# Patient Record
Sex: FEMALE | Race: WHITE | HISPANIC OR LATINO | Employment: OTHER | URBAN - METROPOLITAN AREA
[De-identification: names, ages, dates, MRNs, and addresses within clinical notes are randomized per-mention and may not be internally consistent; named-entity substitution may affect disease eponyms.]

---

## 2019-05-02 DIAGNOSIS — Z98.84 BARIATRIC SURGERY STATUS: Primary | ICD-10-CM

## 2019-05-03 ENCOUNTER — OFFICE VISIT (OUTPATIENT)
Dept: PLASTIC SURGERY | Facility: CLINIC | Age: 49
End: 2019-05-03
Payer: MEDICARE

## 2019-05-03 VITALS — HEIGHT: 60 IN | BODY MASS INDEX: 47.12 KG/M2 | WEIGHT: 240 LBS

## 2019-05-03 DIAGNOSIS — Z92.3 HISTORY OF RADIATION THERAPY: Primary | ICD-10-CM

## 2019-05-03 DIAGNOSIS — Z90.13 ACQUIRED ABSENCE OF BREAST AND ABSENT NIPPLE, BILATERAL: ICD-10-CM

## 2019-05-03 PROCEDURE — 99205 OFFICE O/P NEW HI 60 MIN: CPT | Performed by: PLASTIC SURGERY

## 2019-05-03 RX ORDER — ANASTROZOLE 1 MG/1
TABLET ORAL
COMMUNITY
End: 2019-08-08 | Stop reason: HOSPADM

## 2019-05-03 RX ORDER — CLINDAMYCIN HYDROCHLORIDE 300 MG/1
CAPSULE ORAL
Refills: 0 | COMMUNITY
Start: 2019-04-27 | End: 2019-06-07 | Stop reason: ALTCHOICE

## 2019-05-03 RX ORDER — ALENDRONATE SODIUM 70 MG/1
TABLET ORAL
COMMUNITY

## 2019-05-03 RX ORDER — ACETAMINOPHEN AND CODEINE PHOSPHATE 300; 30 MG/1; MG/1
1-2 TABLET ORAL EVERY 4 HOURS PRN
COMMUNITY
End: 2019-10-04 | Stop reason: SDUPTHER

## 2019-05-03 RX ORDER — TRIAMCINOLONE ACETONIDE 5 MG/G
OINTMENT TOPICAL 2 TIMES DAILY
COMMUNITY
Start: 2018-12-05 | End: 2020-03-20

## 2019-05-03 RX ORDER — TRIAMCINOLONE ACETONIDE 5 MG/G
CREAM TOPICAL AS NEEDED
COMMUNITY
Start: 2019-04-30

## 2019-05-05 PROBLEM — Z92.3 HISTORY OF RADIATION THERAPY: Status: ACTIVE | Noted: 2019-05-05

## 2019-05-05 PROBLEM — Z90.13 ACQUIRED ABSENCE OF BREAST AND ABSENT NIPPLE, BILATERAL: Status: ACTIVE | Noted: 2019-05-05

## 2019-05-06 PROBLEM — Z85.3 PERSONAL HISTORY OF MALIGNANT NEOPLASM OF BREAST: Status: ACTIVE | Noted: 2019-05-06

## 2019-05-10 ENCOUNTER — HOSPITAL ENCOUNTER (OUTPATIENT)
Dept: RADIOLOGY | Facility: HOSPITAL | Age: 49
Discharge: HOME | End: 2019-05-10
Attending: SURGERY
Payer: MEDICARE

## 2019-05-10 DIAGNOSIS — Z98.84 BARIATRIC SURGERY STATUS: ICD-10-CM

## 2019-05-10 PROCEDURE — 74240 X-RAY XM UPR GI TRC 1CNTRST: CPT

## 2019-06-07 ENCOUNTER — OFFICE VISIT (OUTPATIENT)
Dept: BARIATRICS | Facility: CLINIC | Age: 49
End: 2019-06-07
Payer: MEDICARE

## 2019-06-07 VITALS
SYSTOLIC BLOOD PRESSURE: 117 MMHG | RESPIRATION RATE: 16 BRPM | TEMPERATURE: 98.9 F | DIASTOLIC BLOOD PRESSURE: 78 MMHG | WEIGHT: 220 LBS | BODY MASS INDEX: 43.19 KG/M2 | HEIGHT: 60 IN | HEART RATE: 90 BPM

## 2019-06-07 DIAGNOSIS — E66.9 OBESITY, CLASS II, BMI 35-39.9: Primary | ICD-10-CM

## 2019-06-07 DIAGNOSIS — Z98.84 BARIATRIC SURGERY STATUS: ICD-10-CM

## 2019-06-07 DIAGNOSIS — K91.2 POSTSURGICAL MALABSORPTION: ICD-10-CM

## 2019-06-07 PROBLEM — E66.812 OBESITY, CLASS II, BMI 35-39.9: Status: ACTIVE | Noted: 2019-06-07

## 2019-06-07 PROCEDURE — 99204 OFFICE O/P NEW MOD 45 MIN: CPT | Performed by: SURGERY

## 2019-06-17 ENCOUNTER — ANESTHESIA EVENT (OUTPATIENT)
Dept: GASTROENTEROLOGY | Facility: HOSPITAL | Age: 49
End: 2019-06-17

## 2019-06-19 ENCOUNTER — HOSPITAL ENCOUNTER (OUTPATIENT)
Dept: GASTROENTEROLOGY | Facility: HOSPITAL | Age: 49
Setting detail: OUTPATIENT SURGERY
Discharge: HOME | End: 2019-06-19
Attending: SURGERY | Admitting: SURGERY
Payer: MEDICARE

## 2019-06-19 ENCOUNTER — ANESTHESIA (OUTPATIENT)
Dept: GASTROENTEROLOGY | Facility: HOSPITAL | Age: 49
End: 2019-06-19

## 2019-06-19 VITALS
OXYGEN SATURATION: 98 % | HEART RATE: 88 BPM | RESPIRATION RATE: 15 BRPM | SYSTOLIC BLOOD PRESSURE: 117 MMHG | WEIGHT: 220 LBS | HEIGHT: 60 IN | DIASTOLIC BLOOD PRESSURE: 66 MMHG | TEMPERATURE: 98.1 F | BODY MASS INDEX: 43.19 KG/M2

## 2019-06-19 DIAGNOSIS — Z98.84 BARIATRIC SURGERY STATUS: ICD-10-CM

## 2019-06-19 PROCEDURE — 88305 TISSUE EXAM BY PATHOLOGIST: CPT | Performed by: PATHOLOGY

## 2019-06-19 PROCEDURE — 88342 IMHCHEM/IMCYTCHM 1ST ANTB: CPT | Performed by: PATHOLOGY

## 2019-06-19 PROCEDURE — 43239 EGD BIOPSY SINGLE/MULTIPLE: CPT | Performed by: SURGERY

## 2019-06-19 RX ORDER — SODIUM CHLORIDE 9 MG/ML
125 INJECTION, SOLUTION INTRAVENOUS CONTINUOUS
Status: DISCONTINUED | OUTPATIENT
Start: 2019-06-19 | End: 2019-06-23 | Stop reason: HOSPADM

## 2019-06-19 RX ORDER — PROPOFOL 10 MG/ML
INJECTION, EMULSION INTRAVENOUS AS NEEDED
Status: DISCONTINUED | OUTPATIENT
Start: 2019-06-19 | End: 2019-06-19 | Stop reason: SURG

## 2019-06-19 RX ADMIN — SODIUM CHLORIDE 125 ML/HR: 0.9 INJECTION, SOLUTION INTRAVENOUS at 13:27

## 2019-06-19 RX ADMIN — PROPOFOL 150 MG: 10 INJECTION, EMULSION INTRAVENOUS at 13:37

## 2019-06-19 RX ADMIN — LIDOCAINE HYDROCHLORIDE 100 MG: 20 INJECTION, SOLUTION INTRAVENOUS at 13:37

## 2019-06-28 ENCOUNTER — OFFICE VISIT (OUTPATIENT)
Dept: BARIATRICS | Facility: CLINIC | Age: 49
End: 2019-06-28
Payer: MEDICARE

## 2019-06-28 VITALS
TEMPERATURE: 98 F | SYSTOLIC BLOOD PRESSURE: 118 MMHG | HEIGHT: 60 IN | BODY MASS INDEX: 43 KG/M2 | WEIGHT: 219 LBS | DIASTOLIC BLOOD PRESSURE: 70 MMHG | HEART RATE: 76 BPM

## 2019-06-28 DIAGNOSIS — K91.2 POSTSURGICAL MALABSORPTION: ICD-10-CM

## 2019-06-28 DIAGNOSIS — E66.01 OBESITY, CLASS III, BMI 40-49.9 (MORBID OBESITY) (HCC): Primary | ICD-10-CM

## 2019-06-28 DIAGNOSIS — Z98.84 BARIATRIC SURGERY STATUS: ICD-10-CM

## 2019-06-28 PROBLEM — E66.813 OBESITY, CLASS III, BMI 40-49.9 (MORBID OBESITY): Status: ACTIVE | Noted: 2019-06-28

## 2019-06-28 PROCEDURE — 99213 OFFICE O/P EST LOW 20 MIN: CPT | Performed by: SURGERY

## 2019-07-03 ENCOUNTER — APPOINTMENT (OUTPATIENT)
Dept: LAB | Facility: CLINIC | Age: 49
End: 2019-07-03
Payer: MEDICARE

## 2019-07-03 ENCOUNTER — OFFICE VISIT (OUTPATIENT)
Dept: PLASTIC SURGERY | Facility: CLINIC | Age: 49
End: 2019-07-03

## 2019-07-03 DIAGNOSIS — Z90.13 ACQUIRED ABSENCE OF BREAST AND ABSENT NIPPLE, BILATERAL: Primary | ICD-10-CM

## 2019-07-03 DIAGNOSIS — Z85.3 PERSONAL HISTORY OF MALIGNANT NEOPLASM OF BREAST: ICD-10-CM

## 2019-07-03 LAB
ANION GAP SERPL CALCULATED.3IONS-SCNC: 10 MMOL/L (ref 4–13)
BASOPHILS # BLD AUTO: 0.05 THOUSANDS/ΜL (ref 0–0.1)
BASOPHILS NFR BLD AUTO: 1 % (ref 0–1)
BUN SERPL-MCNC: 23 MG/DL (ref 5–25)
CALCIUM SERPL-MCNC: 8.8 MG/DL (ref 8.3–10.1)
CHLORIDE SERPL-SCNC: 105 MMOL/L (ref 100–108)
CO2 SERPL-SCNC: 26 MMOL/L (ref 21–32)
CREAT SERPL-MCNC: 0.88 MG/DL (ref 0.6–1.3)
EOSINOPHIL # BLD AUTO: 0.22 THOUSAND/ΜL (ref 0–0.61)
EOSINOPHIL NFR BLD AUTO: 2 % (ref 0–6)
ERYTHROCYTE [DISTWIDTH] IN BLOOD BY AUTOMATED COUNT: 17 % (ref 11.6–15.1)
GFR SERPL CREATININE-BSD FRML MDRD: 78 ML/MIN/1.73SQ M
GLUCOSE P FAST SERPL-MCNC: 94 MG/DL (ref 65–99)
HCT VFR BLD AUTO: 38.3 % (ref 34.8–46.1)
HGB BLD-MCNC: 11.8 G/DL (ref 11.5–15.4)
IMM GRANULOCYTES # BLD AUTO: 0.03 THOUSAND/UL (ref 0–0.2)
IMM GRANULOCYTES NFR BLD AUTO: 0 % (ref 0–2)
LYMPHOCYTES # BLD AUTO: 2.23 THOUSANDS/ΜL (ref 0.6–4.47)
LYMPHOCYTES NFR BLD AUTO: 22 % (ref 14–44)
MCH RBC QN AUTO: 24.6 PG (ref 26.8–34.3)
MCHC RBC AUTO-ENTMCNC: 30.8 G/DL (ref 31.4–37.4)
MCV RBC AUTO: 80 FL (ref 82–98)
MONOCYTES # BLD AUTO: 0.86 THOUSAND/ΜL (ref 0.17–1.22)
MONOCYTES NFR BLD AUTO: 8 % (ref 4–12)
NEUTROPHILS # BLD AUTO: 6.85 THOUSANDS/ΜL (ref 1.85–7.62)
NEUTS SEG NFR BLD AUTO: 67 % (ref 43–75)
NRBC BLD AUTO-RTO: 0 /100 WBCS
PLATELET # BLD AUTO: 344 THOUSANDS/UL (ref 149–390)
PMV BLD AUTO: 8.9 FL (ref 8.9–12.7)
POTASSIUM SERPL-SCNC: 3.9 MMOL/L (ref 3.5–5.3)
RBC # BLD AUTO: 4.8 MILLION/UL (ref 3.81–5.12)
SODIUM SERPL-SCNC: 141 MMOL/L (ref 136–145)
WBC # BLD AUTO: 10.24 THOUSAND/UL (ref 4.31–10.16)

## 2019-07-03 PROCEDURE — 85025 COMPLETE CBC W/AUTO DIFF WBC: CPT

## 2019-07-03 PROCEDURE — 36415 COLL VENOUS BLD VENIPUNCTURE: CPT

## 2019-07-03 PROCEDURE — 80048 BASIC METABOLIC PNL TOTAL CA: CPT

## 2019-07-03 PROCEDURE — PREOP: Performed by: PLASTIC SURGERY

## 2019-07-03 RX ORDER — OXYCODONE HYDROCHLORIDE 5 MG/1
10 TABLET ORAL EVERY 4 HOURS PRN
Qty: 20 TABLET | Refills: 0 | Status: CANCELLED | OUTPATIENT
Start: 2019-07-03

## 2019-07-03 RX ORDER — IBUPROFEN 400 MG/1
400 TABLET ORAL EVERY 6 HOURS PRN
Qty: 112 TABLET | Refills: 0 | Status: CANCELLED | OUTPATIENT
Start: 2019-07-03

## 2019-07-03 RX ORDER — ONDANSETRON 4 MG/1
4 TABLET, FILM COATED ORAL EVERY 6 HOURS
Qty: 10 TABLET | Refills: 0 | Status: CANCELLED | OUTPATIENT
Start: 2019-07-03 | End: 2019-07-06

## 2019-07-08 RX ORDER — CIPROFLOXACIN 500 MG/1
500 TABLET, FILM COATED ORAL EVERY 12 HOURS SCHEDULED
Qty: 28 TABLET | Refills: 0 | Status: SHIPPED | OUTPATIENT
Start: 2019-08-06 | End: 2019-08-20

## 2019-07-08 RX ORDER — GABAPENTIN 300 MG/1
300 CAPSULE ORAL 3 TIMES DAILY
Qty: 42 CAPSULE | Refills: 0 | Status: SHIPPED | OUTPATIENT
Start: 2019-07-08 | End: 2019-07-16 | Stop reason: ALTCHOICE

## 2019-07-08 RX ORDER — ACETAMINOPHEN 500 MG
975 TABLET ORAL 3 TIMES DAILY PRN
Qty: 2 TABLET | Refills: 1 | Status: ON HOLD | OUTPATIENT
Start: 2019-07-08 | End: 2019-08-08

## 2019-07-16 ENCOUNTER — TELEPHONE (OUTPATIENT)
Dept: PLASTIC SURGERY | Facility: CLINIC | Age: 49
End: 2019-07-16

## 2019-07-16 DIAGNOSIS — Z90.13 ACQUIRED ABSENCE OF BREAST AND ABSENT NIPPLE, BILATERAL: Primary | ICD-10-CM

## 2019-07-16 RX ORDER — GABAPENTIN 250 MG/5ML
250 SOLUTION ORAL 3 TIMES DAILY
Qty: 100 ML | Refills: 1 | Status: SHIPPED | OUTPATIENT
Start: 2019-07-16 | End: 2020-03-20

## 2019-07-16 NOTE — PRE-PROCEDURE INSTRUCTIONS
Pre-Surgery Instructions:   Medication Instructions    acetaminophen (TYLENOL) 500 mg tablet PRN    acetaminophen-codeine (TYLENOL #3) 300-30 mg per tablet Patient was instructed by Physician and understands   alendronate (FOSAMAX) 70 mg tablet Patient was instructed by Physician and understands   anastrozole (ARIMIDEX) 1 mg tablet Patient was instructed by Physician and understands      fluticasone-salmeterol (ADVAIR DISKUS) 250-50 mcg/dose inhaler PRN    triamcinolone (KENALOG) 0 5 % ointment PRN

## 2019-07-16 NOTE — TELEPHONE ENCOUNTER
Spoke to patient and had Dr Ray Rater send over new prescription for liquid gabapentin as she can not take capsules

## 2019-08-04 PROCEDURE — NC001 PR NO CHARGE: Performed by: PHYSICIAN ASSISTANT

## 2019-08-04 NOTE — H&P
H&P - Plastic Surgery   Martina Mccauley 51 y o  female MRN: [id number]  Unit/Bed#:  Encounter: [id number]           Assessment:  History of radiation therapy [Z92 3]      Acquired absence of breast and absent nipple, bilateral [Z90 13]      Personal history of malignant neoplasm of breast [Z85 3]  Plan:  BREAST IMPLANT REMOVAL (Bilateral Breast)      BREAST CAPSULECTOMY (Bilateral Breast)      PECTORALIS MUSCLE REPAIR (Bilateral Chest)      BREAST RECONSTRUCTION W/FLAP LATISSIMUS DORSI (Right Breast)      BREAST TISSUE EXPANDER PLACEMENT (Bilateral Breast)  Anesthesia type: General        HPI:   Martina Mccauley is a 51y o  year old female who presents presents for evaluation for breast reconstruction options  Patient describes the following timeline:     - In 2013 she was found to have right breast cancer for which she underwent 2 lumpectomies given her positive margins  Patient had 6 lymph nodes remove and underwent adjuvant chemotherapy and radiation and the poke holes  She subsequently had a hysterectomy given some fibrous her uterus    - In 2014 she underwent a laparoscopic gastric bypass at the [facility] and lost 100 pounds  - In 2015 she underwent bilateral breast reduction and abdominoplasty with Dr Tiffany Swift  - In 2016 she underwent bilateral mastectomies with immediate placement of tissue expanders by Dr Tiffany Swift  - In 2017 she underwent exchange of expander to permanent implant as well as nipple reconstruction   - In August 2018, patient was found to have capsule contracture for which she underwent exchange of the right implant to a larger size      Patient today presents complaining that she is very dissatisfied with the appearance of her breasts with significant deformities as well as associated pain    She 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 children's and is very conscientious about what her daughter might  think of her       Patient characteristics  Significant medical history: morbid obesity, metabolic syndrome  Prior abdominal surgery: abdomioplasty  Tobacco use: none  Current bra size: n/a  Desired bra size: any size   BMI: 47  PMH/FH of DVT/PE: none  PMH/FH of miscarriages: none        REVIEW OF SYSTEMS    Constitutional: Negative  HENT: Negative  Eyes: Negative  Respiratory: Negative  Cardiovascular: Negative  Gastrointestinal: Negative  Endocrine: Negative  Genitourinary: Negative  Musculoskeletal: Positive for back pain  Skin: Positive for color change  Allergic/Immunologic: Negative  Neurological: Negative  Hematological: Negative  Psychiatric/Behavioral: Negative        Historical Information   Past Medical History:   Diagnosis Date    Arthritis     [facility]     [facility]     [facility], right breast [facility]     Heartburn     Hiatal hernia     History of bariatric surgery     Kidney stone     Osteoporosis     stage 3    Postsurgical malabsorption     Stress incontinence     Thyroid nodule      Past Surgical History:   Procedure Laterality Date    ABDOMINAL SURGERY      BRACHIOPLASTY Bilateral     2017    CARPAL TUNNEL RELEASE Bilateral 2015    CHOLECYSTECTOMY      COLONOSCOPY      GALLBLADDER SURGERY  2015    GASTRIC BYPASS  2014    HYSTERECTOMY  05/2013    INCONTINENCE SURGERY  09/2013    MASTECTOMY Bilateral 10/2016    REDUCTION MAMMAPLASTY       Social History   Social History     Substance and Sexual Activity   Alcohol Use Not Currently    Frequency: Never     Social History     Substance and Sexual Activity   Drug Use Not Currently     Social History     Tobacco Use   Smoking Status Never Smoker   Smokeless Tobacco Never Used     Family History:   Family History   Problem Relation Age of Onset    No Known Problems Mother     Diabetes Father     Heart disease Father        Meds/Allergies   all current active meds have been reviewed      Objective     Physical Exam   Constitutional  She appears well-developed and well-nourished       Eyes  Pupils are equal, round, and reactive to light          Cardiovascular: Normal rate       Pulmonary/Chest  Effort normal       Skin  Skin is warm       Psychiatric  She has a normal mood and affect  Her behavior is normal  Judgment and thought content normal       Abdomen and Hips  Soft  Abdominal shape is not panniculus  A surgical scar is present  Prior abdominoplasty scar  No bulges or hernia       Extremities  Arms: She is also positive for upper extremity lipodystrophy  Legs: She is positive for lower extremity lipodystrophy which is located at the inner thighs, saddle bags, supra-patellar, infra-patellar, infra-gluteal, medio-patellar and posterior          Breast  Breast Measurements:    Right Left   A: Sternal notch to nipple distance (cm) 21 22   B: Midsternum to nipple distance (cm)       C: Midclavicle to nipple distance (cm)       D: Nipple to inframammary fold (cm) 5 7   E: Base width (cm) 15 5 15 5   F: Inframammary distance (cm) 25       Right Left   Areolar diameter (cm)       Nipple diameter (cm)       Superior tissue pinch test (cm)       Breast ptosis (grade 0-3) 0 0            Her breasts are asymmetrical  Her left breast is larger than the right  Her breasts have tight envelope compliance  Her breasts have compromised tissue coverage  Additional breast conditions include the following:   Right Breast: Positive for skin change  Her right breast has a scar  Lab Results:   Lab Results   Component Value Date    WBC 10 24 (H) 07/03/2019    HGB 11 8 07/03/2019    HCT 38 3 07/03/2019    MCV 80 (L) 07/03/2019     07/03/2019          No results found for: TISSUECULT, WOUNDCULT      Imaging Studies:   No results found  EKG, Pathology, and Other Studies:   Lab Results   Component Value Date/Time    [facility]  06/19/2019 01:40 PM     A   Stomach, bx gastric pouch-r/o h pylori biopsy:    -Benign gastric-oxyntoantral type mucosa with minimal superficial chronic inflammation  -No evidence of Paneth cell metaplasia or atrophic gastritis   -No evidence of dysplasia or malignancy   -No H Pylori organisms identified on immunohistochemical stained slide   Control reacted appropriately           No intake or output data in the 24 hours ending 08/04/19 1005    Invasive Devices     None                 VTE Prophylaxis: Sequential compression device Karen Pierce     Code Status: No Order  Advance Directive and Living Will:      Power of :       ** HP by Dr Stacey Gonzalez

## 2019-08-05 ENCOUNTER — TELEPHONE (OUTPATIENT)
Dept: PLASTIC SURGERY | Facility: CLINIC | Age: 49
End: 2019-08-05

## 2019-08-05 ENCOUNTER — ANESTHESIA EVENT (OUTPATIENT)
Dept: PERIOP | Facility: HOSPITAL | Age: 49
DRG: 940 | End: 2019-08-05
Payer: MEDICARE

## 2019-08-06 ENCOUNTER — HOSPITAL ENCOUNTER (INPATIENT)
Facility: HOSPITAL | Age: 49
LOS: 2 days | Discharge: HOME HEALTH | DRG: 940 | End: 2019-08-08
Attending: PLASTIC SURGERY | Admitting: PLASTIC SURGERY
Payer: MEDICARE

## 2019-08-06 ENCOUNTER — ANESTHESIA (OUTPATIENT)
Dept: PERIOP | Facility: HOSPITAL | Age: 49
DRG: 940 | End: 2019-08-06
Payer: MEDICARE

## 2019-08-06 DIAGNOSIS — Z85.3 PERSONAL HISTORY OF MALIGNANT NEOPLASM OF BREAST: Primary | ICD-10-CM

## 2019-08-06 DIAGNOSIS — Z90.13 ACQUIRED ABSENCE OF BREAST AND ABSENT NIPPLE, BILATERAL: ICD-10-CM

## 2019-08-06 DIAGNOSIS — Z92.3 HISTORY OF RADIATION THERAPY: ICD-10-CM

## 2019-08-06 LAB — GLUCOSE SERPL-MCNC: 399 MG/DL (ref 65–140)

## 2019-08-06 PROCEDURE — 0HPU0JZ REMOVAL OF SYNTHETIC SUBSTITUTE FROM LEFT BREAST, OPEN APPROACH: ICD-10-PCS | Performed by: PLASTIC SURGERY

## 2019-08-06 PROCEDURE — 19371 PERI-IMPLT CAPSLC BRST COMPL: CPT | Performed by: PLASTIC SURGERY

## 2019-08-06 PROCEDURE — 88305 TISSUE EXAM BY PATHOLOGIST: CPT | Performed by: PATHOLOGY

## 2019-08-06 PROCEDURE — 97605 NEG PRS WND THER DME<=50SQCM: CPT | Performed by: PLASTIC SURGERY

## 2019-08-06 PROCEDURE — 0HHV0NZ INSERTION OF TISSUE EXPANDER INTO BILATERAL BREAST, OPEN APPROACH: ICD-10-PCS | Performed by: PLASTIC SURGERY

## 2019-08-06 PROCEDURE — 19340 INSJ BREAST IMPLT SM D MAST: CPT | Performed by: PLASTIC SURGERY

## 2019-08-06 PROCEDURE — 88300 SURGICAL PATH GROSS: CPT | Performed by: PATHOLOGY

## 2019-08-06 PROCEDURE — 82948 REAGENT STRIP/BLOOD GLUCOSE: CPT

## 2019-08-06 PROCEDURE — C9290 INJ, BUPIVACAINE LIPOSOME: HCPCS | Performed by: PLASTIC SURGERY

## 2019-08-06 PROCEDURE — 0HRV075 REPLACEMENT OF BILATERAL BREAST USING LATISSIMUS DORSI MYOCUTANEOUS FLAP, OPEN APPROACH: ICD-10-PCS | Performed by: PLASTIC SURGERY

## 2019-08-06 PROCEDURE — 0HPT0JZ REMOVAL OF SYNTHETIC SUBSTITUTE FROM RIGHT BREAST, OPEN APPROACH: ICD-10-PCS | Performed by: PLASTIC SURGERY

## 2019-08-06 PROCEDURE — 0KQJ0ZZ REPAIR LEFT THORAX MUSCLE, OPEN APPROACH: ICD-10-PCS | Performed by: PLASTIC SURGERY

## 2019-08-06 PROCEDURE — C1789 PROSTHESIS, BREAST, IMP: HCPCS | Performed by: PLASTIC SURGERY

## 2019-08-06 PROCEDURE — 88302 TISSUE EXAM BY PATHOLOGIST: CPT | Performed by: PATHOLOGY

## 2019-08-06 PROCEDURE — 0KQH0ZZ REPAIR RIGHT THORAX MUSCLE, OPEN APPROACH: ICD-10-PCS | Performed by: PLASTIC SURGERY

## 2019-08-06 PROCEDURE — 3E0T3BZ INTRODUCTION OF ANESTHETIC AGENT INTO PERIPHERAL NERVES AND PLEXI, PERCUTANEOUS APPROACH: ICD-10-PCS | Performed by: PLASTIC SURGERY

## 2019-08-06 PROCEDURE — 15777 ACELLULAR DERM MATRIX IMPLT: CPT | Performed by: PLASTIC SURGERY

## 2019-08-06 PROCEDURE — 19361 BRST RCNSTJ LATSMS DRSI FLAP: CPT | Performed by: PLASTIC SURGERY

## 2019-08-06 DEVICE — (320 SQ CM) GRAFT SKIN ALLODERM 16 X 20CM THICK RTU PERF: Type: IMPLANTABLE DEVICE | Site: CHEST | Status: FUNCTIONAL

## 2019-08-06 DEVICE — SMOOTH, HIGH PROFILE, SUTURE TABS, INTEGRAL INJECTION DOME, 600CC
Type: IMPLANTABLE DEVICE | Site: CHEST | Status: FUNCTIONAL
Brand: ARTOURA BREAST TISSUE EXPANDER

## 2019-08-06 RX ORDER — ONDANSETRON 2 MG/ML
4 INJECTION INTRAMUSCULAR; INTRAVENOUS ONCE AS NEEDED
Status: DISCONTINUED | OUTPATIENT
Start: 2019-08-06 | End: 2019-08-06 | Stop reason: HOSPADM

## 2019-08-06 RX ORDER — DEXAMETHASONE SODIUM PHOSPHATE 10 MG/ML
INJECTION, SOLUTION INTRAMUSCULAR; INTRAVENOUS AS NEEDED
Status: DISCONTINUED | OUTPATIENT
Start: 2019-08-06 | End: 2019-08-06 | Stop reason: SURG

## 2019-08-06 RX ORDER — HEPARIN SODIUM 5000 [USP'U]/ML
5000 INJECTION, SOLUTION INTRAVENOUS; SUBCUTANEOUS ONCE
Status: DISCONTINUED | OUTPATIENT
Start: 2019-08-06 | End: 2019-08-06

## 2019-08-06 RX ORDER — GABAPENTIN 300 MG/1
300 CAPSULE ORAL ONCE
Status: DISCONTINUED | OUTPATIENT
Start: 2019-08-06 | End: 2019-08-06

## 2019-08-06 RX ORDER — HYDROMORPHONE HCL/PF 1 MG/ML
0.5 SYRINGE (ML) INJECTION EVERY 4 HOURS PRN
Status: DISCONTINUED | OUTPATIENT
Start: 2019-08-06 | End: 2019-08-08 | Stop reason: HOSPADM

## 2019-08-06 RX ORDER — ACETAMINOPHEN 325 MG/1
975 TABLET ORAL EVERY 8 HOURS SCHEDULED
Status: DISCONTINUED | OUTPATIENT
Start: 2019-08-06 | End: 2019-08-08 | Stop reason: HOSPADM

## 2019-08-06 RX ORDER — 0.9 % SODIUM CHLORIDE 0.9 %
VIAL (ML) INJECTION AS NEEDED
Status: DISCONTINUED | OUTPATIENT
Start: 2019-08-06 | End: 2019-08-06 | Stop reason: HOSPADM

## 2019-08-06 RX ORDER — MIDAZOLAM HYDROCHLORIDE 1 MG/ML
INJECTION INTRAMUSCULAR; INTRAVENOUS AS NEEDED
Status: DISCONTINUED | OUTPATIENT
Start: 2019-08-06 | End: 2019-08-06 | Stop reason: SURG

## 2019-08-06 RX ORDER — EPHEDRINE SULFATE 50 MG/ML
INJECTION INTRAVENOUS AS NEEDED
Status: DISCONTINUED | OUTPATIENT
Start: 2019-08-06 | End: 2019-08-06 | Stop reason: SURG

## 2019-08-06 RX ORDER — DOCUSATE SODIUM 100 MG/1
100 CAPSULE, LIQUID FILLED ORAL 2 TIMES DAILY
Status: DISCONTINUED | OUTPATIENT
Start: 2019-08-06 | End: 2019-08-06

## 2019-08-06 RX ORDER — FONDAPARINUX SODIUM 2.5 MG/.5ML
2.5 INJECTION SUBCUTANEOUS EVERY 24 HOURS
Status: DISCONTINUED | OUTPATIENT
Start: 2019-08-06 | End: 2019-08-08 | Stop reason: HOSPADM

## 2019-08-06 RX ORDER — VANCOMYCIN HYDROCHLORIDE 1 G/20ML
INJECTION, POWDER, LYOPHILIZED, FOR SOLUTION INTRAVENOUS AS NEEDED
Status: DISCONTINUED | OUTPATIENT
Start: 2019-08-06 | End: 2019-08-06 | Stop reason: HOSPADM

## 2019-08-06 RX ORDER — ONDANSETRON 2 MG/ML
4 INJECTION INTRAMUSCULAR; INTRAVENOUS EVERY 6 HOURS PRN
Status: DISCONTINUED | OUTPATIENT
Start: 2019-08-06 | End: 2019-08-08 | Stop reason: HOSPADM

## 2019-08-06 RX ORDER — SCOLOPAMINE TRANSDERMAL SYSTEM 1 MG/1
1 PATCH, EXTENDED RELEASE TRANSDERMAL
Status: DISCONTINUED | OUTPATIENT
Start: 2019-08-06 | End: 2019-08-06

## 2019-08-06 RX ORDER — FENTANYL CITRATE/PF 50 MCG/ML
50 SYRINGE (ML) INJECTION
Status: DISCONTINUED | OUTPATIENT
Start: 2019-08-06 | End: 2019-08-06 | Stop reason: HOSPADM

## 2019-08-06 RX ORDER — ONDANSETRON 2 MG/ML
INJECTION INTRAMUSCULAR; INTRAVENOUS AS NEEDED
Status: DISCONTINUED | OUTPATIENT
Start: 2019-08-06 | End: 2019-08-06 | Stop reason: SURG

## 2019-08-06 RX ORDER — FLUTICASONE FUROATE AND VILANTEROL 100; 25 UG/1; UG/1
1 POWDER RESPIRATORY (INHALATION) DAILY
Status: DISCONTINUED | OUTPATIENT
Start: 2019-08-07 | End: 2019-08-08 | Stop reason: HOSPADM

## 2019-08-06 RX ORDER — KETAMINE HCL IN NACL, ISO-OSM 100MG/10ML
SYRINGE (ML) INJECTION AS NEEDED
Status: DISCONTINUED | OUTPATIENT
Start: 2019-08-06 | End: 2019-08-06 | Stop reason: SURG

## 2019-08-06 RX ORDER — CLINDAMYCIN PHOSPHATE 600 MG/50ML
600 INJECTION INTRAVENOUS EVERY 8 HOURS
Status: COMPLETED | OUTPATIENT
Start: 2019-08-06 | End: 2019-08-07

## 2019-08-06 RX ORDER — LIDOCAINE HYDROCHLORIDE 10 MG/ML
0.5 INJECTION, SOLUTION EPIDURAL; INFILTRATION; INTRACAUDAL; PERINEURAL ONCE AS NEEDED
Status: DISCONTINUED | OUTPATIENT
Start: 2019-08-06 | End: 2019-08-06

## 2019-08-06 RX ORDER — TRAMADOL HYDROCHLORIDE 50 MG/1
50 TABLET ORAL EVERY 6 HOURS PRN
Status: DISCONTINUED | OUTPATIENT
Start: 2019-08-06 | End: 2019-08-07

## 2019-08-06 RX ORDER — TRAMADOL HYDROCHLORIDE 50 MG/1
50 TABLET ORAL EVERY 6 HOURS PRN
Qty: 30 TABLET | Refills: 0 | Status: SHIPPED | OUTPATIENT
Start: 2019-08-06 | End: 2019-08-08 | Stop reason: HOSPADM

## 2019-08-06 RX ORDER — MAGNESIUM HYDROXIDE/ALUMINUM HYDROXICE/SIMETHICONE 120; 1200; 1200 MG/30ML; MG/30ML; MG/30ML
30 SUSPENSION ORAL EVERY 6 HOURS PRN
Status: DISCONTINUED | OUTPATIENT
Start: 2019-08-06 | End: 2019-08-08 | Stop reason: HOSPADM

## 2019-08-06 RX ORDER — SODIUM CHLORIDE 9 MG/ML
INJECTION, SOLUTION INTRAVENOUS CONTINUOUS PRN
Status: DISCONTINUED | OUTPATIENT
Start: 2019-08-06 | End: 2019-08-06 | Stop reason: SURG

## 2019-08-06 RX ORDER — HYDROMORPHONE HYDROCHLORIDE 2 MG/ML
INJECTION, SOLUTION INTRAMUSCULAR; INTRAVENOUS; SUBCUTANEOUS AS NEEDED
Status: DISCONTINUED | OUTPATIENT
Start: 2019-08-06 | End: 2019-08-06 | Stop reason: SURG

## 2019-08-06 RX ORDER — PROPOFOL 10 MG/ML
INJECTION, EMULSION INTRAVENOUS AS NEEDED
Status: DISCONTINUED | OUTPATIENT
Start: 2019-08-06 | End: 2019-08-06 | Stop reason: SURG

## 2019-08-06 RX ORDER — KETOROLAC TROMETHAMINE 30 MG/ML
INJECTION, SOLUTION INTRAMUSCULAR; INTRAVENOUS AS NEEDED
Status: DISCONTINUED | OUTPATIENT
Start: 2019-08-06 | End: 2019-08-06 | Stop reason: SURG

## 2019-08-06 RX ORDER — MAGNESIUM HYDROXIDE 1200 MG/15ML
LIQUID ORAL AS NEEDED
Status: DISCONTINUED | OUTPATIENT
Start: 2019-08-06 | End: 2019-08-06 | Stop reason: HOSPADM

## 2019-08-06 RX ORDER — SODIUM CHLORIDE, SODIUM LACTATE, POTASSIUM CHLORIDE, CALCIUM CHLORIDE 600; 310; 30; 20 MG/100ML; MG/100ML; MG/100ML; MG/100ML
75 INJECTION, SOLUTION INTRAVENOUS CONTINUOUS
Status: DISCONTINUED | OUTPATIENT
Start: 2019-08-06 | End: 2019-08-08

## 2019-08-06 RX ORDER — GLYCOPYRROLATE 0.2 MG/ML
INJECTION INTRAMUSCULAR; INTRAVENOUS AS NEEDED
Status: DISCONTINUED | OUTPATIENT
Start: 2019-08-06 | End: 2019-08-06 | Stop reason: SURG

## 2019-08-06 RX ORDER — OXYCODONE HCL 5 MG/5 ML
5 SOLUTION, ORAL ORAL EVERY 4 HOURS PRN
Status: DISCONTINUED | OUTPATIENT
Start: 2019-08-06 | End: 2019-08-06

## 2019-08-06 RX ORDER — DIPHENHYDRAMINE HYDROCHLORIDE 50 MG/ML
25 INJECTION INTRAMUSCULAR; INTRAVENOUS EVERY 6 HOURS PRN
Status: DISCONTINUED | OUTPATIENT
Start: 2019-08-06 | End: 2019-08-08 | Stop reason: HOSPADM

## 2019-08-06 RX ORDER — ROCURONIUM BROMIDE 10 MG/ML
INJECTION, SOLUTION INTRAVENOUS AS NEEDED
Status: DISCONTINUED | OUTPATIENT
Start: 2019-08-06 | End: 2019-08-06 | Stop reason: SURG

## 2019-08-06 RX ORDER — ACETAMINOPHEN 160 MG/5ML
650 SUSPENSION, ORAL (FINAL DOSE FORM) ORAL 3 TIMES DAILY
Status: DISCONTINUED | OUTPATIENT
Start: 2019-08-06 | End: 2019-08-06

## 2019-08-06 RX ORDER — ACETAMINOPHEN 325 MG/1
975 TABLET ORAL ONCE
Status: COMPLETED | OUTPATIENT
Start: 2019-08-06 | End: 2019-08-06

## 2019-08-06 RX ORDER — PROMETHAZINE HYDROCHLORIDE 25 MG/ML
25 INJECTION, SOLUTION INTRAMUSCULAR; INTRAVENOUS ONCE AS NEEDED
Status: DISCONTINUED | OUTPATIENT
Start: 2019-08-06 | End: 2019-08-06 | Stop reason: HOSPADM

## 2019-08-06 RX ORDER — GABAPENTIN 250 MG/5ML
250 SOLUTION ORAL 3 TIMES DAILY
Status: DISCONTINUED | OUTPATIENT
Start: 2019-08-06 | End: 2019-08-08 | Stop reason: HOSPADM

## 2019-08-06 RX ORDER — FENTANYL CITRATE 50 UG/ML
INJECTION, SOLUTION INTRAMUSCULAR; INTRAVENOUS AS NEEDED
Status: DISCONTINUED | OUTPATIENT
Start: 2019-08-06 | End: 2019-08-06 | Stop reason: SURG

## 2019-08-06 RX ORDER — SODIUM CHLORIDE, SODIUM LACTATE, POTASSIUM CHLORIDE, CALCIUM CHLORIDE 600; 310; 30; 20 MG/100ML; MG/100ML; MG/100ML; MG/100ML
50 INJECTION, SOLUTION INTRAVENOUS CONTINUOUS
Status: DISCONTINUED | OUTPATIENT
Start: 2019-08-06 | End: 2019-08-08

## 2019-08-06 RX ORDER — HYDROMORPHONE HCL/PF 1 MG/ML
0.5 SYRINGE (ML) INJECTION
Status: COMPLETED | OUTPATIENT
Start: 2019-08-06 | End: 2019-08-06

## 2019-08-06 RX ORDER — PROPOFOL 10 MG/ML
INJECTION, EMULSION INTRAVENOUS CONTINUOUS PRN
Status: DISCONTINUED | OUTPATIENT
Start: 2019-08-06 | End: 2019-08-06 | Stop reason: SURG

## 2019-08-06 RX ORDER — NEOSTIGMINE METHYLSULFATE 1 MG/ML
INJECTION INTRAVENOUS AS NEEDED
Status: DISCONTINUED | OUTPATIENT
Start: 2019-08-06 | End: 2019-08-06 | Stop reason: SURG

## 2019-08-06 RX ORDER — DIPHENHYDRAMINE HYDROCHLORIDE 50 MG/ML
INJECTION INTRAMUSCULAR; INTRAVENOUS AS NEEDED
Status: DISCONTINUED | OUTPATIENT
Start: 2019-08-06 | End: 2019-08-06 | Stop reason: SURG

## 2019-08-06 RX ORDER — CLINDAMYCIN PHOSPHATE 900 MG/50ML
900 INJECTION INTRAVENOUS ONCE
Status: COMPLETED | OUTPATIENT
Start: 2019-08-06 | End: 2019-08-06

## 2019-08-06 RX ORDER — SODIUM CHLORIDE, SODIUM LACTATE, POTASSIUM CHLORIDE, CALCIUM CHLORIDE 600; 310; 30; 20 MG/100ML; MG/100ML; MG/100ML; MG/100ML
75 INJECTION, SOLUTION INTRAVENOUS CONTINUOUS
Status: DISCONTINUED | OUTPATIENT
Start: 2019-08-06 | End: 2019-08-07

## 2019-08-06 RX ORDER — ESMOLOL HYDROCHLORIDE 10 MG/ML
INJECTION INTRAVENOUS AS NEEDED
Status: DISCONTINUED | OUTPATIENT
Start: 2019-08-06 | End: 2019-08-06 | Stop reason: SURG

## 2019-08-06 RX ADMIN — HYDROMORPHONE HYDROCHLORIDE 0.5 MG: 2 INJECTION, SOLUTION INTRAMUSCULAR; INTRAVENOUS; SUBCUTANEOUS at 13:14

## 2019-08-06 RX ADMIN — ESMOLOL HYDROCHLORIDE 30 MG: 10 INJECTION INTRAVENOUS at 10:22

## 2019-08-06 RX ADMIN — ROCURONIUM BROMIDE 30 MG: 10 INJECTION INTRAVENOUS at 09:16

## 2019-08-06 RX ADMIN — Medication 0.25 MCG/KG/MIN: at 08:34

## 2019-08-06 RX ADMIN — HYDROMORPHONE HYDROCHLORIDE 0.5 MG: 1 INJECTION, SOLUTION INTRAMUSCULAR; INTRAVENOUS; SUBCUTANEOUS at 16:53

## 2019-08-06 RX ADMIN — MIDAZOLAM HYDROCHLORIDE 2 MG: 1 INJECTION, SOLUTION INTRAMUSCULAR; INTRAVENOUS at 08:23

## 2019-08-06 RX ADMIN — DEXAMETHASONE SODIUM PHOSPHATE 10 MG: 10 INJECTION, SOLUTION INTRAMUSCULAR; INTRAVENOUS at 08:30

## 2019-08-06 RX ADMIN — SODIUM CHLORIDE, SODIUM LACTATE, POTASSIUM CHLORIDE, AND CALCIUM CHLORIDE: .6; .31; .03; .02 INJECTION, SOLUTION INTRAVENOUS at 07:41

## 2019-08-06 RX ADMIN — FENTANYL CITRATE 50 MCG: 50 INJECTION, SOLUTION INTRAMUSCULAR; INTRAVENOUS at 10:12

## 2019-08-06 RX ADMIN — HYDROMORPHONE HYDROCHLORIDE 0.5 MG: 1 INJECTION, SOLUTION INTRAMUSCULAR; INTRAVENOUS; SUBCUTANEOUS at 17:05

## 2019-08-06 RX ADMIN — SCOPALAMINE 1 PATCH: 1 PATCH, EXTENDED RELEASE TRANSDERMAL at 07:44

## 2019-08-06 RX ADMIN — FONDAPARINUX SODIUM 2.5 MG: 2.5 INJECTION, SOLUTION SUBCUTANEOUS at 22:49

## 2019-08-06 RX ADMIN — HYDROMORPHONE HYDROCHLORIDE 0.5 MG: 1 INJECTION, SOLUTION INTRAMUSCULAR; INTRAVENOUS; SUBCUTANEOUS at 16:45

## 2019-08-06 RX ADMIN — ONDANSETRON 4 MG: 2 INJECTION INTRAMUSCULAR; INTRAVENOUS at 08:30

## 2019-08-06 RX ADMIN — PROPOFOL 130 MG: 10 INJECTION, EMULSION INTRAVENOUS at 08:30

## 2019-08-06 RX ADMIN — SODIUM CHLORIDE, SODIUM LACTATE, POTASSIUM CHLORIDE, AND CALCIUM CHLORIDE: .6; .31; .03; .02 INJECTION, SOLUTION INTRAVENOUS at 10:15

## 2019-08-06 RX ADMIN — CLINDAMYCIN PHOSPHATE 900 MG: 18 INJECTION, SOLUTION INTRAMUSCULAR; INTRAVENOUS at 08:28

## 2019-08-06 RX ADMIN — GABAPENTIN 250 MG: 250 SOLUTION ORAL at 21:36

## 2019-08-06 RX ADMIN — ROCURONIUM BROMIDE 20 MG: 10 INJECTION INTRAVENOUS at 10:54

## 2019-08-06 RX ADMIN — Medication 20 MG: at 11:00

## 2019-08-06 RX ADMIN — SODIUM CHLORIDE: 0.9 INJECTION, SOLUTION INTRAVENOUS at 08:34

## 2019-08-06 RX ADMIN — DIPHENHYDRAMINE HYDROCHLORIDE 12.5 MG: 50 INJECTION, SOLUTION INTRAMUSCULAR; INTRAVENOUS at 08:23

## 2019-08-06 RX ADMIN — ESMOLOL HYDROCHLORIDE 30 MG: 10 INJECTION INTRAVENOUS at 09:38

## 2019-08-06 RX ADMIN — EPHEDRINE SULFATE 10 MG: 50 INJECTION, SOLUTION INTRAVENOUS at 09:01

## 2019-08-06 RX ADMIN — FENTANYL CITRATE 50 MCG: 50 INJECTION, SOLUTION INTRAMUSCULAR; INTRAVENOUS at 09:58

## 2019-08-06 RX ADMIN — HYDROMORPHONE HYDROCHLORIDE 0.5 MG: 1 INJECTION, SOLUTION INTRAMUSCULAR; INTRAVENOUS; SUBCUTANEOUS at 17:00

## 2019-08-06 RX ADMIN — CLINDAMYCIN PHOSPHATE 900 MG: 18 INJECTION, SOLUTION INTRAMUSCULAR; INTRAVENOUS at 12:28

## 2019-08-06 RX ADMIN — ACETAMINOPHEN 975 MG: 325 TABLET, FILM COATED ORAL at 07:32

## 2019-08-06 RX ADMIN — KETOROLAC TROMETHAMINE 15 MG: 30 INJECTION, SOLUTION INTRAMUSCULAR at 13:12

## 2019-08-06 RX ADMIN — PROPOFOL 110 MCG/KG/MIN: 10 INJECTION, EMULSION INTRAVENOUS at 08:34

## 2019-08-06 RX ADMIN — LIDOCAINE HYDROCHLORIDE 100 MG: 20 INJECTION, SOLUTION INTRAVENOUS at 08:30

## 2019-08-06 RX ADMIN — CLINDAMYCIN PHOSPHATE 600 MG: 600 INJECTION, SOLUTION INTRAVENOUS at 21:36

## 2019-08-06 RX ADMIN — Medication 30 MG: at 09:21

## 2019-08-06 RX ADMIN — ACETAMINOPHEN 975 MG: 325 TABLET, FILM COATED ORAL at 21:35

## 2019-08-06 RX ADMIN — Medication 0.3 MCG/KG/HR: at 08:34

## 2019-08-06 RX ADMIN — NEOSTIGMINE METHYLSULFATE 3 MG: 1 INJECTION, SOLUTION INTRAVENOUS at 15:02

## 2019-08-06 RX ADMIN — ROCURONIUM BROMIDE 50 MG: 10 INJECTION INTRAVENOUS at 08:30

## 2019-08-06 RX ADMIN — EPHEDRINE SULFATE 5 MG: 50 INJECTION, SOLUTION INTRAVENOUS at 09:06

## 2019-08-06 RX ADMIN — TRAMADOL HYDROCHLORIDE 50 MG: 50 TABLET, FILM COATED ORAL at 20:30

## 2019-08-06 RX ADMIN — GLYCOPYRROLATE 0.2 MG: 0.2 INJECTION, SOLUTION INTRAMUSCULAR; INTRAVENOUS at 15:02

## 2019-08-06 RX ADMIN — ONDANSETRON 4 MG: 2 INJECTION INTRAMUSCULAR; INTRAVENOUS at 14:37

## 2019-08-06 NOTE — INTERIM OP NOTE
BREAST IMPLANT REMOVAL, BREAST CAPSULECTOMY, PECTORALIS MUSCLE REPAIR, BREAST RECONSTRUCTION W/FLAP LATISSIMUS DORSI, BREAST TISSUE EXPANDER PLACEMENT  Postoperative Note  PATIENT NAME: Nick Wilkes  : 1970  MRN: [id number]  [facility] OR ROOM 04    Surgery Date: 2019    Preop Diagnosis:  History of radiation therapy [Z92 3]  Acquired absence of breast and absent nipple, bilateral [Z90 13]  Personal history of malignant neoplasm of breast [Z85 3]    Post-Op Diagnosis Codes:     * History of radiation therapy [Z92 3]     * Acquired absence of breast and absent nipple, bilateral [Z90 13]     * Personal history of malignant neoplasm of breast [Z85 3]    Procedure(s) (LRB):  BREAST IMPLANT REMOVAL (Bilateral)  BREAST CAPSULECTOMY (Bilateral)  PECTORALIS MUSCLE REPAIR (Bilateral)  BREAST RECONSTRUCTION W/FLAP LATISSIMUS DORSI (Right)  BREAST TISSUE EXPANDER PLACEMENT (Bilateral)    Surgeon(s) and Role:     * Kenrick Majano MD - Primary     * Moni Lantigua PA-C - Assisting     * Zackery Singleton MD - Fellow    Specimens:  ID Type Source Tests Collected by Time Destination   1 : Right breast capsule Tissue Breast, Right TISSUE EXAM Kenrick Majano MD 2019 4532    2 : Right breast implant--gross only Other Breast, Right TISSUE EXAM Kenrick Majano MD 2019 4581    3 : Left breast capsule Tissue Breast, Left TISSUE EXAM Kenrick Majano MD 2019 1241    4 : Left breast implant gross only Other Breast, Left TISSUE EXAM Kenrick Majano MD 2019 1242    5 : Right mastectomy skin Tissue Breast, Right TISSUE EXAM Kenrick Majano MD 2019 1326        Estimated Blood Loss:   75 mL    Anesthesia Type:   General     Findings:    Right previous texture silicone implant removed, radiated thin skin   Left breast smooth silicone implant removed    Complications:   None    SIGNATURE: Yoni Menchaca MD   DATE: 2019   TIME: 2:38 PM

## 2019-08-06 NOTE — ANESTHESIA POSTPROCEDURE EVALUATION
Post-Op Assessment Note    CV Status:  Stable  Pain Score: 0    Pain management: adequate     Mental Status:  Sleepy   Hydration Status:  Stable   PONV Controlled:  None   Airway Patency:  Patent and adequate   Post Op Vitals Reviewed: Yes      Staff: CRNA           BP   99/50   Temp   96 4   Pulse  60   Resp   16   SpO2   100%

## 2019-08-06 NOTE — ANESTHESIA PREPROCEDURE EVALUATION
Review of Systems/Medical History  Patient summary reviewed  Chart reviewed  No history of anesthetic complications     Cardiovascular  Exercise tolerance (METS): >4,     Pulmonary    Comment: H/o pulmonary fibrosis with chemotherapy, previously on O2 but has weaned off, Spo2 98-99% preop on room air     GI/Hepatic     Hiatal hernia, Bariatric surgery,        Kidney stones, No chronic kidney disease ,        Endo/Other    Obesity  morbid obesity   GYN    Hysterectomy, Breast cancer Right mastectomy, left mastectomy and axillary node dissection  Comment: H/o radiation    Right arm limb alert     Hematology  Anemia iron deficiency anemia,     Musculoskeletal  Negative musculoskeletal ROS   Comment: osteoporosis      Neurology  Negative neurology ROS      Psychology   Negative psychology ROS              Physical Exam    Airway    Mallampati score: III  TM Distance: <3 FB  Neck ROM: full     Dental       Cardiovascular      Pulmonary      Other Findings        Anesthesia Plan  ASA Score- 3     Anesthesia Type- general with ASA Monitors  Additional Monitors:   Airway Plan: ETT  Comment: Recent labs personally reviewed:  Lab Results       Component                Value               Date                       WBC                      10 24 (H)           07/03/2019                 HGB                      11 8                07/03/2019                 PLT                      344                 07/03/2019            Lab Results       Component                Value               Date                       K                        3 9                 07/03/2019                 BUN                      23                  07/03/2019                 CREATININE               0 88                07/03/2019            No results found for: PTT   No results found for: INR    Blood type     Plan: ALEX FERRELL, 2 Ganesh Acosta MD, have personally seen and evaluated the patient prior to anesthetic care    I have reviewed the pre-anesthetic record, medical history, allergies, medications and any other medical records if appropriate to the anesthetic care  If a CRNA is involved in the case, I have reviewed the CRNA assessment, if present, and agree  I discussed the anesthetic plan and risks/benefits/alternatives with the patient including possible PONV, sore throat, and possibility of rare anesthetic and surgical emergencies        Plan Factors-  Patient did not smoke on day of surgery  Induction- intravenous  Postoperative Plan- Plan for postoperative opioid use  Planned trial extubation    Informed Consent- Anesthetic plan and risks discussed with patient  I personally reviewed this patient with the CRNA  Discussed and agreed on the Anesthesia Plan with the CRNA  Sherre Soulier

## 2019-08-07 LAB
ANION GAP SERPL CALCULATED.3IONS-SCNC: 12 MMOL/L (ref 4–13)
BUN SERPL-MCNC: 17 MG/DL (ref 5–25)
CALCIUM SERPL-MCNC: 8.4 MG/DL (ref 8.3–10.1)
CHLORIDE SERPL-SCNC: 105 MMOL/L (ref 100–108)
CO2 SERPL-SCNC: 24 MMOL/L (ref 21–32)
CREAT SERPL-MCNC: 1.03 MG/DL (ref 0.6–1.3)
ERYTHROCYTE [DISTWIDTH] IN BLOOD BY AUTOMATED COUNT: 17.9 % (ref 11.6–15.1)
GFR SERPL CREATININE-BSD FRML MDRD: 64 ML/MIN/1.73SQ M
GLUCOSE SERPL-MCNC: 140 MG/DL (ref 65–140)
GLUCOSE SERPL-MCNC: 150 MG/DL (ref 65–140)
HCT VFR BLD AUTO: 29.9 % (ref 34.8–46.1)
HGB BLD-MCNC: 9.1 G/DL (ref 11.5–15.4)
MAGNESIUM SERPL-MCNC: 1.7 MG/DL (ref 1.6–2.6)
MCH RBC QN AUTO: 24.7 PG (ref 26.8–34.3)
MCHC RBC AUTO-ENTMCNC: 30.4 G/DL (ref 31.4–37.4)
MCV RBC AUTO: 81 FL (ref 82–98)
PLATELET # BLD AUTO: 356 THOUSANDS/UL (ref 149–390)
PMV BLD AUTO: 10.1 FL (ref 8.9–12.7)
POTASSIUM SERPL-SCNC: 4.7 MMOL/L (ref 3.5–5.3)
RBC # BLD AUTO: 3.69 MILLION/UL (ref 3.81–5.12)
SODIUM SERPL-SCNC: 141 MMOL/L (ref 136–145)
WBC # BLD AUTO: 22.35 THOUSAND/UL (ref 4.31–10.16)

## 2019-08-07 PROCEDURE — 82948 REAGENT STRIP/BLOOD GLUCOSE: CPT

## 2019-08-07 PROCEDURE — 85027 COMPLETE CBC AUTOMATED: CPT | Performed by: PLASTIC SURGERY

## 2019-08-07 PROCEDURE — 83735 ASSAY OF MAGNESIUM: CPT | Performed by: PLASTIC SURGERY

## 2019-08-07 PROCEDURE — 80048 BASIC METABOLIC PNL TOTAL CA: CPT | Performed by: PLASTIC SURGERY

## 2019-08-07 RX ORDER — ANASTROZOLE 1 MG/1
1 TABLET ORAL
Status: DISCONTINUED | OUTPATIENT
Start: 2019-08-07 | End: 2019-08-08

## 2019-08-07 RX ORDER — OXYCODONE HYDROCHLORIDE 5 MG/1
5 TABLET ORAL EVERY 4 HOURS PRN
Status: DISCONTINUED | OUTPATIENT
Start: 2019-08-07 | End: 2019-08-07

## 2019-08-07 RX ORDER — OXYCODONE HCL 5 MG/5 ML
5 SOLUTION, ORAL ORAL EVERY 4 HOURS PRN
Status: DISCONTINUED | OUTPATIENT
Start: 2019-08-07 | End: 2019-08-08

## 2019-08-07 RX ADMIN — CLINDAMYCIN PHOSPHATE 600 MG: 600 INJECTION, SOLUTION INTRAVENOUS at 04:50

## 2019-08-07 RX ADMIN — OXYCODONE HYDROCHLORIDE 5 MG: 5 SOLUTION ORAL at 11:27

## 2019-08-07 RX ADMIN — HYDROMORPHONE HYDROCHLORIDE 0.5 MG: 1 INJECTION, SOLUTION INTRAMUSCULAR; INTRAVENOUS; SUBCUTANEOUS at 09:37

## 2019-08-07 RX ADMIN — GABAPENTIN 250 MG: 250 SOLUTION ORAL at 16:58

## 2019-08-07 RX ADMIN — ANASTROZOLE 1 MG: 1 TABLET ORAL at 04:48

## 2019-08-07 RX ADMIN — HYDROMORPHONE HYDROCHLORIDE 0.5 MG: 1 INJECTION, SOLUTION INTRAMUSCULAR; INTRAVENOUS; SUBCUTANEOUS at 21:52

## 2019-08-07 RX ADMIN — TRAMADOL HYDROCHLORIDE 50 MG: 50 TABLET, FILM COATED ORAL at 03:08

## 2019-08-07 RX ADMIN — OXYCODONE HYDROCHLORIDE 5 MG: 5 SOLUTION ORAL at 20:34

## 2019-08-07 RX ADMIN — FONDAPARINUX SODIUM 2.5 MG: 2.5 INJECTION, SOLUTION SUBCUTANEOUS at 22:00

## 2019-08-07 RX ADMIN — HYDROMORPHONE HYDROCHLORIDE 0.5 MG: 1 INJECTION, SOLUTION INTRAMUSCULAR; INTRAVENOUS; SUBCUTANEOUS at 05:09

## 2019-08-07 RX ADMIN — SODIUM CHLORIDE, SODIUM LACTATE, POTASSIUM CHLORIDE, AND CALCIUM CHLORIDE 75 ML/HR: .6; .31; .03; .02 INJECTION, SOLUTION INTRAVENOUS at 10:28

## 2019-08-07 RX ADMIN — ACETAMINOPHEN 975 MG: 325 TABLET, FILM COATED ORAL at 21:58

## 2019-08-07 RX ADMIN — OXYCODONE HYDROCHLORIDE 5 MG: 5 SOLUTION ORAL at 15:59

## 2019-08-07 RX ADMIN — ACETAMINOPHEN 975 MG: 325 TABLET, FILM COATED ORAL at 05:18

## 2019-08-07 RX ADMIN — ACETAMINOPHEN 975 MG: 325 TABLET, FILM COATED ORAL at 14:03

## 2019-08-07 RX ADMIN — GABAPENTIN 250 MG: 250 SOLUTION ORAL at 21:57

## 2019-08-07 RX ADMIN — GABAPENTIN 250 MG: 250 SOLUTION ORAL at 11:27

## 2019-08-07 NOTE — OP NOTE
OPERATIVE REPORT  PATIENT NAME: Janis January    :  1970  MRN: [id number]  Pt Location: [facility] OR ROOM 04    SURGERY DATE: 2019    Surgeon(s) and Role:     * Bogdan Noriega MD - Primary     * Hernan Zurita PA-C - Assisting     * Jackson Reynolds MD - Fellow    Preop Diagnosis:  History of radiation therapy [Z92 3]  Acquired absence of breast and absent nipple, bilateral [Z90 13]  Personal history of malignant neoplasm of breast [Z85 3]    Post-Op Diagnosis Codes:     * History of radiation therapy [Z92 3]     * Acquired absence of breast and absent nipple, bilateral [Z90 13]     * Personal history of malignant neoplasm of breast [Z85 3]    Procedure(s) (LRB):  BREAST IMPLANT REMOVAL (Bilateral)  BREAST CAPSULECTOMY (Bilateral)  PECTORALIS MUSCLE REPAIR (Bilateral)  BREAST RECONSTRUCTION W/FLAP LATISSIMUS DORSI (Right)  BREAST TISSUE EXPANDER PLACEMENT (Bilateral)  APPLICATION OF NEGATIVE PRESSURE DRESSING BILATERAL  INTERCOSTAL NERVE BLOCK WITH EXPAREL     Specimen(s):  ID Type Source Tests Collected by Time Destination   1 : Right breast capsule Tissue Breast, Right TISSUE EXAM Bogdan Noriega MD 2019 9929    2 : Right breast implant--gross only Other Foreign body TISSUE EXAM Bogdan Noriega MD 2019 6474    3 : Left breast capsule Tissue Breast, Left TISSUE EXAM Bogdan Noriega MD 2019 1241    4 : Left breast implant gross only Other Foreign body TISSUE EXAM Bogdan Noriega MD 2019 1242    5 : Right mastectomy skin Tissue Breast, Right TISSUE EXAM Bogdan Noriega MD 2019 1326        Estimated Blood Loss:   75 mL    Drains:  Closed/Suction Drain Right Back Bulb 19 Fr  (Active)   Site Description Unable to view 2019  5:15 PM   Dressing Status Clean;Dry; Intact 2019 12:24 AM   Drainage Appearance Bloody 2019  5:15 PM   Status To bulb suction 2019  5:15 PM   Output (mL) 10 mL 2019  9:41 AM   Number of days: 1 Closed/Suction Drain Right Back Bulb 19 Fr  (Active)   Site Description Unable to view 8/6/2019  5:15 PM   Dressing Status Clean;Dry; Intact 8/7/2019 12:24 AM   Drainage Appearance Bloody 8/7/2019 12:24 AM   Status To bulb suction 8/6/2019  5:15 PM   Output (mL) 30 mL 8/7/2019  9:41 AM   Number of days: 1       Closed/Suction Drain Right; Anterior; Other (Comment) Chest Bulb 15 Fr  (Active)   Site Description Unable to view 8/6/2019  5:15 PM   Dressing Status Clean;Dry; Intact 8/7/2019 12:24 AM   Drainage Appearance Bloody 8/7/2019 12:24 AM   Status To bulb suction 8/7/2019 12:24 AM   Output (mL) 50 mL 8/7/2019  9:41 AM   Number of days: 1       Closed/Suction Drain Right; Anterior; Other (Comment) Chest Bulb 15 Fr  (Active)   Site Description Unable to view 8/6/2019  5:15 PM   Dressing Status Clean;Dry; Intact 8/7/2019 12:24 AM   Drainage Appearance Bloody 8/7/2019 12:24 AM   Status To bulb suction 8/7/2019 12:24 AM   Output (mL) 60 mL 8/7/2019  9:41 AM   Number of days: 1       Closed/Suction Drain Left; Other (Comment) Chest Bulb 15 Fr  (Active)   Site Description Unable to view 8/6/2019  5:15 PM   Dressing Status Clean;Dry; Intact 8/7/2019 12:24 AM   Drainage Appearance Bloody 8/7/2019 12:24 AM   Status To bulb suction 8/7/2019 12:24 AM   Output (mL) 20 mL 8/7/2019  9:41 AM   Number of days: 1       Closed/Suction Drain Inferior; Left Chest 15 Fr  (Active)   Site Description Unable to view 8/6/2019  5:15 PM   Dressing Status Clean;Dry; Intact 8/7/2019 12:24 AM   Drainage Appearance Bloody 8/7/2019 12:24 AM   Status To bulb suction 8/7/2019 12:24 AM   Output (mL) 75 mL 8/7/2019  9:41 AM   Number of days: 1       Negative Pressure Wound Therapy (V A C ) Back Lateral;Right (Active)   Dressing Status Clean;Dry; Intact 8/6/2019 11:54 PM   Output (mL) 0 mL 8/6/2019  4:45 PM   Number of days: 1       Negative Pressure Wound Therapy (V A C ) Breast Right;Left (Active)   Unit Type Prevena 8/6/2019  5:15 PM   Dressing Status Clean;Dry; Intact 8/6/2019 11:54 PM   Output (mL) 0 mL 8/6/2019  4:45 PM   Number of days: 1       [REMOVED] Urethral Catheter Non-latex 16 Fr  (Removed)   Site Assessment Clean;Skin intact 8/6/2019  5:15 PM   Collection Container Standard drainage bag 8/6/2019  5:15 PM   Securement Method Securing device (Describe) 8/6/2019  5:15 PM   Output (mL) 175 mL 8/7/2019 12:24 AM   Number of days: 1       Anesthesia Type:   General    Operative Indications:  History of radiation therapy [Z92 3]  Acquired absence of breast and absent nipple, bilateral [Z90 13]  Personal history of malignant neoplasm of breast [Z85 3]    Statement of medical necessity:   Mrs Juan Randolph is a 76-year-old female with a history of bilateral acquired absence of the breast history of weight loss surgery who presents for bilateral breast reconstruction revisions  During our preoperative encounter patient was counseled about the deformities to be addressed at the surgical and which consisted of removal wound or bilateral prior implants with capsulectomy and conversion to the prepectoral plane reconstruction with a right latissimus dorsi flap and bilateral tissue expander placement  For each of the reconstruction methods mentioned above, the risks, benefits, alternatives, scarring, and recovery time were discussed in great detail  Specific risks detailed included bleeding, infection, hematoma, seroma, scarring, pain, wound healing complications, flap loss, fat necrosis, capsular contracture, need for implant removal, donor site complications, bulge, hernia, umbilical necrosis, need for urgent reoperation, and need for dressing changes were discussed  Patient manifest a reasonable understanding of this and informed consent was then obtained  Operative Findings:  Right breast skin with radiation skin changes       Implants    Right breast  - [employer] Tissue Expander REF: [id number] / LOT [id number] / SN [id number]    Left breast   - [employer] Tissue Expander REF: [id number] / Giuliano Holcomb [id number] / [place]avan [facility] [id number]  - Alloderm Select 16 x 20 cm LOT: [id number] / REF [id number]       Complications:   None    Procedure and Technique:  The patient was met in the preoperative holding area and any last minute questions were properly answered  Preoperative markings were made bilateral breast and including right latissimus dorsi flap as well  She was then taken to the operative theater placed in supine position  All the perioperative measures were taken and smooth general anesthesia was then induced  Patient was then turned into the left lateral decubitus position and all the pressure points were properly padded  Patient was secured using the bean back and the entire right torso was then prepped and draped in usual sterile fashion  Surgical time-out was then performed and procedure started by 1st verifying our markings the right latissimus dorsi flap with an elliptical oblique skin paddle measuring around 10 x 20 cm given the significant amount of skin that was needed for the reconstruction  Skin paddle was then circumscribed using the scalpel and carried down with electrocautery, bevelling out onto the anterior surface of the latissimus dorsi was then encounter  Skin flap was then raised cephalad and caudal along the entire anterior surface of the latissimus dorsi muscle flap until complete exposure of the muscle was performed circumferentially, including in the axilla toward the insertion site  Submuscular plane was then performed anterior and posteriorly completely with careful attention and using hemoclips for any encounter it large caliber perforators along the thoracolumbar region and vicinity  Once this was performed the distal origin of the latissimus dorsi was then transected along the paraspinal region and just above the iliac crest and full-thickness with careful attention as preserving the thoracolumbar fascia and external oblique muscle    The flap was then dissected toward its insertion site and the submuscular plane with careful attention to stop at the encounter it serrated branch and all the remaining muscular attachments circumferentially with dissected free to allow rotation  Satisfactory flap elevation was performed and attention was then turned to the right chest were previous IMF incision was then incised throughout the entire length and carried down using electrocautery until the capsule of the implant was encounter  Mastectomy skin flap was then elevated cephalad to recreate mastectomy defect along the new pre pectoral plane along the breast footprint for reconstruction  Then a complete capsulectomy was then performed circumferentially we only leaving small remnant upper posterior chest wall capsule due to significant adherence due to radiation  Careful preservation of the pectoralis major muscle during this dissection was performed and the capsule and the implant were passed off the table for permanent pathology  The pectoralis major muscle was then dissected free all any remaining attachment and was then secured repair back to the chest wall using interrupted 3-0 Vicryl sutures along the origin of the muscle down to chest wall satisfactorily  Subcutaneous tissue tunnel was then created from the right chest defect to the right total flap site for about 3 to 4 finger breath to allow transposition of the flap without strangulation  Skin paddle was then tacked using interrupted 6 3-0 silk sutures and the flap was then delivery via the sternal out traumatic and no significant compression at the pedicle base was encounter  Right chest wall defect was then secured temporarily closed with skin staples and Ioban dressing was applied  Right donor site was closed by 1st performing hemostasis and irrigation  Two ALEIDA 19  [place] were inserted and secured with 3-0 nylon sutures    The skin was then tailor tacked with staples and 3 layer closure using 2-0 PDS for the Saravanan's, and Insorb stapler for the deep dermal layer and a running 3-0 strata fix suture  Negative pressure dressing was applied to decrease postoperative wound related complications  The patient was then turned into the supine position and the beanbag was then removed  The chest was then re-prepped and draped in the usual sterile fashion  2nd surgical time-out was then performed and procedure started by 1st removing the previously placed staples and the breast pocket was then opened  The flap was then delivery into the surgical field and our proposed new IMF was then marked using a surgical marker  The inferior border of the latissimus dorsi was then inset along the chest wall new IMF to create an inferior Hammock with interrupted 2-0 PDS parachute sutures throughout the entire IMF and lateral border of the breast   Then the pocket was then re-prepped with Betadine and irrigated with antibiotic irrigation  New towels were placed and using exchange of sterile gloves no-touch any could tissue expander was then brought into the field and deflated of any remnant air and inflated with 60 mL of sterile saline  The tissue expander was then secured along the right chest breast footprint using provider suture tabs with interrupted 6 2-0 PDS along the medial inferiorly and lateral tabs with satisfactorily placement along the chest wall  Muscle was then redraped over the implant in the inset circumferentially around the implant with interrupted 2-0 PDS down to the chest wall with complete muscle coverage of the expander  Patient was then brought into the flexed position and the skin paddle was then inset along the new IMF by temporary placed in skin staples to create the skin paddle ptosis    Then the superior mastectomy skin flap was then tailor tack and any remnant radiated mastectomy skin including the previously nipple reconstruction was then removed sharply until satisfactorily shaping of the right breast was obtained  The remaining skin paddle was then tailor tacked with staples and the final shape was obtained  Areas to be de-epithelialized were performed along the medial lateral edge  Two 15  ALEIDA hubless drains were inserted and secured with 3-0 nylon sutures  And layered closure was then performed using deep dermis of 3-0 Monocryl and a running 4-0 Monocryl subcuticular stitch  Attention was then turned to the contralateral left breast in which the prior IMF scar was then incised along the full length and carried down using electrocautery until the capsule was encounter  The breast foot print was recreated and the mastectomy skin flap was then raised cephalad along this borders for future new prepectoral tissue expander placement  Then a total capsulectomy was then performed using the electrocautery with careful attention as preserving the under surface of the pectoralis major muscle and this was passed off the table for permanent pathology  Pectoralis major muscle was then repaired back down to the chest wall to restore the origin insertion to the chest wall using interrupted 3-0 Vicryl sutures of the entire inferior lateral border of the muscle  Hemostasis and irrigation was performed and our new proposed IMF location was then marked along the chest wall  The pocket was then irrigated with solution antibiotic irrigation and prepped and draped with towels sterilely  Teams members exchanged sterile gloves and tissue expander was then brought into the field as well as the ADM material 16 x 20 was brought to the field and soaked in antibiotic irrigation  After 5 minutes the mesh was then inset and oriented longitudinal by creating our inferior Hammock with 3 cm overlap from the chest wall secured with 2 rows of interrupted 2-0 PDS sutures    She expander was then deflated of all the air it and was secure along our proposed breast footprint using the suture tabs with interrupted 2-0 PDS along the medial inferior and lateral tabs  The ADM was then redraped over the implant and secured to the chest wall medial lateral and superiorly with interrupted 2-0 PDS sutures with careful attention to remove excess material in order to fit snugly into the pocket  Once satisfactorily inset of the mesh was obtained then the tissue expander was infiltrated with a total of 240 mL of sterile normal saline  Antibiotic irrigation was again performed and vancomycin powder 1 g was instilled into the defect  Two 15  ALEIDA hubless drains was inserted and secured with 3-0 nylon sutures  Layered closure was then performed with 2-0 PDS along the Saravanan's deep dermal using 3-0 Monocryl and a running 4-0 Monocryl subcuticular stitch for the skin  Negative pressure dressing was then applied to bilateral breast in order to decrease wound related complications and keep the incision sterile for prolonged postoperative time  Nerve blocks using Exparel were performed along the intercostal posterior chest wall, IMF and drain sites for postoperative analgesia using a solution of 20 mg of Exparel diluted with 80 ml of normal saline  I was present for the entire procedure and A physician assistant was required during the procedure for retraction tissue handling,dissection and suturing    Patient Disposition:  PACU , hemodynamically stable and patient will return to OR for planned surgery as a staged procedure for tissue expander exchange to permanent implant       SIGNATURE: Hafsa Suero MD  DATE: August 7, 2019  TIME: 9:44 AM

## 2019-08-07 NOTE — UTILIZATION REVIEW
Initial Clinical Review    Elective [id number] surgical procedure  Age/Sex: 51 y o  female  Surgery Date: 8/6/2019  Procedure: BREAST IMPLANT REMOVAL (Bilateral)  BREAST CAPSULECTOMY (Bilateral)  PECTORALIS MUSCLE REPAIR (Bilateral)  BREAST RECONSTRUCTION W/FLAP LATISSIMUS DORSI (Right)  BREAST TISSUE EXPANDER PLACEMENT (Bilateral)  APPLICATION OF NEGATIVE PRESSURE DRESSING BILATERAL  Anesthesia: General   Operative Findings: Right previous texture silicone implant removed, radiated thin skin   Left breast smooth silicone implant removed  Admission Orders: Date/Time/Statement: 8/6/19 @ 1721   Orders Placed This Encounter   Procedures    Inpatient Admission     Standing Status:   Standing     Number of Occurrences:   1     Order Specific Question:   Admitting Physician     Answer:   Maryann Adames [[id number]]     Order Specific Question:   Level of Care     Answer:   Med Surg [[id number]]     Order Specific Question:   Estimated length of stay     Answer:   More than 2 Midnights     Order Specific Question:   Certification     Answer:   I certify that inpatient services are medically necessary for this patient for a duration of greater than two midnights  See H&P and MD Progress Notes for additional information about the patient's course of treatment  Vital Signs: BP 91/55   Pulse 71   Temp 97 7 °F (36 5 °C)   Resp 18   Ht 5' (1 524 m)   Wt 96 8 kg (213 lb 6 5 oz)   SpO2 97%   BMI 41 68 kg/m²   Diet: REgular  Mobility: Ambulate    HOB in sei-wolfe position,   DVT Prophylaxis:SCDs, ambulate,   Medications/Pain Control:  Tylenol pox3, hydromorphone IV x 6, tramadol pox 2  Results from last 7 days   Lab Units 08/07/19  0618   WBC Thousand/uL 22 35*   HEMOGLOBIN g/dL 9 1*   HEMATOCRIT % 29 9*   PLATELETS Thousands/uL 356         Results from last 7 days   Lab Units 08/07/19  0618   SODIUM mmol/L 141   POTASSIUM mmol/L 4 7   CHLORIDE mmol/L 105   CO2 mmol/L 24   ANION GAP mmol/L 12 BUN mg/dL 17   CREATININE mg/dL 1 03   EGFR ml/min/1 73sq m 64   CALCIUM mg/dL 8 4   MAGNESIUM mg/dL 1 7         Results from last 7 days   Lab Units 08/07/19  0636 08/06/19  2213   POC GLUCOSE mg/dl 150* 399*     Results from last 7 days   Lab Units 08/07/19  0618   GLUCOSE RANDOM mg/dL 140         Date/Time  Temp  Pulse  Resp  BP  MAP (mmHg)  SpO2  O2 Device  Cardiac (WDL)   08/06/19 23:03:59  97 7 °F (36 5 °C)  71  18  91/55  67  97 %  --  --   08/06/19 19:49:43  98 2 °F (36 8 °C)  78  18  114/71  85  99 %  --  --   08/06/19 19:48:58  98 2 °F (36 8 °C)  76  18  114/71  85  99 %  --  --   08/06/19 18:01:56  98 °F (36 7 °C)  71  20  114/71  85  98 %  --  --   08/06/19 1715  97 1 °F (36 2 °C)Abnormal   59  14  127/70  --  99 %  Nasal cannula  X   08/06/19 1700  --  79  20  129/74  --  98 %  Nasal cannula  X   08/06/19 1645  --  73  24Abnormal   136/76  --  98 %  Nasal cannula  X   08/06/19 1630  --  74  25Abnormal   139/80               Current Facility-Administered Medications:  acetaminophen 975 mg Oral Q8H [address]   aluminum-magnesium hydroxide-simethicone 30 mL Oral Q6H PRN   anastrozole 1 mg Oral Early Morning   bupivacaine liposomal 20 mL Infiltration Once   diphenhydrAMINE 25 mg Intravenous Q6H PRN   fluticasone-vilanterol 1 puff Inhalation Daily   fondaparinux 2 5 mg Subcutaneous Q24H   gabapentin 250 mg Oral TID   HYDROmorphone 0 5 mg Intravenous Q4H PRN   lactated ringers 50 mL/hr Intravenous Continuous   lactated ringers 75 mL/hr Intravenous Continuous   lactated ringers 75 mL/hr Intravenous Continuous   ondansetron 4 mg Intravenous Q6H PRN   oxyCODONE 5 mg Oral Q4H PRN   Tylenol pox3,     Network Utilization Review Department  Phone: [phone]; Fax [phone]  [email or web address]  ATTENTION: Please call with any questions or concerns to [phone]  and carefully listen to the prompts so that you are directed to the right person     Send all requests for admission clinical reviews, approved or denied determinations and any other requests to fax [phone]   All voicemails are confidential

## 2019-08-07 NOTE — PLAN OF CARE
Problem: GENITOURINARY - ADULT  Goal: Urinary catheter remains patent  Description  INTERVENTIONS:  - Assess patency of urinary catheter  - If patient has a chronic fallon, consider changing catheter if non-functioning  - Follow guidelines for intermittent irrigation of non-functioning urinary catheter  Outcome: Progressing     Problem: SKIN/TISSUE INTEGRITY - ADULT  Goal: Incision(s), wounds(s) or drain site(s) healing without S/S of infection  Description  INTERVENTIONS  - Assess and document risk factors for skin impairment   - Assess and document dressing, incision, wound bed, drain sites and surrounding tissue  - Initiate Nutrition services consult and/or wound management as needed  Outcome: Progressing     Problem: PAIN - ADULT  Goal: Verbalizes/displays adequate comfort level or baseline comfort level  Description  Interventions:  - Encourage patient to monitor pain and request assistance  - Assess pain using appropriate pain scale  - Administer analgesics based on type and severity of pain and evaluate response  - Implement non-pharmacological measures as appropriate and evaluate response  - Consider cultural and social influences on pain and pain management  - Notify physician/advanced practitioner if interventions unsuccessful or patient reports new pain  Outcome: Progressing     Problem: INFECTION - ADULT  Goal: Absence or prevention of progression during hospitalization  Description  INTERVENTIONS:  - Assess and monitor for signs and symptoms of infection  - Monitor lab/diagnostic results  - Monitor all insertion sites, i e  indwelling lines, tubes, and drains  - Monitor endotracheal (as able) and nasal secretions for changes in amount and color  - [place] appropriate cooling/warming therapies per order  - Administer medications as ordered  - Instruct and encourage patient and family to use good hand hygiene technique  - Identify and instruct in appropriate isolation precautions for identified infection/condition  Outcome: Progressing     Problem: SAFETY ADULT  Goal: Patient will remain free of falls  Description  INTERVENTIONS:  - Assess patient frequently for physical needs  -  Identify cognitive and physical deficits and behaviors that affect risk of falls  -  [place] fall precautions as indicated by assessment   - Educate patient/family on patient safety including physical limitations  - Instruct patient to call for assistance with activity based on assessment  - Modify environment to reduce risk of injury  - Consider OT/PT consult to assist with strengthening/mobility  Outcome: Progressing     Problem: DISCHARGE PLANNING  Goal: Discharge to home or other facility with appropriate resources  Description  INTERVENTIONS:  - Identify barriers to discharge w/patient and caregiver  - Arrange for needed discharge resources and transportation as appropriate  - Identify discharge learning needs (meds, wound care, etc )  - Arrange for interpretive services to assist at discharge as needed  - Refer to Case Management Department for coordinating discharge planning if the patient needs post-hospital services based on physician/advanced practitioner order or complex needs related to functional status, cognitive ability, or social support system  Outcome: Progressing     Problem: Knowledge Deficit  Goal: Patient/family/caregiver demonstrates understanding of disease process, treatment plan, medications, and discharge instructions  Description  Complete learning assessment and assess knowledge base    Interventions:  - Provide teaching at level of understanding  - Provide teaching via preferred learning methods  Outcome: Progressing

## 2019-08-08 VITALS
TEMPERATURE: 98.7 F | WEIGHT: 213.41 LBS | OXYGEN SATURATION: 98 % | HEIGHT: 60 IN | DIASTOLIC BLOOD PRESSURE: 66 MMHG | BODY MASS INDEX: 41.9 KG/M2 | RESPIRATION RATE: 19 BRPM | HEART RATE: 110 BPM | SYSTOLIC BLOOD PRESSURE: 127 MMHG

## 2019-08-08 PROCEDURE — 99024 POSTOP FOLLOW-UP VISIT: CPT | Performed by: PHYSICIAN ASSISTANT

## 2019-08-08 RX ORDER — OXYCODONE HYDROCHLORIDE 5 MG/1
5 TABLET ORAL EVERY 4 HOURS PRN
Qty: 20 TABLET | Refills: 0 | Status: SHIPPED | OUTPATIENT
Start: 2019-08-08 | End: 2019-08-11

## 2019-08-08 RX ORDER — OXYCODONE HYDROCHLORIDE 5 MG/1
5 TABLET ORAL EVERY 4 HOURS PRN
Status: DISCONTINUED | OUTPATIENT
Start: 2019-08-08 | End: 2019-08-08 | Stop reason: HOSPADM

## 2019-08-08 RX ORDER — ACETAMINOPHEN 500 MG
975 TABLET ORAL 3 TIMES DAILY PRN
Qty: 2 TABLET | Refills: 0 | Status: SHIPPED | OUTPATIENT
Start: 2019-08-08 | End: 2019-09-07

## 2019-08-08 RX ADMIN — OXYCODONE HYDROCHLORIDE 5 MG: 5 TABLET ORAL at 10:02

## 2019-08-08 RX ADMIN — GABAPENTIN 250 MG: 250 SOLUTION ORAL at 08:28

## 2019-08-08 RX ADMIN — OXYCODONE HYDROCHLORIDE 5 MG: 5 TABLET ORAL at 14:01

## 2019-08-08 RX ADMIN — FLUTICASONE FUROATE AND VILANTEROL TRIFENATATE 1 PUFF: 100; 25 POWDER RESPIRATORY (INHALATION) at 08:29

## 2019-08-08 RX ADMIN — GABAPENTIN 250 MG: 250 SOLUTION ORAL at 17:08

## 2019-08-08 RX ADMIN — OXYCODONE HYDROCHLORIDE 5 MG: 5 TABLET ORAL at 18:54

## 2019-08-08 RX ADMIN — ACETAMINOPHEN 975 MG: 325 TABLET, FILM COATED ORAL at 14:03

## 2019-08-08 RX ADMIN — ANASTROZOLE 1 MG: 1 TABLET ORAL at 05:00

## 2019-08-08 RX ADMIN — ACETAMINOPHEN 975 MG: 325 TABLET, FILM COATED ORAL at 05:00

## 2019-08-08 RX ADMIN — OXYCODONE HYDROCHLORIDE 5 MG: 5 SOLUTION ORAL at 07:28

## 2019-08-08 NOTE — SOCIAL WORK
[facility] home care have accepted patient  They will provide a nurse for tomorrow  Per surgery patient is cleared for discharge home today  Patient is informed of IMM and given a copy  Signed IMM is in the chart  Patient states her family will transport her home at around 6:30PM today  Nurse and BRISSA notified

## 2019-08-08 NOTE — PROGRESS NOTES
Plastic Surgery  Progress Note -Surgery PA  Júnior Going 51 y o  female MRN: [id number]  Unit/Bed#: -Jac Encounter: [id number]      Assessment   POD #2, s/p bilateral breast implant removal with capsulectomy and pectoralis muscle repair, right breast reconstruction with latissimus dorsi flap and tissue expander placement  - wound vacs in place at anterior chest wall and right back  Minimal drainage  - 6 ALEIDA drains: (24 output) Left superior with 45 cc, left inferior with 130 cc; 2 R back drains, 1 with 85cc, 1170cc, 2 R Ant drains, 1 with 75cc, 1 with 85cc  - urinating without difficulty  - pain under better control with Oxy 5mg tablets in addition to scheduled APAP and gabapentin TID  Plan   - Patient doing well and stable for d/c later today  - continue with IS and ambulation/OOB  - Analgesia scheduled and prn    - Patient will go home with 2 portable VACs  She will follow up with Dr Mercedes Vann as scheduled  - patient familiar with drain care for home but reeducated  ______________________________________________________________________  Subjective:   Patient doing well with oxy 5mg  She was having pain not controlled with Tramadol yesterday when seen and medications were changed  She was having little urine output, btu this has improved  Denies fever chills  Denies shortness of breath, chest pain or palpitations  Ambulating without difficulty  Denies nausea or vomiting  Objective:    Vitals:  /60 (BP Location: Left leg)   Pulse 86   Temp 98 6 °F (37 °C) (Oral)   Resp 19   Ht 5' (1 524 m)   Wt 96 8 kg (213 lb 6 5 oz)   SpO2 96%   BMI 41 68 kg/m²     I/Os:  I/O last 3 completed shifts: In: 1080 [P O :1080]  Out: 1583 [Urine:778; Drains:805]    I/O this shift:   In: 500 [P O :500]  Out: 150 [Urine:150]    Invasive Devices     Peripheral Intravenous Line            Peripheral IV 08/07/19 Left Antecubital 1 day          Drain            Closed/Suction Drain Right Back Bulb 19 Fr  2 days Closed/Suction Drain Right Back Bulb 19 Fr  2 days    Negative Pressure Wound Therapy (V A C ) Back Lateral;Right 2 days    Closed/Suction Drain Inferior; Left Chest 15 Fr  1 day    Closed/Suction Drain Left; Other (Comment) Chest Bulb 15 Fr  1 day    Closed/Suction Drain Right; Anterior; Other (Comment) Chest Bulb 15 Fr  1 day    Closed/Suction Drain Right; Anterior; Other (Comment) Chest Bulb 15 Fr  1 day    Negative Pressure Wound Therapy (V A C ) Breast Right;Left 1 day                Medications:  Current Facility-Administered Medications   Medication Dose Route Frequency    acetaminophen (TYLENOL) tablet 975 mg  975 mg Oral Q8H [facility]    aluminum-magnesium hydroxide-simethicone (MYLANTA) 200-200-20 mg/5 mL oral suspension 30 mL  30 mL Oral Q6H PRN    bupivacaine liposomal (EXPAREL) 1 3 % injection 20 mL  20 mL Infiltration Once    diphenhydrAMINE (BENADRYL) injection 25 mg  25 mg Intravenous Q6H PRN    fluticasone-vilanterol (BREO ELLIPTA) 100-25 mcg/inh inhaler 1 puff  1 puff Inhalation Daily    fondaparinux (ARIXTRA) subcutaneous injection 2 5 mg  2 5 mg Subcutaneous Q24H    gabapentin (NEURONTIN) oral solution 250 mg  250 mg Oral TID    HYDROmorphone (DILAUDID) injection 0 5 mg  0 5 mg Intravenous Q4H PRN    ondansetron (ZOFRAN) injection 4 mg  4 mg Intravenous Q6H PRN    oxyCODONE (ROXICODONE) IR tablet 5 mg  5 mg Oral Q4H PRN                 Lab Results and Cultures:   CBC with diff:   Lab Results   Component Value Date    WBC 22 35 (H) 08/07/2019    HGB 9 1 (L) 08/07/2019    HCT 29 9 (L) 08/07/2019    MCV 81 (L) 08/07/2019     08/07/2019    MCH 24 7 (L) 08/07/2019    MCHC 30 4 (L) 08/07/2019    RDW 17 9 (H) 08/07/2019    MPV 10 1 08/07/2019    NRBC 0 07/03/2019       BMP/CMP:  Lab Results   Component Value Date    K 4 7 08/07/2019     08/07/2019    CO2 24 08/07/2019    BUN 17 08/07/2019    CREATININE 1 03 08/07/2019    CALCIUM 8 4 08/07/2019    EGFR 64 08/07/2019       Physical Exam:  General Appearance:    Alert and orientated x 3, cooperative, no distress, appears stated age   Lungs:     Clear to auscultation bilaterally, respirations unlabored, no wheezes    Heart:    Regular rate and rhythm, S1 and S2 normal, no murmur   Abdomen:    Chest:    Normoactive BS, soft, non tender, non rigid, no masses, no palpated organomegaly  Wound VACs in place with ALEIDA drains  Output as above  Extremities:  Extremities normal, no calf tenderness, no cyanosis or edema   Pulses:   2+ and symmetric all extremities   Skin:   Skin color, texture, turgor normal, no rashes   Neurologic:   CNII-XII intact, normal strength, affect appropriate       Imaging:  No results found          Guillermo Knight PA-C   8/8/2019

## 2019-08-08 NOTE — SOCIAL WORK
CM met with patient in her room  Patient states her four children live with her in her one story house  There are three steps to enter the house  Currently patient's parents are staying at the house to care for children  Patient use a walker and a cane due to osteopetrosis  Patient does not have rehab hx  Patient has hhc hx and would like nursing at home after this hospitalization  A post acute care recommendation was made by your care team for Megan 79  Discussed Freedom of Choice with patient  List of agencies given to patient via in person  patient aware the list is custom filtered for them by preference  and that [facility] post acute providers are designated  Referral sent  Patient [place] [place]  [place]  Patient denies mental health dx hx and substance abuse hx  Patient states her mother-Grace is her health representative  Patient is on disability  Patient drives  Upon clearance for discharge patient will be transported home via parents  CM will follow patient's needs  Patient will go home with six drains and a wound Vac  Nurse and SLIM notified  CM reviewed discharge planning process including the following: identifying help at home, patient preference for discharge planning needs, pharmacy preference, and availability of treatment team to discuss questions or concerns patient and/or family may have regarding understanding medications and recognizing signs and symptoms once discharged  CM also encouraged patient to follow up with all recommended appointments after discharge  Patient advised of importance for patient and family to participate in managing patients medical well being  CM name and role reviewed  Discharge Checklist reviewed and CM will continue to monitor for progress toward discharge goals in nursing and provider rounds

## 2019-08-08 NOTE — PLAN OF CARE
Problem: GENITOURINARY - ADULT  Goal: Urinary catheter remains patent  Description  INTERVENTIONS:  - Assess patency of urinary catheter  - If patient has a chronic fallon, consider changing catheter if non-functioning  - Follow guidelines for intermittent irrigation of non-functioning urinary catheter  Outcome: Adequate for Discharge     Problem: SKIN/TISSUE INTEGRITY - ADULT  Goal: Incision(s), wounds(s) or drain site(s) healing without S/S of infection  Description  INTERVENTIONS  - Assess and document risk factors for skin impairment   - Assess and document dressing, incision, wound bed, drain sites and surrounding tissue  - Initiate Nutrition services consult and/or wound management as needed  Outcome: Adequate for Discharge     Problem: PAIN - ADULT  Goal: Verbalizes/displays adequate comfort level or baseline comfort level  Description  Interventions:  - Encourage patient to monitor pain and request assistance  - Assess pain using appropriate pain scale  - Administer analgesics based on type and severity of pain and evaluate response  - Implement non-pharmacological measures as appropriate and evaluate response  - Consider cultural and social influences on pain and pain management  - Notify physician/advanced practitioner if interventions unsuccessful or patient reports new pain  Outcome: Adequate for Discharge     Problem: INFECTION - ADULT  Goal: Absence or prevention of progression during hospitalization  Description  INTERVENTIONS:  - Assess and monitor for signs and symptoms of infection  - Monitor lab/diagnostic results  - Monitor all insertion sites, i e  indwelling lines, tubes, and drains  - Monitor endotracheal (as able) and nasal secretions for changes in amount and color  - [place] appropriate cooling/warming therapies per order  - Administer medications as ordered  - Instruct and encourage patient and family to use good hand hygiene technique  - Identify and instruct in appropriate isolation precautions for identified infection/condition  Outcome: Adequate for Discharge     Problem: SAFETY ADULT  Goal: Patient will remain free of falls  Description  INTERVENTIONS:  - Assess patient frequently for physical needs  -  Identify cognitive and physical deficits and behaviors that affect risk of falls  -  [place] fall precautions as indicated by assessment   - Educate patient/family on patient safety including physical limitations  - Instruct patient to call for assistance with activity based on assessment  - Modify environment to reduce risk of injury  - Consider OT/PT consult to assist with strengthening/mobility  Outcome: Adequate for Discharge     Problem: DISCHARGE PLANNING  Goal: Discharge to home or other facility with appropriate resources  Description  INTERVENTIONS:  - Identify barriers to discharge w/patient and caregiver  - Arrange for needed discharge resources and transportation as appropriate  - Identify discharge learning needs (meds, wound care, etc )  - Arrange for interpretive services to assist at discharge as needed  - Refer to Case Management Department for coordinating discharge planning if the patient needs post-hospital services based on physician/advanced practitioner order or complex needs related to functional status, cognitive ability, or social support system  Outcome: Adequate for Discharge     Problem: Knowledge Deficit  Goal: Patient/family/caregiver demonstrates understanding of disease process, treatment plan, medications, and discharge instructions  Description  Complete learning assessment and assess knowledge base    Interventions:  - Provide teaching at level of understanding  - Provide teaching via preferred learning methods  Outcome: Adequate for Discharge     Problem: Potential for Falls  Goal: Patient will remain free of falls  Description  INTERVENTIONS:  - Assess patient frequently for physical needs  -  Identify cognitive and physical deficits and behaviors that affect risk of falls    -  [place] fall precautions as indicated by assessment   - Educate patient/family on patient safety including physical limitations  - Instruct patient to call for assistance with activity based on assessment  - Modify environment to reduce risk of injury  - Consider OT/PT consult to assist with strengthening/mobility  Outcome: Adequate for Discharge

## 2019-08-09 NOTE — DISCHARGE SUMMARY
Discharge Summary - Nick Wilkes 51 y o  female MRN: [id number]    Unit/Bed#: -01 Encounter: [id number]    Admission Date: 8/6/2019   Discharge Date: 08/08/19    Admitting Diagnosis:   History of radiation therapy [Z92 3]  Acquired absence of breast and absent nipple, bilateral [Z90 13]  Personal history of malignant neoplasm of breast [Z85 3]    Principle/ Secondary Diagnosis:  Past Medical History:   Diagnosis Date    Arthritis     [facility]     [facility]     [facility] cancer, [facility] breast [facility]     Heartburn     Hiatal hernia     History of bariatric surgery     Kidney stone     [facility]     Osteoporosis     stage 3    Postsurgical malabsorption     Stress incontinence     Thyroid nodule      Past Surgical History:   Procedure Laterality Date    ABDOMINAL SURGERY      ANGIOPLASTY      BRACHIOPLASTY Bilateral     2017    CARPAL TUNNEL RELEASE Bilateral 2015    CHOLECYSTECTOMY      COLONOSCOPY      GALLBLADDER SURGERY  2015    GASTRIC BYPASS  2014    HYSTERECTOMY  05/2013    INCONTINENCE SURGERY  09/2013    MASTECTOMY Bilateral 10/2016    CA BREAST RECONSTRUC W LAT DORSI FLAP Right 8/6/2019    Procedure: BREAST RECONSTRUCTION W/FLAP LATISSIMUS DORSI;  Surgeon: Kenrick Majano MD;  Location: [facility];  Service: Plastics    CA BREAST RECONSTRUC W TISS EXPANDR Bilateral 8/6/2019    Procedure: BREAST TISSUE EXPANDER PLACEMENT;  Surgeon: Kenrick Majano MD;  [facility];  Service: Plastics    CA REMOVAL OF BREAST CAPSULE Bilateral 8/6/2019    Procedure: BREAST CAPSULECTOMY;  Surgeon: Kenrick Majano MD;  [facility];  Service: Plastics    CA REMOVAL OF BREAST IMPLANT Bilateral 8/6/2019    Procedure: BREAST IMPLANT REMOVAL;  Surgeon: Kenrick Majano MD;  [facility];  Service: Plastics    REDUCTION MAMMAPLASTY      TRANSFER MUSCLE PECTORALIS Bilateral 8/6/2019    Procedure: Lee Monzon MUSCLE REPAIR;  Surgeon: Amelia Altman MD;  [facility]: [facility];  Service: Plastics     Discharge Diagnoses:   Principal Problem:    Acquired absence of breast and absent nipple, bilateral  Active Problems:    History of radiation therapy    Personal history of malignant neoplasm of breast      Procedures Performed:  BREAST IMPLANT REMOVAL (Bilateral Breast)  BREAST CAPSULECTOMY (Bilateral Breast)  PECTORALIS MUSCLE REPAIR (Bilateral Chest)  BREAST RECONSTRUCTION W/FLAP LATISSIMUS DORSI (Right Back)  BREAST TISSUE EXPANDER PLACEMENT (Bilateral Breast)    Consultants:   None    History of Present Illness:  Frandy Figueroa is a 51y o  year old female who presents presents for evaluation for breast reconstruction options  Patient describes the following timeline:  - In 2013 she was found to have right breast cancer for which she underwent 2 lumpectomies given her positive margins   Patient had 6 lymph nodes remove and underwent adjuvant chemotherapy and radiation and the poke holes   She subsequently had a hysterectomy given some fibrous her uterus    - In 2014 she underwent a laparoscopic gastric bypass at the [facility] and lost 100 pounds  - In 2015 she underwent bilateral breast reduction and abdominoplasty with Dr Jesus Ocampo  - In 2016 she underwent bilateral mastectomies with immediate placement of tissue expanders by Dr Jesus Ocampo  - In 2017 she underwent exchange of expander to permanent implant as well as nipple reconstruction   - In August 2018, patient was found to have capsule contracture for which she underwent exchange of the right implant to a larger size  Patient today presents complaining that she is very dissatisfied with the appearance of her breasts with significant deformities as well as associated pain   She 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 children's and is very conscientious about what her daughter might Vonita Agata of her     Surgical intervention was planned and patient was in agreement  Hospital Course: Brooklyn Salinas is a 51 y o  female admitted postoperatively  Patient underwent bilateral breast implant removal with capsulectomy and pectoralis muscle repair  Bilateral breast tissue expander placement  Right breast reconstruction with latissimus dorsi flap, and application of bilateral VAC  Patient was admitted for pain control and monitoring of vitals  On postoperative day 1, patient had been up and ambulating without difficulty  She was tolerating a diet without any nausea or vomiting  She did complain of some pain which she did not feel was adequately controlled  Changes were made to her pain medication regimen which the patient agreed with  On postoperative day 2, patient's pain was better controlled  She was set up for home nursing  The patient has 6 drains placed intraoperatively which were serosanguineous  These were left it at discharge  Patient was educated on drain care  She was deemed appropriate for discharge and discharged home with medications for pain and instructions on wound VAC  She will follow up in the Plastic surgery office next week  She was instructed to call with any questions or concerns in the interim  The patient was seen and examined on the day of discharge:   Please see progress note from 8/8/9    Condition at Discharge: stable     Discharge instructions/Information to patient and family:   See after visit summary for information provided to patient and family  Provisions for Follow-Up Care:  See after visit summary for information related to follow-up care and any pertinent home health orders  Disposition: See After Visit Summary for discharge disposition information  Planned Readmission: No    Discharge Statement   I spent 30 minutes discharging the patient  This time was spent on the day of discharge  I had direct contact with the patient on the day of discharge   Additional documentation is required if more than 30 minutes were spent on discharge  Discharge Medications:  See after visit summary for reconciled discharge medications provided to patient and family        Neal Cannon PA-C  8/9/2019

## 2019-08-10 DIAGNOSIS — R33.9 URINARY RETENTION: Primary | ICD-10-CM

## 2019-08-10 RX ORDER — ALFUZOSIN HYDROCHLORIDE 10 MG/1
10 TABLET, EXTENDED RELEASE ORAL DAILY
Qty: 14 TABLET | Refills: 0 | Status: SHIPPED | OUTPATIENT
Start: 2019-08-10 | End: 2020-03-20

## 2019-08-12 ENCOUNTER — TELEPHONE (OUTPATIENT)
Dept: PLASTIC SURGERY | Facility: CLINIC | Age: 49
End: 2019-08-12

## 2019-08-12 NOTE — TELEPHONE ENCOUNTER
Patient called over the weekend for urinary urgency, Dr Odilia Mendoza asked to call and check on the patient  per patient she is feeling a lot better

## 2019-08-14 ENCOUNTER — OFFICE VISIT (OUTPATIENT)
Dept: PLASTIC SURGERY | Facility: CLINIC | Age: 49
End: 2019-08-14

## 2019-08-14 VITALS — HEIGHT: 60 IN | BODY MASS INDEX: 41.82 KG/M2 | WEIGHT: 213 LBS

## 2019-08-14 DIAGNOSIS — Z90.13 ACQUIRED ABSENCE OF BREAST AND ABSENT NIPPLE, BILATERAL: Primary | ICD-10-CM

## 2019-08-14 PROCEDURE — 99024 POSTOP FOLLOW-UP VISIT: CPT | Performed by: PLASTIC SURGERY

## 2019-08-14 RX ORDER — OXYCODONE HYDROCHLORIDE 5 MG/1
5 TABLET ORAL EVERY 6 HOURS PRN
Qty: 20 TABLET | Refills: 0 | Status: SHIPPED | OUTPATIENT
Start: 2019-08-14 | End: 2019-08-27 | Stop reason: SDUPTHER

## 2019-08-14 NOTE — PROGRESS NOTES
Carmen Alt is 1 week post-op: BREAST IMPLANT REMOVAL (Bilateral)  BREAST CAPSULECTOMY (Bilateral)  PECTORALIS MUSCLE REPAIR (Bilateral)  BREAST RECONSTRUCTION W/FLAP LATISSIMUS DORSI (Right)  BREAST TISSUE EXPANDER PLACEMENT (Bilateral)  APPLICATION OF NEGATIVE PRESSURE DRESSING BILATERAL  INTERCOSTAL NERVE BLOCK WITH EXPAREL     Presenting for routine follow-up  S:  Doing well  Patient has noted no excessive redness, pain and discharge  O:  Right back: incision is c/d/i, no palpable fluid collection, ALEIDA SS     Right breast: flap is warm, viable and well perfused, ALEIDA SS     Left breast: incision is c/d/i, no palpable fluid collection, ALEIDA is SS     A:  Satisfactory course  I discussed with Mrs Ayala that I felt she is doing well  Left breast Prevena was replaced and tolerated well  P: Drains removed  and Drains left in place     Patient to return in 1 week for removal of left VAC and steri strips placement, possible drain removal  I explained that left breast drains will be left in place for up to 2-3 weeks to avoid collection around ADM   Oxycodone prescription was refilled  Patient is to return as needed for redness, swelling, discomfort, or any concern about her surgery

## 2019-08-20 ENCOUNTER — OFFICE VISIT (OUTPATIENT)
Dept: PLASTIC SURGERY | Facility: CLINIC | Age: 49
End: 2019-08-20

## 2019-08-20 VITALS — WEIGHT: 213 LBS | BODY MASS INDEX: 41.82 KG/M2 | HEIGHT: 60 IN

## 2019-08-20 DIAGNOSIS — Z09 AFTERCARE INVOLVING THE USE OF PLASTIC SURGERY: Primary | ICD-10-CM

## 2019-08-20 PROCEDURE — 99024 POSTOP FOLLOW-UP VISIT: CPT | Performed by: PLASTIC SURGERY

## 2019-08-20 NOTE — PROGRESS NOTES
Rosalina Higuera is 2 weeks post-op:   BREAST IMPLANT REMOVAL (Bilateral)  BREAST CAPSULECTOMY (Bilateral)  PECTORALIS MUSCLE REPAIR (Bilateral)  BREAST RECONSTRUCTION W/FLAP LATISSIMUS DORSI (Right)  BREAST TISSUE EXPANDER PLACEMENT (Bilateral)  APPLICATION OF NEGATIVE PRESSURE DRESSING BILATERAL  INTERCOSTAL NERVE BLOCK WITH EXPAREL      Presenting for routine follow-up  S:  Doing well  Patient has noted no excessive swelling, pain and discharge  O:  Wound healing well  Drainage as expected  A:  Satisfactory course  P: Drains removed  , Drains left in place  and VAC removed  Patient to return in 1 week for final drain removals and possible expansion   Patient is to return as needed for redness, swelling, discomfort, or any concern about her surgery

## 2019-08-27 ENCOUNTER — OFFICE VISIT (OUTPATIENT)
Dept: PLASTIC SURGERY | Facility: CLINIC | Age: 49
End: 2019-08-27

## 2019-08-27 VITALS — BODY MASS INDEX: 41.82 KG/M2 | HEIGHT: 60 IN | WEIGHT: 213 LBS

## 2019-08-27 DIAGNOSIS — Z90.13 ACQUIRED ABSENCE OF BREAST AND ABSENT NIPPLE, BILATERAL: Primary | ICD-10-CM

## 2019-08-27 PROCEDURE — 99024 POSTOP FOLLOW-UP VISIT: CPT | Performed by: PLASTIC SURGERY

## 2019-08-27 RX ORDER — OXYCODONE HYDROCHLORIDE 5 MG/1
5 TABLET ORAL EVERY 6 HOURS PRN
Qty: 20 TABLET | Refills: 0 | Status: SHIPPED | OUTPATIENT
Start: 2019-08-27 | End: 2019-09-13 | Stop reason: SDUPTHER

## 2019-08-27 NOTE — PROGRESS NOTES
Darya Noble is 3 weeks post-op: BREAST IMPLANT REMOVAL (Bilateral)  BREAST CAPSULECTOMY (Bilateral)  PECTORALIS MUSCLE REPAIR (Bilateral)  BREAST RECONSTRUCTION W/FLAP LATISSIMUS DORSI (Right)  BREAST TISSUE EXPANDER PLACEMENT (Bilateral)  APPLICATION OF NEGATIVE PRESSURE DRESSING BILATERAL  INTERCOSTAL NERVE BLOCK WITH EXPAREL       Presenting for routine follow-up  S:  Doing well  Patient has noted no excessive swelling, pain and discharge  O:  Right back incision well healed, drain serous 60 ml output last 24 hrs       Right breast: flap warm, viable and well perfused  Incisions are c/d/i,      Left breast: soft, skin viable, suture line well healed, no palpable fluid collection  Drains is minimal serous      A:  Satisfactory course  60 ml of maureen saline was added sterile to left breast for total volume of 300 ml  P: left breast Drains removed  and back Drain left in place     Patient to return in 1 week for drain removal and in 2 week for expasion  Patient is to return as needed for redness, swelling, discomfort, or any concern about her surgery

## 2019-09-13 ENCOUNTER — OFFICE VISIT (OUTPATIENT)
Dept: PLASTIC SURGERY | Facility: CLINIC | Age: 49
End: 2019-09-13

## 2019-09-13 VITALS — BODY MASS INDEX: 41.82 KG/M2 | WEIGHT: 213 LBS | HEIGHT: 60 IN

## 2019-09-13 DIAGNOSIS — Z90.13 ACQUIRED ABSENCE OF BREAST AND ABSENT NIPPLE, BILATERAL: ICD-10-CM

## 2019-09-13 DIAGNOSIS — Z85.3 PERSONAL HISTORY OF MALIGNANT NEOPLASM OF BREAST: Primary | ICD-10-CM

## 2019-09-13 PROCEDURE — 99024 POSTOP FOLLOW-UP VISIT: CPT | Performed by: PHYSICIAN ASSISTANT

## 2019-09-13 RX ORDER — OXYCODONE HYDROCHLORIDE 5 MG/1
5 TABLET ORAL EVERY 6 HOURS PRN
Qty: 20 TABLET | Refills: 0 | Status: SHIPPED | OUTPATIENT
Start: 2019-09-13 | End: 2020-03-20

## 2019-09-13 NOTE — PROGRESS NOTES
08/06/19 [id number]         Procedures:   Yordan Conn IMPLANT REMOVAL (Bilateral Breast)      BREAST CAPSULECTOMY (Bilateral Breast)      PECTORALIS MUSCLE REPAIR (Bilateral Chest)      BREAST RECONSTRUCTION W/FLAP LATISSIMUS DORSI (Right Back)      BREAST TISSUE EXPANDER PLACEMENT (Bilateral Breast)     S: Pt feels ok  Still takes Oxycodone for pain at bedtime  Otherwise doing well  O: Incisions are healing well  Right breast flap is warm, good cap refill  Left breast is soft, nontender  A: S/P Right Lat flap breast recon, left breast expander  P:  Last drain removed today  120 cc sterile normal saline was added  to left breast for total volume of 420 cc    60 cc sterile normal saline was added  to right breast for total volume of    Cc      F/U 2 weeks

## 2019-10-04 DIAGNOSIS — Z85.3 PERSONAL HISTORY OF MALIGNANT NEOPLASM OF BREAST: Primary | ICD-10-CM

## 2019-10-04 RX ORDER — ACETAMINOPHEN AND CODEINE PHOSPHATE 300; 30 MG/1; MG/1
1 TABLET ORAL AS NEEDED
Qty: 10 TABLET | Refills: 0 | Status: SHIPPED | OUTPATIENT
Start: 2019-10-04 | End: 2020-09-08 | Stop reason: HOSPADM

## 2019-10-04 RX ORDER — ACETAMINOPHEN AND CODEINE PHOSPHATE 300; 30 MG/1; MG/1
1 TABLET ORAL AS NEEDED
Qty: 10 TABLET | Refills: 0 | Status: CANCELLED | OUTPATIENT
Start: 2019-10-04

## 2019-10-04 RX ORDER — ZOLPIDEM TARTRATE 10 MG/1
5 TABLET ORAL
Qty: 30 TABLET | Refills: 0 | Status: SHIPPED | OUTPATIENT
Start: 2019-10-04 | End: 2020-03-20

## 2019-10-04 NOTE — PROGRESS NOTES
Patient called and requested pain medication  Stated it helps her sleep      Will prescribe Ambien and Tylenol #3

## 2019-10-06 NOTE — PROGRESS NOTES
H&P - Plastic Surgery   Duane Finger 51 y o  female MRN: [id number]  Unit/Bed#:  Encounter: [id number]              Assessment:  History of radiation therapy [Z92 3]      Acquired absence of breast and absent nipple, bilateral [Z90 13]      Personal history of malignant neoplasm of breast [Z85 3]  Plan:  BREAST IMPLANT REMOVAL (Bilateral Breast)      BREAST CAPSULECTOMY (Bilateral Breast)      PECTORALIS MUSCLE REPAIR (Bilateral Chest)      BREAST RECONSTRUCTION W/FLAP LATISSIMUS DORSI (Right Breast)      BREAST TISSUE EXPANDER PLACEMENT (Bilateral Breast)  Anesthesia type: General           HPI:   Duane Finger is a 51y o  year old female who presents presents for evaluation for breast reconstruction options  Patient describes the following timeline:     - In 2013 she was found to have right breast cancer for which she underwent 2 lumpectomies given her positive margins   Patient had 6 lymph nodes remove and underwent adjuvant chemotherapy and radiation and the poke holes   She subsequently had a hysterectomy given some fibrous her uterus    - In 2014 she underwent a laparoscopic gastric bypass at the [facility] and lost 100 pounds  - In 2015 she underwent bilateral breast reduction and abdominoplasty with Dr Darline Bernal  - In 2016 she underwent bilateral mastectomies with immediate placement of tissue expanders by Dr Darline Beranl    - In 2017 she underwent exchange of expander to permanent implant as well as nipple reconstruction   - In August 2018, patient was found to have capsule contracture for which she underwent exchange of the right implant to a larger size      Patient today presents complaining that she is very dissatisfied with the appearance of her breasts with significant deformities as well as associated pain   She 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 children's and is very conscientious about what her daughter might Beckimendoza Coronel of her       Patient characteristics  Significant medical history: morbid obesity, metabolic syndrome  Prior abdominal surgery: abdomioplasty  Tobacco use: none  Current bra size: n/a  Desired bra size: any size   BMI: 47  PMH/FH of DVT/PE: none  PMH/FH of miscarriages: none           REVIEW OF SYSTEMS     Constitutional: Negative     HENT: Negative     Eyes: Negative     Respiratory: Negative     Cardiovascular: Negative     Gastrointestinal: Negative     Endocrine: Negative     Genitourinary: Negative     Musculoskeletal: Positive for back pain  Skin: Positive for color change     Allergic/Immunologic: Negative     Neurological: Negative     Hematological: Negative     Psychiatric/Behavioral: Negative           Historical Information     Medical History        Past Medical History:   Diagnosis Date    Arthritis      [facility] [address]      [facility] [address]      [facility] [address]      Heartburn      Hiatal hernia      History of bariatric surgery      Kidney stone      Osteoporosis       stage 3    Postsurgical malabsorption      Stress incontinence      Thyroid nodule           Surgical History         Past Surgical History:   Procedure Laterality Date    ABDOMINAL SURGERY        BRACHIOPLASTY Bilateral       2017    CARPAL TUNNEL RELEASE Bilateral 2015    CHOLECYSTECTOMY        COLONOSCOPY        GALLBLADDER SURGERY   2015    GASTRIC BYPASS   2014    HYSTERECTOMY   05/2013    INCONTINENCE SURGERY   09/2013    MASTECTOMY Bilateral 10/2016    REDUCTION MAMMAPLASTY                Social History     Social History           Substance and Sexual Activity   Alcohol Use Not Currently    Frequency: Never      Social History          Substance and Sexual Activity   Drug Use Not Currently      Social History          Tobacco Use   Smoking Status Never Smoker   Smokeless Tobacco Never Used      Family History:         Family History   Problem Relation Age of Onset    No Known Problems Mother      Diabetes Father      Heart disease Father              Meds/Allergies all current active meds have been reviewed

## 2019-10-11 ENCOUNTER — OFFICE VISIT (OUTPATIENT)
Dept: PLASTIC SURGERY | Facility: CLINIC | Age: 49
End: 2019-10-11

## 2019-10-11 VITALS — BODY MASS INDEX: 41.82 KG/M2 | WEIGHT: 213 LBS | HEIGHT: 60 IN

## 2019-10-11 DIAGNOSIS — Z90.13 ACQUIRED ABSENCE OF BREAST AND ABSENT NIPPLE, BILATERAL: ICD-10-CM

## 2019-10-11 DIAGNOSIS — Z98.890 S/P FLAP GRAFT: ICD-10-CM

## 2019-10-11 DIAGNOSIS — Z85.3 PERSONAL HISTORY OF MALIGNANT NEOPLASM OF BREAST: Primary | ICD-10-CM

## 2019-10-11 PROCEDURE — 99024 POSTOP FOLLOW-UP VISIT: CPT | Performed by: PHYSICIAN ASSISTANT

## 2019-10-11 NOTE — PROGRESS NOTES
08/06/19 0826               Procedures:       BREAST IMPLANT REMOVAL (Bilateral Breast)      BREAST CAPSULECTOMY (Bilateral Breast)      PECTORALIS MUSCLE REPAIR (Bilateral Chest)      BREAST RECONSTRUCTION W/FLAP LATISSIMUS DORSI (Right Back)      BREAST TISSUE EXPANDER PLACEMENT (Bilateral Breast)     S: Pt presents for eval and expander fill  She has no complaints today  States that her right breast has dropped a little  O: Pt has no pain to palpation  Right flap feels soft  Incisions are well healed  A: S/P breast reconstruction and expander placement  P: Expander fills:    Under sterile conditions the left breast expander was entered at the fill site and 120 cc of NS was instilled  There is now 540 cc in the Left expander  On the right side, 100 cc of NS was instilled  The patient noted some arm discomfort so the instillation was stopped at 100cc  This pain subsided after about 10 minutes  The patient felt fine after, and scheduled for a followup in 3 weeks

## 2019-11-15 ENCOUNTER — OFFICE VISIT (OUTPATIENT)
Dept: PLASTIC SURGERY | Facility: CLINIC | Age: 49
End: 2019-11-15

## 2019-11-15 VITALS — WEIGHT: 213 LBS | BODY MASS INDEX: 41.82 KG/M2 | HEIGHT: 60 IN

## 2019-11-15 DIAGNOSIS — Z98.890 HISTORY OF BREAST RECONSTRUCTION: ICD-10-CM

## 2019-11-15 DIAGNOSIS — Z90.13 ACQUIRED ABSENCE OF BREAST AND ABSENT NIPPLE, BILATERAL: Primary | ICD-10-CM

## 2019-11-15 PROCEDURE — 99024 POSTOP FOLLOW-UP VISIT: CPT | Performed by: PHYSICIAN ASSISTANT

## 2019-11-15 NOTE — PROGRESS NOTES
08/06/19 0826               Procedures:       BREAST IMPLANT REMOVAL (Bilateral Breast)      BREAST CAPSULECTOMY (Bilateral Breast)      PECTORALIS MUSCLE REPAIR (Bilateral Chest)      BREAST RECONSTRUCTION W/FLAP LATISSIMUS DORSI (Right Back)      BREAST TISSUE EXPANDER PLACEMENT (Bilateral Breast)     S: 540 Left, 220 right  Pt feels well  Some occasional pain in right arm  O: Breasts are soft  Incisions are healed  A: Pt is doing well  60 cc fill was performed on each side, resulting in 280 cc on right and 600 cc on the left  Dr Abreu Speaker feels she is ready for expander exchange  She will be scheduled for this        Expander fills are as follows after this visit:    Date  Right  Left  OR  60  240  8/27    60  9/13  60  120  10/11  100  120  11/15  60  60    Total  280  600

## 2019-12-04 PROBLEM — Z98.890 HISTORY OF BREAST RECONSTRUCTION: Status: ACTIVE | Noted: 2019-12-04

## 2020-03-20 ENCOUNTER — OFFICE VISIT (OUTPATIENT)
Dept: PLASTIC SURGERY | Facility: CLINIC | Age: 50
End: 2020-03-20

## 2020-03-20 ENCOUNTER — APPOINTMENT (OUTPATIENT)
Dept: LAB | Facility: CLINIC | Age: 50
End: 2020-03-20
Payer: MEDICARE

## 2020-03-20 VITALS
DIASTOLIC BLOOD PRESSURE: 78 MMHG | HEART RATE: 92 BPM | SYSTOLIC BLOOD PRESSURE: 122 MMHG | HEIGHT: 60 IN | TEMPERATURE: 98.3 F | BODY MASS INDEX: 44.38 KG/M2 | WEIGHT: 226.06 LBS

## 2020-03-20 DIAGNOSIS — Z85.3 PERSONAL HISTORY OF MALIGNANT NEOPLASM OF BREAST: ICD-10-CM

## 2020-03-20 DIAGNOSIS — Z98.890 HISTORY OF BREAST RECONSTRUCTION: Primary | ICD-10-CM

## 2020-03-20 LAB
ANION GAP SERPL CALCULATED.3IONS-SCNC: 10 MMOL/L (ref 4–13)
ATRIAL RATE: 88 BPM
BASOPHILS # BLD AUTO: 0.01 THOUSANDS/ΜL (ref 0–0.1)
BASOPHILS NFR BLD AUTO: 0 % (ref 0–1)
BUN SERPL-MCNC: 13 MG/DL (ref 5–25)
CALCIUM SERPL-MCNC: 8.4 MG/DL (ref 8.3–10.1)
CHLORIDE SERPL-SCNC: 101 MMOL/L (ref 100–108)
CO2 SERPL-SCNC: 26 MMOL/L (ref 21–32)
CREAT SERPL-MCNC: 0.64 MG/DL (ref 0.6–1.3)
EOSINOPHIL # BLD AUTO: 0.17 THOUSAND/ΜL (ref 0–0.61)
EOSINOPHIL NFR BLD AUTO: 3 % (ref 0–6)
ERYTHROCYTE [DISTWIDTH] IN BLOOD BY AUTOMATED COUNT: 20.4 % (ref 11.6–15.1)
GFR SERPL CREATININE-BSD FRML MDRD: 105 ML/MIN/1.73SQ M
GLUCOSE P FAST SERPL-MCNC: 82 MG/DL (ref 65–99)
HCT VFR BLD AUTO: 30.6 % (ref 34.8–46.1)
HGB BLD-MCNC: 8.9 G/DL (ref 11.5–15.4)
IMM GRANULOCYTES # BLD AUTO: 0.02 THOUSAND/UL (ref 0–0.2)
IMM GRANULOCYTES NFR BLD AUTO: 0 % (ref 0–2)
LYMPHOCYTES # BLD AUTO: 0.99 THOUSANDS/ΜL (ref 0.6–4.47)
LYMPHOCYTES NFR BLD AUTO: 17 % (ref 14–44)
MCH RBC QN AUTO: 19.4 PG (ref 26.8–34.3)
MCHC RBC AUTO-ENTMCNC: 29.1 G/DL (ref 31.4–37.4)
MCV RBC AUTO: 67 FL (ref 82–98)
MONOCYTES # BLD AUTO: 0.77 THOUSAND/ΜL (ref 0.17–1.22)
MONOCYTES NFR BLD AUTO: 13 % (ref 4–12)
NEUTROPHILS # BLD AUTO: 3.99 THOUSANDS/ΜL (ref 1.85–7.62)
NEUTS SEG NFR BLD AUTO: 67 % (ref 43–75)
NRBC BLD AUTO-RTO: 0 /100 WBCS
P AXIS: 24 DEGREES
PLATELET # BLD AUTO: 368 THOUSANDS/UL (ref 149–390)
PMV BLD AUTO: 9.4 FL (ref 8.9–12.7)
POTASSIUM SERPL-SCNC: 3.4 MMOL/L (ref 3.5–5.3)
PR INTERVAL: 138 MS
QRS AXIS: 45 DEGREES
QRSD INTERVAL: 78 MS
QT INTERVAL: 378 MS
QTC INTERVAL: 457 MS
RBC # BLD AUTO: 4.58 MILLION/UL (ref 3.81–5.12)
SODIUM SERPL-SCNC: 137 MMOL/L (ref 136–145)
T WAVE AXIS: 41 DEGREES
VENTRICULAR RATE: 88 BPM
WBC # BLD AUTO: 5.95 THOUSAND/UL (ref 4.31–10.16)

## 2020-03-20 PROCEDURE — 93010 ELECTROCARDIOGRAM REPORT: CPT | Performed by: INTERNAL MEDICINE

## 2020-03-20 PROCEDURE — 85025 COMPLETE CBC W/AUTO DIFF WBC: CPT

## 2020-03-20 PROCEDURE — 80048 BASIC METABOLIC PNL TOTAL CA: CPT

## 2020-03-20 PROCEDURE — 93005 ELECTROCARDIOGRAM TRACING: CPT

## 2020-03-20 PROCEDURE — 36415 COLL VENOUS BLD VENIPUNCTURE: CPT

## 2020-03-20 PROCEDURE — PREOP: Performed by: PLASTIC SURGERY

## 2020-03-20 RX ORDER — SULFAMETHOXAZOLE AND TRIMETHOPRIM 800; 160 MG/1; MG/1
1 TABLET ORAL EVERY 12 HOURS SCHEDULED
Qty: 10 TABLET | Refills: 0 | Status: SHIPPED | OUTPATIENT
Start: 2020-03-20 | End: 2020-03-24

## 2020-03-20 RX ORDER — GABAPENTIN 300 MG/1
300 CAPSULE ORAL 3 TIMES DAILY
Qty: 45 CAPSULE | Refills: 0 | Status: SHIPPED | OUTPATIENT
Start: 2020-03-20 | End: 2020-03-24

## 2020-03-20 RX ORDER — ACETAMINOPHEN 500 MG
1000 TABLET ORAL 3 TIMES DAILY
Qty: 112 TABLET | Refills: 0 | Status: SHIPPED | OUTPATIENT
Start: 2020-03-20 | End: 2020-04-03

## 2020-03-20 RX ORDER — CHLORHEXIDINE GLUCONATE 0.12 MG/ML
RINSE ORAL
COMMUNITY
Start: 2020-02-26 | End: 2020-09-08 | Stop reason: HOSPADM

## 2020-03-20 RX ORDER — OXYCODONE HYDROCHLORIDE 5 MG/1
5 TABLET ORAL EVERY 4 HOURS PRN
Qty: 31 TABLET | Refills: 0 | Status: ON HOLD | OUTPATIENT
Start: 2020-03-20 | End: 2021-02-09 | Stop reason: SDUPTHER

## 2020-03-20 NOTE — PROGRESS NOTES
Assessment/Plan   Diagnoses and all orders for this visit:    History of breast reconstruction  -     acetaminophen (TYLENOL) 500 mg tablet; Take 2 tablets (1,000 mg total) by mouth 3 (three) times a day for 14 days Take 2 tablets (1,000 mg)  the night prior to surgery   -     gabapentin (NEURONTIN) 300 mg capsule; Take 1 capsule (300 mg total) by mouth 3 (three) times a day Take 3 capsules (900 mg) the night prior to surgery  -     oxyCODONE (ROXICODONE) 5 mg immediate release tablet; Take 1 tablet (5 mg total) by mouth every 4 (four) hours as needed for moderate painMax Daily Amount: 30 mg  -     sulfamethoxazole-trimethoprim (BACTRIM DS) 800-160 mg per tablet; Take 1 tablet by mouth every 12 (twelve) hours for 5 days    Other orders  -     chlorhexidine (PERIDEX) 0 12 % solution; SWISH AND SPIT WITH 10 ML THREE TIMES DAILY START 3 DAYS PRIOR TO SURGERY      During today's 40 minute visit, all of which was dedicated to counseling, I reviewed the anticipated preoperative, intraoperative, and postoperative courses  I informed her that the nurses will contact the patient the day prior to surgery in order to discuss orientation and logistical information  I will then see her the day of surgery where I will answer any last minute questions and perform my preoperative markings  We will then proceed to the operating room for an anticipated 2 5 hour procedure under general anesthesia, specifically a Bilateral exchange of tissue expander to permanent silicone implants, possible capsulotomy, and left breast fat grafting      For each procedure, I reviewed the incisions/scars, duration, recovery time, postoperative restrictions, and follow-up  When applicable, I discussed hospitalization, dressing changes, drains, and donor site morbidity        All risks, benefits, and options, including but not limited to, change or loss in sensation of the nipple and surrounding nipple/areola complex that may be transient or permanent, scars that will be visible on cut surfaces of the breast, wound separation, infection, breast asymmetry, increased or decreased pigmentation of the skin and/or nipple-areola complex, blood loss, hematoma, seroma, and fat necrosis  The patient will have an expected six-week postoperative recovery period during which time she will have activity restrictions  Specifically, this includes, but is not limited to, avoidance of heavy lifting (nothing heavier than a gallon of milk), avoidance of strenuous activity, avoidance of any activity which makes her hurt or perspire, avoidance of anything that places tension across the incisions, avoidance of submerging the incisions or operative area in water (including bathing in a bathtub, swimming, etc ), and avoidance of dirty, kapil, or contaminated environments  A surgical bra would be recommended to be worn 24/7 for 3 weeks, except when bathing or when washing  It was emphasized to the patient that postoperatively, it is mandated to employ a high-protein, low salt diet, take deep breaths in order to avoid atelectasis, and ambulate at least 5 times a day in order to avoid deep venous thrombosis and pulmonary embolism  The patient was provided prescriptions for acetaminophen, gabapentin, oxycodone, and Hibiclens  The purposes of each were explained and the patient will have these filled prior to the day of the operation  At the conclusion of our conversation, the patient wished to proceed with surgery  Elvi Mejia MD   [address]   Office: [phone]      Subjective   Patient ID: Duane Finger is a 53 y o  female  Duane Finger is being seen today for preoperative evaluation  Since last visit, there has not been any changes in the patient's overall health status and/or surgical plan to report  Patient has a history of breast cancer       They present preoperatively for planned: Bilateral exchange of tissue expander to permanent silicone implants, possible capsulotomy, and left breast fat grafting     Scheduled for: 4/7/2020    In conjunction with: n/a     The following history of N/V post operative: n/a     Nicotine status: n/a     Vitals:    03/20/20 1027   BP: 122/78   Pulse: 92   Temp: 98 3 °F (36 8 °C)     HPI    The following portions of the patient's history were reviewed and updated as appropriate: allergies, current medications, past family history, past medical history, past social history, past surgical history and problem list     Review of Systems   Constitutional: Negative  HENT: Negative  Eyes: Negative  Respiratory: Negative  Cardiovascular: Negative  Gastrointestinal: Negative  Endocrine: Negative  Genitourinary: Negative  Musculoskeletal: Negative  Allergic/Immunologic: Negative  Neurological: Negative  Objective   Physical Exam   Constitutional  She appears well-developed and well-nourished  Cardiovascular: Normal rate  Pulmonary/Chest  Effort normal      Psychiatric  She has a normal mood and affect  Her behavior is normal      Breast            Body mass index is 44 15 kg/m²

## 2020-03-24 ENCOUNTER — DOCUMENTATION (OUTPATIENT)
Dept: PLASTIC SURGERY | Facility: CLINIC | Age: 50
End: 2020-03-24

## 2020-03-24 DIAGNOSIS — Z98.890 HISTORY OF BREAST RECONSTRUCTION: Primary | ICD-10-CM

## 2020-03-24 RX ORDER — CIPROFLOXACIN 500 MG/1
500 TABLET, FILM COATED ORAL EVERY 12 HOURS SCHEDULED
Qty: 14 TABLET | Refills: 0 | Status: SHIPPED | OUTPATIENT
Start: 2020-03-24 | End: 2020-03-31

## 2020-03-24 RX ORDER — GABAPENTIN 250 MG/5ML
300 SOLUTION ORAL 3 TIMES DAILY
Qty: 90 ML | Refills: 0 | Status: SHIPPED | OUTPATIENT
Start: 2020-03-24 | End: 2021-02-01

## 2020-08-14 ENCOUNTER — APPOINTMENT (OUTPATIENT)
Dept: LAB | Facility: HOSPITAL | Age: 50
End: 2020-08-14
Attending: PLASTIC SURGERY
Payer: MEDICARE

## 2020-08-14 DIAGNOSIS — Z98.890 HISTORY OF BREAST RECONSTRUCTION: ICD-10-CM

## 2020-08-14 LAB
ANION GAP SERPL CALCULATED.3IONS-SCNC: 8 MMOL/L (ref 4–13)
BASOPHILS # BLD AUTO: 0.04 THOUSANDS/ΜL (ref 0–0.1)
BASOPHILS NFR BLD AUTO: 1 % (ref 0–1)
BUN SERPL-MCNC: 14 MG/DL (ref 5–25)
CALCIUM SERPL-MCNC: 8.7 MG/DL (ref 8.3–10.1)
CHLORIDE SERPL-SCNC: 106 MMOL/L (ref 100–108)
CO2 SERPL-SCNC: 26 MMOL/L (ref 21–32)
CREAT SERPL-MCNC: 0.66 MG/DL (ref 0.6–1.3)
EOSINOPHIL # BLD AUTO: 0.26 THOUSAND/ΜL (ref 0–0.61)
EOSINOPHIL NFR BLD AUTO: 3 % (ref 0–6)
ERYTHROCYTE [DISTWIDTH] IN BLOOD BY AUTOMATED COUNT: 20.3 % (ref 11.6–15.1)
GFR SERPL CREATININE-BSD FRML MDRD: 104 ML/MIN/1.73SQ M
GLUCOSE P FAST SERPL-MCNC: 67 MG/DL (ref 65–99)
HCT VFR BLD AUTO: 30.6 % (ref 34.8–46.1)
HGB BLD-MCNC: 8.4 G/DL (ref 11.5–15.4)
IMM GRANULOCYTES # BLD AUTO: 0.04 THOUSAND/UL (ref 0–0.2)
IMM GRANULOCYTES NFR BLD AUTO: 1 % (ref 0–2)
LYMPHOCYTES # BLD AUTO: 2.91 THOUSANDS/ΜL (ref 0.6–4.47)
LYMPHOCYTES NFR BLD AUTO: 33 % (ref 14–44)
MCH RBC QN AUTO: 18.3 PG (ref 26.8–34.3)
MCHC RBC AUTO-ENTMCNC: 27.5 G/DL (ref 31.4–37.4)
MCV RBC AUTO: 67 FL (ref 82–98)
MONOCYTES # BLD AUTO: 0.73 THOUSAND/ΜL (ref 0.17–1.22)
MONOCYTES NFR BLD AUTO: 8 % (ref 4–12)
NEUTROPHILS # BLD AUTO: 4.82 THOUSANDS/ΜL (ref 1.85–7.62)
NEUTS SEG NFR BLD AUTO: 54 % (ref 43–75)
NRBC BLD AUTO-RTO: 0 /100 WBCS
PLATELET # BLD AUTO: 424 THOUSANDS/UL (ref 149–390)
PMV BLD AUTO: 9.5 FL (ref 8.9–12.7)
POTASSIUM SERPL-SCNC: 3.9 MMOL/L (ref 3.5–5.3)
RBC # BLD AUTO: 4.59 MILLION/UL (ref 3.81–5.12)
SODIUM SERPL-SCNC: 140 MMOL/L (ref 136–145)
WBC # BLD AUTO: 8.8 THOUSAND/UL (ref 4.31–10.16)

## 2020-08-14 PROCEDURE — 80048 BASIC METABOLIC PNL TOTAL CA: CPT

## 2020-08-14 PROCEDURE — 36415 COLL VENOUS BLD VENIPUNCTURE: CPT

## 2020-08-14 PROCEDURE — 85025 COMPLETE CBC W/AUTO DIFF WBC: CPT

## 2020-09-03 NOTE — PRE-PROCEDURE INSTRUCTIONS
Pre-Surgery Instructions:   Medication Instructions    acetaminophen-codeine (TYLENOL #3) 300-30 mg per tablet Patient was instructed by Physician and understands   alendronate (FOSAMAX) 70 mg tablet Instructed patient per Anesthesia Guidelines   anastrozole (ARIMIDEX) 1 mg tablet Patient was instructed by Physician and understands   chlorhexidine (PERIDEX) 0 12 % solution Patient was instructed by Physician and understands   fluticasone-salmeterol (ADVAIR DISKUS) 250-50 mcg/dose inhaler Instructed patient per Anesthesia Guidelines   Gabapentin 300 MG/6ML SOLN Patient was instructed by Physician and understands   oxyCODONE (ROXICODONE) 5 mg immediate release tablet Patient was instructed by Physician and understands   triamcinolone (KENALOG) 0 5 % cream Instructed patient per Anesthesia Guidelines  Preop and bathing instructions reviewed  Pt has hibiclens  Pt verbalizes understanding of all med hold instructions given to her by surgeon

## 2020-09-07 ENCOUNTER — ANESTHESIA EVENT (OUTPATIENT)
Dept: PERIOP | Facility: HOSPITAL | Age: 50
End: 2020-09-07
Payer: MEDICARE

## 2020-09-07 PROCEDURE — NC001 PR NO CHARGE: Performed by: PHYSICIAN ASSISTANT

## 2020-09-07 NOTE — H&P
H&P - Plastic Surgery   Ilda Mary 53 y o  female MRN: [id number]  Unit/Bed#: OR POOL Encounter: [id number]           Assessment:  Personal history of malignant neoplasm of breast [Z85 3]     Plan:  BREAST REMOVAL TISSUE EXPANDER/ IMPLANT PLACEMENT BREAST (Bilateral Breast)       BREAST CAPSULECTOMY (Bilateral Breast)       BREAST FAT GRAFTING (Left Breast)       HPI:   Ilda Mary is a 53y o  year old female who presents with history of  History of radiation therapy [Z92 3]       Acquired absence of breast and absent nipple, bilateral [Z90 13]       Personal history of malignant neoplasm of breast [Z85 3] and who is now s/p    08/06/19 0826          Procedures:        BREAST IMPLANT REMOVAL (Bilateral Breast)       BREAST CAPSULECTOMY (Bilateral Breast)       PECTORALIS MUSCLE REPAIR (Bilateral Chest)       BREAST RECONSTRUCTION W/FLAP LATISSIMUS DORSI (Right Back)       BREAST TISSUE EXPANDER PLACEMENT (Bilateral Breast)      Dr Dmo Gilmore saw her in March with plans to perform expander to implant procedure  This was delayed due to Covid outbreak  There has not been any changes in the patient's overall health status and/or surgical plan to report  REVIEW OF SYSTEMS    GENERAL/CONSTITUTIONAL: The patient denies fever, fatigue, weakness, weight gain or weight loss  HEAD, EYES, EARS, NOSE AND THROAT: Eyes - The patient denies pain, redness, loss of vision, double or blurred vision and denies wearing glasses  The patient denies ringing in the ears, nosebleeds sinusitis, post nasal drip  Also denies frequent sore throats, hoarseness, painful swallowing  CARDIOVASCULAR: The patient denies chest pain, irregular heartbeats, palpitations, shortness of breath, heart murmurs, high blood pressure, cramps in his legs with walking, pain in his feet or toes at night or varicose veins  RESPIRATORY: The patient denies chronic cough, wheezing or night sweats    GASTROINTESTINAL: The patient denies decreased appetite, nausea, vomiting, diarrhea, constipation, blood in the stools  GENITOURINARY: The patient denies difficult urination, pain or burning with urination, blood in the urine  MUSCULOSKELETAL:chronic pain  The patient denies arm, thigh or calf cramps  No joint or muscle pain  No muscle weakness or tenderness  No joint swelling, neck pain, back pain or major orthopedic injuries  SKIN AND BREASTS: see hpi The patient denies easy bruising, skin redness, skin rash, hives  NEUROLOGIC: The patient denies headache, dizziness, fainting, memory loss  PSYCHIATRIC: The patient denies depression anxiety  ENDOCRINE: The patient denies intolerance to hot or cold temperature, flushing, fingernail changes, increased thirst, increased salt intake or decreased sexual desire  HEMATOLOGIC/LYMPHATIC: The patient denies anemia, bleeding tendency or clotting tendency  ALLERGIC/IMMUNOLOGIC: The patient denies rhinitis, asthma, skin sensitivity, latex allergies or sensitivity        Historical Information   Past Medical History:   Diagnosis Date    Arthritis     [facility] [address]     [facility] [address] [facility]     [facility] right breast [facility] [address] [facility]     Chronic pain disorder     Heartburn     Hiatal hernia     History of bariatric surgery     Irregular heart beat     pt states it was a side effect of a medication    Kidney stone     Lung nodule     Osteoporosis     stage 3    Postsurgical malabsorption     Rheumatoid arthritis [facility] [address] [facility]     Stress incontinence     Thyroid nodule      Past Surgical History:   Procedure Laterality Date    ABDOMINAL SURGERY      BRACHIOPLASTY Bilateral     2017    CARDIAC CATHETERIZATION      CARPAL TUNNEL RELEASE Bilateral 2015    CHOLECYSTECTOMY      COLONOSCOPY      GALLBLADDER SURGERY  2015    GASTRIC BYPASS  2014    HYSTERECTOMY  05/2013    INCONTINENCE SURGERY  09/2013    IR BIOPSY LUNG      lung nodules    MASTECTOMY Bilateral 10/2016    OH BREAST RECONSTRUC W LAT DORSI FLAP Right 8/6/2019    Procedure: BREAST RECONSTRUCTION W/FLAP LATISSIMUS DORSI;  Surgeon: Ariana Hernandez MD;  [facility]: [facility];  Service: Plastics    AL BREAST RECONSTRUC W TISS EXPANDR Bilateral 8/6/2019    Procedure: BREAST TISSUE EXPANDER PLACEMENT;  Surgeon: Ariana Hernandez MD;  [facility];  Service: Plastics    AL REMOVAL OF BREAST CAPSULE Bilateral 8/6/2019    Procedure: BREAST CAPSULECTOMY;  Surgeon: Ariana Hernandez MD;  [facility];  Service: Plastics    AL REMOVAL OF BREAST IMPLANT Bilateral 8/6/2019    Procedure: BREAST IMPLANT REMOVAL;  Surgeon: Ariana Hernandez MD;  [facility];  Service: Plastics    REDUCTION MAMMAPLASTY      TRANSFER MUSCLE PECTORALIS Bilateral 8/6/2019    Procedure: PECTORALIS MUSCLE REPAIR;  Surgeon: Ariana Hernandez MD;  [facility];  Service: Plastics     Social History   Social History     Substance and Sexual Activity   Alcohol Use Not Currently    Frequency: Never     Social History     Substance and Sexual Activity   Drug Use Not Currently     Social History     Tobacco Use   Smoking Status Never Smoker   Smokeless Tobacco Never Used     Family History:   Family History   Problem Relation Age of Onset    No Known Problems Mother     Diabetes Father     Heart disease Father        Meds/Allergies   current meds:   No current facility-administered medications for this encounter  No current facility-administered medications for this encounter       Current Outpatient Medications:     acetaminophen-codeine (TYLENOL #3) 300-30 mg per tablet, Take 1 tablet by mouth as needed (Take 1 tablet pre and post expander fill ), Disp: 10 tablet, Rfl: 0    alendronate (FOSAMAX) 70 mg tablet, every 7 days Weekly on saturdays, Disp: , Rfl:     anastrozole (ARIMIDEX) 1 mg tablet, Take 1 mg by mouth daily, Disp: , Rfl: 4    chlorhexidine (PERIDEX) 0 12 % solution, SWISH AND SPIT WITH 10 ML THREE TIMES DAILY START 3 DAYS PRIOR TO SURGERY, Disp: , Rfl:     fluticasone-salmeterol (ADVAIR DISKUS) 250-50 mcg/dose inhaler, Inhale 1 puff 2 (two) times a day as needed , Disp: , Rfl:     Gabapentin 300 MG/6ML SOLN, Take 6 mL (300 mg total) by mouth 3 (three) times a day for 5 days, Disp: 90 mL, Rfl: 0    oxyCODONE (ROXICODONE) 5 mg immediate release tablet, Take 1 tablet (5 mg total) by mouth every 4 (four) hours as needed for moderate painMax Daily Amount: 30 mg, Disp: 31 tablet, Rfl: 0    triamcinolone (KENALOG) 0 5 % cream, Apply topically as needed , Disp: , Rfl:       Objective     Physical Exam   Constitutional  She appears well-developed and well-nourished  Cardiovascular: Normal rate       Pulmonary/Chest  Effort normal       Psychiatric  She has a normal mood and affect  Her behavior is normal       Breast                Body mass index is 44 15 kg/m²  Lab Results:   Lab Results   Component Value Date    WBC 8 80 08/14/2020    HGB 8 4 (L) 08/14/2020    HCT 30 6 (L) 08/14/2020    MCV 67 (L) 08/14/2020     (H) 08/14/2020          No results found for: TISSUECULT, WOUNDCULT      Imaging Studies:   No results found  EKG, Pathology, and Other Studies:   Lab Results   Component Value Date/Time    [facility]  08/06/2019 09:53 AM     A  Breast, Right, Right breast capsule:  -Benign fibrous tissue  -Adjacent benign adipose tissue  -No evidence of malignancy  B  Foreign body, Right breast implant--gross only:  -Breast implant  Please see the  full gross description  C  Breast, Left, Left breast capsule:  -Benign fibrous tissue with suture granuloma  -No evidence of malignancy  D  Foreign body, Left breast implant gross only:  -Breast implant  Please see the  full gross description  E  Breast, Right, Right mastectomy skin:  -Benign Skin and subcutaneous adipose tissue  -No evidence of malignancy               No intake or output data in the 24 hours ending 09/07/20 1204    Invasive Devices     Drain Closed/Suction Drain Right Back Bulb 19 Fr  398 days    Closed/Suction Drain Right Back Bulb 19 Fr  398 days    Negative Pressure Wound Therapy (V A C ) Back Lateral;Right 398 days    Closed/Suction Drain Inferior; Left Chest 15 Fr  397 days    Closed/Suction Drain Left; Other (Comment) Chest Bulb 15 Fr  397 days    Closed/Suction Drain Right; Anterior; Other (Comment) Chest Bulb 15 Fr  397 days    Closed/Suction Drain Right; Anterior; Other (Comment) Chest Bulb 15 Fr   397 days    Negative Pressure Wound Therapy (V A C ) Breast Right;Left 397 days                VTE Prophylaxis: Sequential compression device (Venodyne)

## 2020-09-08 ENCOUNTER — HOSPITAL ENCOUNTER (OUTPATIENT)
Facility: HOSPITAL | Age: 50
Setting detail: OUTPATIENT SURGERY
Discharge: HOME | End: 2020-09-08
Attending: PLASTIC SURGERY | Admitting: PLASTIC SURGERY
Payer: MEDICARE

## 2020-09-08 ENCOUNTER — ANESTHESIA (OUTPATIENT)
Dept: PERIOP | Facility: HOSPITAL | Age: 50
End: 2020-09-08
Payer: MEDICARE

## 2020-09-08 VITALS
TEMPERATURE: 97.5 F | RESPIRATION RATE: 18 BRPM | HEART RATE: 86 BPM | WEIGHT: 226 LBS | SYSTOLIC BLOOD PRESSURE: 126 MMHG | BODY MASS INDEX: 44.37 KG/M2 | OXYGEN SATURATION: 100 % | DIASTOLIC BLOOD PRESSURE: 58 MMHG | HEIGHT: 60 IN

## 2020-09-08 VITALS — HEART RATE: 104 BPM

## 2020-09-08 PROCEDURE — 19342 INSJ/RPLCMT BRST IMPLT SEP D: CPT | Performed by: PLASTIC SURGERY

## 2020-09-08 PROCEDURE — C1789 PROSTHESIS, BREAST, IMP: HCPCS | Performed by: PLASTIC SURGERY

## 2020-09-08 DEVICE — SMOOTH HIGH PROFILE XTRA 450CC  SMOOTH ROUND SILICONE
Type: IMPLANTABLE DEVICE | Site: BREAST | Status: NON-FUNCTIONAL
Brand: MENTOR MEMORYGEL XTRA BREAST IMPLANT
Removed: 2022-04-12

## 2020-09-08 RX ORDER — HYDROMORPHONE HCL/PF 1 MG/ML
SYRINGE (ML) INJECTION AS NEEDED
Status: DISCONTINUED | OUTPATIENT
Start: 2020-09-08 | End: 2020-09-08

## 2020-09-08 RX ORDER — CLINDAMYCIN PHOSPHATE 900 MG/50ML
900 INJECTION INTRAVENOUS ONCE
Status: COMPLETED | OUTPATIENT
Start: 2020-09-08 | End: 2020-09-08

## 2020-09-08 RX ORDER — HYDROMORPHONE HCL/PF 1 MG/ML
0.4 SYRINGE (ML) INJECTION
Status: DISCONTINUED | OUTPATIENT
Start: 2020-09-08 | End: 2020-09-08 | Stop reason: HOSPADM

## 2020-09-08 RX ORDER — PROPOFOL 10 MG/ML
INJECTION, EMULSION INTRAVENOUS AS NEEDED
Status: DISCONTINUED | OUTPATIENT
Start: 2020-09-08 | End: 2020-09-08

## 2020-09-08 RX ORDER — FENTANYL CITRATE 50 UG/ML
INJECTION, SOLUTION INTRAMUSCULAR; INTRAVENOUS AS NEEDED
Status: DISCONTINUED | OUTPATIENT
Start: 2020-09-08 | End: 2020-09-08

## 2020-09-08 RX ORDER — GABAPENTIN 300 MG/1
300 CAPSULE ORAL ONCE
Status: DISCONTINUED | OUTPATIENT
Start: 2020-09-08 | End: 2020-09-08 | Stop reason: HOSPADM

## 2020-09-08 RX ORDER — ACETAMINOPHEN 325 MG/1
975 TABLET ORAL ONCE
Status: COMPLETED | OUTPATIENT
Start: 2020-09-08 | End: 2020-09-08

## 2020-09-08 RX ORDER — ONDANSETRON 2 MG/ML
INJECTION INTRAMUSCULAR; INTRAVENOUS AS NEEDED
Status: DISCONTINUED | OUTPATIENT
Start: 2020-09-08 | End: 2020-09-08

## 2020-09-08 RX ORDER — DEXAMETHASONE SODIUM PHOSPHATE 10 MG/ML
INJECTION, SOLUTION INTRAMUSCULAR; INTRAVENOUS AS NEEDED
Status: DISCONTINUED | OUTPATIENT
Start: 2020-09-08 | End: 2020-09-08

## 2020-09-08 RX ORDER — BUPIVACAINE HYDROCHLORIDE AND EPINEPHRINE 2.5; 5 MG/ML; UG/ML
INJECTION, SOLUTION EPIDURAL; INFILTRATION; INTRACAUDAL; PERINEURAL AS NEEDED
Status: DISCONTINUED | OUTPATIENT
Start: 2020-09-08 | End: 2020-09-08 | Stop reason: HOSPADM

## 2020-09-08 RX ORDER — DEXMEDETOMIDINE HYDROCHLORIDE 100 UG/ML
INJECTION, SOLUTION INTRAVENOUS AS NEEDED
Status: DISCONTINUED | OUTPATIENT
Start: 2020-09-08 | End: 2020-09-08

## 2020-09-08 RX ORDER — GLYCOPYRROLATE 0.2 MG/ML
INJECTION INTRAMUSCULAR; INTRAVENOUS AS NEEDED
Status: DISCONTINUED | OUTPATIENT
Start: 2020-09-08 | End: 2020-09-08

## 2020-09-08 RX ORDER — SODIUM CHLORIDE, SODIUM LACTATE, POTASSIUM CHLORIDE, CALCIUM CHLORIDE 600; 310; 30; 20 MG/100ML; MG/100ML; MG/100ML; MG/100ML
50 INJECTION, SOLUTION INTRAVENOUS CONTINUOUS
Status: DISCONTINUED | OUTPATIENT
Start: 2020-09-08 | End: 2020-09-08 | Stop reason: HOSPADM

## 2020-09-08 RX ORDER — MIDAZOLAM HYDROCHLORIDE 2 MG/2ML
INJECTION, SOLUTION INTRAMUSCULAR; INTRAVENOUS AS NEEDED
Status: DISCONTINUED | OUTPATIENT
Start: 2020-09-08 | End: 2020-09-08

## 2020-09-08 RX ORDER — LIDOCAINE HYDROCHLORIDE 10 MG/ML
INJECTION, SOLUTION EPIDURAL; INFILTRATION; INTRACAUDAL; PERINEURAL AS NEEDED
Status: DISCONTINUED | OUTPATIENT
Start: 2020-09-08 | End: 2020-09-08

## 2020-09-08 RX ORDER — OXYCODONE HYDROCHLORIDE 5 MG/1
5 TABLET ORAL EVERY 4 HOURS PRN
Status: DISCONTINUED | OUTPATIENT
Start: 2020-09-08 | End: 2020-09-08 | Stop reason: HOSPADM

## 2020-09-08 RX ORDER — ONDANSETRON 2 MG/ML
4 INJECTION INTRAMUSCULAR; INTRAVENOUS ONCE AS NEEDED
Status: DISCONTINUED | OUTPATIENT
Start: 2020-09-08 | End: 2020-09-08 | Stop reason: HOSPADM

## 2020-09-08 RX ADMIN — LIDOCAINE HYDROCHLORIDE 50 MG: 10 INJECTION, SOLUTION EPIDURAL; INFILTRATION; INTRACAUDAL; PERINEURAL at 08:20

## 2020-09-08 RX ADMIN — HYDROMORPHONE HYDROCHLORIDE 0.4 MG: 1 INJECTION, SOLUTION INTRAMUSCULAR; INTRAVENOUS; SUBCUTANEOUS at 10:34

## 2020-09-08 RX ADMIN — DEXMEDETOMIDINE HYDROCHLORIDE 8 MCG: 100 INJECTION, SOLUTION INTRAVENOUS at 08:45

## 2020-09-08 RX ADMIN — MIDAZOLAM HYDROCHLORIDE 2 MG: 1 INJECTION, SOLUTION INTRAMUSCULAR; INTRAVENOUS at 08:14

## 2020-09-08 RX ADMIN — FENTANYL CITRATE 25 MCG: 50 INJECTION INTRAMUSCULAR; INTRAVENOUS at 08:28

## 2020-09-08 RX ADMIN — PROPOFOL 200 MG: 10 INJECTION, EMULSION INTRAVENOUS at 08:20

## 2020-09-08 RX ADMIN — FENTANYL CITRATE 50 MCG: 50 INJECTION INTRAMUSCULAR; INTRAVENOUS at 08:24

## 2020-09-08 RX ADMIN — PHENYLEPHRINE HYDROCHLORIDE 100 MCG: 10 INJECTION INTRAVENOUS at 09:11

## 2020-09-08 RX ADMIN — DEXMEDETOMIDINE HYDROCHLORIDE 12 MCG: 100 INJECTION, SOLUTION INTRAVENOUS at 08:42

## 2020-09-08 RX ADMIN — GLYCOPYRROLATE 0.2 MG: 0.2 INJECTION, SOLUTION INTRAMUSCULAR; INTRAVENOUS at 08:33

## 2020-09-08 RX ADMIN — HYDROMORPHONE HYDROCHLORIDE 0.25 MG: 1 INJECTION, SOLUTION INTRAMUSCULAR; INTRAVENOUS; SUBCUTANEOUS at 09:19

## 2020-09-08 RX ADMIN — HYDROMORPHONE HYDROCHLORIDE 0.4 MG: 1 INJECTION, SOLUTION INTRAMUSCULAR; INTRAVENOUS; SUBCUTANEOUS at 10:49

## 2020-09-08 RX ADMIN — HYDROMORPHONE HYDROCHLORIDE 0.4 MG: 1 INJECTION, SOLUTION INTRAMUSCULAR; INTRAVENOUS; SUBCUTANEOUS at 11:05

## 2020-09-08 RX ADMIN — CLINDAMYCIN PHOSPHATE 900 MG: 18 INJECTION, SOLUTION INTRAMUSCULAR; INTRAVENOUS at 08:25

## 2020-09-08 RX ADMIN — ONDANSETRON 4 MG: 2 INJECTION INTRAMUSCULAR; INTRAVENOUS at 09:32

## 2020-09-08 RX ADMIN — HYDROMORPHONE HYDROCHLORIDE 0.4 MG: 1 INJECTION, SOLUTION INTRAMUSCULAR; INTRAVENOUS; SUBCUTANEOUS at 10:24

## 2020-09-08 RX ADMIN — OXYCODONE HYDROCHLORIDE 5 MG: 5 TABLET ORAL at 11:47

## 2020-09-08 RX ADMIN — ACETAMINOPHEN 975 MG: 325 TABLET, FILM COATED ORAL at 07:46

## 2020-09-08 RX ADMIN — PHENYLEPHRINE HYDROCHLORIDE 200 MCG: 10 INJECTION INTRAVENOUS at 09:05

## 2020-09-08 RX ADMIN — HYDROMORPHONE HYDROCHLORIDE 0.25 MG: 1 INJECTION, SOLUTION INTRAMUSCULAR; INTRAVENOUS; SUBCUTANEOUS at 09:15

## 2020-09-08 RX ADMIN — SODIUM CHLORIDE, SODIUM LACTATE, POTASSIUM CHLORIDE, AND CALCIUM CHLORIDE: .6; .31; .03; .02 INJECTION, SOLUTION INTRAVENOUS at 08:14

## 2020-09-08 RX ADMIN — FENTANYL CITRATE 25 MCG: 50 INJECTION INTRAMUSCULAR; INTRAVENOUS at 08:55

## 2020-09-08 RX ADMIN — DEXAMETHASONE SODIUM PHOSPHATE 4 MG: 10 INJECTION, SOLUTION INTRAMUSCULAR; INTRAVENOUS at 08:24

## 2020-09-08 NOTE — INTERVAL H&P NOTE
H&P reviewed  After examining the patient I find no changes in the patients condition since the H&P had been written      Vitals:    09/08/20 0714   BP: 125/66   Pulse: 85   Resp: 18   Temp: (!) 97 4 °F (36 3 °C)   SpO2: 100%

## 2020-09-08 NOTE — DISCHARGE INSTRUCTIONS
Discharge Instructions  Keep Surgical Bra and dressings clean dry and intact  For pain control, please take your prescribed pain medications  You may resume your previous medications  OK to shower in 48 hours  Wear surgical bra at all times when not showering  Please call my office if you experience increasing pain, increasing redness, increased swelling, drainage through the dressing or fever greater than 100 5°F     My office number is [phone]  Follow up in our office in [place] in 2 weeks  CALL for appointment

## 2020-09-08 NOTE — INTERVAL H&P NOTE
H&P reviewed  After examining the patient I find no changes in the patients condition since the H&P had been written  Patient manifest that she is still dissatisfied with previous left nipple/areola reconstruction and overall shape of implant  I discussed with patient that we both agree that implant exchange will not obtain great asymmetry to contralateral flap side, which we both agree has better shape  I explained that I recommended that we focus on right breast only today with implant exchange and discuss possibility of performed a contralateral latissimus dorsi flap in order to remove all prior scars and NAC complex and better symmetry could be obtained  Risk and benefits were discussed and informed consent was obtained       Jozef Bliss MD   [facility]   [address]   [address], [address]    Office: [phone]        Vitals:    09/08/20 0714   BP: 125/66   Pulse: 85   Resp: 18   Temp: (!) 97 4 °F (36 3 °C)   SpO2: 100%

## 2020-09-08 NOTE — ANESTHESIA PREPROCEDURE EVALUATION
Review of Systems/Medical History  Patient summary reviewed  Chart reviewed  No history of anesthetic complications     Cardiovascular  EKG reviewed , Exercise tolerance (METS): >4 ,  No angina ,    Pulmonary  No shortness of breath, No recent URI ,   Comment: H/o pulmonary fibrosis with chemotherapy, previously on O2 but has weaned off, Spo2 98-99% preop on room air     GI/Hepatic    No GERD ,  Hiatal hernia, Bariatric surgery,        Kidney stones, No chronic kidney disease ,        Endo/Other    Obesity  morbid obesity   GYN    Hysterectomy, Breast cancer Right mastectomy, left mastectomy and axillary node dissection  Comment: H/o radiation    Right arm limb alert     Hematology  Anemia iron deficiency anemia,     Musculoskeletal  Negative musculoskeletal ROS Rheumatoid arthritis ,   Comment: osteoporosis      Neurology  Negative neurology ROS No seizures ,  No CVA ,    Psychology           Physical Exam    Airway    Mallampati score: III  TM Distance: <3 FB  Neck ROM: full     Dental       Cardiovascular      Pulmonary      Other Findings        Anesthesia Plan  ASA Score- 3     Anesthesia Type- general with ASA Monitors  Additional Monitors:   Airway Plan: ETT  Plan Factors-Exercise tolerance (METS): >4     Chart reviewed  EKG reviewed  Patient summary reviewed  Patient did not smoke on day of surgery  Induction- intravenous  Postoperative Plan- Plan for postoperative opioid use  Planned trial extubation    Informed Consent- Anesthetic plan and risks discussed with patient  I personally reviewed this patient with the CRNA  Discussed and agreed on the Anesthesia Plan with the CRNA  Neal Kwan

## 2020-09-08 NOTE — ANESTHESIA POSTPROCEDURE EVALUATION
Post-Op Assessment Note    CV Status:  Stable  Pain Score: 0    Pain management: adequate     Mental Status:  Alert and awake   Hydration Status:  Euvolemic   PONV Controlled:  Controlled   Airway Patency:  Patent      Post Op Vitals Reviewed: Yes      Staff: Anesthesiologist         No complications documented      BP   138/88   Temp  97   Pulse  94   Resp   13   SpO2   99

## 2020-09-08 NOTE — OP NOTE
OPERATIVE REPORT  PATIENT NAME: Grace Shanks    :  1970  MRN: [id number]  Pt Location: [facility]    SURGERY DATE: 2020    Surgeon(s) and Role:     * Kate Johnston MD - Primary     * Flaquito Posada PA-C - Assisting    Preop Diagnosis:  Personal history of malignant neoplasm of breast [Z85 3]    Post-Op Diagnosis Codes:     * Personal history of malignant neoplasm of breast [Z85 3]    Procedure(s) (LRB):  BREAST REMOVAL TISSUE EXPANDER/ IMPLANT PLACEMENT BREAST (RIGHT)  BREAST CAPSULOTOMY (RIGHT)  BREAST CAPSULORRHAPHY (RIGHT)     Specimen(s):  * No specimens in log *    Estimated Blood Loss:   Minimal    Drains:  Closed/Suction Drain Right Back Bulb 19 Fr  (Active)   Number of days: 399       Closed/Suction Drain Right Back Bulb 19 Fr  (Active)   Number of days: 399       Closed/Suction Drain Right; Anterior; Other (Comment) Chest Bulb 15 Fr  (Active)   Number of days: 399       Closed/Suction Drain Right; Anterior; Other (Comment) Chest Bulb 15 Fr  (Active)   Number of days: 399       Closed/Suction Drain Left; Other (Comment) Chest Bulb 15 Fr  (Active)   Number of days: 399       Closed/Suction Drain Inferior; Left Chest 15 Fr  (Active)   Number of days: 399       Negative Pressure Wound Therapy (V A C ) Back Lateral;Right (Active)   Number of days: 399       Negative Pressure Wound Therapy (V A C ) Breast Right;Left (Active)   Number of days: 399       Anesthesia Type:   General    Operative Indications:  Personal history of malignant neoplasm of breast [Z85 3]      Operative Findings:  Intact tissue expander removed and discarded     Implant:   Right breast   [place] Smooth High profile Xtra   Ref SHPX- 450   Lot [id number]   [id number]    Complications:   None    Procedure and Technique:  Mrs Liz Santillan is a 16-year-old female with a history of acquired absence of bilateral breast presents for staged reconstruction planned to correct her breast asymmetries    During today preoperative discussion as per the patient request she is very satisfied with the appearance of the right breast with previous flap and would like to mimic the contralateral breast as much as possible so decision was then made to perform surgery on the right breast only with exchange to permanent implant and capsule work and focus in the left breast and later time given her change in plans  Risks and benefits of the procedure were explained in detail and she manifest understanding of this informed consent was then obtained  Patient was met in the preoperative holding area and all the last minute questions were properly answered and addressed  The chest was then prepped and draped in usual sterile fashion a time-out was then performed  Procedure started by 1st infiltrating 7 mL of 1% lidocaine with epinephrine along a 10 cm incision along the cephalad skin paddle scar  Incision was then made with a scalpel carried down with electrocautery until the this is muscle was then encounter  This was then split along its fibers with electrocautery and the capsule was then entered  The expander was then removed passed of the table and discarded  Capsule was then inspected and a decision was then made to perform capsulotomy along the superior medial aspect of the breast to improve the upper pole contour and shape in preparation for permanent implant placement  This was performed the electrocautery and the lighted retractor as needed  Implant Sizer was then placed into the pocket and given intraoperative findings capsulorrhaphy was then performed along the IMF with a running 1-0 PDS suture in order to secure the IMF with prevention implant descent and also to raise the IMF for better mesh in the contralateral breast   Once this was performed, satisfactory breast shape was confirmed the placed patient sitting position tailor tacked the skin with staples  This point staples were then removed the Sizer was then removed as well    Hemostasis and irrigation with triple antibiotic solution was then performed  The skin was then redraped and prepped with ChloraPrep and all the members of the staff exchange gloves  The final implant was then brought into the field and was delivered out traumatic into the breast pocket  Using interrupted 2-0 PDS latissimus muscle edges were reapproximated try the whole length of the incision satisfactorily  Layered closure was then performed deep dermal 3-0 Monocryl and a running 4-0 Monocryl subcuticular layer  Steri-Strips, surgical glue and dry dressing was then applied the conclusion of the case         I was present for the entire procedure, I was present for all critical portions of the procedure, A qualified resident physician was not available and A physician assistant was required during the procedure for retraction tissue handling,dissection and suturing    Patient Disposition:  PACU , hemodynamically stable and patient will return to OR for planned surgery as a staged procedure    SIGNATURE: Ozzy Calle MD  DATE: September 8, 2020  TIME: 9:51 AM

## 2020-09-18 ENCOUNTER — OFFICE VISIT (OUTPATIENT)
Dept: PLASTIC SURGERY | Facility: CLINIC | Age: 50
End: 2020-09-18

## 2020-09-18 VITALS — WEIGHT: 226 LBS | HEIGHT: 60 IN | BODY MASS INDEX: 44.37 KG/M2 | TEMPERATURE: 97.6 F

## 2020-09-18 DIAGNOSIS — Z98.890 HISTORY OF BREAST RECONSTRUCTION: Primary | ICD-10-CM

## 2020-09-18 DIAGNOSIS — Z90.13 ACQUIRED ABSENCE OF BREAST AND ABSENT NIPPLE, BILATERAL: ICD-10-CM

## 2020-09-18 PROCEDURE — 99024 POSTOP FOLLOW-UP VISIT: CPT | Performed by: PHYSICIAN ASSISTANT

## 2020-09-18 NOTE — PROGRESS NOTES
POST-OP EVALUATION  9/18/2020    Subjective   Regina vo a 53 y o  female is here today for routine post-operative follow up   09/08/20 0814          Procedures:    Fabby Stallion REMOVAL TISSUE EXPANDER/ IMPLANT PLACEMENT BREAST (RIGHT)  BREAST CAPSULOTOMY (RIGHT)  BREAST CAPSULORRHAPHY (RIGHT)         Past Medical History:   Diagnosis Date    Arthritis     [facility]     [facility]     [facility]     Chronic pain disorder     Heartburn     Hiatal hernia     History of bariatric surgery     Irregular heart beat     pt states it was a side effect of a medication    Kidney stone     Lung nodule     Osteoporosis     stage 3    Postsurgical malabsorption     [facility]oid [facility]     Stress incontinence     Thyroid nodule      Past Surgical History:   Procedure Laterality Date    ABDOMINAL SURGERY      BRACHIOPLASTY Bilateral     2017    BREAST IMPLANT Bilateral 9/8/2020    Procedure: BREAST REMOVAL TISSUE EXPANDER/ IMPLANT PLACEMENT BREAST;  Surgeon: Asya Corrales MD;  [facility];  Service: Plastics    CAPSULOTOMY Bilateral 9/8/2020    Procedure: BREAST CAPSULOTOMY;  Surgeon: Asya Corrales MD;  [facility];  Service: Plastics    CARDIAC CATHETERIZATION      CARPAL TUNNEL RELEASE Bilateral 2015    CHOLECYSTECTOMY      COLONOSCOPY      GALLBLADDER SURGERY  2015    GASTRIC BYPASS  2014    HYSTERECTOMY  05/2013    INCONTINENCE SURGERY  09/2013    IR BIOPSY LUNG      lung nodules    MASTECTOMY Bilateral 10/2016    KS BREAST RECONSTRUC W LAT DORSI FLAP Right 8/6/2019    Procedure: BREAST RECONSTRUCTION W/FLAP LATISSIMUS DORSI;  Surgeon: Asya Corrales MD;  [facility];  Service: Plastics    KS BREAST RECONSTRUC W TISS EXPANDR Bilateral 8/6/2019    Procedure: BREAST TISSUE EXPANDER PLACEMENT;  Surgeon: Asya Corrales MD;  [facility];  Service: Plastics    [place] REMOVAL OF BREAST CAPSULE Bilateral 8/6/2019    Procedure: BREAST CAPSULECTOMY;  Surgeon: Ariana Hernandez MD;  [facility];  Service: Plastics    [place] REMOVAL OF BREAST IMPLANT Bilateral 8/6/2019    Procedure: BREAST IMPLANT REMOVAL;  Surgeon: Ariana Hernandez MD;  [facility];  Service: Plastics    REDUCTION MAMMAPLASTY      TRANSFER MUSCLE PECTORALIS Bilateral 8/6/2019    Procedure: PECTORALIS MUSCLE REPAIR;  Surgeon: Ariana Hernandez MD;  [facility];  Service: Plastics        Current Outpatient Medications:     alendronate (FOSAMAX) 70 mg tablet, every 7 days Weekly on saturdays, Disp: , Rfl:     anastrozole (ARIMIDEX) 1 mg tablet, Take 1 mg by mouth daily, Disp: , Rfl: 4    fluticasone-salmeterol (ADVAIR DISKUS) 250-50 mcg/dose inhaler, Inhale 1 puff 2 (two) times a day as needed , Disp: , Rfl:     Gabapentin 300 MG/6ML SOLN, Take 6 mL (300 mg total) by mouth 3 (three) times a day for 5 days, Disp: 90 mL, Rfl: 0    oxyCODONE (ROXICODONE) 5 mg immediate release tablet, Take 1 tablet (5 mg total) by mouth every 4 (four) hours as needed for moderate painMax Daily Amount: 30 mg, Disp: 31 tablet, Rfl: 0    triamcinolone (KENALOG) 0 5 % cream, Apply topically as needed , Disp: , Rfl:   Allergies: Accolate [zafirlukast]; Penicillins; Shellfish allergy; Shellfish-derived products; Singulair [montelukast]; and Pork-derived products    Objective      Physical Exam   Constitutional  She appears well-developed and well-nourished  Eyes      General -             Right: Right eye extraocular movements are normal              Left: Left eye extraocular movements are normal        Pulmonary/Chest  Effort normal and breath sounds normal      Skin  Skin is warm and dry  Psychiatric  She has a normal mood and affect   Her behavior is normal      Breast            Assessment/Plan   Diagnoses and all orders for this visit:    History of breast reconstruction    Acquired absence of breast and absent nipple, bilateral      OK to shower  Remove Steristrips as they peel away  Monitor incision of signs of infection, dehiscence  Followup with Dr Eleni Lyman end of October    Hyacinth Bermudez PA-C

## 2020-10-27 ENCOUNTER — OFFICE VISIT (OUTPATIENT)
Dept: PLASTIC SURGERY | Facility: CLINIC | Age: 50
End: 2020-10-27
Payer: MEDICARE

## 2020-10-27 VITALS — TEMPERATURE: 98.6 F | BODY MASS INDEX: 44.37 KG/M2 | HEIGHT: 60 IN | WEIGHT: 226 LBS

## 2020-10-27 DIAGNOSIS — Z90.13 ACQUIRED ABSENCE OF BREAST AND ABSENT NIPPLE, BILATERAL: Primary | ICD-10-CM

## 2020-10-27 PROCEDURE — 99214 OFFICE O/P EST MOD 30 MIN: CPT | Performed by: PLASTIC SURGERY

## 2021-01-05 ENCOUNTER — LAB (OUTPATIENT)
Dept: LAB | Facility: HOSPITAL | Age: 51
End: 2021-01-05
Attending: PLASTIC SURGERY
Payer: MEDICARE

## 2021-01-05 DIAGNOSIS — Z85.3 PERSONAL HISTORY OF MALIGNANT NEOPLASM OF BREAST: ICD-10-CM

## 2021-01-05 DIAGNOSIS — Z98.890 HISTORY OF BREAST RECONSTRUCTION: ICD-10-CM

## 2021-01-05 LAB
ALBUMIN SERPL BCP-MCNC: 2.9 G/DL (ref 3.5–5)
ALP SERPL-CCNC: 133 U/L (ref 46–116)
ALT SERPL W P-5'-P-CCNC: 12 U/L (ref 12–78)
ANION GAP SERPL CALCULATED.3IONS-SCNC: 10 MMOL/L (ref 4–13)
AST SERPL W P-5'-P-CCNC: 19 U/L (ref 5–45)
ATRIAL RATE: 87 BPM
BASOPHILS # BLD AUTO: 0.05 THOUSANDS/ΜL (ref 0–0.1)
BASOPHILS NFR BLD AUTO: 1 % (ref 0–1)
BILIRUB SERPL-MCNC: 0.2 MG/DL (ref 0.2–1)
BUN SERPL-MCNC: 10 MG/DL (ref 5–25)
CALCIUM ALBUM COR SERPL-MCNC: 9.4 MG/DL (ref 8.3–10.1)
CALCIUM SERPL-MCNC: 8.5 MG/DL (ref 8.3–10.1)
CHLORIDE SERPL-SCNC: 104 MMOL/L (ref 100–108)
CO2 SERPL-SCNC: 25 MMOL/L (ref 21–32)
CREAT SERPL-MCNC: 0.76 MG/DL (ref 0.6–1.3)
EOSINOPHIL # BLD AUTO: 0.27 THOUSAND/ΜL (ref 0–0.61)
EOSINOPHIL NFR BLD AUTO: 3 % (ref 0–6)
ERYTHROCYTE [DISTWIDTH] IN BLOOD BY AUTOMATED COUNT: 21.1 % (ref 11.6–15.1)
GFR SERPL CREATININE-BSD FRML MDRD: 92 ML/MIN/1.73SQ M
GLUCOSE P FAST SERPL-MCNC: 81 MG/DL (ref 65–99)
HCT VFR BLD AUTO: 29.2 % (ref 34.8–46.1)
HGB BLD-MCNC: 8.3 G/DL (ref 11.5–15.4)
IMM GRANULOCYTES # BLD AUTO: 0.04 THOUSAND/UL (ref 0–0.2)
IMM GRANULOCYTES NFR BLD AUTO: 0 % (ref 0–2)
LYMPHOCYTES # BLD AUTO: 2.43 THOUSANDS/ΜL (ref 0.6–4.47)
LYMPHOCYTES NFR BLD AUTO: 24 % (ref 14–44)
MCH RBC QN AUTO: 18.8 PG (ref 26.8–34.3)
MCHC RBC AUTO-ENTMCNC: 28.4 G/DL (ref 31.4–37.4)
MCV RBC AUTO: 66 FL (ref 82–98)
MONOCYTES # BLD AUTO: 0.54 THOUSAND/ΜL (ref 0.17–1.22)
MONOCYTES NFR BLD AUTO: 5 % (ref 4–12)
NEUTROPHILS # BLD AUTO: 6.92 THOUSANDS/ΜL (ref 1.85–7.62)
NEUTS SEG NFR BLD AUTO: 67 % (ref 43–75)
NRBC BLD AUTO-RTO: 0 /100 WBCS
P AXIS: 14 DEGREES
PLATELET # BLD AUTO: 399 THOUSANDS/UL (ref 149–390)
PMV BLD AUTO: 8.9 FL (ref 8.9–12.7)
POTASSIUM SERPL-SCNC: 3.8 MMOL/L (ref 3.5–5.3)
PR INTERVAL: 134 MS
PROT SERPL-MCNC: 8.3 G/DL (ref 6.4–8.2)
QRS AXIS: 49 DEGREES
QRSD INTERVAL: 80 MS
QT INTERVAL: 368 MS
QTC INTERVAL: 442 MS
RBC # BLD AUTO: 4.42 MILLION/UL (ref 3.81–5.12)
SODIUM SERPL-SCNC: 139 MMOL/L (ref 136–145)
T WAVE AXIS: 49 DEGREES
VENTRICULAR RATE: 87 BPM
WBC # BLD AUTO: 10.25 THOUSAND/UL (ref 4.31–10.16)

## 2021-01-05 PROCEDURE — 85025 COMPLETE CBC W/AUTO DIFF WBC: CPT

## 2021-01-05 PROCEDURE — 80053 COMPREHEN METABOLIC PANEL: CPT

## 2021-01-05 PROCEDURE — 93010 ELECTROCARDIOGRAM REPORT: CPT | Performed by: INTERNAL MEDICINE

## 2021-01-05 PROCEDURE — 93005 ELECTROCARDIOGRAM TRACING: CPT

## 2021-01-05 PROCEDURE — 36415 COLL VENOUS BLD VENIPUNCTURE: CPT

## 2021-02-01 NOTE — PRE-PROCEDURE INSTRUCTIONS
Pre-Surgery Instructions:   Medication Instructions    alendronate (FOSAMAX) 70 mg tablet Instructed to take per normal schedule    anastrozole (ARIMIDEX) 1 mg tablet Instructed to take per normal schedule including DOS   fluticasone-salmeterol (ADVAIR DISKUS) 250-50 mcg/dose inhaler Instructed to take as needed including DOS    triamcinolone (KENALOG) 0 5 % cream Instructed to take per normal schedule except DOS    Pre op,medications and showering instructions reviewed-Patient has hibiclens  Instructed to avoid all ASA and OTC Vit/Supp 1 week prior to surgery and to avoid NSAIDs 3 days prior to surgery  Tylenol ok to take prn  Pt  Verbalized understanding of current visitor restrictions

## 2021-02-03 ENCOUNTER — ANESTHESIA EVENT (OUTPATIENT)
Dept: PERIOP | Facility: HOSPITAL | Age: 51
End: 2021-02-03
Payer: MEDICARE

## 2021-02-08 PROCEDURE — NC001 PR NO CHARGE: Performed by: PHYSICIAN ASSISTANT

## 2021-02-08 NOTE — H&P
H&P - Plastic Surgery   Duane Finger 49 y o  female MRN: [id number]  Unit/Bed#:  Encounter: [id number]           Procedures:    Zuleyma Kugel TISSUE EXPANDER/ IMPLANT EXCHANGE (Left Breast)       BREAST CAPSULOTOMY (Left Breast)       BREAST FAT GRAFTING (Left Breast)   Anesthesia type: General   Diagnosis:        History of breast reconstruction [Z98 890]       Personal history of malignant neoplasm of breast [Z85 3]             HPI:   Duane Finger is a 49y o  year old female who presents with a history of acquired absence of bilateral breast with reconstruction at outside hospital with multiple issues  Patient is on her journey with revision or breat reconstruction and presents for her next stage  Recently underwent expander to implant exchange on her right breast, doing well and happy with results       Patient wanted to discuss possibility of having a latissimus flap on her left breast as well since she likes the cosmesis       They present preoperatively for planned: Left breast fat grafting, expander exchange to silicone implants and capsulotomy  REVIEW OF SYSTEMS    GENERAL/CONSTITUTIONAL: The patient denies fever, fatigue, weakness, weight gain or weight loss  HEAD, EYES, EARS, NOSE AND THROAT: Eyes - The patient denies pain, redness, loss of vision, double or blurred vision and denies wearing glasses  The patient denies ringing in the ears, nosebleeds sinusitis, post nasal drip  Also denies frequent sore throats, hoarseness, painful swallowing  CARDIOVASCULAR: The patient denies chest pain, irregular heartbeats, palpitations, shortness of breath, heart murmurs, high blood pressure, cramps in his legs with walking, pain in his feet or toes at night or varicose veins  RESPIRATORY: The patient denies chronic cough, wheezing or night sweats  GASTROINTESTINAL: The patient denies decreased appetite, nausea, vomiting, diarrhea, constipation, blood in the stools    GENITOURINARY: The patient denies difficult urination, pain or burning with urination, blood in the urine  MUSCULOSKELETAL: The patient denies arm, thigh or calf cramps  No joint or muscle pain  No muscle weakness or tenderness  No joint swelling, neck pain, back pain or major orthopedic injuries  SKIN AND BREASTS: see hpi The patient denies easy bruising, skin redness, skin rash, hives  NEUROLOGIC: The patient denies headache, dizziness, fainting, memory loss  PSYCHIATRIC: The patient denies depression anxiety  ENDOCRINE: The patient denies intolerance to hot or cold temperature, flushing, fingernail changes, increased thirst, increased salt intake or decreased sexual desire  HEMATOLOGIC/LYMPHATIC: The patient denies anemia, bleeding tendency or clotting tendency  ALLERGIC/IMMUNOLOGIC: The patient denies rhinitis, asthma, skin sensitivity, latex allergies or sensitivity        Historical Information   Past Medical History:   Diagnosis Date    Arthritis     [facility]     [facility]     [facility]     Chronic pain disorder     Heartburn     Hiatal hernia     History of bariatric surgery     Irregular heart beat     pt states it was a side effect of a medication    Kidney stone     Lung nodule     Osteoporosis     stage 3    Postsurgical malabsorption     [facility]     Sleep apnea     Stress incontinence     Thyroid nodule      Past Surgical History:   Procedure Laterality Date    ABDOMINAL SURGERY      BRACHIOPLASTY Bilateral     2017    BREAST IMPLANT Bilateral 9/8/2020    Procedure: BREAST REMOVAL TISSUE EXPANDER/ IMPLANT PLACEMENT BREAST;  Surgeon: Kathia Vergara MD;  [facility];  Service: Plastics    CAPSULOTOMY Bilateral 9/8/2020    Procedure: BREAST CAPSULOTOMY;  Surgeon: Kathia Vergara MD;  [facility];  Service: Plastics    CARDIAC CATHETERIZATION      CARPAL TUNNEL RELEASE Bilateral 2015    CHOLECYSTECTOMY      COLONOSCOPY      GALLBLADDER SURGERY  2015    GASTRIC BYPASS  2014    HYSTERECTOMY  05/2013    INCONTINENCE SURGERY  09/2013    IR BIOPSY LUNG      lung nodules    MASTECTOMY Bilateral 10/2016    [place] BREAST RECONSTRUCTION W/LATISSIMUS DORSI FLAP Right 8/6/2019    Procedure: BREAST RECONSTRUCTION W/FLAP LATISSIMUS DORSI;  Surgeon: Komal Arguello MD;  [facility];  Service: Plastics    MN DOMINGA-IMPLANT CAPSULECTOMY BREAST COMPLETE Bilateral 8/6/2019    Procedure: BREAST CAPSULECTOMY;  Surgeon: Komal Arguello MD;  [facility];  Service: Plastics    MN REMOVAL INTACT BREAST IMPLANT Bilateral 8/6/2019    Procedure: BREAST IMPLANT REMOVAL;  Surgeon: Komal Arguello MD;  [facility];  Service: Plastics    MN TISSUE EXPANDER PLACEMENT BREAST RECONSTRUCTION Bilateral 8/6/2019    Procedure: BREAST TISSUE EXPANDER PLACEMENT;  Surgeon: Komal Arguello MD;  [facility];  Service: Plastics    REDUCTION MAMMAPLASTY      TRANSFER MUSCLE PECTORALIS Bilateral 8/6/2019    Procedure: PECTORALIS MUSCLE REPAIR;  Surgeon: Komal Arguello MD;  [facility];  Service: Plastics     Social History   Social History     Substance and Sexual Activity   Alcohol Use Yes    Frequency: Monthly or less    Drinks per session: 1 or 2    Comment: Rare     Social History     Substance and Sexual Activity   Drug Use Not Currently     Social History     Tobacco Use   Smoking Status Never Smoker   Smokeless Tobacco Never Used     Family History:   Family History   Problem Relation Age of Onset    No Known Problems Mother     Diabetes Father     Heart disease Father        Meds/Allergies   No current facility-administered medications for this encounter       Current Outpatient Medications:     alendronate (FOSAMAX) 70 mg tablet, every 7 days Weekly on saturdays, Disp: , Rfl:     anastrozole (ARIMIDEX) 1 mg tablet, Take 1 mg by mouth daily, Disp: , Rfl: 4    fluticasone-salmeterol (ADVAIR DISKUS) 250-50 mcg/dose inhaler, Inhale 1 puff 2 (two) times a day as needed , Disp: , Rfl:     triamcinolone (KENALOG) 0 5 % cream, Apply topically as needed , Disp: , Rfl:     oxyCODONE (ROXICODONE) 5 mg immediate release tablet, Take 1 tablet (5 mg total) by mouth every 4 (four) hours as needed for moderate painMax Daily Amount: 30 mg, Disp: 31 tablet, Rfl: 0      Objective     Physical Exam   Constitutional  She appears well-developed and well-nourished  Cardiovascular: Normal rate       Pulmonary/Chest  Effort normal       Psychiatric  She has a normal mood and affect  Her behavior is normal       Breast             Back and Buttocks  She is positive for back and buttocks lipodystrophy  Back:             Body mass index is 44 14 kg/m²  Lab Results:   Lab Results   Component Value Date    WBC 10 25 (H) 01/05/2021    HGB 8 3 (L) 01/05/2021    HCT 29 2 (L) 01/05/2021    MCV 66 (L) 01/05/2021     (H) 01/05/2021          No results found for: TISSUECULT, WOUNDCULT      Imaging Studies:   No results found  EKG, Pathology, and Other Studies:   Lab Results   Component Value Date/Time    [facility]  08/06/2019 09:53 AM     A  Breast, Right, Right breast capsule:  -Benign fibrous tissue  -Adjacent benign adipose tissue  -No evidence of malignancy  B  Foreign body, Right breast implant--gross only:  -Breast implant  Please see the  full gross description  C  Breast, Left, Left breast capsule:  -Benign fibrous tissue with suture granuloma  -No evidence of malignancy  D  Foreign body, Left breast implant gross only:  -Breast implant  Please see the  full gross description  E  Breast, Right, Right mastectomy skin:  -Benign Skin and subcutaneous adipose tissue  -No evidence of malignancy  No intake or output data in the 24 hours ending 02/08/21 1406    Invasive Devices     Drain            Closed/Suction Drain Inferior; Left Chest 15 Fr  552 days    Closed/Suction Drain Left; Other (Comment) Chest Bulb 15 Fr  552 days    Closed/Suction Drain Right Back Bulb 19 Fr  552 days    Closed/Suction Drain Right Back Bulb 19 Fr  552 days    Closed/Suction Drain Right; Anterior; Other (Comment) Chest Bulb 15 Fr  552 days    Closed/Suction Drain Right; Anterior; Other (Comment) Chest Bulb 15 Fr  552 days    Negative Pressure Wound Therapy (V A C ) Back Lateral;Right 552 days    Negative Pressure Wound Therapy (V A C ) Breast Right;Left 552 days                VTE Prophylaxis: Sequential compression device (Venodyne)

## 2021-02-09 ENCOUNTER — APPOINTMENT (OUTPATIENT)
Dept: RADIOLOGY | Facility: HOSPITAL | Age: 51
End: 2021-02-09
Payer: MEDICARE

## 2021-02-09 ENCOUNTER — ANESTHESIA (OUTPATIENT)
Dept: PERIOP | Facility: HOSPITAL | Age: 51
End: 2021-02-09
Payer: MEDICARE

## 2021-02-09 ENCOUNTER — HOSPITAL ENCOUNTER (OUTPATIENT)
Facility: HOSPITAL | Age: 51
Setting detail: OUTPATIENT SURGERY
Discharge: HOME | End: 2021-02-09
Attending: PLASTIC SURGERY | Admitting: PLASTIC SURGERY
Payer: MEDICARE

## 2021-02-09 VITALS
WEIGHT: 226 LBS | BODY MASS INDEX: 44.37 KG/M2 | DIASTOLIC BLOOD PRESSURE: 78 MMHG | RESPIRATION RATE: 18 BRPM | OXYGEN SATURATION: 97 % | HEART RATE: 101 BPM | HEIGHT: 60 IN | TEMPERATURE: 97.6 F | SYSTOLIC BLOOD PRESSURE: 146 MMHG

## 2021-02-09 VITALS — HEART RATE: 113 BPM

## 2021-02-09 DIAGNOSIS — Z90.13 ACQUIRED ABSENCE OF BREAST AND ABSENT NIPPLE, BILATERAL: Primary | ICD-10-CM

## 2021-02-09 DIAGNOSIS — Z98.890 HISTORY OF BREAST RECONSTRUCTION: ICD-10-CM

## 2021-02-09 PROCEDURE — 71045 X-RAY EXAM CHEST 1 VIEW: CPT

## 2021-02-09 PROCEDURE — 11970 RPLCMT TISS XPNDR PERM IMPLT: CPT | Performed by: PLASTIC SURGERY

## 2021-02-09 PROCEDURE — C1789 PROSTHESIS, BREAST, IMP: HCPCS | Performed by: PLASTIC SURGERY

## 2021-02-09 PROCEDURE — 19370 REVJ PERI-IMPLT CAPSULE BRST: CPT | Performed by: PLASTIC SURGERY

## 2021-02-09 DEVICE — SMOOTH HIGH PROFILE XTRA 790CC  SMOOTH ROUND SILICONE
Type: IMPLANTABLE DEVICE | Site: BREAST | Status: FUNCTIONAL
Brand: MENTOR MEMORYGEL XTRA BREAST IMPLANT

## 2021-02-09 RX ORDER — DEXAMETHASONE SODIUM PHOSPHATE 10 MG/ML
INJECTION, SOLUTION INTRAMUSCULAR; INTRAVENOUS AS NEEDED
Status: DISCONTINUED | OUTPATIENT
Start: 2021-02-09 | End: 2021-02-09

## 2021-02-09 RX ORDER — HYDROMORPHONE HCL/PF 1 MG/ML
0.2 SYRINGE (ML) INJECTION
Status: DISCONTINUED | OUTPATIENT
Start: 2021-02-09 | End: 2021-02-09 | Stop reason: HOSPADM

## 2021-02-09 RX ORDER — ONDANSETRON 2 MG/ML
4 INJECTION INTRAMUSCULAR; INTRAVENOUS ONCE AS NEEDED
Status: DISCONTINUED | OUTPATIENT
Start: 2021-02-09 | End: 2021-02-09 | Stop reason: HOSPADM

## 2021-02-09 RX ORDER — FENTANYL CITRATE/PF 50 MCG/ML
25 SYRINGE (ML) INJECTION
Status: COMPLETED | OUTPATIENT
Start: 2021-02-09 | End: 2021-02-09

## 2021-02-09 RX ORDER — LIDOCAINE HYDROCHLORIDE 10 MG/ML
INJECTION, SOLUTION EPIDURAL; INFILTRATION; INTRACAUDAL; PERINEURAL AS NEEDED
Status: DISCONTINUED | OUTPATIENT
Start: 2021-02-09 | End: 2021-02-09

## 2021-02-09 RX ORDER — MAGNESIUM HYDROXIDE 1200 MG/15ML
LIQUID ORAL AS NEEDED
Status: DISCONTINUED | OUTPATIENT
Start: 2021-02-09 | End: 2021-02-09 | Stop reason: HOSPADM

## 2021-02-09 RX ORDER — LIDOCAINE HYDROCHLORIDE AND EPINEPHRINE 10; 10 MG/ML; UG/ML
INJECTION, SOLUTION INFILTRATION; PERINEURAL AS NEEDED
Status: DISCONTINUED | OUTPATIENT
Start: 2021-02-09 | End: 2021-02-09 | Stop reason: HOSPADM

## 2021-02-09 RX ORDER — SENNOSIDES 8.6 MG
650 CAPSULE ORAL EVERY 8 HOURS
Qty: 30 TABLET | Refills: 0 | Status: SHIPPED | OUTPATIENT
Start: 2021-02-09 | End: 2021-02-19

## 2021-02-09 RX ORDER — GABAPENTIN 300 MG/1
300 CAPSULE ORAL ONCE
Status: DISCONTINUED | OUTPATIENT
Start: 2021-02-09 | End: 2021-02-09 | Stop reason: HOSPADM

## 2021-02-09 RX ORDER — SODIUM CHLORIDE, SODIUM LACTATE, POTASSIUM CHLORIDE, CALCIUM CHLORIDE 600; 310; 30; 20 MG/100ML; MG/100ML; MG/100ML; MG/100ML
50 INJECTION, SOLUTION INTRAVENOUS CONTINUOUS
Status: DISCONTINUED | OUTPATIENT
Start: 2021-02-09 | End: 2021-02-09 | Stop reason: HOSPADM

## 2021-02-09 RX ORDER — PROPOFOL 10 MG/ML
INJECTION, EMULSION INTRAVENOUS AS NEEDED
Status: DISCONTINUED | OUTPATIENT
Start: 2021-02-09 | End: 2021-02-09

## 2021-02-09 RX ORDER — OXYCODONE HYDROCHLORIDE 5 MG/1
5 TABLET ORAL EVERY 4 HOURS PRN
Status: DISCONTINUED | OUTPATIENT
Start: 2021-02-09 | End: 2021-02-09 | Stop reason: HOSPADM

## 2021-02-09 RX ORDER — HYDROMORPHONE HCL 110MG/55ML
PATIENT CONTROLLED ANALGESIA SYRINGE INTRAVENOUS AS NEEDED
Status: DISCONTINUED | OUTPATIENT
Start: 2021-02-09 | End: 2021-02-09

## 2021-02-09 RX ORDER — MIDAZOLAM HYDROCHLORIDE 2 MG/2ML
INJECTION, SOLUTION INTRAMUSCULAR; INTRAVENOUS AS NEEDED
Status: DISCONTINUED | OUTPATIENT
Start: 2021-02-09 | End: 2021-02-09

## 2021-02-09 RX ORDER — FENTANYL CITRATE 50 UG/ML
INJECTION, SOLUTION INTRAMUSCULAR; INTRAVENOUS AS NEEDED
Status: DISCONTINUED | OUTPATIENT
Start: 2021-02-09 | End: 2021-02-09

## 2021-02-09 RX ORDER — ACETAMINOPHEN 325 MG/1
975 TABLET ORAL ONCE
Status: COMPLETED | OUTPATIENT
Start: 2021-02-09 | End: 2021-02-09

## 2021-02-09 RX ORDER — CLINDAMYCIN PHOSPHATE 900 MG/50ML
900 INJECTION INTRAVENOUS ONCE
Status: COMPLETED | OUTPATIENT
Start: 2021-02-09 | End: 2021-02-09

## 2021-02-09 RX ORDER — SUCCINYLCHOLINE/SOD CL,ISO/PF 100 MG/5ML
SYRINGE (ML) INTRAVENOUS AS NEEDED
Status: DISCONTINUED | OUTPATIENT
Start: 2021-02-09 | End: 2021-02-09

## 2021-02-09 RX ORDER — SODIUM CHLORIDE, SODIUM LACTATE, POTASSIUM CHLORIDE, CALCIUM CHLORIDE 600; 310; 30; 20 MG/100ML; MG/100ML; MG/100ML; MG/100ML
100 INJECTION, SOLUTION INTRAVENOUS CONTINUOUS
Status: DISCONTINUED | OUTPATIENT
Start: 2021-02-09 | End: 2021-02-09 | Stop reason: HOSPADM

## 2021-02-09 RX ORDER — ONDANSETRON 2 MG/ML
INJECTION INTRAMUSCULAR; INTRAVENOUS AS NEEDED
Status: DISCONTINUED | OUTPATIENT
Start: 2021-02-09 | End: 2021-02-09

## 2021-02-09 RX ORDER — OXYCODONE HYDROCHLORIDE 5 MG/1
5 TABLET ORAL EVERY 6 HOURS PRN
Qty: 27 TABLET | Refills: 0 | Status: SHIPPED | OUTPATIENT
Start: 2021-02-09 | End: 2021-02-19

## 2021-02-09 RX ORDER — ONDANSETRON 2 MG/ML
4 INJECTION INTRAMUSCULAR; INTRAVENOUS ONCE
Status: COMPLETED | OUTPATIENT
Start: 2021-02-09 | End: 2021-02-09

## 2021-02-09 RX ADMIN — FENTANYL CITRATE 25 MCG: 50 INJECTION INTRAMUSCULAR; INTRAVENOUS at 13:41

## 2021-02-09 RX ADMIN — Medication 140 MG: at 11:03

## 2021-02-09 RX ADMIN — PHENYLEPHRINE HYDROCHLORIDE 100 MCG: 10 INJECTION INTRAVENOUS at 11:21

## 2021-02-09 RX ADMIN — FENTANYL CITRATE 25 MCG: 50 INJECTION INTRAMUSCULAR; INTRAVENOUS at 13:37

## 2021-02-09 RX ADMIN — DEXAMETHASONE SODIUM PHOSPHATE 4 MG: 10 INJECTION, SOLUTION INTRAMUSCULAR; INTRAVENOUS at 11:10

## 2021-02-09 RX ADMIN — MIDAZOLAM HYDROCHLORIDE 2 MG: 1 INJECTION, SOLUTION INTRAMUSCULAR; INTRAVENOUS at 10:57

## 2021-02-09 RX ADMIN — FENTANYL CITRATE 25 MCG: 50 INJECTION INTRAMUSCULAR; INTRAVENOUS at 13:25

## 2021-02-09 RX ADMIN — PROPOFOL 140 MG: 10 INJECTION, EMULSION INTRAVENOUS at 11:03

## 2021-02-09 RX ADMIN — LIDOCAINE HYDROCHLORIDE 50 MG: 10 INJECTION, SOLUTION EPIDURAL; INFILTRATION; INTRACAUDAL at 11:03

## 2021-02-09 RX ADMIN — ONDANSETRON 4 MG: 2 INJECTION INTRAMUSCULAR; INTRAVENOUS at 11:56

## 2021-02-09 RX ADMIN — ONDANSETRON 4 MG: 2 INJECTION INTRAMUSCULAR; INTRAVENOUS at 14:18

## 2021-02-09 RX ADMIN — PHENYLEPHRINE HYDROCHLORIDE 100 MCG: 10 INJECTION INTRAVENOUS at 12:05

## 2021-02-09 RX ADMIN — PHENYLEPHRINE HYDROCHLORIDE 100 MCG: 10 INJECTION INTRAVENOUS at 11:50

## 2021-02-09 RX ADMIN — SODIUM CHLORIDE, SODIUM LACTATE, POTASSIUM CHLORIDE, AND CALCIUM CHLORIDE: .6; .31; .03; .02 INJECTION, SOLUTION INTRAVENOUS at 10:45

## 2021-02-09 RX ADMIN — SODIUM CHLORIDE, SODIUM LACTATE, POTASSIUM CHLORIDE, AND CALCIUM CHLORIDE: .6; .31; .03; .02 INJECTION, SOLUTION INTRAVENOUS at 12:15

## 2021-02-09 RX ADMIN — HYDROMORPHONE HYDROCHLORIDE 0.5 MG: 2 INJECTION, SOLUTION INTRAMUSCULAR; INTRAVENOUS; SUBCUTANEOUS at 12:25

## 2021-02-09 RX ADMIN — FENTANYL CITRATE 25 MCG: 50 INJECTION INTRAMUSCULAR; INTRAVENOUS at 13:18

## 2021-02-09 RX ADMIN — CLINDAMYCIN PHOSPHATE 900 MG: 18 INJECTION, SOLUTION INTRAMUSCULAR; INTRAVENOUS at 10:55

## 2021-02-09 RX ADMIN — HYDROMORPHONE HYDROCHLORIDE 0.5 MG: 2 INJECTION, SOLUTION INTRAMUSCULAR; INTRAVENOUS; SUBCUTANEOUS at 12:30

## 2021-02-09 RX ADMIN — FENTANYL CITRATE 100 MCG: 50 INJECTION, SOLUTION INTRAMUSCULAR; INTRAVENOUS at 11:03

## 2021-02-09 RX ADMIN — ACETAMINOPHEN 975 MG: 325 TABLET, FILM COATED ORAL at 09:33

## 2021-02-09 RX ADMIN — OXYCODONE HYDROCHLORIDE 5 MG: 5 TABLET ORAL at 14:42

## 2021-02-09 NOTE — OP NOTE
OPERATIVE REPORT  PATIENT NAME: Tanja Bartlett    :  1970  MRN: [id number]  Pt Location: [facility] OR ROOM 04    SURGERY DATE: 2021    Surgeon(s) and Role:     * Calderon Aguirre MD - Primary     * Triny Young PA-C - Assisting     * Lyn Hale MD - Assisting    Preop Diagnosis:  History of breast reconstruction [G15 419]  Personal history of malignant neoplasm of breast [Z85 3]    Post-Op Diagnosis Codes:     * History of breast reconstruction [Z98 890]     * Personal history of malignant neoplasm of breast [Z85 3]    Procedure(s) (LRB):  BREAST TISSUE EXPANDER/ IMPLANT EXCHANGE (Left)  BREAST CAPSULOTOMY (Left)    Specimen(s):  * No specimens in log *    Estimated Blood Loss:   Minimal    Drains:  Closed/Suction Drain Right Back Bulb 19 Fr  (Active)   Number of days: 553       Closed/Suction Drain Right Back Bulb 19 Fr  (Active)   Number of days: 553       Closed/Suction Drain Right; Anterior; Other (Comment) Chest Bulb 15 Fr  (Active)   Number of days: 553       Closed/Suction Drain Right; Anterior; Other (Comment) Chest Bulb 15 Fr  (Active)   Number of days: 553       Closed/Suction Drain Left; Other (Comment) Chest Bulb 15 Fr  (Active)   Number of days: 553       Closed/Suction Drain Inferior; Left Chest 15 Fr  (Active)   Number of days: 553       Negative Pressure Wound Therapy (V A C ) Back Lateral;Right (Active)   Number of days: 553       Negative Pressure Wound Therapy (V A C ) Breast Right;Left (Active)   Number of days: 553       Anesthesia Type:   General    Operative Indications:  History of breast reconstruction [Z98 890]  Personal history of malignant neoplasm of breast [Z85 3]    Operative Findings:  Intact left tissue expander removed   Cephalad capsulotomy performed to accommodate implant    Implant"    Malcolm Xtra Memory Gel Breast implant   Smooth High Profile  790 c c  Ref [id number]   LOT [id number]   [id number]    Complications:   None    Procedure and Technique:  Mrs Ayala is a 49 years old female with history of acquired absence of bilateral breasts presents for a second-stage her left breast reconstruction, specifically exchange of her expander to a permanent implant and necessary breast pocket modifications  All the risks and benefits of the procedure were explained in detail during our preoperative encounter before signing an informed consent  The patient was met in the preoperative holding area and all last minute questions were properly answered and addressed  Preoperative markings were then made in the standing position she was then taken to the operative theater and placed in supine position  Smooth anesthesia was then induced and all the necessary perioperative measures were taken the chest was then prepped and draped in usual sterile fashion a time-out was then performed  Procedure started by 1st marking our proposed incision using the scar on the central bilateral breast horizontally oriented and infiltrated with 8 mL of 1% lidocaine with epinephrine  Incision was then made with a scalpel down to the subcutaneous tissue and skin flap was then raised for approximately 2 cm cephalad just above the ADM with electrocautery  The ADM was then incised horizontally to the whole length of the incision with electrocautery and expander was then encounter  This was perforated with a scalpel and all the saline was then extracted with the suction in order to extract and plan and discarded  Breast pocket was then inspected noticing good incorporation of the ADM  Capsulotomy was then performed and the cephalad aspect of the breast firm 9:00 to 2:00 in a m  To accommodate a larger size implant    This was performed using the lighted retractor and the electrocautery by incising the capsule finding in the pectoralis major more soreness upper portion of the breast small segment of subpectoral plane was then raised on the cephalad most aspect of the pocket to blend the upper pole of the breast prior to insertion of the implant  Once satisfactory pocket dissection were performed hemostasis was then performed breast Sizer was then brought into the field and inserted into the breast pocket automatically and the skin was then tailor tacked with staples  The patient was then placed in the flexed position and confirmed satisfactory symmetry to the contralateral breast footprint with the implant Sizer in place  The bone was then removed and hemostasis was again performed irrigation with 2 liter of triple antibiotic irrigation (clinda, gentamicin and bacitracin) followed by Betadine solution  The skin was then re-prepped and draped with Betadine and the pocket was then irrigated with Irrisept solution  All the members of the staff exchange gloves and the final implant was then brought into the field and deliver into the breast pocket using the Oleary funnel and after mattress fashion  The position of the implant on the breast footprint was confirmed closure was then performed in layers with interrupted figure-eight 2-0 PDS along the ADM  This was followed by deep dermal 3-0 Monocryl and a 4-0 Monocryl subcuticular layer  Surgical glue and Steri-Strips was then applied at the conclusion of the case       I was present for the entire procedure and I was present for all critical portions of the procedure    Patient Disposition:  PACU  and hemodynamically stable    SIGNATURE: Vinnie Perez MD  DATE: February 9, 2021  TIME: 12:09 PM

## 2021-02-09 NOTE — ANESTHESIA PREPROCEDURE EVALUATION
Procedure:  BREAST TISSUE EXPANDER/ IMPLANT EXCHANGE (Left Breast)  BREAST CAPSULOTOMY (Left Breast)  BREAST FAT GRAFTING (Left Breast)    Relevant Problems   GI/HEPATIC   (+) Bariatric surgery status      NEURO/PSYCH   (+) Personal history of malignant neoplasm of breast        Physical Exam    Airway    Mallampati score: III         Dental   No notable dental hx     Cardiovascular  Rhythm: regular, Rate: normal, Cardiovascular exam normal    Pulmonary  Pulmonary exam normal Breath sounds clear to auscultation,     Other Findings        Anesthesia Plan  ASA Score- 3     Anesthesia Type- general with ASA Monitors  Additional Monitors:   Airway Plan:           Plan Factors-Exercise tolerance (METS): <4 METS  EKG reviewed  Existing labs reviewed  Patient summary reviewed  Patient is not a current smoker  Induction- intravenous  Postoperative Plan-     Informed Consent- Anesthetic plan and risks discussed with patient  I personally reviewed this patient with the CRNA  Discussed and agreed on the Anesthesia Plan with the CRNA  Rivas Cradnall

## 2021-02-09 NOTE — DISCHARGE INSTRUCTIONS
[facility]  [address], [address]  Phone: [phone]     Postoperative Instructions for Outpatient Surgery     These instructions are being provided by your doctor to give you basic guidelines during your post-op recovery  Please let our office know if your contact information has changed  Please call the office today for an appointment in two weeks for your first postoperative care visit  Please call my office at any time if you have drainage from incisions, increased redness or swelling, or a fever greater than 100 5  Dressings: Please keep dressing clean and dry  Wear surgical bra at all times other than in shower  Activity Restrictions: No strenuous activities  Bathing: You may shower in 48 hours  Pat incision dry  Please do not rub incision  Medications:    Resume pre-op medications     You may take tylenol, aleve, or ibuprofen for pain control

## 2021-02-09 NOTE — ANESTHESIA POSTPROCEDURE EVALUATION
Post-Op Assessment Note    CV Status:  Stable    Pain management: adequate     Mental Status:  Sleepy   Hydration Status:  Euvolemic   PONV Controlled:  Controlled   Airway Patency:  Patent      Post Op Vitals Reviewed: Yes      Staff: CRNA   Comments: vss report rn        No complications documented      BP   129/74   Temp      Pulse  102   Resp      SpO2 100

## 2021-02-23 ENCOUNTER — OFFICE VISIT (OUTPATIENT)
Dept: PLASTIC SURGERY | Facility: CLINIC | Age: 51
End: 2021-02-23

## 2021-02-23 DIAGNOSIS — Z98.82 S/P LEFT BREAST IMPLANT: ICD-10-CM

## 2021-02-23 DIAGNOSIS — Z98.890 S/P FLAP GRAFT: Primary | ICD-10-CM

## 2021-02-23 PROCEDURE — 99024 POSTOP FOLLOW-UP VISIT: CPT | Performed by: PHYSICIAN ASSISTANT

## 2021-02-23 NOTE — PROGRESS NOTES
POST-OP EVALUATION  2/23/2021    Olesya Sloan is a 49 y o  female is here today for routine post-operative follow up after   02/09/21 1057         Procedures:        BREAST TISSUE EXPANDER/ IMPLANT EXCHANGE (Left Breast)       BREAST CAPSULOTOMY (Left Breast)     She says she recovered very well from this recent surgery  She is not taking any pain meds  She notes an asymmetry in her breasts now, with the right, lat flap, imf lower than the left      Past Medical History:   Diagnosis Date    Arthritis     [facility] [address] [facility]     [facility] [address] [facility]     Breast cancer, right breast ([facility] [address] [facility]     Chronic pain disorder     Heartburn     Hiatal hernia     History of bariatric surgery     Irregular heart beat     pt states it was a side effect of a medication    Kidney stone     Lung nodule     Osteoporosis     stage 3    Postsurgical malabsorption     Rheumatoid arthritis ([facility] [address] [facility]     Sleep apnea     Stress incontinence     Thyroid nodule      Past Surgical History:   Procedure Laterality Date    ABDOMINAL SURGERY      BRACHIOPLASTY Bilateral     2017    BREAST IMPLANT Bilateral 9/8/2020    Procedure: BREAST REMOVAL TISSUE EXPANDER/ IMPLANT PLACEMENT BREAST;  Surgeon: Payton Mark MD;  [facility];  Service: Plastics    BREAST IMPLANT Left 2/9/2021    Procedure: BREAST TISSUE EXPANDER/ IMPLANT EXCHANGE;  Surgeon: Payton Mark MD;  [facility];  Service: Plastics    CAPSULOTOMY Bilateral 9/8/2020    Procedure: BREAST CAPSULOTOMY;  Surgeon: Payton Mark MD;  [facility];  Service: Plastics    CAPSULOTOMY Left 2/9/2021    Procedure: BREAST CAPSULOTOMY;  Surgeon: Payton Mark MD;  [facility];  Service: Plastics    CARDIAC CATHETERIZATION      CARPAL TUNNEL RELEASE Bilateral 2015    CHOLECYSTECTOMY      COLONOSCOPY      GALLBLADDER SURGERY  2015   Kenisha Palacio GASTRIC BYPASS  2014    HYSTERECTOMY  05/2013    INCONTINENCE SURGERY  09/2013    IR BIOPSY LUNG      lung nodules    MASTECTOMY Bilateral 10/2016    ID BREAST RECONSTRUCTION W/LATISSIMUS DORSI FLAP Right 8/6/2019    Procedure: BREAST RECONSTRUCTION W/FLAP LATISSIMUS DORSI;  Surgeon: Brianna Khan MD;  [facility];  Service: Plastics    ID DOMINGA-IMPLANT CAPSULECTOMY BREAST COMPLETE Bilateral 8/6/2019    Procedure: BREAST CAPSULECTOMY;  Surgeon: Brianna Khan MD;  [facility];  Service: Plastics    ID REMOVAL INTACT BREAST IMPLANT Bilateral 8/6/2019    Procedure: BREAST IMPLANT REMOVAL;  Surgeon: Brianna Khan MD;  [facility];  Service: Plastics    ID TISSUE EXPANDER PLACEMENT BREAST RECONSTRUCTION Bilateral 8/6/2019    Procedure: BREAST TISSUE EXPANDER PLACEMENT;  Surgeon: Brianna Khan MD;  [facility];  Service: Plastics    REDUCTION MAMMAPLASTY      TRANSFER MUSCLE PECTORALIS Bilateral 8/6/2019    Procedure: PECTORALIS MUSCLE REPAIR;  Surgeon: Brianna Khan MD;  [facility];  Service: Plastics        Current Outpatient Medications:     alendronate (FOSAMAX) 70 mg tablet, every 7 days Weekly on saturdays, Disp: , Rfl:     anastrozole (ARIMIDEX) 1 mg tablet, Take 1 mg by mouth daily, Disp: , Rfl: 4    fluticasone-salmeterol (ADVAIR DISKUS) 250-50 mcg/dose inhaler, Inhale 1 puff 2 (two) times a day as needed , Disp: , Rfl:     triamcinolone (KENALOG) 0 5 % cream, Apply topically as needed , Disp: , Rfl:   Allergies: Accolate [zafirlukast], Penicillins, Shellfish allergy, Shellfish-derived products, Singulair [montelukast], and Pork-derived products    Objective    Breasts: surgical scars noted In left breast   Implant properly positioned  There is asymmetry in breast position, with right flap lower than the implant  Assessment/Plan   Diagnoses and all orders for this visit:    S/P right latissimus dorsi flap    S/P left breast implant    Doing well  No pain  Although implant will drop some, she would like to discuss a lift on the right to bring about better symmetry  She will follow up at our next [facility] clinic with Dr Abreu Speaker      Miranda Fregoso PA-C

## 2021-03-10 DIAGNOSIS — Z23 ENCOUNTER FOR IMMUNIZATION: ICD-10-CM

## 2021-04-09 ENCOUNTER — OFFICE VISIT (OUTPATIENT)
Dept: PLASTIC SURGERY | Facility: CLINIC | Age: 51
End: 2021-04-09

## 2021-04-09 VITALS — WEIGHT: 227 LBS | TEMPERATURE: 97.1 F | HEIGHT: 60 IN | BODY MASS INDEX: 44.57 KG/M2

## 2021-04-09 DIAGNOSIS — Z90.13 ACQUIRED ABSENCE OF BREAST AND ABSENT NIPPLE, BILATERAL: Primary | ICD-10-CM

## 2021-04-09 PROCEDURE — 99024 POSTOP FOLLOW-UP VISIT: CPT | Performed by: PLASTIC SURGERY

## 2021-04-09 NOTE — PROGRESS NOTES
Assessment/Plan:      Diagnoses and all orders for this visit:    Acquired absence of breast and absent nipple, bilateral        I discussed that I felt that she is overall much improved in her reconstruction revision  Her concerns were explained and likely to excess skin paddle inferolateral aspect of her breast that was expected due to need to replace multiple previous contracted scars  I discussed right breast mound revision with the patient  This could be achieved with possible right IMF elevation and debulking of flap  I discussed that this could be performed as outpatient surgery/     I discussed risks including but not limited to: bleeding, infection, hematoma, seroma, wound breakdown, scar, need for further surgery, deformity, pain, numbness, weakness, asymmetry, injury to structures, and medical complications  The patient would like to proceed and therefore we will initiate the insurance approval process  A total of 35 minutes of face-to-face time was spent in this encounter, of which >50% was spent in counseling  Radha Smith MD   [facility]   [address]   [address] [facility], [address] [facility]   Office: [phone]          Subjective:     Patient ID: Jozef West is a 49 y o  female  Mrs Alfonso Yanez is a 49years old female who is 2 months s/p BREAST TISSUE EXPANDER/ IMPLANT EXCHANGE (Left)  BREAST CAPSULOTOMY (Left)    Patient manifest doing well with no discomfort  She is very happy with the results but has some concerns about asymmetries that would like to address, specficially when wearing clothing it is noticeable that with breast is "lower and more droopy laterally"  Review of Systems      Objective:     Physical Exam  Constitutional:       Appearance: Normal appearance  HENT:      Head: Normocephalic and atraumatic  Cardiovascular:      Rate and Rhythm: Normal rate     Pulmonary:      Effort: Pulmonary effort is normal  Chest:       Musculoskeletal: Normal range of motion  Neurological:      General: No focal deficit present  Mental Status: She is alert     Psychiatric:         Mood and Affect: Mood normal

## 2021-08-03 ENCOUNTER — APPOINTMENT (OUTPATIENT)
Dept: LAB | Facility: HOSPITAL | Age: 51
End: 2021-08-03
Attending: PLASTIC SURGERY
Payer: MEDICARE

## 2021-08-03 DIAGNOSIS — Z85.3 PERSONAL HISTORY OF MALIGNANT NEOPLASM OF BREAST: ICD-10-CM

## 2021-08-03 LAB
ANION GAP SERPL CALCULATED.3IONS-SCNC: 7 MMOL/L (ref 4–13)
BASOPHILS # BLD AUTO: 0.04 THOUSANDS/ΜL (ref 0–0.1)
BASOPHILS NFR BLD AUTO: 0 % (ref 0–1)
BUN SERPL-MCNC: 13 MG/DL (ref 5–25)
CALCIUM SERPL-MCNC: 8.5 MG/DL (ref 8.3–10.1)
CHLORIDE SERPL-SCNC: 105 MMOL/L (ref 100–108)
CO2 SERPL-SCNC: 25 MMOL/L (ref 21–32)
CREAT SERPL-MCNC: 0.66 MG/DL (ref 0.6–1.3)
EOSINOPHIL # BLD AUTO: 0.31 THOUSAND/ΜL (ref 0–0.61)
EOSINOPHIL NFR BLD AUTO: 3 % (ref 0–6)
ERYTHROCYTE [DISTWIDTH] IN BLOOD BY AUTOMATED COUNT: 21.9 % (ref 11.6–15.1)
GFR SERPL CREATININE-BSD FRML MDRD: 103 ML/MIN/1.73SQ M
GLUCOSE P FAST SERPL-MCNC: 79 MG/DL (ref 65–99)
HCT VFR BLD AUTO: 31.1 % (ref 34.8–46.1)
HGB BLD-MCNC: 8.5 G/DL (ref 11.5–15.4)
IMM GRANULOCYTES # BLD AUTO: 0.05 THOUSAND/UL (ref 0–0.2)
IMM GRANULOCYTES NFR BLD AUTO: 0 % (ref 0–2)
LYMPHOCYTES # BLD AUTO: 2.62 THOUSANDS/ΜL (ref 0.6–4.47)
LYMPHOCYTES NFR BLD AUTO: 23 % (ref 14–44)
MCH RBC QN AUTO: 18.6 PG (ref 26.8–34.3)
MCHC RBC AUTO-ENTMCNC: 27.3 G/DL (ref 31.4–37.4)
MCV RBC AUTO: 68 FL (ref 82–98)
MONOCYTES # BLD AUTO: 1.02 THOUSAND/ΜL (ref 0.17–1.22)
MONOCYTES NFR BLD AUTO: 9 % (ref 4–12)
NEUTROPHILS # BLD AUTO: 7.21 THOUSANDS/ΜL (ref 1.85–7.62)
NEUTS SEG NFR BLD AUTO: 65 % (ref 43–75)
NRBC BLD AUTO-RTO: 0 /100 WBCS
PLATELET # BLD AUTO: 430 THOUSANDS/UL (ref 149–390)
PMV BLD AUTO: 9.4 FL (ref 8.9–12.7)
POTASSIUM SERPL-SCNC: 4.6 MMOL/L (ref 3.5–5.3)
RBC # BLD AUTO: 4.56 MILLION/UL (ref 3.81–5.12)
SODIUM SERPL-SCNC: 137 MMOL/L (ref 136–145)
WBC # BLD AUTO: 11.25 THOUSAND/UL (ref 4.31–10.16)

## 2021-08-03 PROCEDURE — 80048 BASIC METABOLIC PNL TOTAL CA: CPT

## 2021-08-03 PROCEDURE — 36415 COLL VENOUS BLD VENIPUNCTURE: CPT

## 2021-08-03 PROCEDURE — 85025 COMPLETE CBC W/AUTO DIFF WBC: CPT

## 2021-09-01 RX ORDER — CHLORHEXIDINE GLUCONATE 0.12 MG/ML
RINSE ORAL
COMMUNITY
Start: 2021-06-15

## 2021-09-01 NOTE — PRE-PROCEDURE INSTRUCTIONS
Pre-Surgery Instructions:   Medication Instructions    alendronate (FOSAMAX) 70 mg tablet takes on saturday    anastrozole (ARIMIDEX) 1 mg tablet takes at night    chlorhexidine (PERIDEX) 0 12 % solution rinse and spit, may use day of surgery    fluticasone-salmeterol (ADVAIR DISKUS) 250-50 mcg/dose inhaler may use day of surgery if needed    triamcinolone (KENALOG) 0 5 % cream hold day of surgery    My Surgical Experience    The following information was developed to assist you to prepare for your operation  What do I need to do before coming to the hospital?   Arrange for a responsible person to drive you to and from the hospital    Arrange care for your children at home  Children are not allowed in the recovery areas of the hospital   Plan to wear clothing that is easy to put on and take off  If you are having shoulder surgery, wear a shirt that buttons or zippers in the front  Bathing  o Shower the evening before and the morning of your surgery with an antibacterial soap  Please refer to the Pre Op Showering Instructions for Surgery Patients Sheet   o Remove nail polish and all body piercing jewelry  o Do not shave any body part for at least 24 hours before surgery-this includes face, arms, legs and upper body  Food  o Nothing to eat or drink after midnight the night before your surgery  This includes candy and chewing gum  o Exception: If your surgery is after 12:00pm (noon), you may have clear liquids such as 7-Up®, ginger ale, apple or cranberry juice, Jell-O®, water, or clear broth until 8:00 am  o Do not drink milk or juice with pulp on the morning before surgery  o Do not drink alcohol 24 hours before surgery  Medicine  o Follow instructions you received from your surgeon about which medicines you may take on the day of surgery  o If instructed to take medicine on the morning of surgery, take pills with just a small sip of water   Call your prescribing doctor for specific infroamtion on what to do if you take insulin    What should I bring to the hospital?    Bring:  Jossy Plate or a walker, if you have them, for foot or knee surgery   A list of the daily medicines, vitamins, minerals, herbals and nutritional supplements you take  Include the dosages of medicines and the time you take them each day   Glasses, dentures or hearing aids   Minimal clothing; you will be wearing hospital sleepwear   Photo ID; required to verify your identity   If you have a Living Will or Power of , bring a copy of the documents   If you have an ostomy, bring an extra pouch and any supplies you use    Do not bring   Medicines or inhalers   Money, valuables or jewelry    What other information should I know about the day of surgery?  Notify your surgeons if you develop a cold, sore throat, cough, fever, rash or any other illness   Report to the Ambulatory Surgical/Same Day Surgery Unit   You will be instructed to stop at Registration only if you have not been pre-registered   Inform your  fi they do not stay that they will be asked by the staff to leave a phone number where they can be reached   Be available to be reached before surgery  In the event the operating room schedule changes, you may be asked to come in earlier or later than expected    *It is important to tell your doctor and others involved in your health care if you are taking or have been taking any non-prescription drugs, vitamins, minerals, herbals or other nutritional supplements   Any of these may interact with some food or medicines and cause a reaction

## 2021-09-06 ENCOUNTER — ANESTHESIA EVENT (OUTPATIENT)
Dept: PERIOP | Facility: HOSPITAL | Age: 51
End: 2021-09-06
Payer: MEDICARE

## 2021-09-06 PROCEDURE — NC001 PR NO CHARGE: Performed by: PHYSICIAN ASSISTANT

## 2021-09-06 NOTE — H&P
H&P - Plastic Surgery   Violeta Yuan 49 y o  female MRN: [id number]  Unit/Bed#:  Encounter: [id number]           Assessment:  Personal history of malignant neoplasm of breast   Plan:  RIGHT BREAST MOUND REVISION (Right Breast)        HPI:   Violeta Yuan is a 49y o  year old female who presents s/p BREAST TISSUE EXPANDER/ IMPLANT EXCHANGE (Left)  BREAST CAPSULOTOMY (Left)     Patient manifest doing well with no discomfort  She is very happy with the results but has some concerns about asymmetries that would like to address, specficially when wearing clothing it is noticeable that with breast is "lower and more droopy laterally"  She saw Dr Bambi Acevedo in April, and he recommended right breast mound revision  REVIEW OF SYSTEMS    GENERAL/CONSTITUTIONAL: The patient denies fever, fatigue, weakness, weight gain or weight loss  HEAD, EYES, EARS, NOSE AND THROAT: Eyes - The patient denies pain, redness, loss of vision, double or blurred vision and denies wearing glasses  The patient denies ringing in the ears, nosebleeds sinusitis, post nasal drip  Also denies frequent sore throats, hoarseness, painful swallowing  CARDIOVASCULAR: The patient denies chest pain, irregular heartbeats, palpitations, shortness of breath, heart murmurs, high blood pressure, cramps in his legs with walking, pain in his feet or toes at night or varicose veins  RESPIRATORY: The patient denies chronic cough, wheezing or night sweats  GASTROINTESTINAL: The patient denies decreased appetite, nausea, vomiting, diarrhea, constipation, blood in the stools  GENITOURINARY: The patient denies difficult urination, pain or burning with urination, blood in the urine  MUSCULOSKELETAL: The patient denies arm, thigh or calf cramps  No joint or muscle pain  No muscle weakness or tenderness  No joint swelling, neck pain, back pain or major orthopedic injuries    SKIN AND BREASTS: see hpi The patient denies easy bruising, skin redness, skin rash, hives   NEUROLOGIC: The patient denies headache, dizziness, fainting, memory loss  PSYCHIATRIC: The patient denies depression anxiety  ENDOCRINE: The patient denies intolerance to hot or cold temperature, flushing, fingernail changes, increased thirst, increased salt intake or decreased sexual desire  HEMATOLOGIC/LYMPHATIC: The patient denies anemia, bleeding tendency or clotting tendency  ALLERGIC/IMMUNOLOGIC: The patient denies rhinitis, asthma, skin sensitivity, latex allergies or sensitivity        Historical Information   Past Medical History:   Diagnosis Date    Arthritis     Breast [place] [address] )     Breast cancer ([place] [address] )     Breast cancer, right breast ([place] [address] )     Chronic pain disorder     Heartburn     Hiatal hernia     History of bariatric surgery     Irregular heart beat     pt states it was a side effect of a medication    Kidney stone     Lung nodule     Osteoporosis     stage 3    Postsurgical malabsorption     Rheumatoid arthritis ([place] [address] )     Sleep apnea     Stress incontinence     Thyroid nodule      Past Surgical History:   Procedure Laterality Date    ABDOMINAL SURGERY      BRACHIOPLASTY Bilateral     2017    BREAST IMPLANT Bilateral 9/8/2020    Procedure: BREAST REMOVAL TISSUE EXPANDER/ IMPLANT PLACEMENT BREAST;  Surgeon: Emely Berger MD;  [facility];  Service: Plastics    BREAST IMPLANT Left 2/9/2021    Procedure: BREAST TISSUE EXPANDER/ IMPLANT EXCHANGE;  Surgeon: Emely Berger MD;  [facility];  Service: Plastics    CAPSULOTOMY Bilateral 9/8/2020    Procedure: BREAST CAPSULOTOMY;  Surgeon: Emely Berger MD;  [facility];  Service: Plastics    CAPSULOTOMY Left 2/9/2021    Procedure: BREAST CAPSULOTOMY;  Surgeon: Emely Berger MD;  [facility];  Service: Plastics    CARDIAC CATHETERIZATION      CARPAL TUNNEL RELEASE Bilateral 2015    CHOLECYSTECTOMY      COLONOSCOPY      GALLBLADDER SURGERY 2015    GASTRIC BYPASS  2014    HYSTERECTOMY  05/2013    INCONTINENCE SURGERY  09/2013    IR BIOPSY LUNG      lung nodules    MASTECTOMY Bilateral 10/2016    CA BREAST RECONSTRUCTION W/LATISSIMUS DORSI FLAP Right 8/6/2019    Procedure: BREAST RECONSTRUCTION W/FLAP LATISSIMUS DORSI;  Surgeon: Erin Barone MD;  [facility];  Service: Plastics    CA DOMINGA-IMPLANT CAPSULECTOMY BREAST COMPLETE Bilateral 8/6/2019    Procedure: BREAST CAPSULECTOMY;  Surgeon: Erin Barone MD;  [facility];  Service: Plastics    CA REMOVAL INTACT BREAST IMPLANT Bilateral 8/6/2019    Procedure: BREAST IMPLANT REMOVAL;  Surgeon: Erin Barone MD;  [facility];  Service: Plastics    CA TISSUE EXPANDER PLACEMENT BREAST RECONSTRUCTION Bilateral 8/6/2019    Procedure: BREAST TISSUE EXPANDER PLACEMENT;  Surgeon: Erin Barone MD;  [facility];  Service: Plastics    REDUCTION MAMMAPLASTY      TRANSFER MUSCLE PECTORALIS Bilateral 8/6/2019    Procedure: PECTORALIS MUSCLE REPAIR;  Surgeon: Erin Barone MD;  [facility];  Service: Plastics     Social History   Social History     Substance and Sexual Activity   Alcohol Use Yes    Comment: Rare     Social History     Substance and Sexual Activity   Drug Use Not Currently     Social History     Tobacco Use   Smoking Status Never Smoker   Smokeless Tobacco Never Used     Family History:   Family History   Problem Relation Age of Onset    No Known Problems Mother     Diabetes Father     Heart disease Father        Meds/Allergies   No current facility-administered medications for this encounter      Current Outpatient Medications:     alendronate (FOSAMAX) 70 mg tablet, every 7 days Weekly on saturdays, Disp: , Rfl:     anastrozole (ARIMIDEX) 1 mg tablet, Take 1 mg by mouth daily, Disp: , Rfl: 4    chlorhexidine (PERIDEX) 0 12 % solution, SWISH AND SPIT 10 ML BY MOUTH THREE TIMES DAILY, Disp: , Rfl:     fluticasone-salmeterol (ADVAIR DISKUS) 250-50 mcg/dose inhaler, Inhale 1 puff 2 (two) times a day as needed , Disp: , Rfl:     triamcinolone (KENALOG) 0 5 % cream, Apply topically as needed , Disp: , Rfl:       Objective     Constitutional:       Appearance: Normal appearance  HENT:      Head: Normocephalic and atraumatic  Cardiovascular:      Rate and Rhythm: Normal rate  Pulmonary:      Effort: Pulmonary effort is normal    Chest:       Musculoskeletal: Normal range of motion  Neurological:      General: No focal deficit present  Mental Status: She is alert  Psychiatric:         Mood and Affect: Mood normal      Lab Results:   Lab Results   Component Value Date    WBC 11 25 (H) 08/03/2021    HGB 8 5 (L) 08/03/2021    HCT 31 1 (L) 08/03/2021    MCV 68 (L) 08/03/2021     (H) 08/03/2021          No results found for: TISSUECULT, WOUNDCULT      Imaging Studies:   No results found  EKG, Pathology, and Other Studies:   Lab Results   Component Value Date/Time    [facility]  08/06/2019 09:53 AM     A  Breast, Right, Right breast capsule:  -Benign fibrous tissue  -Adjacent benign adipose tissue  -No evidence of malignancy  B  Foreign body, Right breast implant--gross only:  -Breast implant  Please see the  full gross description  C  Breast, Left, Left breast capsule:  -Benign fibrous tissue with suture granuloma  -No evidence of malignancy  D  Foreign body, Left breast implant gross only:  -Breast implant  Please see the  full gross description  E  Breast, Right, Right mastectomy skin:  -Benign Skin and subcutaneous adipose tissue  -No evidence of malignancy  No intake or output data in the 24 hours ending 09/06/21 1940    Invasive Devices     Drain            Closed/Suction Drain Inferior; Left Chest 15 Fr  762 days    Closed/Suction Drain Left; Other (Comment) Chest Bulb 15 Fr  762 days    Closed/Suction Drain Right Back Bulb 19 Fr  762 days    Closed/Suction Drain Right Back Bulb 19 Fr  762 days    Closed/Suction Drain Right; Anterior; Other (Comment) Chest Bulb 15 Fr  762 days    Closed/Suction Drain Right; Anterior; Other (Comment) Chest Bulb 15 Fr  762 days                VTE Prophylaxis: Sequential compression device Tawanna Andrews     Code Status: No Order  Advance Directive and Living Will:      Power of :

## 2021-09-07 ENCOUNTER — ANESTHESIA (OUTPATIENT)
Dept: PERIOP | Facility: HOSPITAL | Age: 51
End: 2021-09-07
Payer: MEDICARE

## 2021-09-07 ENCOUNTER — HOSPITAL ENCOUNTER (OUTPATIENT)
Facility: HOSPITAL | Age: 51
Setting detail: OUTPATIENT SURGERY
Discharge: HOME | End: 2021-09-07
Attending: PLASTIC SURGERY | Admitting: PLASTIC SURGERY
Payer: MEDICARE

## 2021-09-07 VITALS
DIASTOLIC BLOOD PRESSURE: 55 MMHG | TEMPERATURE: 97.6 F | SYSTOLIC BLOOD PRESSURE: 105 MMHG | RESPIRATION RATE: 16 BRPM | WEIGHT: 249.12 LBS | HEIGHT: 60 IN | BODY MASS INDEX: 48.91 KG/M2 | OXYGEN SATURATION: 97 % | HEART RATE: 82 BPM

## 2021-09-07 DIAGNOSIS — Z90.13 ACQUIRED ABSENCE OF BREAST AND ABSENT NIPPLE, BILATERAL: Primary | ICD-10-CM

## 2021-09-07 PROBLEM — G47.30 SLEEP APNEA: Status: ACTIVE | Noted: 2021-09-07

## 2021-09-07 PROBLEM — D64.9 ANEMIA: Status: ACTIVE | Noted: 2021-09-07

## 2021-09-07 PROBLEM — M19.90 ARTHRITIS: Status: ACTIVE | Noted: 2021-09-07

## 2021-09-07 PROBLEM — Z90.710 HISTORY OF HYSTERECTOMY: Status: ACTIVE | Noted: 2021-09-07

## 2021-09-07 PROCEDURE — 19380 REVJ RECONSTRUCTED BREAST: CPT | Performed by: PHYSICIAN ASSISTANT

## 2021-09-07 PROCEDURE — 19380 REVJ RECONSTRUCTED BREAST: CPT | Performed by: PLASTIC SURGERY

## 2021-09-07 RX ORDER — DEXAMETHASONE SODIUM PHOSPHATE 4 MG/ML
INJECTION, SOLUTION INTRA-ARTICULAR; INTRALESIONAL; INTRAMUSCULAR; INTRAVENOUS; SOFT TISSUE AS NEEDED
Status: DISCONTINUED | OUTPATIENT
Start: 2021-09-07 | End: 2021-09-07

## 2021-09-07 RX ORDER — SODIUM CHLORIDE, SODIUM LACTATE, POTASSIUM CHLORIDE, CALCIUM CHLORIDE 600; 310; 30; 20 MG/100ML; MG/100ML; MG/100ML; MG/100ML
50 INJECTION, SOLUTION INTRAVENOUS CONTINUOUS
Status: DISCONTINUED | OUTPATIENT
Start: 2021-09-07 | End: 2021-09-07 | Stop reason: HOSPADM

## 2021-09-07 RX ORDER — GABAPENTIN 300 MG/1
300 CAPSULE ORAL ONCE
Status: DISCONTINUED | OUTPATIENT
Start: 2021-09-07 | End: 2021-09-07 | Stop reason: HOSPADM

## 2021-09-07 RX ORDER — GLYCOPYRROLATE 0.2 MG/ML
INJECTION INTRAMUSCULAR; INTRAVENOUS AS NEEDED
Status: DISCONTINUED | OUTPATIENT
Start: 2021-09-07 | End: 2021-09-07

## 2021-09-07 RX ORDER — OXYCODONE HYDROCHLORIDE 5 MG/1
5 TABLET ORAL EVERY 4 HOURS PRN
Qty: 27 TABLET | Refills: 0 | Status: SHIPPED | OUTPATIENT
Start: 2021-09-07 | End: 2021-09-17

## 2021-09-07 RX ORDER — NEOSTIGMINE METHYLSULFATE 1 MG/ML
INJECTION INTRAVENOUS AS NEEDED
Status: DISCONTINUED | OUTPATIENT
Start: 2021-09-07 | End: 2021-09-07

## 2021-09-07 RX ORDER — METOCLOPRAMIDE HYDROCHLORIDE 5 MG/ML
10 INJECTION INTRAMUSCULAR; INTRAVENOUS ONCE AS NEEDED
Status: DISCONTINUED | OUTPATIENT
Start: 2021-09-07 | End: 2021-09-07 | Stop reason: HOSPADM

## 2021-09-07 RX ORDER — CLINDAMYCIN PHOSPHATE 900 MG/50ML
900 INJECTION INTRAVENOUS ONCE
Status: COMPLETED | OUTPATIENT
Start: 2021-09-07 | End: 2021-09-07

## 2021-09-07 RX ORDER — MAGNESIUM HYDROXIDE 1200 MG/15ML
LIQUID ORAL AS NEEDED
Status: DISCONTINUED | OUTPATIENT
Start: 2021-09-07 | End: 2021-09-07 | Stop reason: HOSPADM

## 2021-09-07 RX ORDER — OXYCODONE HYDROCHLORIDE 5 MG/1
5 TABLET ORAL EVERY 4 HOURS PRN
Status: DISCONTINUED | OUTPATIENT
Start: 2021-09-07 | End: 2021-09-07 | Stop reason: HOSPADM

## 2021-09-07 RX ORDER — LIDOCAINE HYDROCHLORIDE 20 MG/ML
INJECTION, SOLUTION EPIDURAL; INFILTRATION; INTRACAUDAL; PERINEURAL AS NEEDED
Status: DISCONTINUED | OUTPATIENT
Start: 2021-09-07 | End: 2021-09-07

## 2021-09-07 RX ORDER — KETOROLAC TROMETHAMINE 30 MG/ML
INJECTION, SOLUTION INTRAMUSCULAR; INTRAVENOUS AS NEEDED
Status: DISCONTINUED | OUTPATIENT
Start: 2021-09-07 | End: 2021-09-07

## 2021-09-07 RX ORDER — ROCURONIUM BROMIDE 10 MG/ML
INJECTION, SOLUTION INTRAVENOUS AS NEEDED
Status: DISCONTINUED | OUTPATIENT
Start: 2021-09-07 | End: 2021-09-07

## 2021-09-07 RX ORDER — HYDROMORPHONE HCL/PF 1 MG/ML
0.2 SYRINGE (ML) INJECTION
Status: DISCONTINUED | OUTPATIENT
Start: 2021-09-07 | End: 2021-09-07 | Stop reason: HOSPADM

## 2021-09-07 RX ORDER — SODIUM CHLORIDE, SODIUM LACTATE, POTASSIUM CHLORIDE, CALCIUM CHLORIDE 600; 310; 30; 20 MG/100ML; MG/100ML; MG/100ML; MG/100ML
125 INJECTION, SOLUTION INTRAVENOUS CONTINUOUS
Status: DISCONTINUED | OUTPATIENT
Start: 2021-09-07 | End: 2021-09-07 | Stop reason: HOSPADM

## 2021-09-07 RX ORDER — CLINDAMYCIN PHOSPHATE 900 MG/50ML
INJECTION INTRAVENOUS AS NEEDED
Status: DISCONTINUED | OUTPATIENT
Start: 2021-09-07 | End: 2021-09-07

## 2021-09-07 RX ORDER — PROPOFOL 10 MG/ML
INJECTION, EMULSION INTRAVENOUS AS NEEDED
Status: DISCONTINUED | OUTPATIENT
Start: 2021-09-07 | End: 2021-09-07

## 2021-09-07 RX ORDER — ONDANSETRON 2 MG/ML
4 INJECTION INTRAMUSCULAR; INTRAVENOUS ONCE AS NEEDED
Status: DISCONTINUED | OUTPATIENT
Start: 2021-09-07 | End: 2021-09-07 | Stop reason: HOSPADM

## 2021-09-07 RX ORDER — ACETAMINOPHEN 325 MG/1
975 TABLET ORAL ONCE
Status: COMPLETED | OUTPATIENT
Start: 2021-09-07 | End: 2021-09-07

## 2021-09-07 RX ORDER — SENNOSIDES 8.6 MG
650 CAPSULE ORAL EVERY 8 HOURS SCHEDULED
Qty: 30 TABLET | Refills: 0 | Status: SHIPPED | OUTPATIENT
Start: 2021-09-07

## 2021-09-07 RX ORDER — LIDOCAINE HYDROCHLORIDE 10 MG/ML
0.5 INJECTION, SOLUTION EPIDURAL; INFILTRATION; INTRACAUDAL; PERINEURAL ONCE AS NEEDED
Status: DISCONTINUED | OUTPATIENT
Start: 2021-09-07 | End: 2021-09-07 | Stop reason: HOSPADM

## 2021-09-07 RX ORDER — MIDAZOLAM HYDROCHLORIDE 2 MG/2ML
INJECTION, SOLUTION INTRAMUSCULAR; INTRAVENOUS AS NEEDED
Status: DISCONTINUED | OUTPATIENT
Start: 2021-09-07 | End: 2021-09-07

## 2021-09-07 RX ORDER — DEXMEDETOMIDINE HYDROCHLORIDE 100 UG/ML
INJECTION, SOLUTION INTRAVENOUS AS NEEDED
Status: DISCONTINUED | OUTPATIENT
Start: 2021-09-07 | End: 2021-09-07

## 2021-09-07 RX ORDER — LIDOCAINE HYDROCHLORIDE 10 MG/ML
INJECTION, SOLUTION EPIDURAL; INFILTRATION; INTRACAUDAL; PERINEURAL AS NEEDED
Status: DISCONTINUED | OUTPATIENT
Start: 2021-09-07 | End: 2021-09-07

## 2021-09-07 RX ORDER — ONDANSETRON 2 MG/ML
INJECTION INTRAMUSCULAR; INTRAVENOUS AS NEEDED
Status: DISCONTINUED | OUTPATIENT
Start: 2021-09-07 | End: 2021-09-07

## 2021-09-07 RX ORDER — FENTANYL CITRATE/PF 50 MCG/ML
50 SYRINGE (ML) INJECTION
Status: COMPLETED | OUTPATIENT
Start: 2021-09-07 | End: 2021-09-07

## 2021-09-07 RX ORDER — FENTANYL CITRATE 50 UG/ML
INJECTION, SOLUTION INTRAMUSCULAR; INTRAVENOUS AS NEEDED
Status: DISCONTINUED | OUTPATIENT
Start: 2021-09-07 | End: 2021-09-07

## 2021-09-07 RX ADMIN — SODIUM CHLORIDE, SODIUM LACTATE, POTASSIUM CHLORIDE, AND CALCIUM CHLORIDE 125 ML/HR: .6; .31; .03; .02 INJECTION, SOLUTION INTRAVENOUS at 07:48

## 2021-09-07 RX ADMIN — PROPOFOL 150 MG: 10 INJECTION, EMULSION INTRAVENOUS at 08:42

## 2021-09-07 RX ADMIN — FENTANYL CITRATE 50 MCG: 50 INJECTION, SOLUTION INTRAMUSCULAR; INTRAVENOUS at 10:55

## 2021-09-07 RX ADMIN — GLYCOPYRROLATE 0.2 MG: 0.2 INJECTION, SOLUTION INTRAMUSCULAR; INTRAVENOUS at 08:13

## 2021-09-07 RX ADMIN — MIDAZOLAM HYDROCHLORIDE 1 MG: 1 INJECTION, SOLUTION INTRAMUSCULAR; INTRAVENOUS at 08:13

## 2021-09-07 RX ADMIN — LIDOCAINE HYDROCHLORIDE 100 MG: 20 INJECTION, SOLUTION EPIDURAL; INFILTRATION; INTRACAUDAL; PERINEURAL at 08:18

## 2021-09-07 RX ADMIN — FENTANYL CITRATE 100 MCG: 50 INJECTION, SOLUTION INTRAMUSCULAR; INTRAVENOUS at 08:17

## 2021-09-07 RX ADMIN — DEXMEDETOMIDINE HCL 4 MCG: 100 INJECTION INTRAVENOUS at 09:11

## 2021-09-07 RX ADMIN — HYDROMORPHONE HYDROCHLORIDE 0.2 MG: 1 INJECTION, SOLUTION INTRAMUSCULAR; INTRAVENOUS; SUBCUTANEOUS at 11:10

## 2021-09-07 RX ADMIN — FENTANYL CITRATE 100 MCG: 50 INJECTION, SOLUTION INTRAMUSCULAR; INTRAVENOUS at 08:41

## 2021-09-07 RX ADMIN — FENTANYL CITRATE 50 MCG: 50 INJECTION, SOLUTION INTRAMUSCULAR; INTRAVENOUS at 11:00

## 2021-09-07 RX ADMIN — ACETAMINOPHEN 975 MG: 325 TABLET, FILM COATED ORAL at 07:13

## 2021-09-07 RX ADMIN — PROPOFOL 10 MG: 10 INJECTION, EMULSION INTRAVENOUS at 10:31

## 2021-09-07 RX ADMIN — OXYCODONE HYDROCHLORIDE 5 MG: 5 TABLET ORAL at 12:00

## 2021-09-07 RX ADMIN — CLINDAMYCIN PHOSPHATE 900 MG: 18 INJECTION, SOLUTION INTRAMUSCULAR; INTRAVENOUS at 08:10

## 2021-09-07 RX ADMIN — PROPOFOL 40 MG: 10 INJECTION, EMULSION INTRAVENOUS at 10:29

## 2021-09-07 RX ADMIN — NEOSTIGMINE METHYLSULFATE 4 MG: 1 INJECTION INTRAVENOUS at 09:52

## 2021-09-07 RX ADMIN — PROPOFOL 100 MG: 10 INJECTION, EMULSION INTRAVENOUS at 08:19

## 2021-09-07 RX ADMIN — HYDROMORPHONE HYDROCHLORIDE 0.2 MG: 1 INJECTION, SOLUTION INTRAMUSCULAR; INTRAVENOUS; SUBCUTANEOUS at 11:28

## 2021-09-07 RX ADMIN — ONDANSETRON 4 MG: 2 INJECTION INTRAMUSCULAR; INTRAVENOUS at 09:19

## 2021-09-07 RX ADMIN — DEXAMETHASONE SODIUM PHOSPHATE 4 MG: 4 INJECTION, SOLUTION INTRAMUSCULAR; INTRAVENOUS at 08:59

## 2021-09-07 RX ADMIN — MIDAZOLAM HYDROCHLORIDE 1 MG: 1 INJECTION, SOLUTION INTRAMUSCULAR; INTRAVENOUS at 08:16

## 2021-09-07 RX ADMIN — DEXMEDETOMIDINE HCL 8 MCG: 100 INJECTION INTRAVENOUS at 09:05

## 2021-09-07 RX ADMIN — LIDOCAINE HYDROCHLORIDE 50 MG: 10 INJECTION, SOLUTION EPIDURAL; INFILTRATION; INTRACAUDAL; PERINEURAL at 08:41

## 2021-09-07 RX ADMIN — MIDAZOLAM HYDROCHLORIDE 2 MG: 1 INJECTION, SOLUTION INTRAMUSCULAR; INTRAVENOUS at 08:41

## 2021-09-07 RX ADMIN — ROCURONIUM BROMIDE 40 MG: 10 INJECTION, SOLUTION INTRAVENOUS at 08:42

## 2021-09-07 RX ADMIN — KETOROLAC TROMETHAMINE 30 MG: 30 INJECTION, SOLUTION INTRAMUSCULAR at 09:53

## 2021-09-07 RX ADMIN — GLYCOPYRROLATE 0.5 MG: 0.2 INJECTION, SOLUTION INTRAMUSCULAR; INTRAVENOUS at 09:52

## 2021-09-07 RX ADMIN — CLINDAMYCIN PHOSPHATE 900 MG: 900 INJECTION, SOLUTION INTRAVENOUS at 07:49

## 2021-09-07 RX ADMIN — DEXMEDETOMIDINE HCL 8 MCG: 100 INJECTION INTRAVENOUS at 09:18

## 2021-09-07 NOTE — ANESTHESIA POSTPROCEDURE EVALUATION
Post-Op Assessment Note    CV Status:  Stable  Pain Score: 1    Pain management: adequate     Mental Status:  Arousable and sleepy   Hydration Status:  Euvolemic   PONV Controlled:  Controlled   Airway Patency:  Patent  Airway: intubated      Post Op Vitals Reviewed: Yes      Staff: CRNA         No complications documented      BP   139/73   Temp   98 1   Pulse 98   Resp 16   SpO2  100% 4L FM

## 2021-09-07 NOTE — DISCHARGE INSTRUCTIONS
[facility] Plastic and Reconstructive Surgery  Dr Sarkis Ventura [address], [address]  Phone: [phone]     Postoperative Instructions for Outpatient Surgery     These instructions are being provided by your doctor to give you basic guidelines during your post-op recovery  Please let our office know if your contact information has changed  Please call the office today for an appointment in two weeks for your first postoperative care visit  Please call my office at any time if you have drainage from incisions, increased redness or swelling, or a fever greater than 100 5  Dressings: Please keep dressings clean and dry  Activity Restrictions: No strenuous activities  Bathing: You may shower in 48 hours  Pat incision dry  Please do not rub incision  Medications:    Resume pre-op medications  You may take tylenol, aleve, or ibuprofen for pain control Add Oxycodone as needed

## 2021-09-07 NOTE — ANESTHESIA PREPROCEDURE EVALUATION
Procedure:  RIGHT BREAST MOUND REVISION (Right Breast)    Relevant Problems   ANESTHESIA (within normal limits)  GETA 2/9/21:  G1v with MAC3   (-) History of anesthesia complications      CARDIO (within normal limits)      ENDO  THyroid nodule, nonfunctional      GI/HEPATIC  Confirmed NPO appropriate   (+) Bariatric surgery status      /RENAL (within normal limits)      GYN   (+) History of hysterectomy      HEMATOLOGY   (+) Anemia      MUSCULOSKELETAL   (+) Arthritis      NEURO/PSYCH   (+) Personal history of malignant neoplasm of breast      PULMONARY   (+) Sleep apnea (sleeps sitting up, does not use CPAP)   (-) Smoking   (-) URI (upper respiratory infection)      Other   (+) Obesity, Class III, BMI 40-49 9 (morbid obesity) (HCC)        Physical Exam    Airway    Mallampati score: II  TM Distance: >3 FB  Neck ROM: full     Dental   Comment: Poor dentition,     Cardiovascular  Rhythm: regular, Rate: normal,     Pulmonary  Breath sounds clear to auscultation,     Other Findings        Anesthesia Plan  ASA Score- 3     Anesthesia Type- general with ASA Monitors  Additional Monitors:   Airway Plan: ETT  Plan Factors-Exercise tolerance (METS): <4 METS  Chart reviewed  EKG reviewed  Existing labs reviewed  Patient is not a current smoker  Obstructive sleep apnea risk education given perioperatively  Induction- intravenous  Postoperative Plan- Plan for postoperative opioid use  Planned trial extubation    Informed Consent- Anesthetic plan and risks discussed with patient  I personally reviewed this patient with the CRNA  Discussed and agreed on the Anesthesia Plan with the CRNA             Lab Results   Component Value Date    WBC 11 25 (H) 08/03/2021    HGB 8 5 (L) 08/03/2021    HCT 31 1 (L) 08/03/2021    MCV 68 (L) 08/03/2021     (H) 08/03/2021     Lab Results   Component Value Date    SODIUM 137 08/03/2021    K 4 6 08/03/2021     08/03/2021    CO2 25 08/03/2021    BUN 13 08/03/2021    CREATININE 0 66 08/03/2021    GLUC 140 08/07/2019    CALCIUM 8 5 08/03/2021     Lab Results   Component Value Date    ALT 12 01/05/2021    AST 19 01/05/2021    ALKPHOS 133 (H) 01/05/2021     No results found for: INR, PROTIME

## 2021-09-07 NOTE — INTERVAL H&P NOTE
H&P reviewed  After examining the patient I find no changes in the patients condition since the H&P had been written      Vitals:    09/07/21 0701   BP: 146/83   Pulse: 101   Resp: 16   Temp: 98 5 °F (36 9 °C)   SpO2: 100%

## 2021-09-13 NOTE — OP NOTE
OPERATIVE REPORT  PATIENT NAME: Keagan Frias    :  1970  MRN: [id number]  Pt Location: [facility]    SURGERY DATE: 2021    Surgeon(s) and Role:     * oCrby Hernández MD - Primary     * Rashard Napoles PA-C - Assisting    Preop Diagnosis:  Personal history of malignant neoplasm of breast [Z85 3]    Post-Op Diagnosis Codes:     * Personal history of malignant neoplasm of breast [Z85 3]    Procedure(s) (LRB):  RIGHT BREAST MOUND REVISION (Right)    Specimen(s):  * No specimens in log *    Estimated Blood Loss:   5 mL    Drains:  Closed/Suction Drain Right;Lateral Breast Bulb 15 Fr  (Active)   Site Description Unable to view 21   Dressing Status Clean;Dry; Intact 21   Drainage Appearance Bloody 21   Status To bulb suction 21   Output (mL) 45 mL 21   Number of days: 6       Anesthesia Type:   General    Operative Indications:  Personal history of malignant neoplasm of breast [Z85 3]    Operative Findings:  Right LD flap re-elevated and cephalad suspension for more upper pole fullness and ptosis correction  Breast implant capsule left untouched  Complications:   None    Procedure and Technique:  The patient was met in the preoperative holding area and all the last minute questions were properly answered and addressed  Site of surgery was marked and verified she was then taken to the operative theater placed in supine position  After smooth anesthesia was then induced the upper trunk was then prepped and draped in usual sterile fashion and a time-out was then performed  Given the patient desired to try to elevate a breast and improved the upper pole decision was then made to relieve a the upper cephalad portion of the previous latissimus dorsi flap and recent spent cephalad along the chest wall    So the superior skin paddle incision was then incised with scalpel the mastectomy skin flap was then raised cephalad up to the level of the clavicle with electrocautery with careful attention not to injure the muscle underneath  The superior border of the latissimus muscle flap was identified and  of the breast implant capsule with electrocautery for about 2/3 of the distant inferiorly  15  ALEIDA hubless drain was inserted secured with 3-0 nylon pocket was irrigated 1 L of Irrisept solution  The leading edge of the muscle was then suspended up the the pectoralis fascia as cephalad as possible with interrupted 1-0 Vicryl sutures with complete coverage of the breast capsule  The breast footprint itself noted to be in good symmetry and position so was left intact edge  Improvement of the upper pole fullness and skin ptosis was obtained to the skin was then closed in layers deep dermal 3-0 Monocryl and a running 4-0 Monocryl subcuticular layer  Surgical glue, Steri-Strips and dry dressing as well as a surgical bra was then applied the conclusion of the case       I was present for the entire procedure, A qualified resident physician was not available and A physician assistant was required during the procedure for retraction tissue handling,dissection and suturing    Patient Disposition:  PACU  and hemodynamically stable    SIGNATURE: Aldo Brown MD  DATE: September 13, 2021  TIME: 9:59 AM

## 2021-10-01 ENCOUNTER — OFFICE VISIT (OUTPATIENT)
Dept: PLASTIC SURGERY | Facility: CLINIC | Age: 51
End: 2021-10-01

## 2021-10-01 VITALS — HEIGHT: 60 IN | WEIGHT: 249 LBS | BODY MASS INDEX: 48.88 KG/M2 | TEMPERATURE: 97.3 F

## 2021-10-01 DIAGNOSIS — Z98.890 HISTORY OF BREAST RECONSTRUCTION: Primary | ICD-10-CM

## 2021-10-01 PROCEDURE — 99024 POSTOP FOLLOW-UP VISIT: CPT | Performed by: PHYSICIAN ASSISTANT

## 2021-10-18 ENCOUNTER — APPOINTMENT (OUTPATIENT)
Dept: LAB | Facility: HOSPITAL | Age: 51
End: 2021-10-18
Attending: PLASTIC SURGERY
Payer: MEDICARE

## 2021-10-18 DIAGNOSIS — Z98.890 HISTORY OF BREAST RECONSTRUCTION: ICD-10-CM

## 2021-10-18 DIAGNOSIS — Z85.3 PERSONAL HISTORY OF MALIGNANT NEOPLASM OF BREAST: ICD-10-CM

## 2021-10-18 LAB
ANION GAP SERPL CALCULATED.3IONS-SCNC: 11 MMOL/L (ref 4–13)
ATRIAL RATE: 100 BPM
BASOPHILS # BLD AUTO: 0.04 THOUSANDS/ΜL (ref 0–0.1)
BASOPHILS NFR BLD AUTO: 0 % (ref 0–1)
BUN SERPL-MCNC: 11 MG/DL (ref 5–25)
CALCIUM SERPL-MCNC: 8.7 MG/DL (ref 8.3–10.1)
CHLORIDE SERPL-SCNC: 107 MMOL/L (ref 100–108)
CO2 SERPL-SCNC: 25 MMOL/L (ref 21–32)
CREAT SERPL-MCNC: 0.67 MG/DL (ref 0.6–1.3)
EOSINOPHIL # BLD AUTO: 0.3 THOUSAND/ΜL (ref 0–0.61)
EOSINOPHIL NFR BLD AUTO: 3 % (ref 0–6)
ERYTHROCYTE [DISTWIDTH] IN BLOOD BY AUTOMATED COUNT: 22.2 % (ref 11.6–15.1)
GFR SERPL CREATININE-BSD FRML MDRD: 103 ML/MIN/1.73SQ M
GLUCOSE P FAST SERPL-MCNC: 85 MG/DL (ref 65–99)
HCT VFR BLD AUTO: 32.1 % (ref 34.8–46.1)
HGB BLD-MCNC: 9 G/DL (ref 11.5–15.4)
IMM GRANULOCYTES # BLD AUTO: 0.05 THOUSAND/UL (ref 0–0.2)
IMM GRANULOCYTES NFR BLD AUTO: 1 % (ref 0–2)
LYMPHOCYTES # BLD AUTO: 2.63 THOUSANDS/ΜL (ref 0.6–4.47)
LYMPHOCYTES NFR BLD AUTO: 24 % (ref 14–44)
MCH RBC QN AUTO: 19.2 PG (ref 26.8–34.3)
MCHC RBC AUTO-ENTMCNC: 28 G/DL (ref 31.4–37.4)
MCV RBC AUTO: 69 FL (ref 82–98)
MONOCYTES # BLD AUTO: 0.88 THOUSAND/ΜL (ref 0.17–1.22)
MONOCYTES NFR BLD AUTO: 8 % (ref 4–12)
NEUTROPHILS # BLD AUTO: 7.07 THOUSANDS/ΜL (ref 1.85–7.62)
NEUTS SEG NFR BLD AUTO: 64 % (ref 43–75)
NRBC BLD AUTO-RTO: 0 /100 WBCS
P AXIS: 32 DEGREES
PLATELET # BLD AUTO: 438 THOUSANDS/UL (ref 149–390)
PMV BLD AUTO: 9.5 FL (ref 8.9–12.7)
POTASSIUM SERPL-SCNC: 3.5 MMOL/L (ref 3.5–5.3)
PR INTERVAL: 96 MS
QRS AXIS: 47 DEGREES
QRSD INTERVAL: 80 MS
QT INTERVAL: 358 MS
QTC INTERVAL: 461 MS
RBC # BLD AUTO: 4.68 MILLION/UL (ref 3.81–5.12)
SODIUM SERPL-SCNC: 143 MMOL/L (ref 136–145)
T WAVE AXIS: 47 DEGREES
VENTRICULAR RATE: 100 BPM
WBC # BLD AUTO: 10.97 THOUSAND/UL (ref 4.31–10.16)

## 2021-10-18 PROCEDURE — 36415 COLL VENOUS BLD VENIPUNCTURE: CPT

## 2021-10-18 PROCEDURE — 80048 BASIC METABOLIC PNL TOTAL CA: CPT

## 2021-10-18 PROCEDURE — 85025 COMPLETE CBC W/AUTO DIFF WBC: CPT

## 2021-10-18 PROCEDURE — 93005 ELECTROCARDIOGRAM TRACING: CPT

## 2021-10-18 PROCEDURE — 93010 ELECTROCARDIOGRAM REPORT: CPT | Performed by: INTERNAL MEDICINE

## 2021-11-08 PROCEDURE — 99024 POSTOP FOLLOW-UP VISIT: CPT | Performed by: PHYSICIAN ASSISTANT

## 2021-11-10 ENCOUNTER — ANESTHESIA (OUTPATIENT)
Dept: PERIOP | Facility: HOSPITAL | Age: 51
End: 2021-11-10
Payer: MEDICARE

## 2021-11-10 ENCOUNTER — HOSPITAL ENCOUNTER (OUTPATIENT)
Facility: HOSPITAL | Age: 51
Setting detail: OUTPATIENT SURGERY
Discharge: HOME | End: 2021-11-10
Attending: PLASTIC SURGERY | Admitting: PLASTIC SURGERY
Payer: MEDICARE

## 2021-11-10 ENCOUNTER — ANESTHESIA EVENT (OUTPATIENT)
Dept: PERIOP | Facility: HOSPITAL | Age: 51
End: 2021-11-10
Payer: MEDICARE

## 2021-11-10 VITALS
WEIGHT: 249 LBS | BODY MASS INDEX: 48.88 KG/M2 | HEIGHT: 60 IN | DIASTOLIC BLOOD PRESSURE: 71 MMHG | TEMPERATURE: 97.2 F | RESPIRATION RATE: 16 BRPM | HEART RATE: 89 BPM | SYSTOLIC BLOOD PRESSURE: 163 MMHG | OXYGEN SATURATION: 100 %

## 2021-11-10 DIAGNOSIS — Z98.890 HISTORY OF BREAST RECONSTRUCTION: Primary | ICD-10-CM

## 2021-11-10 PROCEDURE — 14302 TIS TRNFR ADDL 30 SQ CM: CPT | Performed by: PHYSICIAN ASSISTANT

## 2021-11-10 PROCEDURE — 15772 GRFG AUTOL FAT LIPO EA ADDL: CPT | Performed by: PHYSICIAN ASSISTANT

## 2021-11-10 PROCEDURE — 12032 INTMD RPR S/A/T/EXT 2.6-7.5: CPT | Performed by: PLASTIC SURGERY

## 2021-11-10 PROCEDURE — 12032 INTMD RPR S/A/T/EXT 2.6-7.5: CPT | Performed by: PHYSICIAN ASSISTANT

## 2021-11-10 PROCEDURE — 15771 GRFG AUTOL FAT LIPO 50 CC/<: CPT | Performed by: PHYSICIAN ASSISTANT

## 2021-11-10 PROCEDURE — 14301 TIS TRNFR ANY 30.1-60 SQ CM: CPT | Performed by: PLASTIC SURGERY

## 2021-11-10 PROCEDURE — 15771 GRFG AUTOL FAT LIPO 50 CC/<: CPT | Performed by: PLASTIC SURGERY

## 2021-11-10 PROCEDURE — 15772 GRFG AUTOL FAT LIPO EA ADDL: CPT | Performed by: PLASTIC SURGERY

## 2021-11-10 RX ORDER — ONDANSETRON 2 MG/ML
4 INJECTION INTRAMUSCULAR; INTRAVENOUS ONCE AS NEEDED
Status: DISCONTINUED | OUTPATIENT
Start: 2021-11-10 | End: 2021-11-10 | Stop reason: HOSPADM

## 2021-11-10 RX ORDER — FENTANYL CITRATE/PF 50 MCG/ML
50 SYRINGE (ML) INJECTION
Status: DISCONTINUED | OUTPATIENT
Start: 2021-11-10 | End: 2021-11-10 | Stop reason: HOSPADM

## 2021-11-10 RX ORDER — MIDAZOLAM HYDROCHLORIDE 2 MG/2ML
INJECTION, SOLUTION INTRAMUSCULAR; INTRAVENOUS AS NEEDED
Status: DISCONTINUED | OUTPATIENT
Start: 2021-11-10 | End: 2021-11-10

## 2021-11-10 RX ORDER — ROCURONIUM BROMIDE 10 MG/ML
INJECTION, SOLUTION INTRAVENOUS AS NEEDED
Status: DISCONTINUED | OUTPATIENT
Start: 2021-11-10 | End: 2021-11-10

## 2021-11-10 RX ORDER — PROPOFOL 10 MG/ML
INJECTION, EMULSION INTRAVENOUS AS NEEDED
Status: DISCONTINUED | OUTPATIENT
Start: 2021-11-10 | End: 2021-11-10

## 2021-11-10 RX ORDER — OXYCODONE HYDROCHLORIDE 5 MG/1
5 TABLET ORAL EVERY 6 HOURS PRN
Qty: 27 TABLET | Refills: 0 | Status: SHIPPED | OUTPATIENT
Start: 2021-11-10 | End: 2021-11-20

## 2021-11-10 RX ORDER — ACETAMINOPHEN 325 MG/1
975 TABLET ORAL ONCE
Status: COMPLETED | OUTPATIENT
Start: 2021-11-10 | End: 2021-11-10

## 2021-11-10 RX ORDER — ONDANSETRON 2 MG/ML
4 INJECTION INTRAMUSCULAR; INTRAVENOUS EVERY 6 HOURS PRN
Status: DISCONTINUED | OUTPATIENT
Start: 2021-11-10 | End: 2021-11-10 | Stop reason: HOSPADM

## 2021-11-10 RX ORDER — ONDANSETRON 2 MG/ML
INJECTION INTRAMUSCULAR; INTRAVENOUS AS NEEDED
Status: DISCONTINUED | OUTPATIENT
Start: 2021-11-10 | End: 2021-11-10

## 2021-11-10 RX ORDER — LIDOCAINE HYDROCHLORIDE 10 MG/ML
0.5 INJECTION, SOLUTION EPIDURAL; INFILTRATION; INTRACAUDAL; PERINEURAL ONCE AS NEEDED
Status: DISCONTINUED | OUTPATIENT
Start: 2021-11-10 | End: 2021-11-10 | Stop reason: HOSPADM

## 2021-11-10 RX ORDER — GABAPENTIN 300 MG/1
300 CAPSULE ORAL ONCE
Status: DISCONTINUED | OUTPATIENT
Start: 2021-11-10 | End: 2021-11-10 | Stop reason: HOSPADM

## 2021-11-10 RX ORDER — GABAPENTIN 100 MG/1
100 CAPSULE ORAL 3 TIMES DAILY
Qty: 30 CAPSULE | Refills: 0 | Status: SHIPPED | OUTPATIENT
Start: 2021-11-10 | End: 2022-04-04

## 2021-11-10 RX ORDER — CLINDAMYCIN PHOSPHATE 900 MG/50ML
900 INJECTION INTRAVENOUS ONCE
Status: COMPLETED | OUTPATIENT
Start: 2021-11-10 | End: 2021-11-10

## 2021-11-10 RX ORDER — SODIUM CHLORIDE, SODIUM LACTATE, POTASSIUM CHLORIDE, AND CALCIUM CHLORIDE .6; .31; .03; .02 G/100ML; G/100ML; G/100ML; G/100ML
IRRIGANT IRRIGATION AS NEEDED
Status: DISCONTINUED | OUTPATIENT
Start: 2021-11-10 | End: 2021-11-10 | Stop reason: HOSPADM

## 2021-11-10 RX ORDER — HYDROMORPHONE HCL/PF 1 MG/ML
0.4 SYRINGE (ML) INJECTION
Status: DISCONTINUED | OUTPATIENT
Start: 2021-11-10 | End: 2021-11-10 | Stop reason: HOSPADM

## 2021-11-10 RX ORDER — NEOSTIGMINE METHYLSULFATE 1 MG/ML
INJECTION INTRAVENOUS AS NEEDED
Status: DISCONTINUED | OUTPATIENT
Start: 2021-11-10 | End: 2021-11-10

## 2021-11-10 RX ORDER — OXYCODONE HYDROCHLORIDE 5 MG/1
5 TABLET ORAL EVERY 4 HOURS PRN
Status: DISCONTINUED | OUTPATIENT
Start: 2021-11-10 | End: 2021-11-10 | Stop reason: HOSPADM

## 2021-11-10 RX ORDER — HYDROMORPHONE HCL/PF 1 MG/ML
SYRINGE (ML) INJECTION AS NEEDED
Status: DISCONTINUED | OUTPATIENT
Start: 2021-11-10 | End: 2021-11-10

## 2021-11-10 RX ORDER — DEXAMETHASONE SODIUM PHOSPHATE 10 MG/ML
INJECTION, SOLUTION INTRAMUSCULAR; INTRAVENOUS AS NEEDED
Status: DISCONTINUED | OUTPATIENT
Start: 2021-11-10 | End: 2021-11-10

## 2021-11-10 RX ORDER — METOCLOPRAMIDE HYDROCHLORIDE 5 MG/ML
10 INJECTION INTRAMUSCULAR; INTRAVENOUS ONCE AS NEEDED
Status: DISCONTINUED | OUTPATIENT
Start: 2021-11-10 | End: 2021-11-10 | Stop reason: HOSPADM

## 2021-11-10 RX ORDER — SODIUM CHLORIDE, SODIUM LACTATE, POTASSIUM CHLORIDE, CALCIUM CHLORIDE 600; 310; 30; 20 MG/100ML; MG/100ML; MG/100ML; MG/100ML
50 INJECTION, SOLUTION INTRAVENOUS CONTINUOUS
Status: DISCONTINUED | OUTPATIENT
Start: 2021-11-10 | End: 2021-11-10 | Stop reason: HOSPADM

## 2021-11-10 RX ORDER — FENTANYL CITRATE 50 UG/ML
INJECTION, SOLUTION INTRAMUSCULAR; INTRAVENOUS AS NEEDED
Status: DISCONTINUED | OUTPATIENT
Start: 2021-11-10 | End: 2021-11-10

## 2021-11-10 RX ORDER — GLYCOPYRROLATE 0.2 MG/ML
INJECTION INTRAMUSCULAR; INTRAVENOUS AS NEEDED
Status: DISCONTINUED | OUTPATIENT
Start: 2021-11-10 | End: 2021-11-10

## 2021-11-10 RX ADMIN — OXYCODONE HYDROCHLORIDE 5 MG: 5 TABLET ORAL at 12:28

## 2021-11-10 RX ADMIN — Medication 50 MCG: at 11:00

## 2021-11-10 RX ADMIN — ROCURONIUM BROMIDE 50 MG: 50 INJECTION, SOLUTION INTRAVENOUS at 08:30

## 2021-11-10 RX ADMIN — PROPOFOL 200 MG: 10 INJECTION, EMULSION INTRAVENOUS at 08:29

## 2021-11-10 RX ADMIN — ONDANSETRON 4 MG: 2 INJECTION INTRAMUSCULAR; INTRAVENOUS at 12:28

## 2021-11-10 RX ADMIN — ONDANSETRON 4 MG: 2 INJECTION INTRAMUSCULAR; INTRAVENOUS at 10:21

## 2021-11-10 RX ADMIN — MIDAZOLAM 2 MG: 1 INJECTION INTRAMUSCULAR; INTRAVENOUS at 08:21

## 2021-11-10 RX ADMIN — HYDROMORPHONE HYDROCHLORIDE 0.25 MG: 1 INJECTION, SOLUTION INTRAMUSCULAR; INTRAVENOUS; SUBCUTANEOUS at 09:05

## 2021-11-10 RX ADMIN — SODIUM CHLORIDE, SODIUM LACTATE, POTASSIUM CHLORIDE, AND CALCIUM CHLORIDE: .6; .31; .03; .02 INJECTION, SOLUTION INTRAVENOUS at 08:21

## 2021-11-10 RX ADMIN — CLINDAMYCIN PHOSPHATE 900 MG: 900 INJECTION, SOLUTION INTRAVENOUS at 08:40

## 2021-11-10 RX ADMIN — HYDROMORPHONE HYDROCHLORIDE 0.25 MG: 1 INJECTION, SOLUTION INTRAMUSCULAR; INTRAVENOUS; SUBCUTANEOUS at 10:01

## 2021-11-10 RX ADMIN — ACETAMINOPHEN 975 MG: 325 TABLET ORAL at 06:26

## 2021-11-10 RX ADMIN — FENTANYL CITRATE 100 MCG: 50 INJECTION INTRAMUSCULAR; INTRAVENOUS at 08:27

## 2021-11-10 RX ADMIN — DEXAMETHASONE SODIUM PHOSPHATE 10 MG: 10 INJECTION, SOLUTION INTRAMUSCULAR; INTRAVENOUS at 10:21

## 2021-11-10 RX ADMIN — Medication 50 MCG: at 10:54

## 2021-11-10 RX ADMIN — NEOSTIGMINE METHYLSULFATE 5 MG: 1 INJECTION INTRAVENOUS at 10:26

## 2021-11-10 RX ADMIN — GLYCOPYRROLATE 0.8 MG: 0.2 INJECTION, SOLUTION INTRAMUSCULAR; INTRAVENOUS at 10:26

## 2021-11-19 ENCOUNTER — OFFICE VISIT (OUTPATIENT)
Dept: PLASTIC SURGERY | Facility: CLINIC | Age: 51
End: 2021-11-19

## 2021-11-19 DIAGNOSIS — Z90.13 ACQUIRED ABSENCE OF BREAST AND ABSENT NIPPLE, BILATERAL: Primary | ICD-10-CM

## 2021-11-19 PROCEDURE — 99024 POSTOP FOLLOW-UP VISIT: CPT | Performed by: PLASTIC SURGERY

## 2021-12-17 ENCOUNTER — OFFICE VISIT (OUTPATIENT)
Dept: PLASTIC SURGERY | Facility: CLINIC | Age: 51
End: 2021-12-17

## 2021-12-17 DIAGNOSIS — Z85.3 PERSONAL HISTORY OF MALIGNANT NEOPLASM OF BREAST: Primary | ICD-10-CM

## 2021-12-17 DIAGNOSIS — Z98.890 HISTORY OF BREAST RECONSTRUCTION: ICD-10-CM

## 2021-12-17 DIAGNOSIS — Z90.13 ACQUIRED ABSENCE OF BREAST AND ABSENT NIPPLE, BILATERAL: ICD-10-CM

## 2021-12-17 PROCEDURE — 99024 POSTOP FOLLOW-UP VISIT: CPT | Performed by: PHYSICIAN ASSISTANT

## 2022-03-01 ENCOUNTER — OFFICE VISIT (OUTPATIENT)
Dept: PLASTIC SURGERY | Facility: CLINIC | Age: 52
End: 2022-03-01
Payer: MEDICARE

## 2022-03-01 VITALS
DIASTOLIC BLOOD PRESSURE: 95 MMHG | WEIGHT: 245 LBS | BODY MASS INDEX: 48.1 KG/M2 | SYSTOLIC BLOOD PRESSURE: 136 MMHG | TEMPERATURE: 97.6 F | RESPIRATION RATE: 16 BRPM | HEIGHT: 60 IN

## 2022-03-01 DIAGNOSIS — Z85.3 PERSONAL HISTORY OF MALIGNANT NEOPLASM OF BREAST: Primary | ICD-10-CM

## 2022-03-01 DIAGNOSIS — Z90.13 ACQUIRED ABSENCE OF BREAST AND ABSENT NIPPLE, BILATERAL: ICD-10-CM

## 2022-03-01 PROCEDURE — 99215 OFFICE O/P EST HI 40 MIN: CPT | Performed by: STUDENT IN AN ORGANIZED HEALTH CARE EDUCATION/TRAINING PROGRAM

## 2022-03-05 NOTE — PROGRESS NOTES
Plastic Surgery Consult    Reason for visit: s/p ANH breast reconstruction presents for further improvement      HPI:  Patient is a 46 y/o female who presents with history of right breast cancer, right chest radiation, bilateral mastectomies, and multiple reconstructive procedures  Patient's mastectomies and initial reconstruction was performed by Dr Eliseo Cuevas in 2016-17  She developed capsular contractures and subsequently underwent conversion to pre-pectoral plane with right sided pedicled latissmus for skin coverage due to history of radiation on the right  She underwent multiple procedures but now has pre-pectoral permanent implant on the left (smooth high profile 790 cc)  On the right, she has latissimus muscle covering (smooth high profile 450 cc) implant  The right side is considerable smaller than the left  It is also tender to touch  She would like improved size on the right, resolution of pain, and improved symmetry with the left  She has undergone bilateral breast fat grafting    She has history of gastric bypass and massive weight loss  She has been followed by her oncologist and remains cancer free        ROS: 12 pt ROS negative, except as otherwise noted in HPI      PMH: GAUTAM, obesity, breast cancer, massive weight loss  FamHx: non-contrib  Past Surgical History:   Procedure Laterality Date    ABDOMINAL SURGERY      BRACHIOPLASTY Bilateral     2017    BREAST IMPLANT Bilateral 9/8/2020    Procedure: BREAST REMOVAL TISSUE EXPANDER/ IMPLANT PLACEMENT BREAST;  Surgeon: Calderon Mcdonald MD;  [facility];  Service: Plastics    BREAST IMPLANT Left 2/9/2021    Procedure: BREAST TISSUE EXPANDER/ IMPLANT EXCHANGE;  Surgeon: Calderon Mcdonald MD;  [facility];  Service: Plastics    CAPSULOTOMY Bilateral 9/8/2020    Procedure: BREAST CAPSULOTOMY;  Surgeon: Calderon Mcdonald MD;  [facility];  Service: Plastics    CAPSULOTOMY Left 2/9/2021    Procedure: BREAST CAPSULOTOMY;  Surgeon: Shannan Worthington MD;  [facility];  Service: Plastics    CARDIAC CATHETERIZATION      CARPAL TUNNEL RELEASE Bilateral 2015    CHOLECYSTECTOMY      COLONOSCOPY      GALLBLADDER SURGERY  2015    GASTRIC BYPASS  2014    HYSTERECTOMY  05/2013    INCONTINENCE SURGERY  09/2013    IR BIOPSY LUNG      lung nodules    LIPOSUCTION W/ FAT INJECTION Bilateral 11/10/2021    Procedure: FAT GRAFTING BILATERAL BREAST;  Surgeon: Shannan Worthington MD;  [facility];  Service: Plastics    MASTECTOMY Bilateral 10/2016    MI BREAST RECONSTRUCTION W/LATISSIMUS DORSI FLAP Right 8/6/2019    Procedure: BREAST RECONSTRUCTION W/FLAP LATISSIMUS DORSI;  Surgeon: Shannan Worthington MD;  [facility];  Service: Plastics    MI DOMINGA-IMPLANT CAPSULECTOMY BREAST COMPLETE Bilateral 8/6/2019    Procedure: BREAST CAPSULECTOMY;  Surgeon: Shannan Worthington MD;  [facility];  Service: Plastics    MI REMOVAL INTACT BREAST IMPLANT Bilateral 8/6/2019    Procedure: BREAST IMPLANT REMOVAL;  Surgeon: Shannan Worthington MD;  [facility];  Service: Plastics    MI REVISION OF RECONSTRUCTED BREAST Right 9/7/2021    Procedure: RIGHT BREAST MOUND REVISION;  Surgeon: Shannan Worthington MD;  [facility];  Service: Plastics    MI TISSUE EXPANDER PLACEMENT BREAST RECONSTRUCTION Bilateral 8/6/2019    Procedure: BREAST TISSUE EXPANDER PLACEMENT;  Surgeon: Shannan Worthington MD;  [facility];  Service: Plastics    REDUCTION MAMMAPLASTY      REVISION OF SCAR Bilateral 11/10/2021    Procedure: SCAR REVISION BILATERAL ARMS;  Surgeon: Shannan Worthington MD;  [facility];  Service: Plastics    TRANSFER MUSCLE PECTORALIS Bilateral 8/6/2019    Procedure: PECTORALIS MUSCLE REPAIR;  Surgeon: Shannan Worthington MD;  [facility];  Service: Plastics     Social: No tobacco, social ETOH  Meds: anastrozole, fosamax, neuorontin, no blood thinners  Allergies   Allergen Reactions    Accolate [Zafirlukast] Itching    Penicillins Anaphylaxis     Unconsciousness, and bronchoconstriction      Shellfish Allergy - Food Allergy Anaphylaxis    Shellfish-Derived Products - Food Allergy Anaphylaxis    Singulair [Montelukast] Itching    Pork-Derived Products - Food Allergy Hives         PE:  Vitals:    03/01/22 0937   BP: 136/95   Resp: 16   Temp: 97 6 °F (36 4 °C)       General: NC/AT, breathing comfortably on RA  Neuro: CN II-XII grossly intact, symmetric reflexes  HEENT: PERRLA, EOMI, external ears normal, no lesions or deformities, neck supple, trachea midline  Respiratory: CTAB, normal respiratory effort  Cardio: RRR, normal S1, S2, no murmur, rubs, gallops  GI: soft, non-tender, non-distended  Extremities/MSK: normal alignment, mobility, gait, no edema  Skin: no rashes, lesions, subcutaneous nodules    BMI 48    Breast exam:  Significant breast asymmetry  Left breast implant lateralization, well healed IMF incision and periareolar incision  Right breast significantly smaller than left breasat  Latissimus flap obscures left IMF  Mildly firm and moderately tender right breast implant (grade IV cap con)  Bilateral prepectoral implants    A/P: 48 y/o female with significant breast asymmetry after right breast cancer, radiation, bilateral mastectomies, and multiple reconstructive procedures  Patient wishes for improved breast symmetry and treatment of right breast Grade IV capsular contracture   -Discussed at length the risks of surgery, particularly on the right due to the history of radiation  This can significantly impede wound healing, increase risk of infection, seromas, scarring, abscess, and wound dehiscence  -Patient is also morbidly obese which also increases the risks  She is not on blood thinners or steroids, and does not smoke    -Patient acknowledged all these risks, including but not limited to infection, bleeding, scarring, cap con, rupture, leakage, malposition, damage to surrounding blood vessels and nerves, and need for further surgery   -Will require nutrition labs as patient is gastric bypass patient to ensure nutritional optimization  -Currently has 790 cc on the left, and 450 cc on the right  -All questions answered, concerns addressed  Consents obtained  -Records reviewed  -Plan: Left breast capsulotomy vs capsulorrhaphy, possible ADM/galaflex for reposition of the implant   Right breast capsulectomy, upsize of implant, liposuction of right lateral chest, possible revision of reconstructed breast, possible ADM/galaflex for repositioning of breast footprint, possible expander placement   -Will require preop with labs and medical clearance        Keira Rey MD   [facility] [place] [facility]astic and Reconstructive [facility]   [address], [address]   [facility], [address] [facility] [address]   Office: [phone]

## 2022-03-24 ENCOUNTER — OFFICE VISIT (OUTPATIENT)
Dept: PLASTIC SURGERY | Facility: CLINIC | Age: 52
End: 2022-03-24
Payer: MEDICARE

## 2022-03-24 ENCOUNTER — APPOINTMENT (OUTPATIENT)
Dept: LAB | Facility: HOSPITAL | Age: 52
End: 2022-03-24
Payer: MEDICARE

## 2022-03-24 VITALS
TEMPERATURE: 97.7 F | WEIGHT: 225 LBS | HEIGHT: 60 IN | SYSTOLIC BLOOD PRESSURE: 131 MMHG | RESPIRATION RATE: 16 BRPM | HEART RATE: 84 BPM | DIASTOLIC BLOOD PRESSURE: 91 MMHG | BODY MASS INDEX: 44.17 KG/M2

## 2022-03-24 DIAGNOSIS — Z90.13 ACQUIRED ABSENCE OF BILATERAL BREASTS AND NIPPLES: ICD-10-CM

## 2022-03-24 DIAGNOSIS — Z85.3 PERSONAL HISTORY OF MALIGNANT NEOPLASM OF BREAST: ICD-10-CM

## 2022-03-24 DIAGNOSIS — Z98.890 HISTORY OF BREAST RECONSTRUCTION: Primary | ICD-10-CM

## 2022-03-24 DIAGNOSIS — Z98.890 OTHER SPECIFIED POSTPROCEDURAL STATES: ICD-10-CM

## 2022-03-24 LAB
ANION GAP SERPL CALCULATED.3IONS-SCNC: 10 MMOL/L (ref 4–13)
BASOPHILS # BLD AUTO: 0.04 THOUSANDS/ΜL (ref 0–0.1)
BASOPHILS NFR BLD AUTO: 0 % (ref 0–1)
BUN SERPL-MCNC: 19 MG/DL (ref 5–25)
CALCIUM SERPL-MCNC: 8.4 MG/DL (ref 8.3–10.1)
CHLORIDE SERPL-SCNC: 107 MMOL/L (ref 100–108)
CO2 SERPL-SCNC: 24 MMOL/L (ref 21–32)
CREAT SERPL-MCNC: 0.6 MG/DL (ref 0.6–1.3)
EOSINOPHIL # BLD AUTO: 0.47 THOUSAND/ΜL (ref 0–0.61)
EOSINOPHIL NFR BLD AUTO: 4 % (ref 0–6)
ERYTHROCYTE [DISTWIDTH] IN BLOOD BY AUTOMATED COUNT: 22.9 % (ref 11.6–15.1)
GFR SERPL CREATININE-BSD FRML MDRD: 105 ML/MIN/1.73SQ M
GLUCOSE P FAST SERPL-MCNC: 84 MG/DL (ref 65–99)
HCT VFR BLD AUTO: 29.7 % (ref 34.8–46.1)
HGB BLD-MCNC: 8.4 G/DL (ref 11.5–15.4)
IMM GRANULOCYTES # BLD AUTO: 0.05 THOUSAND/UL (ref 0–0.2)
IMM GRANULOCYTES NFR BLD AUTO: 1 % (ref 0–2)
LYMPHOCYTES # BLD AUTO: 2.4 THOUSANDS/ΜL (ref 0.6–4.47)
LYMPHOCYTES NFR BLD AUTO: 22 % (ref 14–44)
MCH RBC QN AUTO: 18.3 PG (ref 26.8–34.3)
MCHC RBC AUTO-ENTMCNC: 28.3 G/DL (ref 31.4–37.4)
MCV RBC AUTO: 65 FL (ref 82–98)
MONOCYTES # BLD AUTO: 0.98 THOUSAND/ΜL (ref 0.17–1.22)
MONOCYTES NFR BLD AUTO: 9 % (ref 4–12)
NEUTROPHILS # BLD AUTO: 7.17 THOUSANDS/ΜL (ref 1.85–7.62)
NEUTS SEG NFR BLD AUTO: 64 % (ref 43–75)
NRBC BLD AUTO-RTO: 0 /100 WBCS
PLATELET # BLD AUTO: 444 THOUSANDS/UL (ref 149–390)
PMV BLD AUTO: 9.4 FL (ref 8.9–12.7)
POTASSIUM SERPL-SCNC: 4.1 MMOL/L (ref 3.5–5.3)
RBC # BLD AUTO: 4.6 MILLION/UL (ref 3.81–5.12)
SODIUM SERPL-SCNC: 141 MMOL/L (ref 136–145)
WBC # BLD AUTO: 11.11 THOUSAND/UL (ref 4.31–10.16)

## 2022-03-24 PROCEDURE — 80048 BASIC METABOLIC PNL TOTAL CA: CPT

## 2022-03-24 PROCEDURE — 36415 COLL VENOUS BLD VENIPUNCTURE: CPT

## 2022-03-24 PROCEDURE — 99215 OFFICE O/P EST HI 40 MIN: CPT | Performed by: STUDENT IN AN ORGANIZED HEALTH CARE EDUCATION/TRAINING PROGRAM

## 2022-03-24 PROCEDURE — 85025 COMPLETE CBC W/AUTO DIFF WBC: CPT

## 2022-03-28 ENCOUNTER — ANESTHESIA EVENT (OUTPATIENT)
Dept: PERIOP | Facility: AMBULARY SURGERY CENTER | Age: 52
End: 2022-03-28
Payer: MEDICARE

## 2022-03-28 NOTE — PROGRESS NOTES
PRS Preop    Pre-op for complicated redo-breast reconstruction for R breast    Refer to prior notes: In brief, patient is a 48 y/o female with significant breast asymmetry after right breast cancer, radiation, bilateral mastectomies, and multiple reconstructive procedures  Patient wishes for improved breast symmetry and treatment of R breast Grade IV capsular contracture  No tobacco, no blood thinners    BMI 48     Breast exam:  Significant breast asymmetry  Left breast implant lateralization, well healed IMF incision and periareolar incision  Right breast significantly smaller than left breasat  Latissimus flap obscures left IMF  Mildly firm and moderately tender right breast implant (grade IV cap con)  Bilateral prepectoral implants    Upon further chart review, patient has smooth high profile 790 cc implant on the left (placed 2/2021) and currently has smooth high profile 450 cc on the right, which matches her clinical breast size asymmetry  Currently, there is nothing wrong with the left side and as such, will not touch left side  The right side is the side of pain and is smaller  The right breast implant is prepectoral but is covered by latissimus flap  I discussed with the patient indepth that the plan would be to perform liposuction of the right lateral flank to diminish fullness, perform capsulectomy and removal of implant, and replace with larger implant to match the size on the left  If the skin envelope is too tight, will place an expander  Will require the use of ADM  I discussed with patient that the remaining fullness from the latissimus flap would be addressed as a staged procedure as the blood supply would be at risk with concomitant procedures      Surgical plan: right lateral chest liposuction, right breast capsulectomy with removal of implant, placement of larger implant with ADM    I discussed the risks of the procedure including but not limited to infection, bleeding, scarring, cap con, leakage, rupture, malposition, asymmetry, contour deformity  I stressed that her obesity and history of radiation were two of the biggest risk factors to contribute to postop complications   Patient acknowledged      -Will require medical clearance for her leukocytosis and anemia  -Will order implants and ADM    Jacques Hough MD   [facility] Plastic and Reconstructive Surgery   [address], [address]    Office: [phone]

## 2022-04-04 NOTE — PRE-PROCEDURE INSTRUCTIONS
Pre-Surgery Instructions:   Medication Instructions    acetaminophen (TYLENOL) 650 mg CR tablet pt instructed to not take on day of surgery     alendronate (FOSAMAX) 70 mg tablet pt instructed to not take on day of surgery     anastrozole (ARIMIDEX) 1 mg tablet Patient was instructed by Physician and understands   chlorhexidine (PERIDEX) 0 12 % solution pt instructed to not use on day of surgery     fluticasone-salmeterol (ADVAIR DISKUS) 250-50 mcg/dose inhaler pt instructed to use prn on day of surgery     triamcinolone (KENALOG) 0 5 % cream pt instructed to not use on day of surgery     Pt denies fever, sob, sore throat and cough  Pt verbalized understanding of shower and med instructions  Pt instructed to stop nsaids and supplements one week prior to surgery

## 2022-04-11 PROBLEM — K21.9 GASTROESOPHAGEAL REFLUX DISEASE: Status: ACTIVE | Noted: 2022-04-11

## 2022-04-11 PROBLEM — M06.9 RHEUMATOID ARTHRITIS (HCC): Status: ACTIVE | Noted: 2022-04-11

## 2022-04-11 PROBLEM — K44.9 HIATAL HERNIA: Status: ACTIVE | Noted: 2022-04-11

## 2022-04-11 PROCEDURE — NC001 PR NO CHARGE: Performed by: PHYSICIAN ASSISTANT

## 2022-04-11 NOTE — H&P
H&P Exam -Plastic and Reconstructive Surgery - [facility] 47 y o  female MRN: [id number]    Unit/Bed#:  Encounter: [id number]   From Dr Lotus Guan H&P      HPI:  Patient is a 46 y/o female who presents with history of right breast cancer, right chest radiation, bilateral mastectomies, and multiple reconstructive procedures  Patient's mastectomies and initial reconstruction was performed by Dr Purvi Stephen in 2016-17  She developed capsular contractures and subsequently underwent conversion to pre-pectoral plane with right sided pedicled latissmus for skin coverage due to history of radiation on the right  She underwent multiple procedures but now has pre-pectoral permanent implant on the left (smooth high profile 790 cc)  On the right, she has latissimus muscle covering (smooth high profile 450 cc) implant  The right side is considerable smaller than the left   It is also tender to touch       She would like improved size on the right, resolution of pain, and improved symmetry with the left      She has undergone bilateral breast fat grafting     She has history of gastric bypass and massive weight loss      She has been followed by her oncologist and remains cancer free         ROS: 12 pt ROS negative, except as otherwise noted in HPI        PMH: GAUTAM, obesity, breast cancer, massive weight loss  FamHx: non-contrib  Surgical History         Past Surgical History:   Procedure Laterality Date    ABDOMINAL SURGERY        BRACHIOPLASTY Bilateral       2017    BREAST IMPLANT Bilateral 9/8/2020     Procedure: BREAST REMOVAL TISSUE EXPANDER/ IMPLANT PLACEMENT BREAST;  Surgeon: Kiera Rg MD;  [facility];  Service: Plastics    BREAST IMPLANT Left 2/9/2021     Procedure: BREAST TISSUE EXPANDER/ IMPLANT EXCHANGE;  Surgeon: Kiera Rg MD;  [facility];  Service: Plastics    CAPSULOTOMY Bilateral 9/8/2020     Procedure: BREAST CAPSULOTOMY;  Surgeon: Kiera Rg MD; [facility];  Service: Plastics    CAPSULOTOMY Left 2/9/2021     Procedure: BREAST CAPSULOTOMY;  Surgeon: George Siddiqi MD;  [facility];  Service: Plastics    CARDIAC CATHETERIZATION        CARPAL TUNNEL RELEASE Bilateral 2015    CHOLECYSTECTOMY        COLONOSCOPY        GALLBLADDER SURGERY   2015    GASTRIC BYPASS   2014    HYSTERECTOMY   05/2013    INCONTINENCE SURGERY   09/2013    IR BIOPSY LUNG         lung nodules    LIPOSUCTION W/ FAT INJECTION Bilateral 11/10/2021     Procedure: FAT GRAFTING BILATERAL BREAST;  Surgeon: George Siddiqi MD;  [facility];  Service: Plastics    MASTECTOMY Bilateral 10/2016    DC BREAST RECONSTRUCTION W/LATISSIMUS DORSI FLAP Right 8/6/2019     Procedure: BREAST RECONSTRUCTION W/FLAP LATISSIMUS DORSI;  Surgeon: George Siddiqi MD;  [facility];  Service: Plastics    DC DOMINGA-IMPLANT CAPSULECTOMY BREAST COMPLETE Bilateral 8/6/2019     Procedure: BREAST CAPSULECTOMY;  Surgeon: George Siddiqi MD;  [facility];  Service: Plastics    DC REMOVAL INTACT BREAST IMPLANT Bilateral 8/6/2019     Procedure: BREAST IMPLANT REMOVAL;  Surgeon: George Siddiqi MD;  [facility];  Service: Plastics    DC REVISION OF RECONSTRUCTED BREAST Right 9/7/2021     Procedure: RIGHT BREAST MOUND REVISION;  Surgeon: George Siddiqi MD;  [facility];  Service: Plastics    DC TISSUE EXPANDER PLACEMENT BREAST RECONSTRUCTION Bilateral 8/6/2019     Procedure: BREAST TISSUE EXPANDER PLACEMENT;  Surgeon: George Siddiqi MD;  [facility];  Service: Plastics    REDUCTION MAMMAPLASTY        REVISION OF SCAR Bilateral 11/10/2021     Procedure: SCAR REVISION BILATERAL ARMS;  Surgeon: George Siddiqi MD;  [facility];  Service: Plastics    TRANSFER MUSCLE PECTORALIS Bilateral 8/6/2019     Procedure: PECTORALIS MUSCLE REPAIR;  Surgeon: Karime Fritz Papo Velazco MD;  [facility];  Service: Plastics         Social: No tobacco, social ETOH  Meds: anastrozole, fosamax, neuorontin, no blood thinners        Allergies   Allergen Reactions    Accolate [Zafirlukast] Itching    Penicillins Anaphylaxis       Unconsciousness, and bronchoconstriction       Shellfish Allergy - Food Allergy Anaphylaxis    Shellfish-Derived Products - Food Allergy Anaphylaxis    Singulair [Montelukast] Itching    Pork-Derived Products - Food Allergy Hives                PE:  Vitals:     03/01/22 0937   BP: 136/95   Resp: 16   Temp: 97 6 °F (36 4 °C)         General: NC/AT, breathing comfortably on RA  Neuro: CN II-XII grossly intact, symmetric reflexes  HEENT: PERRLA, EOMI, external ears normal, no lesions or deformities, neck supple, trachea midline  Respiratory: CTAB, normal respiratory effort  Cardio: RRR, normal S1, S2, no murmur, rubs, gallops  GI: soft, non-tender, non-distended  Extremities/MSK: normal alignment, mobility, gait, no edema  Skin: no rashes, lesions, subcutaneous nodules     BMI 48     Breast exam:  Significant breast asymmetry  Left breast implant lateralization, well healed IMF incision and periareolar incision  Right breast significantly smaller than left breasat  Latissimus flap obscures left IMF  Mildly firm and moderately tender right breast implant (grade IV cap con)  Bilateral prepectoral implants     A/P: 48 y/o female with significant breast asymmetry after right breast cancer, radiation, bilateral mastectomies, and multiple reconstructive procedures  Patient wishes for improved breast symmetry and treatment of right breast Grade IV capsular contracture   -Discussed at length the risks of surgery, particularly on the right due to the history of radiation  This can significantly impede wound healing, increase risk of infection, seromas, scarring, abscess, and wound dehiscence  -Patient is also morbidly obese which also increases the risks   She is not on blood thinners or steroids, and does not smoke  -Patient acknowledged all these risks, including but not limited to infection, bleeding, scarring, cap con, rupture, leakage, malposition, damage to surrounding blood vessels and nerves, and need for further surgery   -Will require nutrition labs as patient is gastric bypass patient to ensure nutritional optimization  -Currently has 790 cc on the left, and 450 cc on the right  -All questions answered, concerns addressed  Consents obtained  -Records reviewed  -Plan: Left breast capsulotomy vs capsulorrhaphy, possible ADM/galaflex for reposition of the implant   Right breast capsulectomy, upsize of implant, liposuction of right lateral chest, possible revision of reconstructed breast, possible ADM/galaflex for repositioning of breast footprint, possible expander placement   -Will require preop with labs and medical clearance           Dalila Noriega MD   [facility] Plastic and Reconstructive Surgery   [address]   [address]   Office: [phone]

## 2022-04-12 ENCOUNTER — HOSPITAL ENCOUNTER (OUTPATIENT)
Facility: AMBULARY SURGERY CENTER | Age: 52
Setting detail: OUTPATIENT SURGERY
Discharge: HOME | End: 2022-04-12
Attending: STUDENT IN AN ORGANIZED HEALTH CARE EDUCATION/TRAINING PROGRAM | Admitting: STUDENT IN AN ORGANIZED HEALTH CARE EDUCATION/TRAINING PROGRAM
Payer: MEDICARE

## 2022-04-12 ENCOUNTER — ANESTHESIA (OUTPATIENT)
Dept: PERIOP | Facility: AMBULARY SURGERY CENTER | Age: 52
End: 2022-04-12
Payer: MEDICARE

## 2022-04-12 VITALS
OXYGEN SATURATION: 98 % | DIASTOLIC BLOOD PRESSURE: 66 MMHG | SYSTOLIC BLOOD PRESSURE: 134 MMHG | TEMPERATURE: 97.7 F | HEIGHT: 60 IN | RESPIRATION RATE: 18 BRPM | HEART RATE: 89 BPM | WEIGHT: 235 LBS | BODY MASS INDEX: 46.13 KG/M2

## 2022-04-12 DIAGNOSIS — Z90.13 ACQUIRED ABSENCE OF BILATERAL BREASTS AND NIPPLES: ICD-10-CM

## 2022-04-12 DIAGNOSIS — Z98.890 OTHER SPECIFIED POSTPROCEDURAL STATES: ICD-10-CM

## 2022-04-12 DIAGNOSIS — Z98.890 HISTORY OF BREAST RECONSTRUCTION: Primary | ICD-10-CM

## 2022-04-12 DIAGNOSIS — Z90.13 ACQUIRED ABSENCE OF BREAST AND ABSENT NIPPLE, BILATERAL: Primary | ICD-10-CM

## 2022-04-12 DIAGNOSIS — Z85.3 PERSONAL HISTORY OF MALIGNANT NEOPLASM OF BREAST: ICD-10-CM

## 2022-04-12 PROCEDURE — 88302 TISSUE EXAM BY PATHOLOGIST: CPT | Performed by: PATHOLOGY

## 2022-04-12 PROCEDURE — 19342 INSJ/RPLCMT BRST IMPLT SEP D: CPT | Performed by: STUDENT IN AN ORGANIZED HEALTH CARE EDUCATION/TRAINING PROGRAM

## 2022-04-12 PROCEDURE — 88305 TISSUE EXAM BY PATHOLOGIST: CPT | Performed by: PATHOLOGY

## 2022-04-12 PROCEDURE — L8600 IMPLANT BREAST SILICONE/EQ: HCPCS | Performed by: STUDENT IN AN ORGANIZED HEALTH CARE EDUCATION/TRAINING PROGRAM

## 2022-04-12 PROCEDURE — C1781 MESH (IMPLANTABLE): HCPCS | Performed by: STUDENT IN AN ORGANIZED HEALTH CARE EDUCATION/TRAINING PROGRAM

## 2022-04-12 PROCEDURE — 99024 POSTOP FOLLOW-UP VISIT: CPT | Performed by: PHYSICIAN ASSISTANT

## 2022-04-12 PROCEDURE — 15777 ACELLULAR DERM MATRIX IMPLT: CPT | Performed by: STUDENT IN AN ORGANIZED HEALTH CARE EDUCATION/TRAINING PROGRAM

## 2022-04-12 DEVICE — (320 SQ CM) GRAFT SKIN ALLODERM 16 X 20CM THICK RTU PERF: Type: IMPLANTABLE DEVICE | Site: CHEST | Status: FUNCTIONAL

## 2022-04-12 RX ORDER — CIPROFLOXACIN 500 MG/1
500 TABLET, FILM COATED ORAL EVERY 12 HOURS SCHEDULED
Qty: 20 TABLET | Refills: 0 | Status: SHIPPED | OUTPATIENT
Start: 2022-04-12 | End: 2022-04-22

## 2022-04-12 RX ORDER — ONDANSETRON 2 MG/ML
INJECTION INTRAMUSCULAR; INTRAVENOUS AS NEEDED
Status: DISCONTINUED | OUTPATIENT
Start: 2022-04-12 | End: 2022-04-12

## 2022-04-12 RX ORDER — ESMOLOL HYDROCHLORIDE 10 MG/ML
INJECTION INTRAVENOUS AS NEEDED
Status: DISCONTINUED | OUTPATIENT
Start: 2022-04-12 | End: 2022-04-12

## 2022-04-12 RX ORDER — SODIUM CHLORIDE, SODIUM LACTATE, POTASSIUM CHLORIDE, CALCIUM CHLORIDE 600; 310; 30; 20 MG/100ML; MG/100ML; MG/100ML; MG/100ML
50 INJECTION, SOLUTION INTRAVENOUS CONTINUOUS
Status: DISCONTINUED | OUTPATIENT
Start: 2022-04-12 | End: 2022-04-12 | Stop reason: HOSPADM

## 2022-04-12 RX ORDER — MIDAZOLAM HYDROCHLORIDE 2 MG/2ML
INJECTION, SOLUTION INTRAMUSCULAR; INTRAVENOUS AS NEEDED
Status: DISCONTINUED | OUTPATIENT
Start: 2022-04-12 | End: 2022-04-12

## 2022-04-12 RX ORDER — ONDANSETRON 4 MG/1
4 TABLET, FILM COATED ORAL EVERY 8 HOURS PRN
Qty: 20 TABLET | Refills: 0 | Status: SHIPPED | OUTPATIENT
Start: 2022-04-12

## 2022-04-12 RX ORDER — DEXAMETHASONE SODIUM PHOSPHATE 10 MG/ML
INJECTION, SOLUTION INTRAMUSCULAR; INTRAVENOUS AS NEEDED
Status: DISCONTINUED | OUTPATIENT
Start: 2022-04-12 | End: 2022-04-12

## 2022-04-12 RX ORDER — ONDANSETRON 2 MG/ML
4 INJECTION INTRAMUSCULAR; INTRAVENOUS ONCE AS NEEDED
Status: DISCONTINUED | OUTPATIENT
Start: 2022-04-12 | End: 2022-04-12 | Stop reason: HOSPADM

## 2022-04-12 RX ORDER — FENTANYL CITRATE/PF 50 MCG/ML
50 SYRINGE (ML) INJECTION
Status: DISCONTINUED | OUTPATIENT
Start: 2022-04-12 | End: 2022-04-12 | Stop reason: HOSPADM

## 2022-04-12 RX ORDER — MAGNESIUM HYDROXIDE 1200 MG/15ML
LIQUID ORAL AS NEEDED
Status: DISCONTINUED | OUTPATIENT
Start: 2022-04-12 | End: 2022-04-12 | Stop reason: HOSPADM

## 2022-04-12 RX ORDER — ACETAMINOPHEN 325 MG/1
650 TABLET ORAL EVERY 6 HOURS PRN
Status: DISCONTINUED | OUTPATIENT
Start: 2022-04-12 | End: 2022-04-12 | Stop reason: HOSPADM

## 2022-04-12 RX ORDER — ACETAMINOPHEN 325 MG/1
975 TABLET ORAL ONCE
Status: COMPLETED | OUTPATIENT
Start: 2022-04-12 | End: 2022-04-12

## 2022-04-12 RX ORDER — CLINDAMYCIN HYDROCHLORIDE 150 MG/1
600 CAPSULE ORAL EVERY 6 HOURS SCHEDULED
Qty: 160 CAPSULE | Refills: 0 | Status: SHIPPED | OUTPATIENT
Start: 2022-04-12 | End: 2022-04-22

## 2022-04-12 RX ORDER — PROPOFOL 10 MG/ML
INJECTION, EMULSION INTRAVENOUS AS NEEDED
Status: DISCONTINUED | OUTPATIENT
Start: 2022-04-12 | End: 2022-04-12

## 2022-04-12 RX ORDER — OXYCODONE HYDROCHLORIDE 5 MG/1
5 TABLET ORAL EVERY 4 HOURS PRN
Status: DISCONTINUED | OUTPATIENT
Start: 2022-04-12 | End: 2022-04-12 | Stop reason: HOSPADM

## 2022-04-12 RX ORDER — OXYCODONE HYDROCHLORIDE 5 MG/1
5 TABLET ORAL EVERY 4 HOURS PRN
Qty: 30 TABLET | Refills: 0 | Status: SHIPPED | OUTPATIENT
Start: 2022-04-12 | End: 2022-04-22

## 2022-04-12 RX ORDER — ROCURONIUM BROMIDE 10 MG/ML
INJECTION, SOLUTION INTRAVENOUS AS NEEDED
Status: DISCONTINUED | OUTPATIENT
Start: 2022-04-12 | End: 2022-04-12

## 2022-04-12 RX ORDER — HYDROMORPHONE HCL/PF 1 MG/ML
0.25 SYRINGE (ML) INJECTION
Status: DISCONTINUED | OUTPATIENT
Start: 2022-04-12 | End: 2022-04-12 | Stop reason: HOSPADM

## 2022-04-12 RX ORDER — FENTANYL CITRATE 50 UG/ML
INJECTION, SOLUTION INTRAMUSCULAR; INTRAVENOUS AS NEEDED
Status: DISCONTINUED | OUTPATIENT
Start: 2022-04-12 | End: 2022-04-12

## 2022-04-12 RX ORDER — HYDROMORPHONE HCL/PF 1 MG/ML
SYRINGE (ML) INJECTION AS NEEDED
Status: DISCONTINUED | OUTPATIENT
Start: 2022-04-12 | End: 2022-04-12

## 2022-04-12 RX ORDER — GABAPENTIN 300 MG/1
300 CAPSULE ORAL ONCE
Status: DISCONTINUED | OUTPATIENT
Start: 2022-04-12 | End: 2022-04-12 | Stop reason: HOSPADM

## 2022-04-12 RX ORDER — DOCUSATE SODIUM 100 MG/1
100 CAPSULE, LIQUID FILLED ORAL 2 TIMES DAILY PRN
Qty: 30 CAPSULE | Refills: 0 | Status: SHIPPED | OUTPATIENT
Start: 2022-04-12

## 2022-04-12 RX ORDER — NEOSTIGMINE METHYLSULFATE 1 MG/ML
INJECTION INTRAVENOUS AS NEEDED
Status: DISCONTINUED | OUTPATIENT
Start: 2022-04-12 | End: 2022-04-12

## 2022-04-12 RX ORDER — LIDOCAINE HYDROCHLORIDE 10 MG/ML
INJECTION, SOLUTION EPIDURAL; INFILTRATION; INTRACAUDAL; PERINEURAL AS NEEDED
Status: DISCONTINUED | OUTPATIENT
Start: 2022-04-12 | End: 2022-04-12

## 2022-04-12 RX ORDER — LIDOCAINE HYDROCHLORIDE AND EPINEPHRINE 10; 10 MG/ML; UG/ML
INJECTION, SOLUTION INFILTRATION; PERINEURAL AS NEEDED
Status: DISCONTINUED | OUTPATIENT
Start: 2022-04-12 | End: 2022-04-12 | Stop reason: HOSPADM

## 2022-04-12 RX ORDER — GLYCOPYRROLATE 0.2 MG/ML
INJECTION INTRAMUSCULAR; INTRAVENOUS AS NEEDED
Status: DISCONTINUED | OUTPATIENT
Start: 2022-04-12 | End: 2022-04-12

## 2022-04-12 RX ORDER — ONDANSETRON 4 MG/1
4 TABLET, ORALLY DISINTEGRATING ORAL EVERY 6 HOURS PRN
Status: DISCONTINUED | OUTPATIENT
Start: 2022-04-12 | End: 2022-04-12 | Stop reason: HOSPADM

## 2022-04-12 RX ORDER — CLINDAMYCIN PHOSPHATE 900 MG/50ML
900 INJECTION INTRAVENOUS ONCE
Status: COMPLETED | OUTPATIENT
Start: 2022-04-12 | End: 2022-04-12

## 2022-04-12 RX ADMIN — ESMOLOL HYDROCHLORIDE 40 MG: 100 INJECTION, SOLUTION INTRAVENOUS at 10:31

## 2022-04-12 RX ADMIN — NEOSTIGMINE METHYLSULFATE 3 MG: 1 INJECTION, SOLUTION INTRAMUSCULAR; INTRAVENOUS; SUBCUTANEOUS at 10:21

## 2022-04-12 RX ADMIN — ROCURONIUM BROMIDE 50 MG: 10 INJECTION, SOLUTION INTRAVENOUS at 07:39

## 2022-04-12 RX ADMIN — FENTANYL CITRATE 50 MCG: 50 INJECTION, SOLUTION INTRAMUSCULAR; INTRAVENOUS at 07:39

## 2022-04-12 RX ADMIN — CLINDAMYCIN PHOSPHATE 900 MG: 18 INJECTION, SOLUTION INTRAMUSCULAR; INTRAVENOUS at 07:27

## 2022-04-12 RX ADMIN — ONDANSETRON 4 MG: 2 INJECTION INTRAMUSCULAR; INTRAVENOUS at 10:11

## 2022-04-12 RX ADMIN — OXYCODONE HYDROCHLORIDE 5 MG: 5 TABLET ORAL at 12:05

## 2022-04-12 RX ADMIN — ACETAMINOPHEN 650 MG: 325 TABLET, FILM COATED ORAL at 12:05

## 2022-04-12 RX ADMIN — HYDROMORPHONE HYDROCHLORIDE 0.25 MG: 1 INJECTION, SOLUTION INTRAMUSCULAR; INTRAVENOUS; SUBCUTANEOUS at 11:10

## 2022-04-12 RX ADMIN — DEXAMETHASONE SODIUM PHOSPHATE 10 MG: 10 INJECTION, SOLUTION INTRAMUSCULAR; INTRAVENOUS at 07:39

## 2022-04-12 RX ADMIN — GLYCOPYRROLATE 0.4 MG: 0.2 INJECTION, SOLUTION INTRAMUSCULAR; INTRAVENOUS at 10:21

## 2022-04-12 RX ADMIN — FENTANYL CITRATE 50 MCG: 50 INJECTION INTRAMUSCULAR; INTRAVENOUS at 10:53

## 2022-04-12 RX ADMIN — SODIUM CHLORIDE, SODIUM LACTATE, POTASSIUM CHLORIDE, AND CALCIUM CHLORIDE: .6; .31; .03; .02 INJECTION, SOLUTION INTRAVENOUS at 07:04

## 2022-04-12 RX ADMIN — HYDROMORPHONE HYDROCHLORIDE 0.25 MG: 1 INJECTION, SOLUTION INTRAMUSCULAR; INTRAVENOUS; SUBCUTANEOUS at 08:49

## 2022-04-12 RX ADMIN — PROPOFOL 200 MG: 10 INJECTION, EMULSION INTRAVENOUS at 07:39

## 2022-04-12 RX ADMIN — MIDAZOLAM HYDROCHLORIDE 2 MG: 1 INJECTION, SOLUTION INTRAMUSCULAR; INTRAVENOUS at 07:28

## 2022-04-12 RX ADMIN — LIDOCAINE HYDROCHLORIDE 100 MG: 10 INJECTION, SOLUTION EPIDURAL; INFILTRATION; INTRACAUDAL at 07:39

## 2022-04-12 RX ADMIN — FENTANYL CITRATE 50 MCG: 50 INJECTION, SOLUTION INTRAMUSCULAR; INTRAVENOUS at 07:49

## 2022-04-12 RX ADMIN — ACETAMINOPHEN 975 MG: 325 TABLET, FILM COATED ORAL at 07:01

## 2022-04-12 RX ADMIN — HYDROMORPHONE HYDROCHLORIDE 0.25 MG: 1 INJECTION, SOLUTION INTRAMUSCULAR; INTRAVENOUS; SUBCUTANEOUS at 08:35

## 2022-04-12 RX ADMIN — FENTANYL CITRATE 50 MCG: 50 INJECTION INTRAMUSCULAR; INTRAVENOUS at 10:43

## 2022-04-12 NOTE — DISCHARGE SUMMARY
Discharge Summary - Plastic Surgery   David Borjas 47 y o  female MRN: [id number]  Unit/Bed#: OR WADE Encounter: [id number]    Admission Date:  4/12/22    Discharge Date: 4/12/22    Admitting Diagnosis: Personal history of malignant neoplasm of breast [Z85 3]  Acquired absence of bilateral breasts and nipples [Z90 13]  Other specified postprocedural states [Z98 890]    Discharge Diagnosis: S/P removal of right breast implant, capsulectomy, placement of right breast implant with acelluar dermal matrix , LIPOSUCTION RIGHT BREAST    Medical Problems             Resolved Problems  Date Reviewed: 4/9/2021    None                Attending: Dr Lemuel Jara Physician(s): N/A     Procedures Performed: Removal of right breast implant, capsulectomy, placement of right breast implant with acelluar dermal matrix , LIPOSUCTION RIGHT BREAST    Pathology: routine     Hospital Course: The above procedure was performed without complication  The patient was discharged home that day  Condition at Discharge: good     Discharge instructions/Information to patient and family:     -Take over-the-counter tylenol or ibuprofen for mild to moderate pain  For severe pain, may take roxicodone   -Do not drive or operate heavy machinery when taking narcotic pain medicine as it can cause drowsiness   -No showering for 72 hours  After 72 hours can remove dressings and shower  Do not submerge incisions (no baths, pools, hottubs)  -No strenuous activity, no heavy lifting (nothing over 5 lbs)  -Take all medicines as prescribed  -Record ALEIDA drain output and bring drain log to clinic   -Resume regular diet and home medications  -Wear surgical bra at all times when not showering   -Call Plastic Surgery office to schedule post-op follow in 7 days for post operative care and a drain check          Ramo Mccullough MD   [facility] Plastic and Reconstructive Surgery   [address], [address]   [place]   Office: [phone]    Provisions for Follow-Up Care:  See after visit summary for information related to follow-up care and any pertinent home health orders  Disposition: Home          Planned Readmission: No    Discharge Statement   I spent 10 minutes discharging the patient  This time was spent on the day of discharge  I had direct contact with the patient on the day of discharge  Additional documentation is required if more than 30 minutes were spent on discharge  Discharge Medications:  See after visit summary for reconciled discharge medications provided to patient and family

## 2022-04-12 NOTE — ANESTHESIA POSTPROCEDURE EVALUATION
Post-Op Assessment Note    CV Status:  Stable  Pain Score: 0    Pain management: adequate     Mental Status:  Alert and awake   Hydration Status:  Euvolemic   PONV Controlled:  Controlled   Airway Patency:  Patent      Post Op Vitals Reviewed: Yes      Staff: CRNA         No complications documented      BP   146/49   Temp 97 1   Pulse 91   Resp 19   SpO2 100

## 2022-04-12 NOTE — ANESTHESIA PREPROCEDURE EVALUATION
Procedure:  Right lateral chest, flank liposuction, removal of right breast implant, possible capsulectomy, placement of right breast implant with acelluar dermal matrix  Possible implant exchange on left breast, possible capsulectomy (Bilateral Breast)  LIPOSUCTION RIGHT LATERAL CHEST (Right Breast)    Relevant Problems   GI/HEPATIC   (+) Bariatric surgery status   (+) Gastroesophageal reflux disease   (+) Hiatal hernia      GYN   (+) History of hysterectomy      HEMATOLOGY   (+) Anemia      MUSCULOSKELETAL   (+) Arthritis   (+) Rheumatoid arthritis (HCC)      NEURO/PSYCH   (+) Personal history of malignant neoplasm of breast      PULMONARY   (+) Sleep apnea (CPAP)      Other   (+) Obesity, Class III, BMI 40-49 9 (morbid obesity) (HCC)        Physical Exam    Airway    Mallampati score: III  TM Distance: <3 FB  Neck ROM: full     Dental   Comment: edentulous,     Cardiovascular      Pulmonary      Other Findings      Results for Ramos Fess (MRN [id number]) as of 4/11/2022 22:08   Ref  Range 3/24/2022 09:30   Sodium Latest Ref Range: 136 - 145 mmol/L 141   Potassium Latest Ref Range: 3 5 - 5 3 mmol/L 4 1   Chloride Latest Ref Range: 100 - 108 mmol/L 107   CO2 Latest Ref Range: 21 - 32 mmol/L 24   Anion Gap Latest Ref Range: 4 - 13 mmol/L 10   BUN Latest Ref Range: 5 - 25 mg/dL 19   Creatinine Latest Ref Range: 0 60 - 1 30 mg/dL 0 60   GLUCOSE FASTING Latest Ref Range: 65 - 99 mg/dL 84   Calcium Latest Ref Range: 8 3 - 10 1 mg/dL 8 4   eGFR Latest Units: ml/min/1 73sq m 105       Results for Ramos Fess (MRN [id number]) as of 4/11/2022 22:08   Ref   Range 3/24/2022 09:30   WBC Latest Ref Range: 4 31 - 10 16 Thousand/uL 11 11 (H)   Red Blood Cell Count Latest Ref Range: 3 81 - 5 12 Million/uL 4 60   Hemoglobin Latest Ref Range: 11 5 - 15 4 g/dL 8 4 (L)   HCT Latest Ref Range: 34 8 - 46 1 % 29 7 (L)   MCV Latest Ref Range: 82 - 98 fL 65 (L)   MCH Latest Ref Range: 26 8 - 34 3 pg 18 3 (L)   MCHC Latest Ref Range: 31 4 - 37 4 g/dL 28 3 (L)   RDW Latest Ref Range: 11 6 - 15 1 % 22 9 (H)   Platelet Count Latest Ref Range: 149 - 390 Thousands/uL 444 (H)       EKG (10/2021)  Sinus rhythm with short ID    Anesthesia Plan  ASA Score- 3     Anesthesia Type- general with ASA Monitors  Additional Monitors:   Airway Plan: ETT  Comment: NPO after: MN (sip of water with meds)    Medical clearance from 4/4 reviewed  Patient saw Dr TOMAS [facility] for anemia and was subsequently cleared to continue without further workup  Trending the patient's hgb shows that the patient has hgb ranging from 8 to low 9's since 2019  Rivas Crandall Plan Factors-Exercise tolerance (METS): <4 METS  Chart reviewed  EKG reviewed  Existing labs reviewed  Patient summary reviewed  Patient is not a current smoker  Induction- intravenous  Postoperative Plan- Plan for postoperative opioid use  Planned trial extubation    Informed Consent- Anesthetic plan and risks discussed with patient  I personally reviewed this patient with the CRNA  Discussed and agreed on the Anesthesia Plan with the CRNA  Rivas Crandall

## 2022-04-12 NOTE — PROGRESS NOTES
Patient called and stated that she is unable to take capsules  Clindamycin only comes in capsule  S/W Ike Lilly PA-C and he stated to place Cipro tablet  Called and informed patient and she was very thankful

## 2022-04-12 NOTE — INTERIM OP NOTE
removal of right breast implant, capsulectomy, placement of right breast implant with acelluar dermal matrix , LIPOSUCTION RIGHT BREAST  Postoperative Note  PATIENT NAME: Danilo Gibbs  : 1970  MRN: [id number]  [facility]    Surgery Date: 2022    Preop Diagnosis:  Personal history of malignant neoplasm of breast [Z85 3]  Acquired absence of bilateral breasts and nipples [Z90 13]  Other specified postprocedural states [Z98 890]    Post-Op Diagnosis Codes:     * Personal history of malignant neoplasm of breast [Z85 3]     * Acquired absence of bilateral breasts and nipples [Z90 13]     * Other specified postprocedural states [Z98 890]    Procedure(s) (LRB):  removal of right breast implant, capsulectomy, placement of right breast implant with acelluar dermal matrix   (Right)  LIPOSUCTION RIGHT BREAST (Right)    Surgeon(s) and Role:     * Ella Quintanilla MD - Primary     * Edith Lou PA-C - Assisting    Specimens:  ID Type Source Tests Collected by Time Destination   1 : right breast scar Tissue Skin, Plastic Repair TISSUE EXAM Ella Quintanilla MD 2022 [id number]    2 : right breast capsule Tissue Breast, Right TISSUE EXAM Ella Quintanilla MD 2022 1290        Estimated Blood Loss:   Minimal    Anesthesia Type:   General     Findings:    None   Complications:   None    SIGNATURE: Edith Lou PA-C   DATE: 2022   TIME: 10:38 AM

## 2022-04-12 NOTE — OP NOTE
OPERATIVE REPORT  PATIENT NAME: Karishma Mckeon    :  1970  MRN: [id number]  Pt [facility]: [facility]    SURGERY DATE: 2022    Surgeon(s) and Role:     * Emily Silveira MD - Primary     * Venancio Camp PA-C - Assisting    Preop Diagnosis:  Personal history of malignant neoplasm of breast [Z85 3]  Acquired absence of bilateral breasts and nipples [Z90 13]  Other specified postprocedural states [Z98 890]    Post-Op Diagnosis Codes:     * Personal history of malignant neoplasm of breast [Z85 3]     * Acquired absence of bilateral breasts and nipples [Z90 13]     * Other specified postprocedural states [Z98 890]    Procedure(s) (LRB):  1  Excision of right breast widened scar (1 5x 12cm)  2  Right breast subtotal capsulectomy (CPT 20313)  3  Removal of intact right breast implant (CPT T3239002)  4  Delayed insertion of right breast prothesis for reconstructive breast with ADM (CPT 53236, 44361)  5  Liposuction of right lateral chest      Specimen(s):  ID Type Source Tests Collected by Time Destination   1 : right breast scar Tissue Skin, Plastic Repair TISSUE EXAM Emily Silveira MD 2022 [id number]    2 : right breast capsule Tissue Breast, Right TISSUE EXAM Emily Silveira MD 2022 9654        Estimated Blood Loss:   Minimal    Drains:  Closed/Suction Drain Right;Lateral Chest Bulb 15 Fr  (Active)   Number of days: 0       Closed/Suction Drain Lateral;Right Chest Bulb 15 Fr   (Active)   Number of days: 0       Anesthesia Type:   General    Operative Indications:  Personal history of malignant neoplasm of breast [Z85 3]  Acquired absence of bilateral breasts and nipples [Z90 13]  Other specified postprocedural states [Z98 890]  Multiple reconstructive procedures s/p right breast cancer who presents with capsular contracture and would like improvement in size as well    Operative Findings:  Right breast capsule is soft and supple, no calcifications, no seroma  Right breast implant removed intact [place] [id number] cc silicone implant  Right breast implant placement [place] URI450 cc silicone implant with ADM (Ref [id number] cc, Lot [id number], SN [id number])  Total lipoaspirate from right chest 687 cc    Complications:   None    Procedure and Technique:  Patient was brought to the operating room, transferred to the operating table in supine fashion  After undergoing general anesthesia, a timeout was performed at which point all patient identifiers were deemed to be correct  The chest was prepped and draped in the normal sterile fashion  I first began by infiltrating the widened right breast superior scar with 10cc of 1% lidocaine with epi  After allowing for the epi to take effect, the widened scar (12x1 5 cm) including subcutaneous tissue was excised and sent for path  I then dissected through the underlying latissimus flap along the fibers continuing directly down to the capsule  Prior to entering the capsule  The superficial surface of the capsule was dissected free from the overlying subcutaneous tissue and muscle superiorly, medially, and laterally  The capsule was entered and the intact [place] [id number] cc was removed  I then performed subtotal capsulectomy of the superior, anterior, medial, and lateral capsule  The capsule was soft and supple, without calcifications or masses  The capsule was sent for path  The posterior capsule which lied directly on the chest wall was scored and cauterized and was well adhered to the chest wall  The inferior capsule remained and was scored and as it was soft and supple, was left to provide inferior support for the implant  The pocket was irrigated with 1:1 betadine:normal saline solution followed by irricept  Two 15 Vietnamese drains were placed into the pocket  One medially and one laterally and secured with 3-0 nylon sutures  Next 150 cc of tumescent solution was infused into the right lateral chest  After allowing the for the tumescent to take effect   The lateral chest was liposuctioned to contour with total lipoaspirate of 200 cc  On the back table, under sterile conditions, a 16x20 cm ADM was used to perform anterior wrap of the [place] 057 cc silicone implant after it was irrigated in betadine and irricept  3-0 vicryl sutures were used as spanning sutures  The entire construct was then placed into the pocket with tacking parachute 0-vicryl sutures superiorly and medially  Next, the incision was tailor-tacked and patient was sat upright  There was noted to have significantly improved symmetry  After, I proceeded with closing the subcutaneous tissue and deep dermis with 3-0 vicryl sutures, followed by 3-0 stratafix for the skin, exofin was applied overlying the incision  The incision was dressed with gauze ABDs and patient was placed in supportive bra  This concluded the procedure  Patient tolerated the procedure well without complications  At the end of the case, all sponge, needle, and instrument counts were correct  Patient was awakened from anesthesia and taken to the PACU in stable condition  I was present for the entire procedure, A qualified resident physician was available and A physician assistant was required during the procedure for retraction, tissue handling, dissection and suturing            Patient Disposition:  PACU       SIGNATURE: Kristi Tony MD  DATE: April 12, 2022  TIME: 10:45 AM

## 2022-04-12 NOTE — DISCHARGE INSTRUCTIONS
-Take over-the-counter tylenol or ibuprofen for mild to moderate pain  For severe pain, may take roxicodone   -Do not drive or operate heavy machinery when taking narcotic pain medicine as it can cause drowsiness   -No showering for 72 hours  After 72 hours can remove dressings and shower  Do not submerge incisions (no baths, pools, hottubs)  -No strenuous activity, no heavy lifting (nothing over 5 lbs)  -Take all medicines as prescribed  -Record ALEIDA drain output and bring drain log to clinic   -Resume regular diet and home medications  -Wear surgical bra at all times when not showering   -Call Plastic Surgery office to schedule post-op follow in 7 days for post operative care and a drain check          Eleni Dubose MD   Formerly named [facility] Plastic and Reconstructive Surgery   [address]   [place], [address]   Office: [phone]

## 2022-04-18 ENCOUNTER — OFFICE VISIT (OUTPATIENT)
Dept: PLASTIC SURGERY | Facility: CLINIC | Age: 52
End: 2022-04-18

## 2022-04-18 DIAGNOSIS — R21 RASH: Primary | ICD-10-CM

## 2022-04-18 PROCEDURE — 99024 POSTOP FOLLOW-UP VISIT: CPT | Performed by: STUDENT IN AN ORGANIZED HEALTH CARE EDUCATION/TRAINING PROGRAM

## 2022-04-18 RX ORDER — METHYLPREDNISOLONE 4 MG/1
4 TABLET ORAL DAILY
Qty: 21 TABLET | Refills: 0 | Status: SHIPPED | OUTPATIENT
Start: 2022-04-18 | End: 2022-05-02 | Stop reason: SDUPTHER

## 2022-04-18 NOTE — PROGRESS NOTES
PRS Note    POD6 from right breast capsulectomy, implant removal, and replacement with larger silicone implant with ADM    Patient was doing very well for 4 days, but then starting Sunday noted a maculopapular rash that has increased in size over her right breast  She denies fever, chills, nausea, vomiting  She complains of extreme itching  The prineo tape was removed  Widespread maculopapular erythematous rash over the entire right breast  Minimal pain  Good shape and size symmetry compared to the left  ALEIDA drains intact with serous drainage  Less than 30 cc for both    Plan:  -Removed 1 ALEIDA drain  -Due to history of radiation, will likely keep other drain for another week to ensure no fluid buildup  -Patient to return on Thursday for evaluation of rash  I prescribed medrol dose fausto and instructed patient to take OTC benadryl for the pruritis      Hiral Louis MD   [facility] Plastic and Reconstructive Surgery   [address], [address]   [facility], [address] [facility]   Office: [phone]

## 2022-04-21 ENCOUNTER — OFFICE VISIT (OUTPATIENT)
Dept: PLASTIC SURGERY | Facility: CLINIC | Age: 52
End: 2022-04-21

## 2022-04-21 DIAGNOSIS — Z85.3 PERSONAL HISTORY OF MALIGNANT NEOPLASM OF BREAST: Primary | ICD-10-CM

## 2022-04-21 PROCEDURE — 99024 POSTOP FOLLOW-UP VISIT: CPT | Performed by: PHYSICIAN ASSISTANT

## 2022-04-21 NOTE — PROGRESS NOTES
POST-OP EVALUATION  4/21/2022    Olesya Li is a 47 y o  female is here today for routine post-operative follow up   Beny Edgar is status post removal of right breast implant, capsulectomy, placement of right breast implant with acelluar dermal matrix  (Right Breast) LIPOSUCTION RIGHT BREAST (Right Breast) on 04/12/2022  She says her rash has significantly improved, since removing the surgical tape and beginning oral steroids  She is overall satisfied with her surgical results  She has no pain, and feels that her symmetry is much improved    Past Medical History:   Diagnosis Date    [facility] )     [facility] )     [facility] cancer[facility] right breast ([facility] )     [facility]     Chronic pain disorder     CPAP (continuous positive airway pressure) dependence     no currently used    GERD (gastroesophageal reflux disease)     Heartburn     Hiatal hernia     History of bariatric surgery     Irregular heart beat     pt states it was a side effect of a medication    Kidney stone     Lung nodule     Osteoporosis     stage 3    Postsurgical malabsorption     RA [facility]rheumatoid arthritis) ([facility] )     [facility]     Sleep apnea     Stress incontinence     Thyroid nodule      Past Surgical History:   Procedure Laterality Date    ABDOMINAL SURGERY      BRACHIOPLASTY Bilateral     2017    BREAST IMPLANT Bilateral 9/8/2020    Procedure: BREAST REMOVAL TISSUE EXPANDER/ IMPLANT PLACEMENT BREAST;  Surgeon: Myesha Tsang MD;  [facility];  Service: Plastics    BREAST IMPLANT Left 2/9/2021    Procedure: BREAST TISSUE EXPANDER/ IMPLANT EXCHANGE;  Surgeon: Myesha Tsang MD;  [facility];  Service: Plastics    CAPSULOTOMY Bilateral 9/8/2020    Procedure: BREAST CAPSULOTOMY;  Surgeon: Myesha Tsang MD;  [facility];  Service: Plastics    CAPSULOTOMY Left 2/9/2021    Procedure: BREAST CAPSULOTOMY;  Surgeon: Candice Maurer Papo Velazco MD;  [facility];  Service: Plastics    CARDIAC CATHETERIZATION      CARPAL TUNNEL RELEASE Bilateral 2015    CHOLECYSTECTOMY      COLONOSCOPY      GALLBLADDER SURGERY  2015    GASTRIC BYPASS  2014    HYSTERECTOMY  05/2013    INCONTINENCE SURGERY  09/2013    IR BIOPSY LUNG      lung nodules    LIPOSUCTION Right 4/12/2022    Procedure: LIPOSUCTION RIGHT BREAST;  Surgeon: Kristi Tony MD;  [facility];  Service: Plastics    LIPOSUCTION W/ FAT INJECTION Bilateral 11/10/2021    Procedure: FAT GRAFTING BILATERAL BREAST;  Surgeon: Myesha Tsang MD;  [facility];  Service: Plastics    MASTECTOMY Bilateral 10/2016    NE BREAST RECONSTRUCTION W/LATISSIMUS DORSI FLAP Right 8/6/2019    Procedure: BREAST RECONSTRUCTION W/FLAP LATISSIMUS DORSI;  Surgeon: Myesha Tsang MD;  [facility];  Service: Plastics    NE DOMINGA-IMPLANT CAPSULECTOMY BREAST COMPLETE Bilateral 8/6/2019    Procedure: BREAST CAPSULECTOMY;  Surgeon: Myesha Tsang MD;  [facility];  Service: Plastics    NE REMOVAL INTACT BREAST IMPLANT Bilateral 8/6/2019    Procedure: BREAST IMPLANT REMOVAL;  Surgeon: Myesha Tsang MD;  [facility];  Service: Plastics    NE REMOVAL INTACT BREAST IMPLANT Right 4/12/2022    Procedure: removal of right breast implant, capsulectomy, placement of right breast implant with acelluar dermal matrix ;  Surgeon: Kristi Tony MD;  [facility];  Service: Plastics    NE REVISION OF RECONSTRUCTED BREAST Right 9/7/2021    Procedure: RIGHT BREAST MOUND REVISION;  Surgeon: Myesha Tsang MD;  [facility];  Service: Plastics    NE TISSUE EXPANDER PLACEMENT BREAST RECONSTRUCTION Bilateral 8/6/2019    Procedure: BREAST TISSUE EXPANDER PLACEMENT;  Surgeon: Myesha Tsang MD;  [facility];  Service: Plastics    REDUCTION MAMMAPLASTY      REVISION OF SCAR Bilateral 11/10/2021    Procedure: Jenna Parnell ARMS;  Surgeon: Kathia Vergara MD;  [facility];  Service: Plastics    TRANSFER MUSCLE PECTORALIS Bilateral 8/6/2019    Procedure: PECTORALIS MUSCLE REPAIR;  Surgeon: Kathia Vergara MD;  [facility];  Service: Plastics        Current Outpatient Medications:     acetaminophen (TYLENOL) 650 mg CR tablet, Take 1 tablet (650 mg total) by mouth every 8 (eight) hours (Patient taking differently: Take 650 mg by mouth every 8 (eight) hours as needed  ), Disp: 30 tablet, Rfl: 0    alendronate (FOSAMAX) 70 mg tablet, every 7 days Weekly on saturdays, Disp: , Rfl:     anastrozole (ARIMIDEX) 1 mg tablet, Take 1 mg by mouth daily, Disp: , Rfl: 4    chlorhexidine (PERIDEX) 0 12 % solution, SWISH AND SPIT 10 ML BY MOUTH THREE TIMES DAILY, Disp: , Rfl:     ciprofloxacin (CIPRO) 500 mg tablet, Take 1 tablet (500 mg total) by mouth every 12 (twelve) hours for 10 days, Disp: 20 tablet, Rfl: 0    clindamycin (CLEOCIN) 150 mg capsule, Take 4 capsules (600 mg total) by mouth every 6 (six) hours for 10 days, Disp: 160 capsule, Rfl: 0    docusate sodium (COLACE) 100 mg capsule, Take 1 capsule (100 mg total) by mouth 2 (two) times a day as needed for constipation, Disp: 30 capsule, Rfl: 0    fluticasone-salmeterol (ADVAIR DISKUS) 250-50 mcg/dose inhaler, Inhale 1 puff 2 (two) times a day as needed , Disp: , Rfl:     methylPREDNISolone 4 MG tablet therapy pack, Take 1 tablet (4 mg total) by mouth daily Use as directed on package, Disp: 21 tablet, Rfl: 0    ondansetron (ZOFRAN) 4 mg tablet, Take 1 tablet (4 mg total) by mouth every 8 (eight) hours as needed for nausea or vomiting, Disp: 20 tablet, Rfl: 0    oxyCODONE (Roxicodone) 5 immediate release tablet, Take 1 tablet (5 mg total) by mouth every 4 (four) hours as needed for moderate pain for up to 10 days Max Daily Amount: 30 mg, Disp: 30 tablet, Rfl: 0    triamcinolone (KENALOG) 0 5 % cream, Apply topically as needed , Disp: , Rfl:   Allergies: Accolate [zafirlukast], Penicillins, Shellfish allergy - food allergy, Shellfish-derived products - food allergy, Singulair [montelukast], and Pork-derived products - food allergy    Objective    Periwound rash, improved compared to previous photo  (See pic in media)  Breast is soft, nontender  Assessment/Plan   Diagnoses and all orders for this visit:    Personal history of malignant neoplasm of breast    Drain was removed today  Patient will apply Neosporin to surgical incision for approximately 1 more week  She will monitor for any adverse changes, and follow up with us in around 3 weeks      Briseida Shah PA-C

## 2022-05-03 ENCOUNTER — TELEPHONE (OUTPATIENT)
Dept: PLASTIC SURGERY | Facility: CLINIC | Age: 52
End: 2022-05-03

## 2022-05-03 NOTE — TELEPHONE ENCOUNTER
Called patient on 5/2/22 to discuss rash of breast   She states she is unable to be seen in person before her next scheduled appointment due to lack of money for gas  She states that she finished her course of oral steroids on 4/23, and that she had resolution of the rash until 4/29/22  It has not become unbearably itchy  She states that it originates at her incision and now covers the entire breast and into the axilla  She reports that it looks as it did on the previous occasion (photo available in media)  Discussed with Dr Mike Larose and will order an additional medrol dose pack  We will see the patient back in the office on Monday for her scheduled appointment  She was advised to call the office or seek care in an ER or Urgent Care if rash significantly changes or becomes worse prior to her next scheduled appointment

## 2022-05-09 ENCOUNTER — OFFICE VISIT (OUTPATIENT)
Dept: PLASTIC SURGERY | Facility: CLINIC | Age: 52
End: 2022-05-09

## 2022-05-09 DIAGNOSIS — Z98.890 S/P FLAP GRAFT: ICD-10-CM

## 2022-05-09 DIAGNOSIS — Z85.3 PERSONAL HISTORY OF MALIGNANT NEOPLASM OF BREAST: Primary | ICD-10-CM

## 2022-05-09 DIAGNOSIS — Z90.13 ACQUIRED ABSENCE OF BREAST AND ABSENT NIPPLE, BILATERAL: ICD-10-CM

## 2022-05-09 DIAGNOSIS — Z98.890 HISTORY OF BREAST RECONSTRUCTION: ICD-10-CM

## 2022-05-09 PROCEDURE — 99024 POSTOP FOLLOW-UP VISIT: CPT | Performed by: PHYSICIAN ASSISTANT

## 2022-05-09 NOTE — PROGRESS NOTES
POST-OP EVALUATION  5/9/2022    Olesya Malloy is a 47 y o  female is here today for routine post-operative follow up  She is status post removal of right breast implant, capsulectomy and placement of right breast implant with acellular dermal matrix on 04/12/2022; this procedure also included liposuction of the right breast tissue  Patient states she feels much better, after a course of steroids      Past Medical History:   Diagnosis Date    Breast cancer ([place] [address] )     Breast cancer ([place] [address] )     Breast cancer, right breast ([place] [address] )     Cancer ([place] [address] )     Chronic pain disorder     CPAP (continuous positive airway pressure) dependence     no currently used    GERD (gastroesophageal reflux disease)     Heartburn     Hiatal hernia     History of bariatric surgery     Irregular heart beat     pt states it was a side effect of a medication    Kidney stone     Lung nodule     Osteoporosis     stage 3    Postsurgical malabsorption     RA (rheumatoid arthritis) ([place] [address] )     Rheumatoid arthritis ([place] [address] )     Sleep apnea     Stress incontinence     Thyroid nodule      Past Surgical History:   Procedure Laterality Date    ABDOMINAL SURGERY      BRACHIOPLASTY Bilateral     2017    BREAST IMPLANT Bilateral 9/8/2020    Procedure: BREAST REMOVAL TISSUE EXPANDER/ IMPLANT PLACEMENT BREAST;  Surgeon: Komal Arguello MD;  [facility];  Service: Plastics    BREAST IMPLANT Left 2/9/2021    Procedure: BREAST TISSUE EXPANDER/ IMPLANT EXCHANGE;  Surgeon: Komal Arguello MD;  [facility];  Service: Plastics    CAPSULOTOMY Bilateral 9/8/2020    Procedure: BREAST CAPSULOTOMY;  Surgeon: Komal Arguello MD;  [facility];  Service: Plastics    CAPSULOTOMY Left 2/9/2021    Procedure: BREAST CAPSULOTOMY;  Surgeon: Komal Arguello MD;  [facility];  Service: Plastics    CARDIAC CATHETERIZATION      CARPAL TUNNEL RELEASE Bilateral 2015    CHOLECYSTECTOMY      COLONOSCOPY      GALLBLADDER SURGERY  2015    GASTRIC BYPASS  2014    HYSTERECTOMY  05/2013    INCONTINENCE SURGERY  09/2013    IR BIOPSY LUNG      lung nodules    LIPOSUCTION Right 4/12/2022    Procedure: LIPOSUCTION RIGHT BREAST;  Surgeon: Vianne Hashimoto, MD;  [facility];  Service: Plastics    LIPOSUCTION W/ FAT INJECTION Bilateral 11/10/2021    Procedure: FAT GRAFTING BILATERAL BREAST;  Surgeon: Cleve Childers MD;  [facility];  Service: Plastics    MASTECTOMY Bilateral 10/2016    DE BREAST RECONSTRUCTION W/LATISSIMUS DORSI FLAP Right 8/6/2019    Procedure: BREAST RECONSTRUCTION W/FLAP LATISSIMUS DORSI;  Surgeon: Cleve Childers MD;  [facility];  Service: Plastics    DE DOMINGA-IMPLANT CAPSULECTOMY BREAST COMPLETE Bilateral 8/6/2019    Procedure: BREAST CAPSULECTOMY;  Surgeon: Cleve Childers MD;  [facility];  Service: Plastics    DE REMOVAL INTACT BREAST IMPLANT Bilateral 8/6/2019    Procedure: BREAST IMPLANT REMOVAL;  Surgeon: Cleve Childers MD;  [facility];  Service: Plastics    DE REMOVAL INTACT BREAST IMPLANT Right 4/12/2022    Procedure: removal of right breast implant, capsulectomy, placement of right breast implant with acelluar dermal matrix ;  Surgeon: Vianne Hashimoto, MD;  [facility];  Service: Plastics    DE REVISION OF RECONSTRUCTED BREAST Right 9/7/2021    Procedure: RIGHT BREAST MOUND REVISION;  Surgeon: Cleve Childers MD;  [facility];  Service: Plastics    DE TISSUE EXPANDER PLACEMENT BREAST RECONSTRUCTION Bilateral 8/6/2019    Procedure: BREAST TISSUE EXPANDER PLACEMENT;  Surgeon: Cleve Childers MD;  [facility];  Service: Plastics    REDUCTION MAMMAPLASTY      REVISION OF SCAR Bilateral 11/10/2021    Procedure: SCAR REVISION BILATERAL ARMS;  Surgeon: Cleve Childers MD;  [facility];  Service: Plastics  TRANSFER MUSCLE PECTORALIS Bilateral 8/6/2019    Procedure: PECTORALIS MUSCLE REPAIR;  Surgeon: Jonatan Garcia MD;  [facility];  Service: Plastics        Current Outpatient Medications:     acetaminophen (TYLENOL) 650 mg CR tablet, Take 1 tablet (650 mg total) by mouth every 8 (eight) hours (Patient taking differently: Take 650 mg by mouth every 8 (eight) hours as needed  ), Disp: 30 tablet, Rfl: 0    alendronate (FOSAMAX) 70 mg tablet, every 7 days Weekly on saturdays, Disp: , Rfl:     anastrozole (ARIMIDEX) 1 mg tablet, Take 1 mg by mouth daily, Disp: , Rfl: 4    chlorhexidine (PERIDEX) 0 12 % solution, SWISH AND SPIT 10 ML BY MOUTH THREE TIMES DAILY, Disp: , Rfl:     docusate sodium (COLACE) 100 mg capsule, Take 1 capsule (100 mg total) by mouth 2 (two) times a day as needed for constipation, Disp: 30 capsule, Rfl: 0    fluticasone-salmeterol (ADVAIR DISKUS) 250-50 mcg/dose inhaler, Inhale 1 puff 2 (two) times a day as needed , Disp: , Rfl:     methylPREDNISolone 4 MG tablet therapy pack, Take 1 tablet (4 mg total) by mouth daily Use as directed on package, Disp: 21 tablet, Rfl: 0    ondansetron (ZOFRAN) 4 mg tablet, Take 1 tablet (4 mg total) by mouth every 8 (eight) hours as needed for nausea or vomiting, Disp: 20 tablet, Rfl: 0    triamcinolone (KENALOG) 0 5 % cream, Apply topically as needed , Disp: , Rfl:   Allergies: Accolate [zafirlukast], Penicillins, Shellfish allergy - food allergy, Shellfish-derived products - food allergy, Singulair [montelukast], and Pork-derived products - food allergy    Objective      Right breast is soft, without induration or contracture  The rash has significantly improved      Assessment/Plan   Diagnoses and all orders for this visit:    Personal history of malignant neoplasm of breast    S/P right latissimus dorsi flap    Acquired absence of breast and absent nipple, bilateral    History of breast reconstruction    Patient will continue with lotion that she knows is safe for her  She has finished steroids  She will see us in 3 weeks, and is asked to call with any concerns prior to that time      Cindy Hughes PA-C

## 2022-05-25 ENCOUNTER — OFFICE VISIT (OUTPATIENT)
Dept: PLASTIC SURGERY | Facility: CLINIC | Age: 52
End: 2022-05-25

## 2022-05-25 DIAGNOSIS — Z90.13 ACQUIRED ABSENCE OF BREAST AND ABSENT NIPPLE, BILATERAL: ICD-10-CM

## 2022-05-25 DIAGNOSIS — Z92.3 HISTORY OF RADIATION THERAPY: ICD-10-CM

## 2022-05-25 DIAGNOSIS — Z85.3 PERSONAL HISTORY OF MALIGNANT NEOPLASM OF BREAST: ICD-10-CM

## 2022-05-25 DIAGNOSIS — Z98.890 HISTORY OF BREAST RECONSTRUCTION: Primary | ICD-10-CM

## 2022-05-25 PROCEDURE — 99024 POSTOP FOLLOW-UP VISIT: CPT | Performed by: PHYSICIAN ASSISTANT

## 2022-05-25 NOTE — PROGRESS NOTES
POST-OP EVALUATION  5/25/2022    Olesya Hand is a 47 y o  female is here today for routine post-operative follow up  She is status post removal of right breast implant, capsulectomy and placement of right breast implant with acellular dermal matrix on 04/12/2022; this procedure also included liposuction of the right breast tissue  Patient feels much better today  Her skin irritation/allergy has resolved  She relates it to cutting down on coffee and eggs      Past Medical History:   Diagnosis Date    Breast [place] ([place] [address] )     Breast cancer ([place] [address] )     Breast cancer, right breast ([place] [address] )     Cancer ([place] [address] )     Chronic pain disorder     CPAP (continuous positive airway pressure) dependence     no currently used    GERD (gastroesophageal reflux disease)     Heartburn     Hiatal hernia     History of bariatric surgery     Irregular heart beat     pt states it was a side effect of a medication    Kidney stone     Lung nodule     Osteoporosis     stage 3    Postsurgical malabsorption     RA [place]rheumatoid arthritis) ([place] [address] )     Rheumatoid arthritis ([place] [address] )     Sleep apnea     Stress incontinence     Thyroid nodule      Past Surgical History:   Procedure Laterality Date    ABDOMINAL SURGERY      BRACHIOPLASTY Bilateral     2017    BREAST IMPLANT Bilateral 9/8/2020    Procedure: BREAST REMOVAL TISSUE EXPANDER/ IMPLANT PLACEMENT BREAST;  Surgeon: Triston Goldstein MD;  [facility];  Service: Plastics    BREAST IMPLANT Left 2/9/2021    Procedure: BREAST TISSUE EXPANDER/ IMPLANT EXCHANGE;  Surgeon: Triston Goldstein MD;  [facility];  Service: Plastics    CAPSULOTOMY Bilateral 9/8/2020    Procedure: BREAST CAPSULOTOMY;  Surgeon: Triston Goldstein MD;  [facility];  Service: Plastics    CAPSULOTOMY Left 2/9/2021    Procedure: BREAST CAPSULOTOMY;  Surgeon: Triston Goldstein MD;  [facility];  Service: [place] [id number] CATHETERIZATION      CARPAL TUNNEL RELEASE Bilateral 2015    CHOLECYSTECTOMY      COLONOSCOPY      GALLBLADDER SURGERY  2015    GASTRIC BYPASS  2014    HYSTERECTOMY  05/2013    INCONTINENCE SURGERY  09/2013    IR BIOPSY LUNG      lung nodules    LIPOSUCTION Right 4/12/2022    Procedure: LIPOSUCTION RIGHT BREAST;  Surgeon: Richard Valero MD;  [facility];  Service: Plastics    LIPOSUCTION W/ FAT INJECTION Bilateral 11/10/2021    Procedure: FAT GRAFTING BILATERAL BREAST;  Surgeon: Tenzin Nunez MD;  [facility];  Service: Plastics    MASTECTOMY Bilateral 10/2016    ME BREAST RECONSTRUCTION W/LATISSIMUS DORSI FLAP Right 8/6/2019    Procedure: BREAST RECONSTRUCTION W/FLAP LATISSIMUS DORSI;  Surgeon: Tenzin Nunez MD;  [facility];  Service: Plastics    ME DOMINGA-IMPLANT CAPSULECTOMY BREAST COMPLETE Bilateral 8/6/2019    Procedure: BREAST CAPSULECTOMY;  Surgeon: Tenzin Nunez MD;  [facility];  Service: Plastics    ME REMOVAL INTACT BREAST IMPLANT Bilateral 8/6/2019    Procedure: BREAST IMPLANT REMOVAL;  Surgeon: Tenzin Nunez MD;  [facility];  Service: Plastics    ME REMOVAL INTACT BREAST IMPLANT Right 4/12/2022    Procedure: removal of right breast implant, capsulectomy, placement of right breast implant with acelluar dermal matrix ;  Surgeon: Richard Valero MD;  [facility];  Service: Plastics    ME REVISION OF RECONSTRUCTED BREAST Right 9/7/2021    Procedure: RIGHT BREAST MOUND REVISION;  Surgeon: Tenzin Nunez MD;  [facility];  Service: Plastics    ME TISSUE EXPANDER PLACEMENT BREAST RECONSTRUCTION Bilateral 8/6/2019    Procedure: BREAST TISSUE EXPANDER PLACEMENT;  Surgeon: Tenzin Nunez MD;  [facility];  Service: Plastics    REDUCTION MAMMAPLASTY      REVISION OF SCAR Bilateral 11/10/2021    Procedure: SCAR REVISION BILATERAL ARMS;  Surgeon: Rahul Velasco Irene Garcia MD;  Location:  MAIN OR;  Service: Plastics    TRANSFER MUSCLE PECTORALIS Bilateral 8/6/2019    Procedure: PECTORALIS MUSCLE REPAIR;  Surgeon: Jonatan Garcia MD;  [facility];  Service: Plastics        Current Outpatient Medications:     acetaminophen (TYLENOL) 650 mg CR tablet, Take 1 tablet (650 mg total) by mouth every 8 (eight) hours (Patient taking differently: Take 650 mg by mouth every 8 (eight) hours as needed  ), Disp: 30 tablet, Rfl: 0    alendronate (FOSAMAX) 70 mg tablet, every 7 days Weekly on saturdays, Disp: , Rfl:     anastrozole (ARIMIDEX) 1 mg tablet, Take 1 mg by mouth daily, Disp: , Rfl: 4    chlorhexidine (PERIDEX) 0 12 % solution, SWISH AND SPIT 10 ML BY MOUTH THREE TIMES DAILY, Disp: , Rfl:     docusate sodium (COLACE) 100 mg capsule, Take 1 capsule (100 mg total) by mouth 2 (two) times a day as needed for constipation, Disp: 30 capsule, Rfl: 0    fluticasone-salmeterol (ADVAIR DISKUS) 250-50 mcg/dose inhaler, Inhale 1 puff 2 (two) times a day as needed , Disp: , Rfl:     methylPREDNISolone 4 MG tablet therapy pack, Take 1 tablet (4 mg total) by mouth daily Use as directed on package, Disp: 21 tablet, Rfl: 0    ondansetron (ZOFRAN) 4 mg tablet, Take 1 tablet (4 mg total) by mouth every 8 (eight) hours as needed for nausea or vomiting, Disp: 20 tablet, Rfl: 0    triamcinolone (KENALOG) 0 5 % cream, Apply topically as needed , Disp: , Rfl:   Allergies: Accolate [zafirlukast], Penicillins, Shellfish allergy - food allergy, Shellfish-derived products - food allergy, Singulair [montelukast], and Pork-derived products - food allergy    Objective      Breasts are soft, nontender  No areas of induration  Radiation skin changes remain        Assessment/Plan   Diagnoses and all orders for this visit:    History of breast reconstruction    History of radiation therapy    Personal history of malignant neoplasm of breast    Acquired absence of breast and absent nipple, bilateral    Patient is happy with her current status and surgical result  She will continue to monitor for any adverse changes  We will see her in 6 weeks      Mireya Murguia PA-C

## 2022-09-14 ENCOUNTER — OFFICE VISIT (OUTPATIENT)
Dept: PLASTIC SURGERY | Facility: CLINIC | Age: 52
End: 2022-09-14
Payer: MEDICARE

## 2022-09-14 DIAGNOSIS — Z98.890 HISTORY OF BREAST RECONSTRUCTION: Primary | ICD-10-CM

## 2022-09-14 PROCEDURE — 99212 OFFICE O/P EST SF 10 MIN: CPT | Performed by: PHYSICIAN ASSISTANT

## 2022-09-14 NOTE — PROGRESS NOTES
Assessment/Plan:     Patient is a 50-year-old female who is status post right breast capsulectomy and placement of right breast implant with acellular dermal matrix Dr August Hylton on 04/12/2022  Please see HPI  The patient presents to the office today for routine postoperative check  She reports that she is very pleased with the results of her breast reconstruction  Of note, the patient is interested in discussing bilateral thighplasty  The patient will be scheduled with Dr August Hylton      Diagnoses and all orders for this visit:    History of breast reconstruction          Subjective:     Patient ID: Jonathan Gee is a 47 y o  female  HPI     The patient reports that she is very pleased with her overall aesthetic results of her breast reconstruction  She has no issues or concerns today with exception of wanting to discuss a thighplasty  Review of Systems    See HPI    Objective:     Physical Exam      Patient's incisions are completely healed, clean and dry  Scarring is minimal   Breasts are grossly symmetric

## 2022-10-13 ENCOUNTER — OFFICE VISIT (OUTPATIENT)
Dept: PLASTIC SURGERY | Facility: CLINIC | Age: 52
End: 2022-10-13
Payer: MEDICARE

## 2022-10-13 VITALS — WEIGHT: 230 LBS | TEMPERATURE: 98.7 F | HEIGHT: 60 IN | BODY MASS INDEX: 45.16 KG/M2

## 2022-10-13 DIAGNOSIS — Z85.3 PERSONAL HISTORY OF MALIGNANT NEOPLASM OF BREAST: Primary | ICD-10-CM

## 2022-10-13 PROCEDURE — 99214 OFFICE O/P EST MOD 30 MIN: CPT | Performed by: STUDENT IN AN ORGANIZED HEALTH CARE EDUCATION/TRAINING PROGRAM

## 2022-10-14 NOTE — PROGRESS NOTES
PRS Note    Patient is 6 months s/p right breast capsulectomy, implant removal, and replacement with larger silicone implant with ADM  She has noted some downward drift of the implant, but is otherwise satisfied with the improvement  Her concern today is the diminished fullness of the upper pole on the right side as well as flattened area on the anterior left breast     The right breast implant IMF sits 1 cm lower than the left with diminished volume in upper pole  I discussed that fat-grafting is an option for improving the volume  The fat could be harvested from her thigh region as she has ample donor site there  The drawback would be excess loose skin that may require excision with significant fat harvesting with liposuction  I discussed fat grafting and the resorption percentage of 30-40%  I discussed that fat grafting may need to be performed multiple sessions to create ample satisfactory volume  Patient acknowledged  Not on blood thinners    A/P: 6 months s/p right breast capsulectomy, implant removal, and replacement with larger implant with ADM who presents with breast asymmetry from history of breast cancer   -Will proceed with bilateral breast fat grafting with revolve system  -[facility]  -Risks, benefits, procedure, and complications discussed  Increased risk of complications given her elevated BMI   Patient acknowledged   -All questions answered, concerns addressed   -Photos taken, consent obtained  -Will schedule    Jacques Hough MD   [facility] Plastic and Reconstructive Surgery   [address], [address]   Office: [phone]

## 2022-10-17 ENCOUNTER — PREP FOR PROCEDURE (OUTPATIENT)
Dept: PLASTIC SURGERY | Facility: CLINIC | Age: 52
End: 2022-10-17

## 2022-10-17 DIAGNOSIS — Z85.3 PERSONAL HISTORY OF MALIGNANT NEOPLASM OF BREAST: Primary | ICD-10-CM

## 2022-12-05 ENCOUNTER — APPOINTMENT (OUTPATIENT)
Dept: LAB | Facility: HOSPITAL | Age: 52
End: 2022-12-05

## 2022-12-05 DIAGNOSIS — Z85.3 PERSONAL HISTORY OF MALIGNANT NEOPLASM OF BREAST: ICD-10-CM

## 2022-12-05 LAB
ALBUMIN SERPL BCP-MCNC: 3.2 G/DL (ref 3.5–5)
ALP SERPL-CCNC: 121 U/L (ref 46–116)
ALT SERPL W P-5'-P-CCNC: 12 U/L (ref 12–78)
ANION GAP SERPL CALCULATED.3IONS-SCNC: 4 MMOL/L (ref 4–13)
AST SERPL W P-5'-P-CCNC: 17 U/L (ref 5–45)
BASOPHILS # BLD AUTO: 0.04 THOUSANDS/ÂΜL (ref 0–0.1)
BASOPHILS NFR BLD AUTO: 0 % (ref 0–1)
BILIRUB SERPL-MCNC: 0.32 MG/DL (ref 0.2–1)
BUN SERPL-MCNC: 13 MG/DL (ref 5–25)
CALCIUM ALBUM COR SERPL-MCNC: 9.2 MG/DL (ref 8.3–10.1)
CALCIUM SERPL-MCNC: 8.6 MG/DL (ref 8.3–10.1)
CHLORIDE SERPL-SCNC: 110 MMOL/L (ref 96–108)
CO2 SERPL-SCNC: 25 MMOL/L (ref 21–32)
CREAT SERPL-MCNC: 0.6 MG/DL (ref 0.6–1.3)
EOSINOPHIL # BLD AUTO: 0.26 THOUSAND/ÂΜL (ref 0–0.61)
EOSINOPHIL NFR BLD AUTO: 3 % (ref 0–6)
ERYTHROCYTE [DISTWIDTH] IN BLOOD BY AUTOMATED COUNT: 21 % (ref 11.6–15.1)
GFR SERPL CREATININE-BSD FRML MDRD: 105 ML/MIN/1.73SQ M
GLUCOSE P FAST SERPL-MCNC: 102 MG/DL (ref 65–99)
HCT VFR BLD AUTO: 30.5 % (ref 34.8–46.1)
HGB BLD-MCNC: 8.3 G/DL (ref 11.5–15.4)
IMM GRANULOCYTES # BLD AUTO: 0.03 THOUSAND/UL (ref 0–0.2)
IMM GRANULOCYTES NFR BLD AUTO: 0 % (ref 0–2)
LYMPHOCYTES # BLD AUTO: 2.31 THOUSANDS/ÂΜL (ref 0.6–4.47)
LYMPHOCYTES NFR BLD AUTO: 23 % (ref 14–44)
MCH RBC QN AUTO: 18.3 PG (ref 26.8–34.3)
MCHC RBC AUTO-ENTMCNC: 27.2 G/DL (ref 31.4–37.4)
MCV RBC AUTO: 67 FL (ref 82–98)
MONOCYTES # BLD AUTO: 0.97 THOUSAND/ÂΜL (ref 0.17–1.22)
MONOCYTES NFR BLD AUTO: 10 % (ref 4–12)
NEUTROPHILS # BLD AUTO: 6.26 THOUSANDS/ÂΜL (ref 1.85–7.62)
NEUTS SEG NFR BLD AUTO: 64 % (ref 43–75)
NRBC BLD AUTO-RTO: 0 /100 WBCS
PLATELET # BLD AUTO: 408 THOUSANDS/UL (ref 149–390)
PMV BLD AUTO: 8.9 FL (ref 8.9–12.7)
POTASSIUM SERPL-SCNC: 3.7 MMOL/L (ref 3.5–5.3)
PROT SERPL-MCNC: 7.9 G/DL (ref 6.4–8.4)
RBC # BLD AUTO: 4.53 MILLION/UL (ref 3.81–5.12)
SODIUM SERPL-SCNC: 139 MMOL/L (ref 135–147)
WBC # BLD AUTO: 9.87 THOUSAND/UL (ref 4.31–10.16)

## 2022-12-19 ENCOUNTER — ANESTHESIA EVENT (OUTPATIENT)
Dept: PERIOP | Facility: AMBULARY SURGERY CENTER | Age: 52
End: 2022-12-19

## 2022-12-22 NOTE — PRE-PROCEDURE INSTRUCTIONS
Pre-Surgery Instructions:   Medication Instructions   • alendronate (FOSAMAX) 70 mg tablet Takes on saturday   • anastrozole (ARIMIDEX) 1 mg tablet Take this medication day of surgery if normally taken in the morning  • chlorhexidine (PERIDEX) 0 12 % solution Hold day of surgery     • fluticasone-salmeterol (Advair) 250-50 mcg/dose inhaler May use day of surgery if needed     • triamcinolone (KENALOG) 0 5 % cream Hold day of surgery      My Surgical Experience    The following information was developed to assist you to prepare for your operation  What do I need to do before coming to the hospital?  • Arrange for a responsible person to drive you to and from the hospital   • Arrange care for your children at home  Children are not allowed in the recovery areas of the hospital  • Plan to wear clothing that is easy to put on and take off  If you are having shoulder surgery, wear a shirt that buttons or zippers in the front  Bathing  o Shower the evening before and the morning of your surgery with an antibacterial soap  Please refer to the Pre Op Showering Instructions for Surgery Patients Sheet   o Remove nail polish and all body piercing jewelry  o Do not shave any body part for at least 24 hours before surgery-this includes face, arms, legs and upper body  Food  o Nothing to eat or drink after midnight the night before your surgery  This includes candy and chewing gum  o Exception: If your surgery is after 12:00pm (noon), you may have clear liquids such as 7-Up®, ginger ale, apple or cranberry juice, Jell-O®, water, or clear broth until 8:00 am  o Do not drink milk or juice with pulp on the morning before surgery  o Do not drink alcohol 24 hours before surgery  Medicine  o Follow instructions you received from your surgeon about which medicines you may take on the day of surgery  o If instructed to take medicine on the morning of surgery, take pills with just a small sip of water   Call your prescribing doctor for specific infroamtion on what to do if you take insulin    What should I bring to the hospital?    Bring:  • Crutches or a walker, if you have them, for foot or knee surgery  • A list of the daily medicines, vitamins, minerals, herbals and nutritional supplements you take  Include the dosages of medicines and the time you take them each day  • Glasses, dentures or hearing aids  • Minimal clothing; you will be wearing hospital sleepwear  • Photo ID; required to verify your identity  • If you have a Living Will or Power of , bring a copy of the documents  • If you have an ostomy, bring an extra pouch and any supplies you use    Do not bring  • Medicines or inhalers  • Money, valuables or jewelry    What other information should I know about the day of surgery? • Notify your surgeons if you develop a cold, sore throat, cough, fever, rash or any other illness  • Report to the Ambulatory Surgical/Same Day Surgery Unit  • You will be instructed to stop at Registration only if you have not been pre-registered  • Inform your  fi they do not stay that they will be asked by the staff to leave a phone number where they can be reached  • Be available to be reached before surgery  In the event the operating room schedule changes, you may be asked to come in earlier or later than expected    *It is important to tell your doctor and others involved in your health care if you are taking or have been taking any non-prescription drugs, vitamins, minerals, herbals or other nutritional supplements   Any of these may interact with some food or medicines and cause a reaction

## 2022-12-28 ENCOUNTER — ANESTHESIA (OUTPATIENT)
Dept: PERIOP | Facility: AMBULARY SURGERY CENTER | Age: 52
End: 2022-12-28

## 2022-12-28 ENCOUNTER — HOSPITAL ENCOUNTER (OUTPATIENT)
Facility: AMBULARY SURGERY CENTER | Age: 52
Setting detail: OUTPATIENT SURGERY
Discharge: HOME | End: 2022-12-28
Attending: STUDENT IN AN ORGANIZED HEALTH CARE EDUCATION/TRAINING PROGRAM | Admitting: STUDENT IN AN ORGANIZED HEALTH CARE EDUCATION/TRAINING PROGRAM

## 2022-12-28 VITALS
DIASTOLIC BLOOD PRESSURE: 70 MMHG | WEIGHT: 242 LBS | BODY MASS INDEX: 47.51 KG/M2 | OXYGEN SATURATION: 100 % | HEIGHT: 60 IN | TEMPERATURE: 97.4 F | HEART RATE: 95 BPM | SYSTOLIC BLOOD PRESSURE: 139 MMHG | RESPIRATION RATE: 14 BRPM

## 2022-12-28 RX ORDER — GABAPENTIN 300 MG/1
300 CAPSULE ORAL ONCE
Status: DISCONTINUED | OUTPATIENT
Start: 2022-12-28 | End: 2022-12-28 | Stop reason: HOSPADM

## 2022-12-28 RX ORDER — CLINDAMYCIN PHOSPHATE 900 MG/50ML
900 INJECTION INTRAVENOUS ONCE
Status: DISCONTINUED | OUTPATIENT
Start: 2022-12-28 | End: 2022-12-28 | Stop reason: HOSPADM

## 2022-12-28 RX ORDER — ACETAMINOPHEN 325 MG/1
975 TABLET ORAL ONCE
Status: COMPLETED | OUTPATIENT
Start: 2022-12-28 | End: 2022-12-28

## 2022-12-28 RX ORDER — SODIUM CHLORIDE, SODIUM LACTATE, POTASSIUM CHLORIDE, CALCIUM CHLORIDE 600; 310; 30; 20 MG/100ML; MG/100ML; MG/100ML; MG/100ML
125 INJECTION, SOLUTION INTRAVENOUS CONTINUOUS
Status: DISCONTINUED | OUTPATIENT
Start: 2022-12-28 | End: 2022-12-28 | Stop reason: HOSPADM

## 2022-12-28 RX ADMIN — ACETAMINOPHEN 975 MG: 325 TABLET, FILM COATED ORAL at 06:50

## 2022-12-28 NOTE — H&P
H&P - Plastic Surgery   Holly Hodge 47 y o  female MRN: [id number]  Unit/Bed#:  Encounter: [id number]           Assessment:  Personal history of malignant neoplasm of breast [Z85 3]  Plan:  BILATERAL BREAST FAT GRAFTING (Bilateral: Breast)        HPI:   Holly Hodge is a 47y o  year old female who presents with 6 months s/p right breast capsulectomy, implant removal, and replacement with larger silicone implant with ADM  She has noted some downward drift of the implant, but is otherwise satisfied with the improvement      Her concern today is the diminished fullness of the upper pole on the right side as well as flattened area on the anterior left breast      The right breast implant IMF sits 1 cm lower than the left with diminished volume in upper pole  REVIEW OF SYSTEMS    GENERAL/CONSTITUTIONAL: The patient denies fever, fatigue, weakness, weight gain or weight loss  HEAD, EYES, EARS, NOSE AND THROAT: Eyes - The patient denies pain, redness, loss of vision, double or blurred vision and denies wearing glasses  The patient denies ringing in the ears, nosebleeds sinusitis, post nasal drip  Also denies frequent sore throats, hoarseness, painful swallowing  CARDIOVASCULAR: The patient denies chest pain, irregular heartbeats, palpitations, shortness of breath, heart murmurs, high blood pressure, cramps in his legs with walking, pain in his feet or toes at night or varicose veins  RESPIRATORY: The patient denies chronic cough, wheezing or night sweats  GASTROINTESTINAL: The patient denies decreased appetite, nausea, vomiting, diarrhea, constipation, blood in the stools  GENITOURINARY: The patient denies difficult urination, pain or burning with urination, blood in the urine  MUSCULOSKELETAL: The patient denies arm, thigh or calf cramps  No joint or muscle pain  No muscle weakness or tenderness  No joint swelling, neck pain, back pain or major orthopedic injuries    SKIN AND BREASTS: see hpi The patient denies easy bruising, skin redness, skin rash, hives  NEUROLOGIC: The patient denies headache, dizziness, fainting, memory loss  PSYCHIATRIC: The patient denies depression anxiety  ENDOCRINE: The patient denies intolerance to hot or cold temperature, flushing, fingernail changes, increased thirst, increased salt intake or decreased sexual desire  HEMATOLOGIC/LYMPHATIC: The patient denies anemia, bleeding tendency or clotting tendency  ALLERGIC/IMMUNOLOGIC: The patient denies rhinitis, asthma, skin sensitivity, latex allergies or sensitivity        Historical Information   Past Medical History:   Diagnosis Date   • Breast cancer ([place] [address] )    • Breast cancer ([place] [address] )    • Breast cancer, right breast ([place] [address] )    • Cancer ([place] [address] )    • Chronic pain disorder    • CPAP (continuous positive airway pressure) dependence     no currently used   • GERD (gastroesophageal reflux disease)    • Heartburn    • Hiatal hernia    • History of bariatric surgery    • Irregular heart beat     pt states it was a side effect of a medication   • Kidney stone    • Lung nodule    • Osteoporosis     stage 3   • Postsurgical malabsorption    • RA (rheumatoid arthritis) ([place] [address] )    • Rheumatoid arthritis ([place] [address] )    • Sleep apnea    • Stress incontinence    • Thyroid nodule      Past Surgical History:   Procedure Laterality Date   • ABDOMINAL SURGERY     • BRACHIOPLASTY Bilateral     2017   • BREAST IMPLANT Bilateral 9/8/2020    Procedure: BREAST REMOVAL TISSUE EXPANDER/ IMPLANT PLACEMENT BREAST;  Surgeon: Yari Esquivel MD;  [facility];  Service: Plastics   • BREAST IMPLANT Left 2/9/2021    Procedure: BREAST TISSUE EXPANDER/ IMPLANT EXCHANGE;  Surgeon: Yari Esquivel MD;  [facility];  Service: Plastics   • CAPSULOTOMY Bilateral 9/8/2020    Procedure: BREAST CAPSULOTOMY;  Surgeon: Yari Esquivel MD;  [facility];  Service: Plastics   • CAPSULOTOMY Left 2/9/2021    Procedure: BREAST CAPSULOTOMY; Surgeon: Maura Talbert MD;  [facility];  Service: Plastics   • CARDIAC CATHETERIZATION     • CARPAL TUNNEL RELEASE Bilateral 2015   • CHOLECYSTECTOMY     • COLONOSCOPY     • GALLBLADDER SURGERY  2015   • GASTRIC BYPASS  2014   • HYSTERECTOMY  05/2013   • INCONTINENCE SURGERY  09/2013   • IR BIOPSY LUNG      lung nodules   • LIPOSUCTION Right 4/12/2022    Procedure: LIPOSUCTION RIGHT BREAST;  Surgeon: Meg Brambila MD;  [facility];  Service: Plastics   • LIPOSUCTION W/ FAT INJECTION Bilateral 11/10/2021    Procedure: FAT GRAFTING BILATERAL BREAST;  Surgeon: Maura Talbert MD;  [facility];  Service: Plastics   • MASTECTOMY Bilateral 10/2016   • CA BREAST RECONSTRUCTION W/LATISSIMUS DORSI FLAP Right 8/6/2019    Procedure: BREAST RECONSTRUCTION W/FLAP LATISSIMUS DORSI;  Surgeon: Maura Talbert MD;  [facility];  Service: Plastics   • CA DOMINGA-IMPLANT CAPSULECTOMY BREAST COMPLETE Bilateral 8/6/2019    Procedure: BREAST CAPSULECTOMY;  Surgeon: Maura Talbert MD;  [facility];  Service: Plastics   • CA REMOVAL INTACT BREAST IMPLANT Bilateral 8/6/2019    Procedure: BREAST IMPLANT REMOVAL;  Surgeon: Maura Talbert MD;  [facility];  Service: Plastics   • CA REMOVAL INTACT BREAST IMPLANT Right 4/12/2022    Procedure: removal of right breast implant, capsulectomy, placement of right breast implant with acelluar dermal matrix ;  Surgeon: Meg Brambila MD;  [facility];  Service: Plastics   • CA REVISION OF RECONSTRUCTED BREAST Right 9/7/2021    Procedure: RIGHT BREAST MOUND REVISION;  Surgeon: Maura Talbert MD;  [facility];  Service: Plastics   • CA TISSUE EXPANDER PLACEMENT BREAST RECONSTRUCTION Bilateral 8/6/2019    Procedure: BREAST TISSUE EXPANDER PLACEMENT;  Surgeon: Maura Talbert MD;  [facility];  Service: Plastics   • REDUCTION MAMMAPLASTY     • REVISION OF SCAR Bilateral 11/10/2021    Procedure: Horace RAMIREZ;  Surgeon: Sherin Molina MD;  [facility];  Service: Plastics   • TRANSFER MUSCLE PECTORALIS Bilateral 8/6/2019    Procedure: Mellisa Castorenaeric;  Surgeon: Sherin Molina MD;  [facility];  Service: Plastics     Social History   Social History     Substance and Sexual Activity   Alcohol Use Not Currently     Social History     Substance and Sexual Activity   Drug Use Not Currently     Social History     Tobacco Use   Smoking Status Never   Smokeless Tobacco Never     Family History:   Family History   Problem Relation Age of Onset   • No Known Problems Mother    • Diabetes Father    • Heart disease Father    • No Known Problems Brother        Meds/Allergies     Current Facility-Administered Medications:   •  [START ON 12/28/2022] EPINEPHrine PF (ADRENALIN) 2 mg, lidocaine (PF) (XYLOCAINE-MPF) 1 % 50 mL in sodium chloride 0 9 % 1,000 mL OR irrigation, , Irrigation, Once, Isabel Campo MD    Current Outpatient Medications:   •  alendronate (FOSAMAX) 70 mg tablet, every 7 days Weekly on saturdays, Disp: , Rfl:   •  anastrozole (ARIMIDEX) 1 mg tablet, Take 1 mg by mouth daily, Disp: , Rfl: 4  •  chlorhexidine (PERIDEX) 0 12 % solution, SWISH AND SPIT 10 ML BY MOUTH THREE TIMES DAILY, Disp: , Rfl:   •  fluticasone-salmeterol (Advair) 250-50 mcg/dose inhaler, Inhale 1 puff 2 (two) times a day as needed , Disp: , Rfl:   •  triamcinolone (KENALOG) 0 5 % cream, Apply topically as needed , Disp: , Rfl:       Objective     General appearance: alert and oriented, in no acute distress  Head: Normocephalic, without obvious abnormality, atraumatic  Eyes: negative  Lungs: clear to auscultation bilaterally  Breasts: Patient's incisions are completely healed, clean and dry  Scarring is minimal   Breasts are grossly symmetric    Heart: regular rate and rhythm  Abdomen: soft, non-tender; bowel sounds normal; no masses, no organomegaly  Extremities: extremities normal, warm and well-perfused; no cyanosis, clubbing, or edema  Pulses: 2+ and symmetric  Neurologic: Alert and oriented X 3, normal strength and tone  Normal symmetric reflexes  Normal coordination and gait    Lab Results:   Lab Results   Component Value Date    WBC 9 87 12/05/2022    HGB 8 3 (L) 12/05/2022    HCT 30 5 (L) 12/05/2022    MCV 67 (L) 12/05/2022     (H) 12/05/2022          No results found for: TISSUECULT, WOUNDCULT      Imaging Studies:   No results found  EKG, Pathology, and Other Studies:   Lab Results   Component Value Date/Time    [facility]  04/12/2022 08:17 AM     A  Skin, Plastic Repair, right breast scar, excision:   -Benign skin and subcutaneous tissue with dense fibrous tissue/ scar  -No evidence of malignancy  B  U, right breast capsule:  -Benign fibrous capsule with adjacent adipose tissue  -No evidence of malignancy            No intake or output data in the 24 hours ending 12/27/22 2002    Invasive Devices     Drain  Duration           Closed/Suction Drain Lateral;Right Chest Bulb 15 Fr  259 days    Closed/Suction Drain Right;Lateral Chest Bulb 15 Fr  259 days                VTE Prophylaxis: Sequential compression device (Venodyne)

## 2022-12-28 NOTE — ANESTHESIA PREPROCEDURE EVALUATION
Procedure:  BILATERAL BREAST FAT GRAFTING (Bilateral: Breast)    Relevant Problems   GI/HEPATIC   (+) Bariatric surgery status   (+) Gastroesophageal reflux disease   (+) Hiatal hernia      GYN   (+) History of hysterectomy      HEMATOLOGY   (+) Anemia      MUSCULOSKELETAL   (+) Arthritis   (+) Rheumatoid arthritis (HCC)      NEURO/PSYCH   (+) Personal history of malignant neoplasm of breast      PULMONARY   (+) Sleep apnea      No CPAP      Physical Exam    Airway    Mallampati score: II  TM Distance: <3 FB  Neck ROM: full     Dental       Cardiovascular      Pulmonary      Other Findings       Latest Reference Range & Units 12/05/22 07:20   Sodium 135 - 147 mmol/L 139   Potassium 3 5 - 5 3 mmol/L 3 7   Chloride 96 - 108 mmol/L 110 (H)   CO2 21 - 32 mmol/L 25   Anion Gap 4 - 13 mmol/L 4   BUN 5 - 25 mg/dL 13   Creatinine 0 60 - 1 30 mg/dL 0 60   GLUCOSE FASTING 65 - 99 mg/dL 102 (H)   Calcium 8 3 - 10 1 mg/dL 8 6   CORRECTED CALCIUM 8 3 - 10 1 mg/dL 9 2   AST 5 - 45 U/L 17   ALT 12 - 78 U/L 12   Alkaline Phosphatase 46 - 116 U/L 121 (H)   Total Protein 6 4 - 8 4 g/dL 7 9   Albumin 3 5 - 5 0 g/dL 3 2 (L)   TOTAL BILIRUBIN 0 20 - 1 00 mg/dL 0 32   eGFR ml/min/1 73sq m 105   (H): Data is abnormally high  (L): Data is abnormally low     Latest Reference Range & Units 12/05/22 07:20   WBC 4 31 - 10 16 Thousand/uL 9 87   Red Blood Cell Count 3 81 - 5 12 Million/uL 4 53   Hemoglobin 11 5 - 15 4 g/dL 8 3 (L)   HCT 34 8 - 46 1 % 30 5 (L)   MCV 82 - 98 fL 67 (L)   MCH 26 8 - 34 3 pg 18 3 (L)   MCHC 31 4 - 37 4 g/dL 27 2 (L)   RDW 11 6 - 15 1 % 21 0 (H)   Platelet Count 601 - 390 Thousands/uL 408 (H)   MPV 8 9 - 12 7 fL 8 9   nRBC /100 WBCs 0   (L): Data is abnormally low  (H): Data is abnormally high    Anesthesia Plan  ASA Score- 3     Anesthesia Type- general with ASA Monitors  Additional Monitors:   Airway Plan: ETT  Comment: Npo after MN    Severe anemia noted - hgb at baseline for the past year   In 4/2022, patient received clearance for surgery  Hgb unchanged from that clearance which determined that she was medically optimized  Plan Factors-Exercise tolerance (METS): <4 METS  Chart reviewed  Existing labs reviewed  Patient summary reviewed  Patient is not a current smoker  Induction- intravenous  Postoperative Plan- Plan for postoperative opioid use  Planned trial extubation    Informed Consent- Anesthetic plan and risks discussed with patient  I personally reviewed this patient with the CRNA  Discussed and agreed on the Anesthesia Plan with the CRNA  [place]

## 2022-12-28 NOTE — PROGRESS NOTES
Plastic Surgery Note    Unable to obtain IV, difficult access  Procedure cancelled  Will reschedule  Needs to be scheduled at hospital, preferably [facility] for proximity, will likely require midline IV  Also, requires cipro and pain medicine after future fat grafting procedures      Av Gonzalez MD   [facility] Plastic and Reconstructive Surgery   [address]   [place] [address]    Office: [phone]

## 2022-12-28 NOTE — H&P
Plastic Surgery Attending    H&P reviewed, no new changes  Digna Sykes MD   [facility] Plastic and Reconstructive Surgery   [address]   Office: [phone]        H&P - Plastic Surgery   Erika Kemp 47 y o  female MRN: [id number]  Unit/Bed#:  Encounter: [id number]              Assessment:  Personal history of malignant neoplasm of breast [Z85 3]  Plan:  BILATERAL BREAST FAT GRAFTING (Bilateral: Breast)           HPI:   Erika Kemp is a 47y o  year old female who presents with 6 months s/p right breast capsulectomy, implant removal, and replacement with larger silicone implant with ADM  She has noted some downward drift of the implant, but is otherwise satisfied with the improvement      Her concern today is the diminished fullness of the upper pole on the right side as well as flattened area on the anterior left breast      The right breast implant IMF sits 1 cm lower than the left with diminished volume in upper pole            REVIEW OF SYSTEMS     GENERAL/CONSTITUTIONAL: The patient denies fever, fatigue, weakness, weight gain or weight loss  HEAD, EYES, EARS, NOSE AND THROAT: Eyes - The patient denies pain, redness, loss of vision, double or blurred vision and denies wearing glasses  The patient denies ringing in the ears, nosebleeds sinusitis, post nasal drip  Also denies frequent sore throats, hoarseness, painful swallowing  CARDIOVASCULAR: The patient denies chest pain, irregular heartbeats, palpitations, shortness of breath, heart murmurs, high blood pressure, cramps in his legs with walking, pain in his feet or toes at night or varicose veins  RESPIRATORY: The patient denies chronic cough, wheezing or night sweats  GASTROINTESTINAL: The patient denies decreased appetite, nausea, vomiting, diarrhea, constipation, blood in the stools    GENITOURINARY: The patient denies difficult urination, pain or burning with urination, blood in the urine   MUSCULOSKELETAL: The patient denies arm, thigh or calf cramps  No joint or muscle pain  No muscle weakness or tenderness  No joint swelling, neck pain, back pain or major orthopedic injuries  SKIN AND BREASTS: see hpi The patient denies easy bruising, skin redness, skin rash, hives  NEUROLOGIC: The patient denies headache, dizziness, fainting, memory loss  PSYCHIATRIC: The patient denies depression anxiety  ENDOCRINE: The patient denies intolerance to hot or cold temperature, flushing, fingernail changes, increased thirst, increased salt intake or decreased sexual desire  HEMATOLOGIC/LYMPHATIC: The patient denies anemia, bleeding tendency or clotting tendency    ALLERGIC/IMMUNOLOGIC: The patient denies rhinitis, asthma, skin sensitivity, latex allergies or sensitivity            Historical Information      Medical History        Past Medical History:   Diagnosis Date   • Breast cancer ([facility] )     • Breast cancer ([facility] )     • Breast cancer, right breast ([facility] )     • Cancer ([facility] )     • Chronic pain disorder     • CPAP (continuous positive airway pressure) dependence       no currently used   • GERD (gastroesophageal reflux disease)     • Heartburn     • Hiatal hernia     • History of bariatric surgery     • Irregular heart beat       pt states it was a side effect of a medication   • Kidney stone     • Lung nodule     • Osteoporosis       stage 3   • Postsurgical malabsorption     • RA (rheumatoid arthritis) (HCC)     • Rheumatoid arthritis (HCC)     • Sleep apnea     • Stress incontinence     • Thyroid nodule           Surgical History         Past Surgical History:   Procedure Laterality Date   • ABDOMINAL SURGERY       • BRACHIOPLASTY Bilateral       2017   • BREAST IMPLANT Bilateral 9/8/2020     Procedure: BREAST REMOVAL TISSUE EXPANDER/ IMPLANT PLACEMENT BREAST;  Surgeon: Selwyn Grey MD;  [facility];  Service: Plastics   • BREAST IMPLANT Left 2/9/2021     Procedure: BREAST TISSUE EXPANDER/ IMPLANT EXCHANGE;  Surgeon: Sherin Molina MD;  [facility];  Service: Plastics   • CAPSULOTOMY Bilateral 9/8/2020     Procedure: BREAST CAPSULOTOMY;  Surgeon: Sherin Molina MD;  [facility];  Service: Plastics   • CAPSULOTOMY Left 2/9/2021     Procedure: BREAST CAPSULOTOMY;  Surgeon: Sherin Molina MD;  [facility];  Service: Plastics   • CARDIAC CATHETERIZATION       • CARPAL TUNNEL RELEASE Bilateral 2015   • CHOLECYSTECTOMY       • COLONOSCOPY       • GALLBLADDER SURGERY   2015   • GASTRIC BYPASS   2014   • HYSTERECTOMY   05/2013   • INCONTINENCE SURGERY   09/2013   • IR BIOPSY LUNG         lung nodules   • LIPOSUCTION Right 4/12/2022     Procedure: LIPOSUCTION RIGHT BREAST;  Surgeon: Isabel Campo MD;  [facility];  Service: Plastics   • LIPOSUCTION W/ FAT INJECTION Bilateral 11/10/2021     Procedure: FAT GRAFTING BILATERAL BREAST;  Surgeon: Sherin Molina MD;  [facility];  Service: Plastics   • MASTECTOMY Bilateral 10/2016   • WI BREAST RECONSTRUCTION W/LATISSIMUS DORSI FLAP Right 8/6/2019     Procedure: BREAST RECONSTRUCTION W/FLAP LATISSIMUS DORSI;  Surgeon: Sherin Molina MD;  [facility];  Service: Plastics   • WI DOMINGA-IMPLANT CAPSULECTOMY BREAST COMPLETE Bilateral 8/6/2019     Procedure: BREAST CAPSULECTOMY;  Surgeon: Sherin Molina MD;  [facility];  Service: Plastics   • WI REMOVAL INTACT BREAST IMPLANT Bilateral 8/6/2019     Procedure: BREAST IMPLANT REMOVAL;  Surgeon: Sherin Molina MD;  [facility];  Service: Plastics   • WI REMOVAL INTACT BREAST IMPLANT Right 4/12/2022     Procedure: removal of right breast implant, capsulectomy, placement of right breast implant with acelluar dermal matrix ;  Surgeon: Isabel Campo MD;  [facility];  Service: Plastics   • WI REVISION OF RECONSTRUCTED BREAST Right 9/7/2021     Procedure: RIGHT BREAST MOUND REVISION;  Surgeon: Beatriz Palacios MD;  [facility];  Service: Plastics   • AZ TISSUE EXPANDER PLACEMENT BREAST RECONSTRUCTION Bilateral 8/6/2019     Procedure: BREAST TISSUE EXPANDER PLACEMENT;  Surgeon: Beatriz Palacios MD;  [facility];  Service: Plastics   • REDUCTION MAMMAPLASTY       • REVISION OF SCAR Bilateral 11/10/2021     Procedure: [place] ARMS;  Surgeon: Beatriz Palacios MD;  [facility];  Service: Plastics   • TRANSFER MUSCLE PECTORALIS Bilateral 8/6/2019     Procedure: Dayla Coup;  Surgeon: Beatriz Palacios MD;  [facility];  Service: Plastics               Social History      Social History          Substance and Sexual Activity   Alcohol Use Not Currently      Social History          Substance and Sexual Activity   Drug Use Not Currently      Social History          Tobacco Use   Smoking Status Never   Smokeless Tobacco Never      Family History:   Family History         Family History   Problem Relation Age of Onset   • No Known Problems Mother     • Diabetes Father     • Heart disease Father     • No Known Problems Brother                    Meds/Allergies         Current Facility-Administered Medications:   •  [START ON 12/28/2022] EPINEPHrine PF (ADRENALIN) 2 mg, lidocaine (PF) (XYLOCAINE-MPF) 1 % 50 mL in sodium chloride 0 9 % 1,000 mL OR irrigation, , Irrigation, Once, Dayna Nichols MD     Current Outpatient Medications:   •  alendronate (FOSAMAX) 70 mg tablet, every 7 days Weekly on saturdays, Disp: , Rfl:   •  anastrozole (ARIMIDEX) 1 mg tablet, Take 1 mg by mouth daily, Disp: , Rfl: 4  •  chlorhexidine (PERIDEX) 0 12 % solution, SWISH AND SPIT 10 ML BY MOUTH THREE TIMES DAILY, Disp: , Rfl:   •  fluticasone-salmeterol (Advair) 250-50 mcg/dose inhaler, Inhale 1 puff 2 (two) times a day as needed , Disp: , Rfl:   •  triamcinolone (KENALOG) 0 5 % cream, Apply topically as needed , Disp: , Rfl:               Objective

## 2023-01-11 ENCOUNTER — APPOINTMENT (OUTPATIENT)
Dept: PREADMISSION TESTING | Facility: HOSPITAL | Age: 53
End: 2023-01-11

## 2023-01-13 ENCOUNTER — ANESTHESIA EVENT (OUTPATIENT)
Dept: PERIOP | Facility: HOSPITAL | Age: 53
End: 2023-01-13

## 2023-01-13 ENCOUNTER — ANESTHESIA (OUTPATIENT)
Dept: PERIOP | Facility: HOSPITAL | Age: 53
End: 2023-01-13

## 2023-01-13 ENCOUNTER — HOSPITAL ENCOUNTER (OUTPATIENT)
Facility: HOSPITAL | Age: 53
Setting detail: OUTPATIENT SURGERY
Discharge: HOME | End: 2023-01-13
Attending: STUDENT IN AN ORGANIZED HEALTH CARE EDUCATION/TRAINING PROGRAM | Admitting: STUDENT IN AN ORGANIZED HEALTH CARE EDUCATION/TRAINING PROGRAM

## 2023-01-13 VITALS
HEIGHT: 60 IN | TEMPERATURE: 97 F | OXYGEN SATURATION: 95 % | HEART RATE: 126 BPM | SYSTOLIC BLOOD PRESSURE: 163 MMHG | RESPIRATION RATE: 20 BRPM | DIASTOLIC BLOOD PRESSURE: 70 MMHG | BODY MASS INDEX: 47.51 KG/M2 | WEIGHT: 242 LBS

## 2023-01-13 DIAGNOSIS — Z90.13 ACQUIRED ABSENCE OF BREAST AND ABSENT NIPPLE, BILATERAL: Primary | ICD-10-CM

## 2023-01-13 RX ORDER — MINERAL OIL
OIL (ML) MISCELLANEOUS AS NEEDED
Status: DISCONTINUED | OUTPATIENT
Start: 2023-01-13 | End: 2023-01-13 | Stop reason: HOSPADM

## 2023-01-13 RX ORDER — DEXAMETHASONE SODIUM PHOSPHATE 10 MG/ML
INJECTION, SOLUTION INTRAMUSCULAR; INTRAVENOUS AS NEEDED
Status: DISCONTINUED | OUTPATIENT
Start: 2023-01-13 | End: 2023-01-13

## 2023-01-13 RX ORDER — PROPOFOL 10 MG/ML
INJECTION, EMULSION INTRAVENOUS AS NEEDED
Status: DISCONTINUED | OUTPATIENT
Start: 2023-01-13 | End: 2023-01-13

## 2023-01-13 RX ORDER — ONDANSETRON 2 MG/ML
INJECTION INTRAMUSCULAR; INTRAVENOUS AS NEEDED
Status: DISCONTINUED | OUTPATIENT
Start: 2023-01-13 | End: 2023-01-13

## 2023-01-13 RX ORDER — CLINDAMYCIN PHOSPHATE 900 MG/50ML
900 INJECTION INTRAVENOUS ONCE
Status: COMPLETED | OUTPATIENT
Start: 2023-01-13 | End: 2023-01-13

## 2023-01-13 RX ORDER — CIPROFLOXACIN 500 MG/1
500 TABLET, FILM COATED ORAL EVERY 12 HOURS SCHEDULED
Qty: 20 TABLET | Refills: 0 | Status: SHIPPED | OUTPATIENT
Start: 2023-01-13 | End: 2023-01-23

## 2023-01-13 RX ORDER — OXYCODONE HYDROCHLORIDE 5 MG/1
5 TABLET ORAL EVERY 6 HOURS PRN
Qty: 20 TABLET | Refills: 0 | Status: SHIPPED | OUTPATIENT
Start: 2023-01-13 | End: 2023-01-20 | Stop reason: SDUPTHER

## 2023-01-13 RX ORDER — FENTANYL CITRATE 50 UG/ML
INJECTION, SOLUTION INTRAMUSCULAR; INTRAVENOUS AS NEEDED
Status: DISCONTINUED | OUTPATIENT
Start: 2023-01-13 | End: 2023-01-13

## 2023-01-13 RX ORDER — ONDANSETRON 2 MG/ML
4 INJECTION INTRAMUSCULAR; INTRAVENOUS ONCE AS NEEDED
Status: COMPLETED | OUTPATIENT
Start: 2023-01-13 | End: 2023-01-13

## 2023-01-13 RX ORDER — SODIUM CHLORIDE, SODIUM LACTATE, POTASSIUM CHLORIDE, CALCIUM CHLORIDE 600; 310; 30; 20 MG/100ML; MG/100ML; MG/100ML; MG/100ML
125 INJECTION, SOLUTION INTRAVENOUS CONTINUOUS
Status: DISCONTINUED | OUTPATIENT
Start: 2023-01-13 | End: 2023-01-13 | Stop reason: HOSPADM

## 2023-01-13 RX ORDER — SODIUM CHLORIDE, SODIUM LACTATE, POTASSIUM CHLORIDE, CALCIUM CHLORIDE 600; 310; 30; 20 MG/100ML; MG/100ML; MG/100ML; MG/100ML
INJECTION, SOLUTION INTRAVENOUS CONTINUOUS PRN
Status: DISCONTINUED | OUTPATIENT
Start: 2023-01-13 | End: 2023-01-13

## 2023-01-13 RX ORDER — GABAPENTIN 300 MG/1
300 CAPSULE ORAL ONCE
Status: DISCONTINUED | OUTPATIENT
Start: 2023-01-13 | End: 2023-01-13 | Stop reason: HOSPADM

## 2023-01-13 RX ORDER — ACETAMINOPHEN 325 MG/1
975 TABLET ORAL ONCE
Status: COMPLETED | OUTPATIENT
Start: 2023-01-13 | End: 2023-01-13

## 2023-01-13 RX ORDER — OXYCODONE HYDROCHLORIDE 5 MG/1
5 TABLET ORAL ONCE
Status: COMPLETED | OUTPATIENT
Start: 2023-01-13 | End: 2023-01-13

## 2023-01-13 RX ORDER — SODIUM CHLORIDE, SODIUM LACTATE, POTASSIUM CHLORIDE, CALCIUM CHLORIDE 600; 310; 30; 20 MG/100ML; MG/100ML; MG/100ML; MG/100ML
20 INJECTION, SOLUTION INTRAVENOUS CONTINUOUS
Status: DISCONTINUED | OUTPATIENT
Start: 2023-01-13 | End: 2023-01-13 | Stop reason: HOSPADM

## 2023-01-13 RX ORDER — MIDAZOLAM HYDROCHLORIDE 2 MG/2ML
INJECTION, SOLUTION INTRAMUSCULAR; INTRAVENOUS AS NEEDED
Status: DISCONTINUED | OUTPATIENT
Start: 2023-01-13 | End: 2023-01-13

## 2023-01-13 RX ORDER — ROCURONIUM BROMIDE 10 MG/ML
INJECTION, SOLUTION INTRAVENOUS AS NEEDED
Status: DISCONTINUED | OUTPATIENT
Start: 2023-01-13 | End: 2023-01-13

## 2023-01-13 RX ORDER — ACETAMINOPHEN 500 MG
500 TABLET ORAL EVERY 4 HOURS PRN
Qty: 30 TABLET | Refills: 2 | Status: SHIPPED | OUTPATIENT
Start: 2023-01-13

## 2023-01-13 RX ORDER — LIDOCAINE HYDROCHLORIDE 20 MG/ML
INJECTION, SOLUTION EPIDURAL; INFILTRATION; INTRACAUDAL; PERINEURAL AS NEEDED
Status: DISCONTINUED | OUTPATIENT
Start: 2023-01-13 | End: 2023-01-13

## 2023-01-13 RX ORDER — FENTANYL CITRATE/PF 50 MCG/ML
25 SYRINGE (ML) INJECTION
Status: COMPLETED | OUTPATIENT
Start: 2023-01-13 | End: 2023-01-13

## 2023-01-13 RX ADMIN — ACETAMINOPHEN 975 MG: 325 TABLET, FILM COATED ORAL at 13:01

## 2023-01-13 RX ADMIN — DEXAMETHASONE SODIUM PHOSPHATE 10 MG: 10 INJECTION, SOLUTION INTRAMUSCULAR; INTRAVENOUS at 14:15

## 2023-01-13 RX ADMIN — FENTANYL CITRATE 50 MCG: 50 INJECTION INTRAMUSCULAR; INTRAVENOUS at 14:11

## 2023-01-13 RX ADMIN — FENTANYL CITRATE 25 MCG: 50 INJECTION INTRAMUSCULAR; INTRAVENOUS at 16:48

## 2023-01-13 RX ADMIN — PROPOFOL 150 MG: 10 INJECTION, EMULSION INTRAVENOUS at 14:11

## 2023-01-13 RX ADMIN — FENTANYL CITRATE 25 MCG: 50 INJECTION INTRAMUSCULAR; INTRAVENOUS at 16:16

## 2023-01-13 RX ADMIN — ONDANSETRON 4 MG: 2 INJECTION INTRAMUSCULAR; INTRAVENOUS at 16:50

## 2023-01-13 RX ADMIN — LIDOCAINE HYDROCHLORIDE 10 MG: 20 INJECTION, SOLUTION EPIDURAL; INFILTRATION; INTRACAUDAL; PERINEURAL at 14:11

## 2023-01-13 RX ADMIN — CLINDAMYCIN PHOSPHATE 900 MG: 900 INJECTION, SOLUTION INTRAVENOUS at 14:05

## 2023-01-13 RX ADMIN — SUGAMMADEX 200 MG: 100 INJECTION, SOLUTION INTRAVENOUS at 15:33

## 2023-01-13 RX ADMIN — ROCURONIUM BROMIDE 50 MG: 10 SOLUTION INTRAVENOUS at 14:11

## 2023-01-13 RX ADMIN — OXYCODONE HYDROCHLORIDE 5 MG: 5 TABLET ORAL at 18:02

## 2023-01-13 RX ADMIN — SODIUM CHLORIDE, SODIUM LACTATE, POTASSIUM CHLORIDE, AND CALCIUM CHLORIDE: .6; .31; .03; .02 INJECTION, SOLUTION INTRAVENOUS at 14:06

## 2023-01-13 RX ADMIN — ONDANSETRON 4 MG: 2 INJECTION INTRAMUSCULAR; INTRAVENOUS at 14:15

## 2023-01-13 RX ADMIN — FENTANYL CITRATE 25 MCG: 50 INJECTION INTRAMUSCULAR; INTRAVENOUS at 16:31

## 2023-01-13 RX ADMIN — FENTANYL CITRATE 25 MCG: 50 INJECTION INTRAMUSCULAR; INTRAVENOUS at 16:21

## 2023-01-13 RX ADMIN — MIDAZOLAM 2 MG: 1 INJECTION INTRAMUSCULAR; INTRAVENOUS at 14:03

## 2023-01-13 NOTE — ANESTHESIA POSTPROCEDURE EVALUATION
Post-Op Assessment Note    CV Status:  Stable  Pain Score: 0    Pain management: adequate     Mental Status:  Alert and awake   Hydration Status:  Euvolemic   PONV Controlled:  Controlled   Airway Patency:  Patent  Airway: intubated      Post Op Vitals Reviewed: Yes      Staff: CRNA         No notable events documented      BP   135/87   Temp     Pulse  98   Resp   18   SpO2   100

## 2023-01-13 NOTE — H&P
H&P - Plastic Surgery   Milka Denny 47 y o  female MRN: [id number]  Unit/Bed#:  Encounter: [id number]           Assessment:  Personal history of malignant neoplasm of breast [Z85 3]  Plan:  BILATERAL BREAST FAT GRAFTING (Bilateral: Breast)        HPI:   Milka Denny is a 47y o  year old female who presents with s/p right breast capsulectomy, implant removal, and replacement with larger silicone implant with ADM  She has noted some downward drift of the implant, but is otherwise satisfied with the improvement      Her concern today is the diminished fullness of the upper pole on the right side as well as flattened area on the anterior left breast      The right breast implant IMF sits 1 cm lower than the left with diminished volume in upper pole  REVIEW OF SYSTEMS    GENERAL/CONSTITUTIONAL: The patient denies fever, fatigue, weakness, weight gain or weight loss  HEAD, EYES, EARS, NOSE AND THROAT: Eyes - The patient denies pain, redness, loss of vision, double or blurred vision and denies wearing glasses  The patient denies ringing in the ears, nosebleeds sinusitis, post nasal drip  Also denies frequent sore throats, hoarseness, painful swallowing  CARDIOVASCULAR: The patient denies chest pain, irregular heartbeats, palpitations, shortness of breath, heart murmurs, high blood pressure, cramps in his legs with walking, pain in his feet or toes at night or varicose veins  RESPIRATORY: The patient denies chronic cough, wheezing or night sweats  GASTROINTESTINAL: The patient denies decreased appetite, nausea, vomiting, diarrhea, constipation, blood in the stools  GENITOURINARY: The patient denies difficult urination, pain or burning with urination, blood in the urine  MUSCULOSKELETAL: The patient denies arm, thigh or calf cramps  No joint or muscle pain  No muscle weakness or tenderness  No joint swelling, neck pain, back pain or major orthopedic injuries    SKIN AND BREASTS: see hpi The patient denies easy bruising, skin redness, skin rash, hives  NEUROLOGIC: The patient denies headache, dizziness, fainting, memory loss  PSYCHIATRIC: The patient denies depression anxiety  ENDOCRINE: The patient denies intolerance to hot or cold temperature, flushing, fingernail changes, increased thirst, increased salt intake or decreased sexual desire  HEMATOLOGIC/LYMPHATIC: The patient denies anemia, bleeding tendency or clotting tendency  ALLERGIC/IMMUNOLOGIC: The patient denies rhinitis, asthma, skin sensitivity, latex allergies or sensitivity        Historical Information   Past Medical History:   Diagnosis Date   • Anesthesia complication     needs IR preprocedure for IV access/not ASC candidate   • Breast cancer ([facility] )    • Breast cancer ([facility] )    • Breast cancer, right breast ([place] [address] )    • Cancer ([place] [address] )    • Chronic pain disorder    • CPAP (continuous positive airway pressure) dependence     no currently used   • GERD (gastroesophageal reflux disease)    • Heartburn    • Hiatal hernia    • History of bariatric surgery    • Irregular heart beat     pt states it was a side effect of a medication   • Kidney stone    • Lung nodule    • Osteoporosis     stage 3   • Postsurgical malabsorption    • RA (rheumatoid arthritis) ([place] [address] )    • Rheumatoid arthritis ([place] [address] )    • Sleep apnea    • Stress incontinence    • Thyroid nodule      Past Surgical History:   Procedure Laterality Date   • ABDOMINAL SURGERY     • BRACHIOPLASTY Bilateral     2017   • BREAST IMPLANT Bilateral 9/8/2020    Procedure: BREAST REMOVAL TISSUE EXPANDER/ IMPLANT PLACEMENT BREAST;  Surgeon: Bella Hinojosa MD;  [facility];  Service: Plastics   • BREAST IMPLANT Left 2/9/2021    Procedure: BREAST TISSUE EXPANDER/ IMPLANT EXCHANGE;  Surgeon: Bella Hinojosa MD;  [facility];  Service: Plastics   • CAPSULOTOMY Bilateral 9/8/2020    Procedure: BREAST CAPSULOTOMY;  Surgeon: Bella Hinojosa MD;  [facility];  Service: Plastics   • CAPSULOTOMY Left 2/9/2021    Procedure: BREAST CAPSULOTOMY;  Surgeon: Rose Marie Peguero MD;  [facility];  Service: Plastics   • CARDIAC CATHETERIZATION     • CARPAL TUNNEL RELEASE Bilateral 2015   • CHOLECYSTECTOMY     • COLONOSCOPY     • GALLBLADDER SURGERY  2015   • GASTRIC BYPASS  2014   • HYSTERECTOMY  05/2013   • INCONTINENCE SURGERY  09/2013   • IR BIOPSY LUNG      lung nodules   • LIPOSUCTION Right 4/12/2022    Procedure: LIPOSUCTION RIGHT BREAST;  Surgeon: Omaira Arrieta MD;  [facility];  Service: Plastics   • LIPOSUCTION W/ FAT INJECTION Bilateral 11/10/2021    Procedure: FAT GRAFTING BILATERAL BREAST;  Surgeon: Rose Marie Peguero MD;  [facility];  Service: Plastics   • MASTECTOMY Bilateral 10/2016   • TN BREAST RECONSTRUCTION W/LATISSIMUS DORSI FLAP Right 8/6/2019    Procedure: BREAST RECONSTRUCTION W/FLAP LATISSIMUS DORSI;  Surgeon: Rose Marie Peguero MD;  [facility];  Service: Plastics   • TN DOMINGA-IMPLANT CAPSULECTOMY BREAST COMPLETE Bilateral 8/6/2019    Procedure: BREAST CAPSULECTOMY;  Surgeon: Rose Marie Peguero MD;  [facility];  Service: Plastics   • TN REMOVAL INTACT BREAST IMPLANT Bilateral 8/6/2019    Procedure: BREAST IMPLANT REMOVAL;  Surgeon: Rose Marie Peguero MD;  [facility];  Service: Plastics   • TN REMOVAL INTACT BREAST IMPLANT Right 4/12/2022    Procedure: removal of right breast implant, capsulectomy, placement of right breast implant with acelluar dermal matrix ;  Surgeon: Omaira Arrieta MD;  [facility];  Service: Plastics   • TN REVISION OF RECONSTRUCTED BREAST Right 9/7/2021    Procedure: RIGHT BREAST MOUND REVISION;  Surgeon: Rose Marie Peguero MD;  [facility];  Service: Plastics   • TN TISSUE EXPANDER PLACEMENT BREAST RECONSTRUCTION Bilateral 8/6/2019    Procedure: BREAST TISSUE EXPANDER PLACEMENT;  Surgeon: Rose Marie Peguero MD; [facility];  Service: Plastics   • REDUCTION MAMMAPLASTY     • REVISION OF SCAR Bilateral 11/10/2021    Procedure: [place] ARMS;  Surgeon: Keisha Nunez MD;  Location:  MAIN OR;  Service: Plastics   • TRANSFER MUSCLE PECTORALIS Bilateral 8/6/2019    Procedure: [place];  Surgeon: Keisha Nunez MD;  [facility];  Service: Plastics     Social History   Social History     Substance and Sexual Activity   Alcohol Use Not Currently     Social History     Substance and Sexual Activity   Drug Use Not Currently     Social History     Tobacco Use   Smoking Status Never   Smokeless Tobacco Never     Family History:   Family History   Problem Relation Age of Onset   • No Known Problems Mother    • Diabetes Father    • Heart disease Father    • No Known Problems Brother        Meds/Allergies       Current Facility-Administered Medications:   •  [START ON 1/13/2023] EPINEPHrine (ADRENALIN) 0 2 mg, lidocaine (PF) (XYLOCAINE-MPF) 1 % 50 mL in sodium chloride 0 9 % 1,000 mL OR irrigation, , Irrigation, Once, Antonella Gage MD    Current Outpatient Medications:   •  alendronate (FOSAMAX) 70 mg tablet, every 7 days Weekly on saturdays, Disp: , Rfl:   •  anastrozole (ARIMIDEX) 1 mg tablet, Take 1 mg by mouth daily, Disp: , Rfl: 4  •  chlorhexidine (PERIDEX) 0 12 % solution, SWISH AND SPIT 10 ML BY MOUTH THREE TIMES DAILY, Disp: , Rfl:   •  fluticasone-salmeterol (Advair) 250-50 mcg/dose inhaler, Inhale 1 puff 2 (two) times a day as needed , Disp: , Rfl:   •  triamcinolone (KENALOG) 0 5 % cream, Apply topically as needed , Disp: , Rfl:   Objective     Ht 5' (1 524 m)   Wt 110 kg (242 lb)   BMI 47 26 kg/m²     General Appearance:    Alert, cooperative, no distress, appears stated age   Head:    Normocephalic, without obvious abnormality, atraumatic   Eyes:    PERRL, conjunctiva/corneas clear, EOM's intact, fundi     benign, both eyes   Ears:    Normal TM's and external ear canals, both ears   Nose:   Nares normal, septum midline, mucosa normal, no drainage    or sinus tenderness   Throat:   Lips, mucosa, and tongue normal; teeth and gums normal   Neck:   Supple, symmetrical, trachea midline, no adenopathy;     thyroid:  no enlargement/tenderness/nodules; no carotid    bruit or JVD   Back:     Symmetric, no curvature, ROM normal, no CVA tenderness   Lungs:     Clear to auscultation bilaterally, respirations unlabored   Chest Wall:    No tenderness or deformity    Heart:    Regular rate and rhythm, S1 and S2 normal, no murmur, rub   or gallop   Breast Exam:    Patient's incisions are completely healed, clean and dry  Scarring is minimal   Breasts are grossly symmetric  No tenderness, masses, or nipple abnormality   Abdomen:     Soft, non-tender, bowel sounds active all four quadrants,     no masses, no organomegaly   Genitalia:    Normal female without lesion, discharge or tenderness   Rectal:    Normal tone, no masses or tenderness; guaiac negative stool   Extremities:   Extremities normal, atraumatic, no cyanosis or edema   Pulses:   2+ and symmetric all extremities   Skin:   Skin color, texture, turgor normal, no rashes or lesions   Lymph nodes:   Cervical, supraclavicular, and axillary nodes normal   Neurologic:   CNII-XII intact, normal strength, sensation and reflexes     throughout       Lab Results:   Lab Results   Component Value Date    WBC 9 87 12/05/2022    HGB 8 3 (L) 12/05/2022    HCT 30 5 (L) 12/05/2022    MCV 67 (L) 12/05/2022     (H) 12/05/2022          No results found for: TISSUECULT, WOUNDCULT      Imaging Studies:   No results found  EKG, Pathology, and Other Studies:   Lab Results   Component Value Date/Time    [facility]  04/12/2022 08:17 AM     A  Skin, Plastic Repair, right breast scar, excision:   -Benign skin and subcutaneous tissue with dense fibrous tissue/ scar  -No evidence of malignancy       B  U, right breast capsule:  -Benign fibrous capsule with adjacent adipose tissue  -No evidence of malignancy            No intake or output data in the 24 hours ending 01/12/23 2044    Invasive Devices     Drain  Duration           Closed/Suction Drain Lateral;Right Chest Bulb 15 Fr  275 days    Closed/Suction Drain Right;Lateral Chest Bulb 15 Fr  275 days                VTE Prophylaxis: Sequential compression device (Venodyne)

## 2023-01-13 NOTE — ANESTHESIA PREPROCEDURE EVALUATION
Procedure:  BILATERAL BREAST FAT GRAFTING (Bilateral: Breast)    Relevant Problems   GI/HEPATIC   (+) Bariatric surgery status   (+) Gastroesophageal reflux disease   (+) Hiatal hernia      GYN   (+) History of hysterectomy      HEMATOLOGY   (+) Anemia      MUSCULOSKELETAL   (+) Arthritis   (+) Rheumatoid arthritis (HCC)      NEURO/PSYCH   (+) Personal history of malignant neoplasm of breast      PULMONARY   (+) Sleep apnea        Physical Exam    Airway    Mallampati score: III  TM Distance: >3 FB  Neck ROM: full     Dental   No notable dental hx     Cardiovascular  Cardiovascular exam normal    Pulmonary  Pulmonary exam normal     Other Findings        Anesthesia Plan  ASA Score- 3     Anesthesia Type- general with ASA Monitors  Additional Monitors:   Airway Plan: ETT  Plan Factors-Exercise tolerance (METS): >4 METS  Chart reviewed  EKG reviewed  Existing labs reviewed  Patient summary reviewed  Patient is not a current smoker  Induction- intravenous  Postoperative Plan- Plan for postoperative opioid use  Informed Consent- Anesthetic plan and risks discussed with patient  I personally reviewed this patient with the CRNA  Discussed and agreed on the Anesthesia Plan with the CRNA  Jamia Pickering

## 2023-01-13 NOTE — DISCHARGE INSTR - AVS FIRST PAGE
Discharge instructions    -Take tylenol 500 mg for mild-moderate pain  If still having severe pain, add roxicodone (narcotic) for severe pain   -Do not drive or operate heavy machinery when taking narcotic pain medicine as it can cause drowsiness   -No showering for 48 hours  After 48 hours can remove dressings, shower  No submerging incisions (no baths, pools, hottubs)  -No strenuous activity, no heavy lifting (nothing over 5 lbs)    -Take all medicines as prescribed  -Take antibiotics to completion  -Resume regular diet and home medications  -Call Plastic Surgery office to schedule post-op follow in 7-10 days for suture removal       Guillermina Strange MD   [facility] Plastic and Reconstructive Surgery   [address] [facility] [address]   Office: [phone]

## 2023-01-13 NOTE — OP NOTE
OPERATIVE REPORT  PATIENT NAME: Danielle Casiano    :  1970  MRN: [id number]  Pt Location: [facility] OR ROOM 01    SURGERY DATE: 2023    Surgeon(s) and Role:     * Rajendra Lamb MD - Primary     * Nick Navarro MD - Assisting    Preop Diagnosis:  Personal history of malignant neoplasm of breast [Z85 3]    Post-Op Diagnosis Codes:     * Personal history of malignant neoplasm of breast [Z85 3]    Procedure(s) (LRB):  1  Fat grafting to right breast (310 cc )  2  Fat grafting to the left breast (220 cc)    Specimen(s):  * No specimens in log *    Estimated Blood Loss:   Minimal    Drains:  Closed/Suction Drain Right;Lateral Chest Bulb 15 Fr  (Active)   Number of days: 276       Closed/Suction Drain Lateral;Right Chest Bulb 15 Fr  (Active)   Number of days: 276       Anesthesia Type:   General    Operative Indications:  Personal history of malignant neoplasm of breast [Z85 3]      Operative Findings:  Fat grafting to right breast (310 cc)  Fat grafting to the left breast (999 cc)    Complications:   None    Procedure and Technique:  Patient was brought to the operating room, transferred to the operating table in supine fashion  After undergoing general anesthesia, a timeout was performed at which point all patient identifiers were deemed to be correct  The chest abdomen and medial thighs were prepped and draped in the normal sterile fashion  I first began by tumescing the bilateral medial thighs with 600 cc of the normal tumescent solution into each medial thigh, total of 1200 cc of tumescent solution  After allowing for the tumescence to take effect, I proceeded with fat graft harvest with Revolve fat harvesting system  The fat was liposuctioned with large bore cannula under low pressure  Using the Revolve system a total of 530 cc of fat was harvested and washed   Three small incisions were made in the prior breast scars and the fat graft was injected into the superior poles of bilateral breasts making sure to avoid puncture of the implants  A total of 310 cc of fat was injected into the right breast, 220 cc into the left breast in cross -hatched fashion  After completion of injection, the small liposuction and fat grafting incisions were closed with 3-0 nylon suture  This concluded the procedure  Patient tolerated the procedure well without complications  At the end of the case, all sponge, needle, and instrument counts were correct  Patient was awakened from anesthesia and taken to the PACU in stable condition      I was present for the entire procedure, A qualified resident physician was available and A physician assistant was not required during the procedure for retraction, tissue handling, dissection and suturing       Patient Disposition:  PACU         SIGNATURE: Jeffry Hollis MD  DATE: January 13, 2023  TIME: 4:20 PM

## 2023-01-20 ENCOUNTER — OFFICE VISIT (OUTPATIENT)
Dept: PLASTIC SURGERY | Facility: CLINIC | Age: 53
End: 2023-01-20

## 2023-01-20 DIAGNOSIS — Z90.13 ACQUIRED ABSENCE OF BREAST AND ABSENT NIPPLE, BILATERAL: ICD-10-CM

## 2023-01-20 RX ORDER — OXYCODONE HYDROCHLORIDE 5 MG/1
5 TABLET ORAL EVERY 6 HOURS PRN
Qty: 10 TABLET | Refills: 0 | Status: SHIPPED | OUTPATIENT
Start: 2023-01-20 | End: 2023-01-30

## 2023-01-20 NOTE — PROGRESS NOTES
Assessment/Plan:     Patient is a 47 YOF who is s/p fat grafting to her bilateral breasts by Dr Jodi Velasquez on 1/13/23  Please see HPI  Patient presents to the office today for a 1st post operative visit and suture removal  She reports that she has been doing well  She has been taking pain medication only at night for sleep  She requests a few additional pills with a plan of discontinuation next week  The patient will return to the office in approx 2 5 months to discuss potential surgical interventions or sooner with any questions or concerns  Diagnoses and all orders for this visit:    Acquired absence of breast and absent nipple, bilateral  -     oxyCODONE (Roxicodone) 5 immediate release tablet; Take 1 tablet (5 mg total) by mouth every 6 (six) hours as needed for severe pain for up to 10 days Max Daily Amount: 20 mg          Subjective:     Patient ID: Moni Booker is a 47 y o  female  HPI     Patient reports no issues post operatively  Review of Systems    See HPI     Objective:     Physical Exam      Incisions and sutures are clean, dry and intact  Expected ecchymosis of the bilateral medial thighs

## 2023-04-07 ENCOUNTER — OFFICE VISIT (OUTPATIENT)
Dept: PLASTIC SURGERY | Facility: CLINIC | Age: 53
End: 2023-04-07

## 2023-04-07 DIAGNOSIS — Z98.890 HISTORY OF BREAST RECONSTRUCTION: Primary | ICD-10-CM

## 2023-04-07 DIAGNOSIS — Z85.3 PERSONAL HISTORY OF MALIGNANT NEOPLASM OF BREAST: ICD-10-CM

## 2023-04-07 NOTE — PROGRESS NOTES
"Assessment/Plan:    Patient is a 47 [address] who is s/p fat grafting to her bilateral breasts by Dr Taylor Morley on 1/13/23  Please see HPI  Patient presents to the office today to discuss further fat grafting  Patient requests further fat grafting to bilateral breasts, specifically the upper pole of the right breast and centrally on the left breast  She requests media thigh donor sites  Of note, patient strongly prefers to be scheduled the 2nd week of August and only wishes to be scheduled at the [facility]  I discussed fat grafting to the bilateral reconstructed breasts  Patient understood and agreed  This will be done under general anesthesia  Discussed options, including forgoing surgery, as well as benefits and risks of surgery including but not limited to anesthesia, bleeding, infection, scarring and potential need for additional procedures  Consent was obtained and all questions answered to their satisfaction  We will plan for surgery at their earliest convenience  No problem-specific Assessment & Plan notes found for this encounter  Diagnoses and all orders for this visit:    History of breast reconstruction    Personal history of malignant neoplasm of breast          Subjective:      Patient ID: Aung Stanton is a 47 y o  female  HPI     Patient reports that she has noticed \"drooping\" of the grafted fat on her right breast  She additionally noted some flattening of the central portion of the left breast including over the NAC  Please see photo in media        The following portions of the patient's history were reviewed and updated as appropriate: She  has a past medical history of Anesthesia complication, Breast cancer ([address] ), Breast cancer ([address] ), Breast cancer, right breast ([address] ), Cancer ([address] ), Chronic pain disorder, CPAP (continuous positive airway pressure) dependence, GERD (gastroesophageal reflux disease), Heartburn, Hiatal hernia, History of bariatric surgery, " Irregular heart beat, Kidney stone, Lung nodule, Osteoporosis, Postsurgical malabsorption, RA (rheumatoid arthritis) ([facility] ), Rheumatoid arthritis ([facility] ), Sleep apnea, Stress incontinence, and Thyroid nodule  She   Patient Active Problem List    Diagnosis Date Noted   • Gastroesophageal reflux disease 04/11/2022   • Rheumatoid arthritis ([facility] ) 04/11/2022   • Hiatal hernia 04/11/2022   • Sleep apnea 09/07/2021   • History of hysterectomy 09/07/2021   • Anemia 09/07/2021   • Arthritis 09/07/2021   • History of breast reconstruction 12/04/2019   • S/P right latissimus dorsi flap 10/11/2019   • Obesity, Class III, BMI 40-49 9 (morbid obesity) [facility] ) 06/28/2019   • Obesity, Class II, BMI 35-39 9 06/07/2019   • Bariatric surgery status 06/07/2019   • Postsurgical malabsorption 06/07/2019   • Personal history of malignant neoplasm of breast 05/06/2019   • Acquired absence of breast and absent nipple, bilateral 05/05/2019   • History of radiation therapy 05/05/2019     She  has a past surgical history that includes Hysterectomy (05/2013); Reduction mammaplasty; Gastric bypass (2014); Brachioplasty (Bilateral); Gallbladder surgery (2015); Carpal tunnel release (Bilateral, 2015); Incontinence surgery (09/2013); Cholecystectomy; Abdominal surgery; Colonoscopy; pr removal intact breast implant (Bilateral, 8/6/2019); pr frederick-implant capsulectomy breast complete (Bilateral, 8/6/2019); pr breast reconstruction w/latissimus dorsi flap (Right, 8/6/2019); pr tissue expander placement breast reconstruction (Bilateral, 8/6/2019); TRANSFER MUSCLE PECTORALIS (Bilateral, 8/6/2019); Cardiac catheterization; IR biopsy lung; BREAST IMPLANT (Bilateral, 9/8/2020); Capsulectomy (Bilateral, 9/8/2020); BREAST IMPLANT (Left, 2/9/2021); Capsulectomy (Left, 2/9/2021); pr revision of reconstructed breast (Right, 9/7/2021); Mastectomy (Bilateral, 10/2016); Liposuction w/ fat injection (Bilateral, 11/10/2021);  Revision of scar (Bilateral, 11/10/2021); pr removal intact breast implant (Right, 4/12/2022); Liposuction (Right, 4/12/2022); and pr grafting of autologous fat by lipo 50 cc or less (Bilateral, 1/13/2023)  Her family history includes Diabetes in her father; Heart disease in her father; No Known Problems in her brother and mother  She  reports that she has never smoked  She has never used smokeless tobacco  She reports that she does not currently use alcohol  She reports that she does not currently use drugs  Current Outpatient Medications   Medication Sig Dispense Refill   • acetaminophen (TYLENOL) 500 mg tablet Take 1 tablet (500 mg total) by mouth every 4 (four) hours as needed for mild pain or moderate pain 30 tablet 2   • alendronate (FOSAMAX) 70 mg tablet every 7 days Weekly on saturdays     • anastrozole (ARIMIDEX) 1 mg tablet Take 1 mg by mouth daily  4   • chlorhexidine (PERIDEX) 0 12 % solution SWISH AND SPIT 10 ML BY MOUTH THREE TIMES DAILY     • fluticasone-salmeterol (Advair) 250-50 mcg/dose inhaler Inhale 1 puff 2 (two) times a day as needed      • triamcinolone (KENALOG) 0 5 % cream Apply topically as needed        No current facility-administered medications for this visit  Current Outpatient Medications on File Prior to Visit   Medication Sig   • acetaminophen (TYLENOL) 500 mg tablet Take 1 tablet (500 mg total) by mouth every 4 (four) hours as needed for mild pain or moderate pain   • alendronate (FOSAMAX) 70 mg tablet every 7 days Weekly on saturdays   • anastrozole (ARIMIDEX) 1 mg tablet Take 1 mg by mouth daily   • chlorhexidine (PERIDEX) 0 12 % solution SWISH AND SPIT 10 ML BY MOUTH THREE TIMES DAILY   • fluticasone-salmeterol (Advair) 250-50 mcg/dose inhaler Inhale 1 puff 2 (two) times a day as needed    • triamcinolone (KENALOG) 0 5 % cream Apply topically as needed      No current facility-administered medications on file prior to visit       She is allergic to accolate [zafirlukast], penicillins, shellfish allergy - food allergy, shellfish-derived products - food allergy, singulair [montelukast], pork-derived products - food allergy, and medical tape       Review of Systems    A 12 point ROS was completed and is negative except as per HPI  Objective: There were no vitals taken for this visit  Physical Exam  Vitals and nursing note reviewed  Constitutional:       General: She is not in acute distress  Appearance: Normal appearance  She is obese  She is not ill-appearing, toxic-appearing or diaphoretic  HENT:      Head: Normocephalic and atraumatic  Nose: Nose normal       Mouth/Throat:      Mouth: Mucous membranes are moist    Eyes:      Extraocular Movements: Extraocular movements intact  Conjunctiva/sclera: Conjunctivae normal       Pupils: Pupils are equal, round, and reactive to light  Neck:      Vascular: No carotid bruit  Cardiovascular:      Rate and Rhythm: Normal rate and regular rhythm  Pulses: Normal pulses  Heart sounds: Normal heart sounds  Pulmonary:      Effort: Pulmonary effort is normal  No respiratory distress  Breath sounds: Normal breath sounds  Abdominal:      General: Abdomen is flat  Palpations: Abdomen is soft  Musculoskeletal:         General: No swelling, tenderness, deformity or signs of injury  Normal range of motion  Cervical back: Normal range of motion and neck supple  No rigidity or tenderness  Right lower leg: No edema  Left lower leg: No edema  Comments: See HPI    Lymphadenopathy:      Cervical: No cervical adenopathy  Skin:     General: Skin is warm and dry  Neurological:      General: No focal deficit present  Mental Status: She is alert and oriented to person, place, and time  Cranial Nerves: No cranial nerve deficit  Sensory: No sensory deficit  Motor: No weakness     Psychiatric:         Mood and Affect: Mood normal          Behavior: Behavior normal

## 2023-05-30 ENCOUNTER — APPOINTMENT (OUTPATIENT)
Dept: LAB | Facility: CLINIC | Age: 53
End: 2023-05-30

## 2023-05-30 ENCOUNTER — APPOINTMENT (OUTPATIENT)
Dept: LAB | Facility: HOSPITAL | Age: 53
End: 2023-05-30

## 2023-05-30 DIAGNOSIS — Z85.3 PERSONAL HISTORY OF BREAST CANCER: ICD-10-CM

## 2023-05-30 DIAGNOSIS — Z01.812 ENCOUNTER FOR PRE-OPERATIVE LABORATORY TESTING: ICD-10-CM

## 2023-05-30 DIAGNOSIS — Z98.890 HISTORY OF BREAST RECONSTRUCTION: ICD-10-CM

## 2023-05-30 LAB
ANION GAP SERPL CALCULATED.3IONS-SCNC: 7 MMOL/L (ref 4–13)
BASOPHILS # BLD AUTO: 0.07 THOUSANDS/ÂΜL (ref 0–0.1)
BASOPHILS NFR BLD AUTO: 1 % (ref 0–1)
BUN SERPL-MCNC: 12 MG/DL (ref 5–25)
CALCIUM SERPL-MCNC: 8.8 MG/DL (ref 8.4–10.2)
CHLORIDE SERPL-SCNC: 107 MMOL/L (ref 96–108)
CO2 SERPL-SCNC: 24 MMOL/L (ref 21–32)
CREAT SERPL-MCNC: 0.57 MG/DL (ref 0.6–1.3)
EOSINOPHIL # BLD AUTO: 0.3 THOUSAND/ÂΜL (ref 0–0.61)
EOSINOPHIL NFR BLD AUTO: 3 % (ref 0–6)
ERYTHROCYTE [DISTWIDTH] IN BLOOD BY AUTOMATED COUNT: 22 % (ref 11.6–15.1)
GFR SERPL CREATININE-BSD FRML MDRD: 106 ML/MIN/1.73SQ M
GLUCOSE P FAST SERPL-MCNC: 84 MG/DL (ref 65–99)
HCT VFR BLD AUTO: 31.8 % (ref 34.8–46.1)
HGB BLD-MCNC: 8.6 G/DL (ref 11.5–15.4)
IMM GRANULOCYTES # BLD AUTO: 0.03 THOUSAND/UL (ref 0–0.2)
IMM GRANULOCYTES NFR BLD AUTO: 0 % (ref 0–2)
LYMPHOCYTES # BLD AUTO: 2.32 THOUSANDS/ÂΜL (ref 0.6–4.47)
LYMPHOCYTES NFR BLD AUTO: 24 % (ref 14–44)
MCH RBC QN AUTO: 17.9 PG (ref 26.8–34.3)
MCHC RBC AUTO-ENTMCNC: 27 G/DL (ref 31.4–37.4)
MCV RBC AUTO: 66 FL (ref 82–98)
MONOCYTES # BLD AUTO: 0.8 THOUSAND/ÂΜL (ref 0.17–1.22)
MONOCYTES NFR BLD AUTO: 8 % (ref 4–12)
NEUTROPHILS # BLD AUTO: 6.35 THOUSANDS/ÂΜL (ref 1.85–7.62)
NEUTS SEG NFR BLD AUTO: 64 % (ref 43–75)
NRBC BLD AUTO-RTO: 0 /100 WBCS
PLATELET # BLD AUTO: 350 THOUSANDS/UL (ref 149–390)
PMV BLD AUTO: 9.9 FL (ref 8.9–12.7)
POTASSIUM SERPL-SCNC: 4.3 MMOL/L (ref 3.5–5.3)
RBC # BLD AUTO: 4.81 MILLION/UL (ref 3.81–5.12)
SODIUM SERPL-SCNC: 138 MMOL/L (ref 135–147)
WBC # BLD AUTO: 9.87 THOUSAND/UL (ref 4.31–10.16)

## 2023-06-05 ENCOUNTER — ANESTHESIA EVENT (OUTPATIENT)
Dept: PERIOP | Facility: HOSPITAL | Age: 53
End: 2023-06-05
Payer: MEDICARE

## 2023-06-07 RX ORDER — ACETAMINOPHEN AND CODEINE PHOSPHATE 300; 60 MG/1; MG/1
1 TABLET ORAL EVERY 4 HOURS PRN
COMMUNITY
End: 2023-06-13

## 2023-06-12 PROCEDURE — NC001 PR NO CHARGE: Performed by: PHYSICIAN ASSISTANT

## 2023-06-12 NOTE — H&P
H&P - Plastic Surgery   Denver Rundle 47 y o  female MRN: [id number]  Unit/Bed#:  Encounter: [id number]           Assessment:  History of breast reconstruction [Z98 890]       Personal history of breast cancer [Z85 3]  Plan:  FAT GRAFTING TO BILATERAL BREASTS (Bilateral: Breast)        HPI:   Denver Rundle is a 47y o  year old female who presents with s/p fat grafting to her bilateral breasts by Dr Cameron Schwartz on 1/13/23       Patient presents to the office today to discuss further fat grafting  Patient requests further fat grafting to bilateral breasts, specifically the upper pole of the right breast and centrally on the left breast  She requests media thigh donor sites  REVIEW OF SYSTEMS    GENERAL/CONSTITUTIONAL: The patient denies fever, fatigue, weakness, weight gain or weight loss  HEAD, EYES, EARS, NOSE AND THROAT: Eyes - The patient denies pain, redness, loss of vision, double or blurred vision and denies wearing glasses  The patient denies ringing in the ears, nosebleeds sinusitis, post nasal drip  Also denies frequent sore throats, hoarseness, painful swallowing  CARDIOVASCULAR: The patient denies chest pain, irregular heartbeats, palpitations, shortness of breath, heart murmurs, high blood pressure, cramps in his legs with walking, pain in his feet or toes at night or varicose veins  RESPIRATORY: The patient denies chronic cough, wheezing or night sweats  GASTROINTESTINAL: The patient denies decreased appetite, nausea, vomiting, diarrhea, constipation, blood in the stools  GENITOURINARY: The patient denies difficult urination, pain or burning with urination, blood in the urine  MUSCULOSKELETAL: The patient denies arm, thigh or calf cramps  No joint or muscle pain  No muscle weakness or tenderness  No joint swelling, neck pain, back pain or major orthopedic injuries  SKIN AND BREASTS: see hpi The patient denies easy bruising, skin redness, skin rash, hives    NEUROLOGIC: The patient denies headache, dizziness, fainting, memory loss  PSYCHIATRIC: The patient denies depression anxiety  ENDOCRINE: The patient denies intolerance to hot or cold temperature, flushing, fingernail changes, increased thirst, increased salt intake or decreased sexual desire  HEMATOLOGIC/LYMPHATIC: The patient denies anemia, bleeding tendency or clotting tendency  ALLERGIC/IMMUNOLOGIC: The patient denies rhinitis, asthma, skin sensitivity, latex allergies or sensitivity        Historical Information   Past Medical History:   Diagnosis Date   • Anesthesia complication     needs IR preprocedure for IV access/not ASC candidate   • Breast cancer ([place] [address] )    • Breast cancer ([place] [address] )    • Breast cancer, right breast ([place] [address] )    • Cancer ([place] [address] )    • Chronic pain disorder    • CPAP (continuous positive airway pressure) dependence     no currently used   • GERD (gastroesophageal reflux disease)    • Heartburn    • Hiatal hernia    • History of bariatric surgery    • Irregular heart beat     pt states it was a side effect of a medication   • Kidney stone    • Lung nodule    • Osteoporosis     stage 3   • Postsurgical malabsorption    • RA (rheumatoid arthritis) ([place] [address] )    • Rheumatoid arthritis ([place] [address] )    • Sleep apnea    • Stress incontinence    • Thyroid nodule      Past Surgical History:   Procedure Laterality Date   • ABDOMINAL SURGERY     • BRACHIOPLASTY Bilateral     2017   • BREAST IMPLANT Bilateral 9/8/2020    Procedure: BREAST REMOVAL TISSUE EXPANDER/ IMPLANT PLACEMENT BREAST;  Surgeon: Chao Hinojosa MD;  [facility];  Service: Plastics   • BREAST IMPLANT Left 2/9/2021    Procedure: BREAST TISSUE EXPANDER/ IMPLANT EXCHANGE;  Surgeon: Chao Hinojosa MD;  [facility];  Service: Plastics   • CAPSULOTOMY Bilateral 9/8/2020    Procedure: BREAST CAPSULOTOMY;  Surgeon: Chao Hinojosa MD;  [facility];  Service: Plastics   • CAPSULOTOMY Left 2/9/2021    Procedure: BREAST CAPSULOTOMY; Surgeon: Jose Campbell MD;  [facility];  Service: Plastics   • CARDIAC CATHETERIZATION     • CARPAL TUNNEL RELEASE Bilateral 2015   • CHOLECYSTECTOMY     • COLONOSCOPY     • GALLBLADDER SURGERY  2015   • GASTRIC BYPASS  2014   • HYSTERECTOMY  05/2013   • INCONTINENCE SURGERY  09/2013   • IR BIOPSY LUNG      lung nodules   • LIPOSUCTION Right 4/12/2022    Procedure: LIPOSUCTION RIGHT BREAST;  Surgeon: Sukhjinder Campbell MD;  [facility];  Service: Plastics   • LIPOSUCTION W/ FAT INJECTION Bilateral 11/10/2021    Procedure: FAT GRAFTING BILATERAL BREAST;  Surgeon: Jose Campbell MD;  [facility];  Service: Plastics   • MASTECTOMY Bilateral 10/2016   • KS BREAST RECONSTRUCTION W/LATISSIMUS DORSI FLAP Right 8/6/2019    Procedure: BREAST RECONSTRUCTION W/FLAP LATISSIMUS DORSI;  Surgeon: Jose Campbell MD;  [facility];  Service: Plastics   • KS GRAFTING OF AUTOLOGOUS FAT BY LIPO 50 CC OR LESS Bilateral 1/13/2023    Procedure: BILATERAL BREAST FAT GRAFTING;  Surgeon: Sukhjinder Campbell MD;  [facility];  Service: Plastics   • KS DOMINGA-IMPLANT CAPSULECTOMY BREAST COMPLETE Bilateral 8/6/2019    Procedure: BREAST CAPSULECTOMY;  Surgeon: Jose Campbell MD;  [facility];  Service: Plastics   • KS REMOVAL INTACT BREAST IMPLANT Bilateral 8/6/2019    Procedure: BREAST IMPLANT REMOVAL;  Surgeon: Jose Campbell MD;  [facility];  Service: Plastics   • KS REMOVAL INTACT BREAST IMPLANT Right 4/12/2022    Procedure: removal of right breast implant, capsulectomy, placement of right breast implant with acelluar dermal matrix ;  Surgeon: Sukhjinder Campbell MD;  [facility];  Service: Plastics   • KS REVISION OF RECONSTRUCTED BREAST Right 9/7/2021    Procedure: RIGHT BREAST MOUND REVISION;  Surgeon: Jose Campbell MD;  [facility];  Service: Plastics   • KS TISSUE EXPANDER PLACEMENT BREAST RECONSTRUCTION Bilateral 8/6/2019    Procedure: BREAST TISSUE EXPANDER PLACEMENT;  Surgeon: Anastacio Murguia MD;  [facility];  Service: Plastics   • REDUCTION MAMMAPLASTY     • REVISION OF SCAR Bilateral 11/10/2021    Procedure: Angelinevaibhav Vinnie RAMIREZ;  Surgeon: Anastacio Murguia MD;  [facility];  Service: Plastics   • TRANSFER MUSCLE PECTORALIS Bilateral 8/6/2019    Procedure: Connie Fuller;  Surgeon: Anastacio Murguia MD;  [facility];  Service: Plastics     Social History   Social History     Substance and Sexual Activity   Alcohol Use Not Currently     Social History     Substance and Sexual Activity   Drug Use Never     Social History     Tobacco Use   Smoking Status Never   Smokeless Tobacco Never     Family History:   Family History   Problem Relation Age of Onset   • No Known Problems Mother    • Diabetes Father    • Heart disease Father    • No Known Problems Brother        Meds/Allergies     Current Facility-Administered Medications:   •  [START ON 6/13/2023] lidocaine (XYLOCAINE) 1 % 25 mL, EPINEPHrine PF (ADRENALIN) 2 mg in sodium chloride 0 9 % 1,000 mL OR irrigation, , Irrigation, Once, Manny Monreal MD    Current Outpatient Medications:   •  acetaminophen (TYLENOL) 500 mg tablet, Take 1 tablet (500 mg total) by mouth every 4 (four) hours as needed for mild pain or moderate pain, Disp: 30 tablet, Rfl: 2  •  acetaminophen-codeine (TYLENOL with CODEINE #4) 300-60 MG per tablet, Take 1 tablet by mouth every 4 (four) hours as needed for moderate pain, Disp: , Rfl:   •  alendronate (FOSAMAX) 70 mg tablet, every 7 days Weekly on saturdays, Disp: , Rfl:   •  anastrozole (ARIMIDEX) 1 mg tablet, Take 1 mg by mouth daily, Disp: , Rfl: 4  •  chlorhexidine (PERIDEX) 0 12 % solution, 2 (two) times a day, Disp: , Rfl:   •  triamcinolone (KENALOG) 0 5 % cream, Apply topically as needed , Disp: , Rfl:       Objective     Constitutional: "General: She is not in acute distress  Appearance: Normal appearance  She is obese  She is not ill-appearing, toxic-appearing or diaphoretic  HENT:      Head: Normocephalic and atraumatic  Nose: Nose normal       Mouth/Throat:      Mouth: Mucous membranes are moist    Eyes:      Extraocular Movements: Extraocular movements intact  Conjunctiva/sclera: Conjunctivae normal       Pupils: Pupils are equal, round, and reactive to light  Neck:      Vascular: No carotid bruit  Cardiovascular:      Rate and Rhythm: Normal rate and regular rhythm  Pulses: Normal pulses  Heart sounds: Normal heart sounds  Pulmonary:      Effort: Pulmonary effort is normal  No respiratory distress  Breath sounds: Normal breath sounds  Abdominal:      General: Abdomen is flat  Palpations: Abdomen is soft  Musculoskeletal:         General: No swelling, tenderness, deformity or signs of injury  Normal range of motion  Cervical back: Normal range of motion and neck supple  No rigidity or tenderness  Right lower leg: No edema  Left lower leg: No edema  Comments: See HPI    Lymphadenopathy:      Cervical: No cervical adenopathy  Skin:     General: Skin is warm and dry  Neurological:      General: No focal deficit present  Mental Status: She is alert and oriented to person, place, and time  Cranial Nerves: No cranial nerve deficit  Sensory: No sensory deficit  Motor: No weakness  Psychiatric:         Mood and Affect: Mood normal          Behavior: Behavior normal      Lab Results:   Lab Results   Component Value Date    HCT 31 8 (L) 05/30/2023    HGB 8 6 (L) 05/30/2023    MCV 66 (L) 05/30/2023     05/30/2023    WBC 9 87 05/30/2023          No results found for: \"TISSUECULT\", \"WOUNDCULT\"      Imaging Studies:   No results found  EKG, Pathology, and Other Studies:   Lab Results   Component Value Date/Time    [facility]  04/12/2022 08:17 AM     A   Skin, " Plastic Repair, right breast scar, excision:   -Benign skin and subcutaneous tissue with dense fibrous tissue/ scar  -No evidence of malignancy  B  U, right breast capsule:  -Benign fibrous capsule with adjacent adipose tissue  -No evidence of malignancy  No intake or output data in the 24 hours ending 06/12/23 1820    Invasive Devices     Drain  Duration           Closed/Suction Drain Lateral;Right Chest Bulb 15 Fr  426 days    Closed/Suction Drain Right;Lateral Chest Bulb 15 Fr   426 days                VTE Prophylaxis: Sequential compression device (Venodyne)

## 2023-06-13 ENCOUNTER — HOSPITAL ENCOUNTER (OUTPATIENT)
Facility: HOSPITAL | Age: 53
Setting detail: OUTPATIENT SURGERY
Discharge: HOME | End: 2023-06-13
Attending: STUDENT IN AN ORGANIZED HEALTH CARE EDUCATION/TRAINING PROGRAM | Admitting: STUDENT IN AN ORGANIZED HEALTH CARE EDUCATION/TRAINING PROGRAM
Payer: MEDICARE

## 2023-06-13 ENCOUNTER — ANESTHESIA (OUTPATIENT)
Dept: PERIOP | Facility: HOSPITAL | Age: 53
End: 2023-06-13
Payer: MEDICARE

## 2023-06-13 VITALS
BODY MASS INDEX: 45.16 KG/M2 | TEMPERATURE: 97.6 F | HEIGHT: 60 IN | DIASTOLIC BLOOD PRESSURE: 82 MMHG | OXYGEN SATURATION: 97 % | WEIGHT: 230 LBS | HEART RATE: 105 BPM | SYSTOLIC BLOOD PRESSURE: 164 MMHG | RESPIRATION RATE: 17 BRPM

## 2023-06-13 DIAGNOSIS — Z90.13 ACQUIRED ABSENCE OF BREAST AND ABSENT NIPPLE, BILATERAL: Primary | ICD-10-CM

## 2023-06-13 PROCEDURE — 15772 GRFG AUTOL FAT LIPO EA ADDL: CPT | Performed by: STUDENT IN AN ORGANIZED HEALTH CARE EDUCATION/TRAINING PROGRAM

## 2023-06-13 PROCEDURE — 15771 GRFG AUTOL FAT LIPO 50 CC/<: CPT | Performed by: STUDENT IN AN ORGANIZED HEALTH CARE EDUCATION/TRAINING PROGRAM

## 2023-06-13 PROCEDURE — 15771 GRFG AUTOL FAT LIPO 50 CC/<: CPT | Performed by: PHYSICIAN ASSISTANT

## 2023-06-13 PROCEDURE — 15772 GRFG AUTOL FAT LIPO EA ADDL: CPT | Performed by: PHYSICIAN ASSISTANT

## 2023-06-13 RX ORDER — ONDANSETRON 2 MG/ML
INJECTION INTRAMUSCULAR; INTRAVENOUS AS NEEDED
Status: DISCONTINUED | OUTPATIENT
Start: 2023-06-13 | End: 2023-06-13

## 2023-06-13 RX ORDER — MIDAZOLAM HYDROCHLORIDE 2 MG/2ML
INJECTION, SOLUTION INTRAMUSCULAR; INTRAVENOUS AS NEEDED
Status: DISCONTINUED | OUTPATIENT
Start: 2023-06-13 | End: 2023-06-13

## 2023-06-13 RX ORDER — SODIUM CHLORIDE, SODIUM LACTATE, POTASSIUM CHLORIDE, AND CALCIUM CHLORIDE .6; .31; .03; .02 G/100ML; G/100ML; G/100ML; G/100ML
IRRIGANT IRRIGATION AS NEEDED
Status: DISCONTINUED | OUTPATIENT
Start: 2023-06-13 | End: 2023-06-13 | Stop reason: HOSPADM

## 2023-06-13 RX ORDER — PROMETHAZINE HYDROCHLORIDE 25 MG/ML
12.5 INJECTION, SOLUTION INTRAMUSCULAR; INTRAVENOUS ONCE AS NEEDED
Status: DISCONTINUED | OUTPATIENT
Start: 2023-06-13 | End: 2023-06-13 | Stop reason: HOSPADM

## 2023-06-13 RX ORDER — ONDANSETRON 2 MG/ML
4 INJECTION INTRAMUSCULAR; INTRAVENOUS ONCE AS NEEDED
Status: DISCONTINUED | OUTPATIENT
Start: 2023-06-13 | End: 2023-06-13 | Stop reason: HOSPADM

## 2023-06-13 RX ORDER — OXYCODONE HYDROCHLORIDE 5 MG/1
5 TABLET ORAL EVERY 4 HOURS PRN
Qty: 15 TABLET | Refills: 0 | Status: SHIPPED | OUTPATIENT
Start: 2023-06-13

## 2023-06-13 RX ORDER — OXYCODONE HYDROCHLORIDE 5 MG/1
5 TABLET ORAL
Status: DISCONTINUED | OUTPATIENT
Start: 2023-06-13 | End: 2023-06-13 | Stop reason: HOSPADM

## 2023-06-13 RX ORDER — DEXAMETHASONE SODIUM PHOSPHATE 10 MG/ML
INJECTION, SOLUTION INTRAMUSCULAR; INTRAVENOUS AS NEEDED
Status: DISCONTINUED | OUTPATIENT
Start: 2023-06-13 | End: 2023-06-13

## 2023-06-13 RX ORDER — SODIUM CHLORIDE, SODIUM LACTATE, POTASSIUM CHLORIDE, CALCIUM CHLORIDE 600; 310; 30; 20 MG/100ML; MG/100ML; MG/100ML; MG/100ML
125 INJECTION, SOLUTION INTRAVENOUS CONTINUOUS
Status: DISCONTINUED | OUTPATIENT
Start: 2023-06-13 | End: 2023-06-13 | Stop reason: HOSPADM

## 2023-06-13 RX ORDER — GABAPENTIN 300 MG/1
300 CAPSULE ORAL ONCE
Status: DISCONTINUED | OUTPATIENT
Start: 2023-06-13 | End: 2023-06-13 | Stop reason: HOSPADM

## 2023-06-13 RX ORDER — FENTANYL CITRATE 50 UG/ML
INJECTION, SOLUTION INTRAMUSCULAR; INTRAVENOUS AS NEEDED
Status: DISCONTINUED | OUTPATIENT
Start: 2023-06-13 | End: 2023-06-13

## 2023-06-13 RX ORDER — CLINDAMYCIN HYDROCHLORIDE 150 MG/1
150 CAPSULE ORAL EVERY 6 HOURS SCHEDULED
Qty: 28 CAPSULE | Refills: 0 | Status: SHIPPED | OUTPATIENT
Start: 2023-06-13 | End: 2023-06-13

## 2023-06-13 RX ORDER — ACETAMINOPHEN 325 MG/1
975 TABLET ORAL ONCE
Status: COMPLETED | OUTPATIENT
Start: 2023-06-13 | End: 2023-06-13

## 2023-06-13 RX ORDER — PROPOFOL 10 MG/ML
INJECTION, EMULSION INTRAVENOUS AS NEEDED
Status: DISCONTINUED | OUTPATIENT
Start: 2023-06-13 | End: 2023-06-13

## 2023-06-13 RX ORDER — LIDOCAINE HYDROCHLORIDE 10 MG/ML
INJECTION, SOLUTION EPIDURAL; INFILTRATION; INTRACAUDAL; PERINEURAL AS NEEDED
Status: DISCONTINUED | OUTPATIENT
Start: 2023-06-13 | End: 2023-06-13

## 2023-06-13 RX ORDER — CLINDAMYCIN PHOSPHATE 900 MG/50ML
900 INJECTION INTRAVENOUS ONCE
Status: COMPLETED | OUTPATIENT
Start: 2023-06-13 | End: 2023-06-13

## 2023-06-13 RX ORDER — FENTANYL CITRATE/PF 50 MCG/ML
25 SYRINGE (ML) INJECTION
Status: COMPLETED | OUTPATIENT
Start: 2023-06-13 | End: 2023-06-13

## 2023-06-13 RX ORDER — CIPROFLOXACIN 500 MG/1
500 TABLET, FILM COATED ORAL EVERY 12 HOURS SCHEDULED
Qty: 14 TABLET | Refills: 0 | Status: SHIPPED | OUTPATIENT
Start: 2023-06-13 | End: 2023-06-20

## 2023-06-13 RX ADMIN — DEXAMETHASONE SODIUM PHOSPHATE 10 MG: 10 INJECTION, SOLUTION INTRAMUSCULAR; INTRAVENOUS at 13:55

## 2023-06-13 RX ADMIN — FENTANYL CITRATE 25 MCG: 50 INJECTION INTRAMUSCULAR; INTRAVENOUS at 16:18

## 2023-06-13 RX ADMIN — SODIUM CHLORIDE, SODIUM LACTATE, POTASSIUM CHLORIDE, AND CALCIUM CHLORIDE: .6; .31; .03; .02 INJECTION, SOLUTION INTRAVENOUS at 12:31

## 2023-06-13 RX ADMIN — SODIUM CHLORIDE, SODIUM LACTATE, POTASSIUM CHLORIDE, AND CALCIUM CHLORIDE: .6; .31; .03; .02 INJECTION, SOLUTION INTRAVENOUS at 14:46

## 2023-06-13 RX ADMIN — OXYCODONE HYDROCHLORIDE 5 MG: 5 TABLET ORAL at 17:34

## 2023-06-13 RX ADMIN — MIDAZOLAM 2 MG: 1 INJECTION INTRAMUSCULAR; INTRAVENOUS at 12:51

## 2023-06-13 RX ADMIN — ONDANSETRON 4 MG: 2 INJECTION INTRAMUSCULAR; INTRAVENOUS at 13:53

## 2023-06-13 RX ADMIN — FENTANYL CITRATE 50 MCG: 50 INJECTION INTRAMUSCULAR; INTRAVENOUS at 14:30

## 2023-06-13 RX ADMIN — CLINDAMYCIN PHOSPHATE 900 MG: 18 INJECTION, SOLUTION INTRAMUSCULAR; INTRAVENOUS at 13:51

## 2023-06-13 RX ADMIN — PROPOFOL 150 MG: 10 INJECTION, EMULSION INTRAVENOUS at 13:55

## 2023-06-13 RX ADMIN — ACETAMINOPHEN 975 MG: 325 TABLET, FILM COATED ORAL at 06:58

## 2023-06-13 RX ADMIN — FENTANYL CITRATE 25 MCG: 50 INJECTION INTRAMUSCULAR; INTRAVENOUS at 16:27

## 2023-06-13 RX ADMIN — FENTANYL CITRATE 25 MCG: 50 INJECTION INTRAMUSCULAR; INTRAVENOUS at 16:41

## 2023-06-13 RX ADMIN — FENTANYL CITRATE 25 MCG: 50 INJECTION INTRAMUSCULAR; INTRAVENOUS at 16:48

## 2023-06-13 RX ADMIN — LIDOCAINE HYDROCHLORIDE 50 MG: 10 INJECTION, SOLUTION EPIDURAL; INFILTRATION; INTRACAUDAL at 13:55

## 2023-06-13 RX ADMIN — FENTANYL CITRATE 25 MCG: 50 INJECTION INTRAMUSCULAR; INTRAVENOUS at 13:59

## 2023-06-13 RX ADMIN — FENTANYL CITRATE 25 MCG: 50 INJECTION INTRAMUSCULAR; INTRAVENOUS at 14:50

## 2023-06-13 NOTE — ANESTHESIA PREPROCEDURE EVALUATION
Procedure:  FAT GRAFTING TO BILATERAL BREASTS (Bilateral: Breast)    Relevant Problems   GI/HEPATIC   (+) Gastroesophageal reflux disease   (+) Hiatal hernia      GYN   (+) History of hysterectomy      HEMATOLOGY   (+) Anemia      MUSCULOSKELETAL   (+) Arthritis   (+) Hiatal hernia   (+) Rheumatoid arthritis (HCC)      PULMONARY   (+) Sleep apnea        Physical Exam    Airway    Mallampati score: IV  TM Distance: >3 FB  Neck ROM: full     Dental   No notable dental hx     Cardiovascular  Cardiovascular exam normal    Pulmonary  Pulmonary exam normal     Other Findings        Anesthesia Plan  ASA Score- 3     Anesthesia Type- general with ASA Monitors  Additional Monitors:   Airway Plan: LMA  Plan Factors-Exercise tolerance (METS): >4 METS  Chart reviewed  EKG reviewed  Existing labs reviewed  Patient summary reviewed  Patient is not a current smoker  Induction- intravenous  Postoperative Plan- Plan for postoperative opioid use  Informed Consent- Anesthetic plan and risks discussed with patient  I personally reviewed this patient with the CRNA  Discussed and agreed on the Anesthesia Plan with the CRNA  Manasa Segura

## 2023-06-13 NOTE — DISCHARGE INSTR - AVS FIRST PAGE
Surgery Date: 6/13/2023                Patient: Markus Wilkes  Surgeon: Dr Chavo Garcia     Postoperative Instructions for Outpatient Surgery  Fat Grafting     Dressings:  [] Skin glue was applied to your incision over absorbable sutures  You may feel small pieces of suture at the ends of your incision  [x] Incisions are closed with nonabsorbable sutures  [] Remove dressing the first morning following your surgery and bathe as directed  [x] No dressings are required but you may cover the incision with band-aid or gauze for comfort   [] Apply bacitracin or other antibiotic ointment to incision and cover with band-aid or gauze  [] Leave dressing in place until your follow up appointment  [x] Other instructions: ABD's then ACE wrap     Bathing:  [x] Shower 48 hours after surgery  Allow soap and water to gently wash over the incision  No scrubbing  Gently pat dry and apply dressing as needed/instructed above  [x] Keep incision/dressing dry until your follow up appointment  [x] No submerging incision in bathtub, pool, hot tub and/or lake  Activity:  [x] No heavy lifting (> 10lbs)  [x] No strenuous exercise   [] Walking is permitted and encouraged  [] Strict sun avoidance/protection of incision site  [] Other instructions:      Medication:  [x] Resume preoperative medications  [x] Ok to use Tylenol for pain control  You may also use ibuprofen 48 hours after surgery  Add Oxycodone as needed  [x] Finish all antibiotics as prescribed  [x] You may not drive until off your pain medications  [] Apply ice to area as needed for pain  Do not place ice directly on skin  [] Other instructions: It is expected to have some bruising, swelling and mild oozing at the incision site and the surrounding area  If there is more than you expect or you suspect an infection, please call the office  Some patients may experience a low-grade fever after surgery  If it is above 100 4, please call the office       If you do not have a postoperative office appointment scheduled, please call the office today and let the staff know Dr Ivonne VASQUEZ needs to see you in 10  days  Please call [phone] with any questions, concerns or changes

## 2023-06-13 NOTE — ANESTHESIA POSTPROCEDURE EVALUATION
Post-Op Assessment Note    CV Status:  Stable  Pain Score: 0    Pain management: adequate     Mental Status:  Alert and awake   Hydration Status:  Euvolemic   PONV Controlled:  Controlled   Airway Patency:  Patent      Post Op Vitals Reviewed: Yes      Staff: CRNA         There were no known notable events for this encounter      /77 (06/13/23 1537)    Temp 97 5 °F (36 4 °C) (06/13/23 1537)    Pulse 105 (06/13/23 1537)   Resp 20 (06/13/23 1537)    SpO2 100 % (06/13/23 1537)

## 2023-06-13 NOTE — OP NOTE
OPERATIVE REPORT  PATIENT NAME: Michael Banks    :  1970  MRN: [id number]  Pt Location: [facility] 05    SURGERY DATE: 2023    Surgeon(s) and Role:     * Clementine Iqbal MD - Primary     * Beka Ledesma PA-C - Assisting    Preop Diagnosis:  History of breast reconstruction [Z98 890]  Personal history of breast cancer [Z85 3]    Post-Op Diagnosis Codes:     * History of breast reconstruction [Z98 890]     * Personal history of breast cancer [Z85 3]    Procedure(s):  1  Fat grafting to bilateral breasts   -Right breast: 260 cc   -Left breast: 300 cc    Specimen(s):  * No specimens in log *    Estimated Blood Loss:   5 mL    Drains:  Closed/Suction Drain Right;Lateral Chest Bulb 15 Fr  (Active)   Number of days: 427       Closed/Suction Drain Lateral;Right Chest Bulb 15 Fr  (Active)   Number of days: 427       Anesthesia Type:   General    Operative Indications:  History of breast reconstruction [Z98 890]  Personal history of breast cancer [Z85 3]    Operative Findings: Total tumescence: 700 cc   Total fat grafted: 560 cc (right: 260 cc, left: 906 cc)    Complications:   None    Procedure and Technique:  Patient was brought to the operating room, transferred to the operating table in supine fashion  After undergoing general anesthesia, a timeout was performed at which point all patient identifiers were deemed to be correct  The chest, abdomen, and bilateral thighs were prepped and draped in the normal sterile fashion  I first began by making small incisions within the old lower abdominal scar and tumesced the bilateral lateral thighs (350 cc per side) with the normal tumescent solution  After allowing for the tumescent to take effect, the bilateral lateral thighs were liposuctioned with Revolve fat harvesting system  The fat was washed and processed and transferred to 10 cc syringes  Small incisions were made in the bilateral breast scars and used as entry sites for the fat grafting   A total of 260 cc of fat was grafted to the right breast and a total of 300 cc of fat was grafted to the left breast  The incisions were closed with 3-0 nylon sutures  The chest was wrapped with ace-wrap for compression and gauze with steri-strips were used to dress the lateral thigh incision sites  This concluded the procedure  Patient tolerated the procedure well without complications  At the end of the case, all sponge, needle, and instrument counts were correct  Patient was awakened from anesthesia and taken to the PACU in stable condition      I was present for the entire procedure, A qualified resident physician was available and A physician assistant was required during the procedure for retraction, tissue handling, dissection and suturing        Patient Disposition:  PACU         SIGNATURE: Michael Harman MD  DATE: June 13, 2023  TIME: 3:27 PM

## 2023-06-20 ENCOUNTER — OFFICE VISIT (OUTPATIENT)
Dept: PLASTIC SURGERY | Facility: CLINIC | Age: 53
End: 2023-06-20

## 2023-06-20 DIAGNOSIS — Z98.890 HISTORY OF BREAST RECONSTRUCTION: Primary | ICD-10-CM

## 2023-06-20 PROCEDURE — 99024 POSTOP FOLLOW-UP VISIT: CPT | Performed by: PHYSICIAN ASSISTANT

## 2023-06-20 NOTE — PROGRESS NOTES
Assessment/Plan:     Patient is a 42-year-old female who is status post fat grafting to bilateral breast by Dr Prieto Clay on 6/13/2023  Please see HPI  Patient returns to the office today for first postoperative visit and suture removal per patient request   I did inform the patient that generally we would not remove sutures this soon postoperatively, however the patient is about to leave on vacation and will be gone for 8 weeks  Patient will continue to wear a supportive bra at all times  She will return to the office in September to discuss further fat grafting  Diagnoses and all orders for this visit:    History of breast reconstruction          Subjective:     Patient ID: Suraj Rivera is a 47 y o  female  HPI     She reports that she has been doing well  Pain has been under control  She is pleased thus far with her aesthetic results  Review of Systems    See HPI    Objective:     Physical Exam      All incisions and sutures are clean, dry and intact  Patient is expected ecchymosis on bilateral hips

## 2023-07-13 ENCOUNTER — PREP FOR PROCEDURE (OUTPATIENT)
Dept: PLASTIC SURGERY | Facility: CLINIC | Age: 53
End: 2023-07-13

## 2023-07-13 DIAGNOSIS — Z98.890 HISTORY OF BREAST RECONSTRUCTION: Primary | ICD-10-CM

## 2023-07-13 DIAGNOSIS — Z85.3 PERSONAL HISTORY OF MALIGNANT NEOPLASM OF BREAST: ICD-10-CM

## 2023-08-06 ENCOUNTER — APPOINTMENT (EMERGENCY)
Dept: CT IMAGING | Facility: HOSPITAL | Age: 53
DRG: 660 | End: 2023-08-06
Payer: MEDICARE

## 2023-08-06 ENCOUNTER — HOSPITAL ENCOUNTER (INPATIENT)
Facility: HOSPITAL | Age: 53
LOS: 2 days | Discharge: HOME | DRG: 660 | End: 2023-08-10
Attending: EMERGENCY MEDICINE | Admitting: INTERNAL MEDICINE
Payer: MEDICARE

## 2023-08-06 DIAGNOSIS — N20.1 RIGHT URETERAL STONE: Primary | ICD-10-CM

## 2023-08-06 PROBLEM — N13.2 URETERAL STONE WITH HYDRONEPHROSIS: Status: ACTIVE | Noted: 2023-08-06

## 2023-08-06 PROBLEM — D72.829 LEUKOCYTOSIS: Status: ACTIVE | Noted: 2023-08-06

## 2023-08-06 LAB
ALBUMIN SERPL BCP-MCNC: 4.2 G/DL (ref 3.5–5)
ALP SERPL-CCNC: 118 U/L (ref 34–104)
ALT SERPL W P-5'-P-CCNC: 3 U/L (ref 7–52)
ANION GAP SERPL CALCULATED.3IONS-SCNC: 9 MMOL/L
AST SERPL W P-5'-P-CCNC: 17 U/L (ref 13–39)
BACTERIA UR QL AUTO: ABNORMAL /HPF
BASOPHILS # BLD AUTO: 0.03 THOUSANDS/ÂΜL (ref 0–0.1)
BASOPHILS NFR BLD AUTO: 0 % (ref 0–1)
BILIRUB SERPL-MCNC: 0.29 MG/DL (ref 0.2–1)
BILIRUB UR QL STRIP: NEGATIVE
BUN SERPL-MCNC: 19 MG/DL (ref 5–25)
CALCIUM SERPL-MCNC: 8.9 MG/DL (ref 8.4–10.2)
CHLORIDE SERPL-SCNC: 109 MMOL/L (ref 96–108)
CLARITY UR: CLEAR
CO2 SERPL-SCNC: 21 MMOL/L (ref 21–32)
COLOR UR: ABNORMAL
CREAT SERPL-MCNC: 0.82 MG/DL (ref 0.6–1.3)
EOSINOPHIL # BLD AUTO: 0.04 THOUSAND/ÂΜL (ref 0–0.61)
EOSINOPHIL NFR BLD AUTO: 0 % (ref 0–6)
ERYTHROCYTE [DISTWIDTH] IN BLOOD BY AUTOMATED COUNT: 21.3 % (ref 11.6–15.1)
GFR SERPL CREATININE-BSD FRML MDRD: 82 ML/MIN/1.73SQ M
GLUCOSE SERPL-MCNC: 106 MG/DL (ref 65–140)
GLUCOSE UR STRIP-MCNC: NEGATIVE MG/DL
HCT VFR BLD AUTO: 32.6 % (ref 34.8–46.1)
HGB BLD-MCNC: 9.2 G/DL (ref 11.5–15.4)
HGB UR QL STRIP.AUTO: ABNORMAL
IMM GRANULOCYTES # BLD AUTO: 0.04 THOUSAND/UL (ref 0–0.2)
IMM GRANULOCYTES NFR BLD AUTO: 0 % (ref 0–2)
KETONES UR STRIP-MCNC: NEGATIVE MG/DL
LEUKOCYTE ESTERASE UR QL STRIP: NEGATIVE
LIPASE SERPL-CCNC: 7 U/L (ref 11–82)
LYMPHOCYTES # BLD AUTO: 1.34 THOUSANDS/ÂΜL (ref 0.6–4.47)
LYMPHOCYTES NFR BLD AUTO: 10 % (ref 14–44)
MCH RBC QN AUTO: 18.2 PG (ref 26.8–34.3)
MCHC RBC AUTO-ENTMCNC: 28.2 G/DL (ref 31.4–37.4)
MCV RBC AUTO: 65 FL (ref 82–98)
MONOCYTES # BLD AUTO: 0.82 THOUSAND/ÂΜL (ref 0.17–1.22)
MONOCYTES NFR BLD AUTO: 6 % (ref 4–12)
NEUTROPHILS # BLD AUTO: 11.23 THOUSANDS/ÂΜL (ref 1.85–7.62)
NEUTS SEG NFR BLD AUTO: 84 % (ref 43–75)
NITRITE UR QL STRIP: NEGATIVE
NON-SQ EPI CELLS URNS QL MICRO: ABNORMAL /HPF
NRBC BLD AUTO-RTO: 0 /100 WBCS
PH UR STRIP.AUTO: 6.5 [PH]
PLATELET # BLD AUTO: 467 THOUSANDS/UL (ref 149–390)
PMV BLD AUTO: 9.6 FL (ref 8.9–12.7)
POTASSIUM SERPL-SCNC: 3.9 MMOL/L (ref 3.5–5.3)
PROT SERPL-MCNC: 8.7 G/DL (ref 6.4–8.4)
PROT UR STRIP-MCNC: NEGATIVE MG/DL
RBC # BLD AUTO: 5.05 MILLION/UL (ref 3.81–5.12)
RBC #/AREA URNS AUTO: ABNORMAL /HPF
SODIUM SERPL-SCNC: 139 MMOL/L (ref 135–147)
SP GR UR STRIP.AUTO: 1.02 (ref 1–1.03)
UROBILINOGEN UR STRIP-ACNC: <2 MG/DL
WBC # BLD AUTO: 13.5 THOUSAND/UL (ref 4.31–10.16)
WBC #/AREA URNS AUTO: ABNORMAL /HPF

## 2023-08-06 PROCEDURE — 36415 COLL VENOUS BLD VENIPUNCTURE: CPT | Performed by: PHYSICIAN ASSISTANT

## 2023-08-06 PROCEDURE — 74176 CT ABD & PELVIS W/O CONTRAST: CPT

## 2023-08-06 PROCEDURE — 99284 EMERGENCY DEPT VISIT MOD MDM: CPT

## 2023-08-06 PROCEDURE — 85025 COMPLETE CBC W/AUTO DIFF WBC: CPT | Performed by: PHYSICIAN ASSISTANT

## 2023-08-06 PROCEDURE — G1004 CDSM NDSC: HCPCS

## 2023-08-06 PROCEDURE — 96361 HYDRATE IV INFUSION ADD-ON: CPT

## 2023-08-06 PROCEDURE — 99223 1ST HOSP IP/OBS HIGH 75: CPT | Performed by: INTERNAL MEDICINE

## 2023-08-06 PROCEDURE — 80053 COMPREHEN METABOLIC PANEL: CPT | Performed by: PHYSICIAN ASSISTANT

## 2023-08-06 PROCEDURE — 81001 URINALYSIS AUTO W/SCOPE: CPT | Performed by: PHYSICIAN ASSISTANT

## 2023-08-06 PROCEDURE — 96375 TX/PRO/DX INJ NEW DRUG ADDON: CPT

## 2023-08-06 PROCEDURE — 96374 THER/PROPH/DIAG INJ IV PUSH: CPT

## 2023-08-06 PROCEDURE — 99285 EMERGENCY DEPT VISIT HI MDM: CPT | Performed by: PHYSICIAN ASSISTANT

## 2023-08-06 PROCEDURE — 83690 ASSAY OF LIPASE: CPT | Performed by: PHYSICIAN ASSISTANT

## 2023-08-06 PROCEDURE — 96376 TX/PRO/DX INJ SAME DRUG ADON: CPT

## 2023-08-06 RX ORDER — OXYCODONE HYDROCHLORIDE AND ACETAMINOPHEN 5; 325 MG/1; MG/1
1 TABLET ORAL ONCE
Status: COMPLETED | OUTPATIENT
Start: 2023-08-06 | End: 2023-08-06

## 2023-08-06 RX ORDER — KETOROLAC TROMETHAMINE 30 MG/ML
15 INJECTION, SOLUTION INTRAMUSCULAR; INTRAVENOUS ONCE
Status: COMPLETED | OUTPATIENT
Start: 2023-08-06 | End: 2023-08-06

## 2023-08-06 RX ORDER — ONDANSETRON 2 MG/ML
4 INJECTION INTRAMUSCULAR; INTRAVENOUS EVERY 6 HOURS PRN
Status: DISCONTINUED | OUTPATIENT
Start: 2023-08-06 | End: 2023-08-10 | Stop reason: HOSPADM

## 2023-08-06 RX ORDER — TAMSULOSIN HYDROCHLORIDE 0.4 MG/1
0.4 CAPSULE ORAL ONCE
Status: DISCONTINUED | OUTPATIENT
Start: 2023-08-06 | End: 2023-08-10 | Stop reason: HOSPADM

## 2023-08-06 RX ORDER — ONDANSETRON 2 MG/ML
4 INJECTION INTRAMUSCULAR; INTRAVENOUS ONCE
Status: COMPLETED | OUTPATIENT
Start: 2023-08-06 | End: 2023-08-06

## 2023-08-06 RX ORDER — HYDROMORPHONE HCL/PF 1 MG/ML
0.5 SYRINGE (ML) INJECTION ONCE
Status: COMPLETED | OUTPATIENT
Start: 2023-08-06 | End: 2023-08-06

## 2023-08-06 RX ORDER — HYDROMORPHONE HCL/PF 1 MG/ML
0.5 SYRINGE (ML) INJECTION EVERY 4 HOURS PRN
Status: DISCONTINUED | OUTPATIENT
Start: 2023-08-06 | End: 2023-08-10 | Stop reason: HOSPADM

## 2023-08-06 RX ORDER — ANASTROZOLE 1 MG/1
1 TABLET ORAL DAILY
Status: DISCONTINUED | OUTPATIENT
Start: 2023-08-06 | End: 2023-08-10 | Stop reason: HOSPADM

## 2023-08-06 RX ORDER — SODIUM CHLORIDE 9 MG/ML
125 INJECTION, SOLUTION INTRAVENOUS CONTINUOUS
Status: DISCONTINUED | OUTPATIENT
Start: 2023-08-06 | End: 2023-08-09

## 2023-08-06 RX ORDER — MORPHINE SULFATE 4 MG/ML
4 INJECTION, SOLUTION INTRAMUSCULAR; INTRAVENOUS ONCE
Status: COMPLETED | OUTPATIENT
Start: 2023-08-06 | End: 2023-08-06

## 2023-08-06 RX ORDER — FONDAPARINUX SODIUM 2.5 MG/.5ML
2.5 INJECTION SUBCUTANEOUS DAILY
Status: DISCONTINUED | OUTPATIENT
Start: 2023-08-06 | End: 2023-08-10 | Stop reason: HOSPADM

## 2023-08-06 RX ORDER — OXYCODONE HYDROCHLORIDE 10 MG/1
10 TABLET ORAL EVERY 4 HOURS PRN
Status: DISCONTINUED | OUTPATIENT
Start: 2023-08-06 | End: 2023-08-10 | Stop reason: HOSPADM

## 2023-08-06 RX ORDER — ACETAMINOPHEN 325 MG/1
650 TABLET ORAL EVERY 6 HOURS PRN
Status: DISCONTINUED | OUTPATIENT
Start: 2023-08-06 | End: 2023-08-10 | Stop reason: HOSPADM

## 2023-08-06 RX ORDER — OXYCODONE HYDROCHLORIDE 5 MG/1
5 TABLET ORAL EVERY 4 HOURS PRN
Status: DISCONTINUED | OUTPATIENT
Start: 2023-08-06 | End: 2023-08-10 | Stop reason: HOSPADM

## 2023-08-06 RX ADMIN — ONDANSETRON 4 MG: 2 INJECTION INTRAMUSCULAR; INTRAVENOUS at 19:59

## 2023-08-06 RX ADMIN — OXYCODONE HYDROCHLORIDE 5 MG: 5 TABLET ORAL at 22:29

## 2023-08-06 RX ADMIN — SODIUM CHLORIDE 1000 ML: 0.9 INJECTION, SOLUTION INTRAVENOUS at 08:23

## 2023-08-06 RX ADMIN — SODIUM CHLORIDE 1000 ML: 0.9 INJECTION, SOLUTION INTRAVENOUS at 12:48

## 2023-08-06 RX ADMIN — OXYCODONE HYDROCHLORIDE AND ACETAMINOPHEN 1 TABLET: 5; 325 TABLET ORAL at 11:47

## 2023-08-06 RX ADMIN — HYDROMORPHONE HYDROCHLORIDE 0.5 MG: 1 INJECTION, SOLUTION INTRAMUSCULAR; INTRAVENOUS; SUBCUTANEOUS at 15:15

## 2023-08-06 RX ADMIN — SODIUM CHLORIDE 125 ML/HR: 0.9 INJECTION, SOLUTION INTRAVENOUS at 15:13

## 2023-08-06 RX ADMIN — SODIUM CHLORIDE 125 ML/HR: 0.9 INJECTION, SOLUTION INTRAVENOUS at 22:24

## 2023-08-06 RX ADMIN — KETOROLAC TROMETHAMINE 15 MG: 30 INJECTION, SOLUTION INTRAMUSCULAR; INTRAVENOUS at 12:29

## 2023-08-06 RX ADMIN — HYDROMORPHONE HYDROCHLORIDE 0.5 MG: 1 INJECTION, SOLUTION INTRAMUSCULAR; INTRAVENOUS; SUBCUTANEOUS at 19:50

## 2023-08-06 RX ADMIN — FONDAPARINUX SODIUM 2.5 MG: 2.5 INJECTION, SOLUTION SUBCUTANEOUS at 13:49

## 2023-08-06 RX ADMIN — MORPHINE SULFATE 4 MG: 4 INJECTION INTRAVENOUS at 08:23

## 2023-08-06 RX ADMIN — OXYCODONE HYDROCHLORIDE 10 MG: 5 TABLET ORAL at 17:30

## 2023-08-06 RX ADMIN — ONDANSETRON 4 MG: 2 INJECTION INTRAMUSCULAR; INTRAVENOUS at 08:23

## 2023-08-06 RX ADMIN — KETOROLAC TROMETHAMINE 15 MG: 30 INJECTION, SOLUTION INTRAMUSCULAR; INTRAVENOUS at 08:42

## 2023-08-06 RX ADMIN — HYDROMORPHONE HYDROCHLORIDE 0.5 MG: 1 INJECTION, SOLUTION INTRAMUSCULAR; INTRAVENOUS; SUBCUTANEOUS at 10:02

## 2023-08-06 NOTE — ASSESSMENT & PLAN NOTE
· Patient presenting secondary to worsening right-sided pain  · CT imaging showing ureteral stone with mild to moderate hydronephrosis  · Kidney function stable at this time  · Case discussed with urology and no need for transfer at this time  · Urology consult placed  · will continue with aggressive fluids  · Pain regimen placed  · Continue to monitor

## 2023-08-06 NOTE — ASSESSMENT & PLAN NOTE
· Patient with elevated WBC of 13.5  · Likely reactive from stress and pain  · UA negative for underlying UTI  · Continue to monitor

## 2023-08-06 NOTE — ASSESSMENT & PLAN NOTE
· Patient noted to have hemoglobin fluctuating between 8-9  · Stable at this time  · Continue to monitor

## 2023-08-06 NOTE — PLAN OF CARE
Problem: MOBILITY - ADULT  Goal: Maintain or return to baseline ADL function  Description: INTERVENTIONS:  -  Assess patient's ability to carry out ADLs; assess patient's baseline for ADL function and identify physical deficits which impact ability to perform ADLs (bathing, care of mouth/teeth, toileting, grooming, dressing, etc.)  - Assess/evaluate cause of self-care deficits   - Assess range of motion  - Assess patient's mobility; develop plan if impaired  - Assess patient's need for assistive devices and provide as appropriate  - Encourage maximum independence but intervene and supervise when necessary  - Involve family in performance of ADLs  - Assess for home care needs following discharge   - Consider OT consult to assist with ADL evaluation and planning for discharge  - Provide patient education as appropriate  Outcome: Progressing  Goal: Maintains/Returns to pre admission functional level  Description: INTERVENTIONS:  - Perform BMAT or MOVE assessment daily.   - Set and communicate daily mobility goal to care team and patient/family/caregiver. - Collaborate with rehabilitation services on mobility goals if consulted  - Perform Range of Motion  times a day. - Reposition patient every  hours.   - Dangle patient  times a day  - Stand patient  times a day  - Ambulate patient  times a day  - Out of bed to chair  times a day   - Out of bed for meals  times a day  - Out of bed for toileting  - Record patient progress and toleration of activity level   Outcome: Progressing     Problem: PAIN - ADULT  Goal: Verbalizes/displays adequate comfort level or baseline comfort level  Description: Interventions:  - Encourage patient to monitor pain and request assistance  - Assess pain using appropriate pain scale  - Administer analgesics based on type and severity of pain and evaluate response  - Implement non-pharmacological measures as appropriate and evaluate response  - Consider cultural and social influences on pain and pain management  - Notify physician/advanced practitioner if interventions unsuccessful or patient reports new pain  Outcome: Progressing     Problem: INFECTION - ADULT  Goal: Absence or prevention of progression during hospitalization  Description: INTERVENTIONS:  - Assess and monitor for signs and symptoms of infection  - Monitor lab/diagnostic results  - Monitor all insertion sites, i.e. indwelling lines, tubes, and drains  - Monitor endotracheal if appropriate and nasal secretions for changes in amount and color  - Lyman appropriate cooling/warming therapies per order  - Administer medications as ordered  - Instruct and encourage patient and family to use good hand hygiene technique  - Identify and instruct in appropriate isolation precautions for identified infection/condition  Outcome: Progressing  Goal: Absence of fever/infection during neutropenic period  Description: INTERVENTIONS:  - Monitor WBC    Outcome: Progressing     Problem: SAFETY ADULT  Goal: Maintain or return to baseline ADL function  Description: INTERVENTIONS:  -  Assess patient's ability to carry out ADLs; assess patient's baseline for ADL function and identify physical deficits which impact ability to perform ADLs (bathing, care of mouth/teeth, toileting, grooming, dressing, etc.)  - Assess/evaluate cause of self-care deficits   - Assess range of motion  - Assess patient's mobility; develop plan if impaired  - Assess patient's need for assistive devices and provide as appropriate  - Encourage maximum independence but intervene and supervise when necessary  - Involve family in performance of ADLs  - Assess for home care needs following discharge   - Consider OT consult to assist with ADL evaluation and planning for discharge  - Provide patient education as appropriate  Outcome: Progressing  Goal: Maintains/Returns to pre admission functional level  Description: INTERVENTIONS:  - Perform BMAT or MOVE assessment daily.   - Set and communicate daily mobility goal to care team and patient/family/caregiver. - Collaborate with rehabilitation services on mobility goals if consulted  - Perform Range of Motion  times a day. - Reposition patient every  hours.   - Dangle patient times a day  - Stand patient  times a day  - Ambulate patient  times a day  - Out of bed to chair  times a day   - Out of bed for meals  times a day  - Out of bed for toileting  - Record patient progress and toleration of activity level   Outcome: Progressing  Goal: Patient will remain free of falls  Description: INTERVENTIONS:  - Educate patient/family on patient safety including physical limitations  - Instruct patient to call for assistance with activity   - Consult OT/PT to assist with strengthening/mobility   - Keep Call bell within reach  - Keep bed low and locked with side rails adjusted as appropriate  - Keep care items and personal belongings within reach  - Initiate and maintain comfort rounds  - Make Fall Risk Sign visible to staff  - Offer Toileting every  Hours, in advance of need  - Initiate/Maintain alarm  - Obtain necessary fall risk management equipment:   - Apply yellow socks and bracelet for high fall risk patients  - Consider moving patient to room near nurses station  Outcome: Progressing     Problem: DISCHARGE PLANNING  Goal: Discharge to home or other facility with appropriate resources  Description: INTERVENTIONS:  - Identify barriers to discharge w/patient and caregiver  - Arrange for needed discharge resources and transportation as appropriate  - Identify discharge learning needs (meds, wound care, etc.)  - Arrange for interpretive services to assist at discharge as needed  - Refer to Case Management Department for coordinating discharge planning if the patient needs post-hospital services based on physician/advanced practitioner order or complex needs related to functional status, cognitive ability, or social support system  Outcome: Progressing Problem: Knowledge Deficit  Goal: Patient/family/caregiver demonstrates understanding of disease process, treatment plan, medications, and discharge instructions  Description: Complete learning assessment and assess knowledge base.   Interventions:  - Provide teaching at level of understanding  - Provide teaching via preferred learning methods  Outcome: Progressing

## 2023-08-06 NOTE — H&P
[address]  H&P  Name: Cristina Figueroa 47 y.o. female I MRN: [id number]  Unit/Bed#: ED 23 I Date of Admission: 8/6/2023   Date of Service: 8/6/2023 I Hospital Day: 0      Assessment/Plan   * Ureteral stone with hydronephrosis  Assessment & Plan  · Patient presenting secondary to worsening right-sided pain  · CT imaging showing ureteral stone with mild to moderate hydronephrosis  · Kidney function stable at this time  · Case discussed with urology and no need for transfer at this time  · Urology consult placed  · will continue with aggressive fluids  · Pain regimen placed  · Continue to monitor    Leukocytosis  Assessment & Plan  · Patient with elevated WBC of 13.5  · Likely reactive from stress and pain  · UA negative for underlying UTI  · Continue to monitor    Gastroesophageal reflux disease  Assessment & Plan  · Stable    Anemia  Assessment & Plan  · Patient noted to have hemoglobin fluctuating between 8-9  · Stable at this time  · Continue to monitor    Obesity, Class II, BMI 35-39.9  Assessment & Plan  · Lifestyle modifications advised       VTE Pharmacologic Prophylaxis: VTE Score: 3 Moderate Risk (Score 3-4) - Pharmacological DVT Prophylaxis Ordered: argatroban. Code Status: Level 1 - Full Code   Discussion with family: Updated  (significant other) at bedside. Anticipated Length of Stay: Patient will be admitted on an observation basis with an anticipated length of stay of less than 2 midnights secondary to Kidney stone. Total Time Spent on Date of Encounter in care of patient: 75 minutes This time was spent on one or more of the following: performing physical exam; counseling and coordination of care; obtaining or reviewing history; documenting in the medical record; reviewing/ordering tests, medications or procedures; communicating with other healthcare professionals and discussing with patient's family/caregivers.     Chief Complaint: Flank pain    History of Present Illness:  Chaz Zuniga is a 47 y.o. female with a PMH of anemia who presents with flank pain. Patient presenting secondary to worsening right-sided flank pain which was not controlled at home prompting her to come to the emergency department. Patient denies any associated fever, chill, lightheadedness or dizziness. On exam patient's pain better controlled at this time. Review of Systems:  Review of Systems   Constitutional: Negative for chills, fatigue, fever and unexpected weight change. HENT: Negative for congestion, ear pain, sore throat and tinnitus. Eyes: Negative for visual disturbance. Respiratory: Negative for cough, chest tightness, shortness of breath and wheezing. Cardiovascular: Negative for chest pain, palpitations and leg swelling. Gastrointestinal: Negative for abdominal pain, constipation, diarrhea, nausea and vomiting. Genitourinary: Positive for flank pain. Negative for dysuria and frequency. Skin: Negative for rash. Neurological: Negative for dizziness, weakness, light-headedness, numbness and headaches. All other systems reviewed and are negative.       Past Medical and Surgical History:   Past Medical History:   Diagnosis Date   • Anesthesia complication     needs IR preprocedure for IV access/not ASC candidate   • Breast cancer ([address])    • Breast cancer ([address])    • Breast cancer, right breast ([address])    • Cancer ([address])    • Chronic pain disorder    • CPAP (continuous positive airway pressure) dependence     no currently used   • GERD (gastroesophageal reflux disease)    • Heartburn    • Hiatal hernia    • History of bariatric surgery    • Irregular heart beat     pt states it was a side effect of a medication   • Kidney stone    • Lung nodule    • Osteoporosis     stage 3   • Postsurgical malabsorption    • RA (rheumatoid arthritis) (HCC)    • Rheumatoid arthritis ([address])    • Sleep apnea    • Stress incontinence    • Thyroid nodule        Past Surgical History: Procedure Laterality Date   • ABDOMINAL SURGERY     • BRACHIOPLASTY Bilateral     2017   • BREAST IMPLANT Bilateral 9/8/2020    Procedure: BREAST REMOVAL TISSUE EXPANDER/ IMPLANT PLACEMENT BREAST;  Surgeon: Perez Wellington MD;  [facility];  Service: Plastics   • BREAST IMPLANT Left 2/9/2021    Procedure: BREAST TISSUE EXPANDER/ IMPLANT EXCHANGE;  Surgeon: Perez Wellington MD;  [facility];  Service: Plastics   • CAPSULOTOMY Bilateral 9/8/2020    Procedure: BREAST CAPSULOTOMY;  Surgeon: Perez Wellington MD;  [facility];  Service: Plastics   • CAPSULOTOMY Left 2/9/2021    Procedure: BREAST CAPSULOTOMY;  Surgeon: Perez Wellington MD;  [facility];  Service: Plastics   • CARDIAC CATHETERIZATION     • CARPAL TUNNEL RELEASE Bilateral 2015   • CHOLECYSTECTOMY     • COLONOSCOPY     • GALLBLADDER SURGERY  2015   • GASTRIC BYPASS  2014   • HYSTERECTOMY  05/2013   • INCONTINENCE SURGERY  09/2013   • IR BIOPSY LUNG      lung nodules   • LIPOSUCTION Right 4/12/2022    Procedure: LIPOSUCTION RIGHT BREAST;  Surgeon: Lilibeth Mcallister MD;  [facility];  Service: Plastics   • LIPOSUCTION W/ FAT INJECTION Bilateral 11/10/2021    Procedure: FAT GRAFTING BILATERAL BREAST;  Surgeon: Perez Wellington MD;  [facility];  Service: Plastics   • MASTECTOMY Bilateral 10/2016   • VA BREAST RECONSTRUCTION W/LATISSIMUS DORSI FLAP Right 8/6/2019    Procedure: BREAST RECONSTRUCTION W/FLAP LATISSIMUS DORSI;  Surgeon: Perez Wellington MD;  [facility];  Service: Plastics   • VA GRAFTING OF AUTOLOGOUS FAT BY LIPO 50 CC OR LESS Bilateral 1/13/2023    Procedure: BILATERAL BREAST FAT GRAFTING;  Surgeon: Lilibeth Mcallister MD;  [facility];  Service: Plastics   • VA GRAFTING OF AUTOLOGOUS FAT BY LIPO 50 CC OR LESS Bilateral 6/13/2023    Procedure: FAT GRAFTING TO BILATERAL BREASTS;  Surgeon: Lilibeth Mcallister MD;  [facility]; Service: Plastics   • FL DOMINGA-IMPLANT CAPSULECTOMY BREAST COMPLETE Bilateral 8/6/2019    Procedure: BREAST CAPSULECTOMY;  Surgeon: Roro Scott MD;  [facility];  Service: Plastics   • FL REMOVAL INTACT BREAST IMPLANT Bilateral 8/6/2019    Procedure: BREAST IMPLANT REMOVAL;  Surgeon: Roro Scott MD;  [facility];  Service: Plastics   • FL REMOVAL INTACT BREAST IMPLANT Right 4/12/2022    Procedure: removal of right breast implant, capsulectomy, placement of right breast implant with acelluar dermal matrix.;  Surgeon: Ernesto Sanchez MD;  [facility];  Service: Plastics   • FL REVISION OF RECONSTRUCTED BREAST Right 9/7/2021    Procedure: RIGHT BREAST MOUND REVISION;  Surgeon: Roro Scott MD;  [facility];  Service: Plastics   • FL TISSUE EXPANDER PLACEMENT BREAST RECONSTRUCTION Bilateral 8/6/2019    Procedure: BREAST TISSUE EXPANDER PLACEMENT;  Surgeon: Roro Scott MD;  [facility];  Service: Plastics   • REDUCTION MAMMAPLASTY     • REVISION OF SCAR Bilateral 11/10/2021    Procedure: SCAR REVISION BILATERAL ARMS;  Surgeon: Roro Scott MD;  [facility];  Service: Plastics   • TRANSFER MUSCLE PECTORALIS Bilateral 8/6/2019    Procedure: PECTORALIS MUSCLE REPAIR;  Surgeon: Roro Scott MD;  [facility];  Service: Plastics       Meds/Allergies:  Prior to Admission medications    Medication Sig Start Date End Date Taking?  Authorizing Provider   acetaminophen (TYLENOL) 500 mg tablet Take 1 tablet (500 mg total) by mouth every 4 (four) hours as needed for mild pain or moderate pain 1/13/23   Ernesto Sanchez MD   alendronate (FOSAMAX) 70 mg tablet every 7 days Weekly on saturdays    Historical Provider, MD   anastrozole (ARIMIDEX) 1 mg tablet Take 1 mg by mouth daily 8/28/19   Historical Provider, MD   chlorhexidine (PERIDEX) 0.12 % solution 2 (two) times a day 6/15/21   Historical Provider, MD   oxyCODONE (ROXICODONE) 5 immediate release tablet Take 1 tablet (5 mg total) by mouth every 4 (four) hours as needed for moderate pain for up to 15 doses Max Daily Amount: 30 mg 6/13/23   [facility] [place] Arti Grover PA-C   triamcinolone (KENALOG) 0.5 % cream Apply topically as needed  4/30/19   Historical Provider, MD VARGAS have reviewed home medications with patient personally. Allergies: Allergies   Allergen Reactions   • Accolate [Zafirlukast] Itching   • Penicillins Anaphylaxis     Unconsciousness, and bronchoconstriction     • Shellfish Allergy - Food Allergy Anaphylaxis   • Shellfish-Derived Products - Food Allergy Anaphylaxis   • Singulair [Montelukast] Itching   • Pork-Derived Products - Food Allergy Hives   • Medical Tape Rash     P/S specific surgical tape used at last procedure        Social History:  Marital Status: Single   Patient Pre-hospital Living Situation: Home  Patient Pre-hospital Level of Mobility: walks    Substance Use History:   Social History     Substance and Sexual Activity   Alcohol Use Not Currently     Social History     Tobacco Use   Smoking Status Never   Smokeless Tobacco Never     Social History     Substance and Sexual Activity   Drug Use Never       Family History:  Family History   Problem Relation Age of Onset   • No Known Problems Mother    • Diabetes Father    • Heart disease Father    • No Known Problems Brother        Physical Exam:     Vitals:   Blood Pressure: 138/77 (08/06/23 1230)  Pulse: 100 (08/06/23 1230)  Temperature: 97.8 °F (36.6 °C) (08/06/23 0819)  Temp Source: Oral (08/06/23 0819)  Respirations: 18 (08/06/23 1230)  Height: 5' (152.4 cm) (08/06/23 0814)  Weight - Scale: 104 kg (230 lb) (08/06/23 0814)  SpO2: 99 % (08/06/23 1230)    Physical Exam  Vitals and nursing note reviewed. Constitutional:       General: She is not in acute distress. Appearance: She is well-developed. HENT:      Head: Normocephalic and atraumatic.    Eyes:      General: No scleral icterus. Conjunctiva/sclera: Conjunctivae normal.   Cardiovascular:      Rate and Rhythm: Normal rate and regular rhythm. Heart sounds: Normal heart sounds. No murmur heard. No friction rub. No gallop. Pulmonary:      Effort: Pulmonary effort is normal. No respiratory distress. Breath sounds: Normal breath sounds. No wheezing or rales. Abdominal:      General: Bowel sounds are normal. There is no distension. Palpations: Abdomen is soft. Tenderness: There is no abdominal tenderness. There is right CVA tenderness. Musculoskeletal:         General: Normal range of motion. Skin:     General: Skin is warm. Findings: No rash. Neurological:      Mental Status: She is alert and oriented to person, place, and time. Additional Data:     Lab Results:  Results from last 7 days   Lab Units 08/06/23  0823   WBC Thousand/uL 13.50*   HEMOGLOBIN g/dL 9.2*   HEMATOCRIT % 32.6*   PLATELETS Thousands/uL 467*   NEUTROS PCT % 84*   LYMPHS PCT % 10*   MONOS PCT % 6   EOS PCT % 0     Results from last 7 days   Lab Units 08/06/23  0924   SODIUM mmol/L 139   POTASSIUM mmol/L 3.9   CHLORIDE mmol/L 109*   CO2 mmol/L 21   BUN mg/dL 19   CREATININE mg/dL 0.82   ANION GAP mmol/L 9   CALCIUM mg/dL 8.9   ALBUMIN g/dL 4.2   TOTAL BILIRUBIN mg/dL 0.29   ALK PHOS U/L 118*   ALT U/L 3*   AST U/L 17   GLUCOSE RANDOM mg/dL 106                       Lines/Drains:  Invasive Devices     Peripheral Intravenous Line  Duration           Peripheral IV 08/06/23 Left;Ventral (anterior) Forearm <1 day          Drain  Duration           Closed/Suction Drain Lateral;Right Chest Bulb 15 Fr. 481 days    Closed/Suction Drain Right;Lateral Chest Bulb 15 Fr.  481 days                    Imaging: Reviewed radiology reports from this admission including: abdominal/pelvic CT  CT abdomen pelvis wo contrast   Final Result by Hailey Wood MD (08/06 7810)      4 mm x 2 mm stone in the right proximal ureter with upstream mild to moderate hydronephrosis. Workstation performed: [id number]             ** Please Note: This note has been constructed using a voice recognition system.  **

## 2023-08-06 NOTE — ED NOTES
Pt's lunch arrived, once pt is done eating pt will be brought to assigned room.      Krysten Garcia  08/06/23 2505

## 2023-08-06 NOTE — ED PROVIDER NOTES
History  Chief Complaint   Patient presents with   • Abdominal Pain   • Flank Pain     States "my kidney stones are traveling" woke at 0430 with right sided flank pain and vomiting      Martin Diaz is a 60-year-old female arriving to the emergency department for evaluation with complaint of acute right flank pain. Patient reports symptoms developed around 430 this morning with no inciting event to her knowledge. She expresses concern for the possibility of a kidney stone, finding that the location and quality of pain is similar. She admits to nausea and vomiting as well, denying hematemesis. She denies constipation, diarrhea, or stool change. She denies dysuria or urgency, though does admit to urinary frequency. Denies fevers, chills, sweats. She offers no other complaints or concerns at this time. Prior to Admission Medications   Prescriptions Last Dose Informant Patient Reported?  Taking?   acetaminophen (TYLENOL) 500 mg tablet   No No   Sig: Take 1 tablet (500 mg total) by mouth every 4 (four) hours as needed for mild pain or moderate pain   alendronate (FOSAMAX) 70 mg tablet  Self Yes No   Sig: every 7 days Weekly on    anastrozole (ARIMIDEX) 1 mg tablet  Self Yes No   Sig: Take 1 mg by mouth daily   chlorhexidine (PERIDEX) 0.12 % solution   Yes No   Si (two) times a day   oxyCODONE (ROXICODONE) 5 immediate release tablet   No No   Sig: Take 1 tablet (5 mg total) by mouth every 4 (four) hours as needed for moderate pain for up to 15 doses Max Daily Amount: 30 mg   triamcinolone (KENALOG) 0.5 % cream  Self Yes No   Sig: Apply topically as needed       Facility-Administered Medications: None       Past Medical History:   Diagnosis Date   • Anesthesia complication     needs IR preprocedure for IV access/not ASC candidate   • Breast cancer (HCC)    • Breast cancer ([address])    • Breast cancer, right breast (HCC)    • Cancer (HCC)    • Chronic pain disorder    • CPAP (continuous positive airway pressure) dependence     no currently used   • GERD (gastroesophageal reflux disease)    • Heartburn    • Hiatal hernia    • History of bariatric surgery    • Irregular heart beat     pt states it was a side effect of a medication   • Kidney stone    • Lung nodule    • Osteoporosis     stage 3   • Postsurgical malabsorption    • RA (rheumatoid arthritis) ([address])    • Rheumatoid arthritis ([address])    • Sleep apnea    • Stress incontinence    • Thyroid nodule        Past Surgical History:   Procedure Laterality Date   • ABDOMINAL SURGERY     • BRACHIOPLASTY Bilateral     2017   • BREAST IMPLANT Bilateral 9/8/2020    Procedure: BREAST REMOVAL TISSUE EXPANDER/ IMPLANT PLACEMENT BREAST;  Surgeon: Gaston Caldera MD;  [facility];  Service: Plastics   • BREAST IMPLANT Left 2/9/2021    Procedure: BREAST TISSUE EXPANDER/ IMPLANT EXCHANGE;  Surgeon: Gaston Caldera MD;  [facility];  Service: Plastics   • CAPSULOTOMY Bilateral 9/8/2020    Procedure: BREAST CAPSULOTOMY;  Surgeon: Gaston Caldera MD;  [facility];  Service: Plastics   • CAPSULOTOMY Left 2/9/2021    Procedure: BREAST CAPSULOTOMY;  Surgeon: Gaston Caldera MD;  [facility];  Service: Plastics   • CARDIAC CATHETERIZATION     • CARPAL TUNNEL RELEASE Bilateral 2015   • CHOLECYSTECTOMY     • COLONOSCOPY     • GALLBLADDER SURGERY  2015   • GASTRIC BYPASS  2014   • HYSTERECTOMY  05/2013   • INCONTINENCE SURGERY  09/2013   • IR BIOPSY LUNG      lung nodules   • LIPOSUCTION Right 4/12/2022    Procedure: LIPOSUCTION RIGHT BREAST;  Surgeon: Rupal Rosales MD;  [facility];  Service: Plastics   • LIPOSUCTION W/ FAT INJECTION Bilateral 11/10/2021    Procedure: FAT GRAFTING BILATERAL BREAST;  Surgeon: Gaston Caldera MD;  [facility];  Service: Plastics   • MASTECTOMY Bilateral 10/2016   • CT BREAST RECONSTRUCTION W/LATISSIMUS DORSI FLAP Right 8/6/2019    Procedure: BREAST RECONSTRUCTION W/FLAP LATISSIMUS DORSI;  Surgeon: Jackie Sánchez MD;  [facility];  Service: Plastics   • WV GRAFTING OF AUTOLOGOUS FAT BY LIPO 50 CC OR LESS Bilateral 1/13/2023    Procedure: BILATERAL BREAST FAT GRAFTING;  Surgeon: Alize Hess MD;  [facility];  Service: Plastics   • WV GRAFTING OF AUTOLOGOUS FAT BY LIPO 50 CC OR LESS Bilateral 6/13/2023    Procedure: FAT GRAFTING TO BILATERAL BREASTS;  Surgeon: Alize Hess MD;  [facility];  Service: Plastics   • WV DOMINGA-IMPLANT CAPSULECTOMY BREAST COMPLETE Bilateral 8/6/2019    Procedure: BREAST CAPSULECTOMY;  Surgeon: Jackie Sánchez MD;  [facility];  Service: Plastics   • WV REMOVAL INTACT BREAST IMPLANT Bilateral 8/6/2019    Procedure: BREAST IMPLANT REMOVAL;  Surgeon: Jackie Sánchez MD;  [facility];  Service: Plastics   • WV REMOVAL INTACT BREAST IMPLANT Right 4/12/2022    Procedure: removal of right breast implant, capsulectomy, placement of right breast implant with acelluar dermal matrix.;  Surgeon: Alize Hess MD;  [facility];  Service: Plastics   • WV REVISION OF RECONSTRUCTED BREAST Right 9/7/2021    Procedure: RIGHT BREAST MOUND REVISION;  Surgeon: Jackie Sánchez MD;  [facility];  Service: Plastics   • WV TISSUE EXPANDER PLACEMENT BREAST RECONSTRUCTION Bilateral 8/6/2019    Procedure: BREAST TISSUE EXPANDER PLACEMENT;  Surgeon: Jackie Sánchez MD;  [facility];  Service: Plastics   • REDUCTION MAMMAPLASTY     • REVISION OF SCAR Bilateral 11/10/2021    Procedure: Latisha RAMIREZ;  Surgeon: Jackie Sánchez MD;  [facility];  Service: Plastics   • TRANSFER MUSCLE PECTORALIS Bilateral 8/6/2019    Procedure: PECTORALIS MUSCLE REPAIR;  Surgeon: Jackie Sánchez MD;  [facility];  Service: Plastics       Family History   Problem Relation Age of Onset   • No Known Problems Mother    • Diabetes Father    • Heart disease Father    • No Known Problems Brother      I have reviewed and agree with the history as documented. E-Cigarette/Vaping   • E-Cigarette Use Never User      E-Cigarette/Vaping Substances   • Nicotine No    • THC No    • CBD No    • Flavoring No    • Other No    • Unknown No      Social History     Tobacco Use   • Smoking status: Never   • Smokeless tobacco: Never   Vaping Use   • Vaping Use: Never used   Substance Use Topics   • Alcohol use: Not Currently   • Drug use: Never       Review of Systems   Constitutional: Negative for chills and fever. Gastrointestinal: Positive for nausea and vomiting. Negative for abdominal pain, constipation and diarrhea. Genitourinary: Positive for flank pain. All other systems reviewed and are negative. Physical Exam  Physical Exam  Vitals and nursing note reviewed. Constitutional:       General: She is in acute distress (2/2 pain). Appearance: Normal appearance. She is well-developed. She is not ill-appearing, toxic-appearing or diaphoretic. HENT:      Head: Normocephalic and atraumatic. Right Ear: External ear normal.      Left Ear: External ear normal.   Eyes:      Conjunctiva/sclera: Conjunctivae normal.   Cardiovascular:      Rate and Rhythm: Normal rate and regular rhythm. Pulses: Normal pulses. Pulmonary:      Effort: Pulmonary effort is normal. No respiratory distress. Breath sounds: Normal breath sounds. No wheezing, rhonchi or rales. Chest:      Chest wall: No tenderness. Abdominal:      General: There is no distension. Palpations: Abdomen is soft. Tenderness: There is no abdominal tenderness. There is right CVA tenderness. Musculoskeletal:         General: Normal range of motion. Cervical back: Normal range of motion and neck supple. Skin:     General: Skin is warm and dry. Capillary Refill: Capillary refill takes less than 2 seconds.    Neurological: Mental Status: She is alert. Motor: Motor function is intact. No weakness.    Psychiatric:         Mood and Affect: Mood normal.         Vital Signs  ED Triage Vitals   Temperature Pulse Respirations BP SpO2   08/06/23 0819 08/06/23 0814 08/06/23 0814 -- 08/06/23 0814   97.8 °F (36.6 °C) 85 18  99 %      Temp Source Heart Rate Source Patient Position - Orthostatic VS BP Location FiO2 (%)   08/06/23 0819 -- -- -- --   Oral          Pain Score       --                  Vitals:    08/06/23 0814   Pulse: 85         Visual Acuity      ED Medications  Medications   sodium chloride 0.9 % bolus 1,000 mL (1,000 mL Intravenous New Bag 8/6/23 0823)   ondansetron (ZOFRAN) injection 4 mg (4 mg Intravenous Given 8/6/23 0823)   morphine injection 4 mg (4 mg Intravenous Given 8/6/23 0823)       Diagnostic Studies  Results Reviewed     Procedure Component Value Units Date/Time    Urine Microscopic [[id number]]  (Abnormal) Collected: 08/06/23 1137    Lab Status: Final result Specimen: Urine, Clean Catch Updated: 08/06/23 1150     RBC, UA 30-50 /hpf      WBC, UA 1-2 /hpf      Epithelial Cells Occasional /hpf      Bacteria, UA None Seen /hpf     UA w Reflex to Microscopic w Reflex to Culture [[id number]]  (Abnormal) Collected: 08/06/23 1137    Lab Status: Final result Specimen: Urine, Clean Catch Updated: 08/06/23 1149     Color, UA Light Yellow     Clarity, UA Clear     Specific Gravity, UA 1.019     pH, UA 6.5     Leukocytes, UA Negative     Nitrite, UA Negative     Protein, UA Negative mg/dl      Glucose, UA Negative mg/dl      Ketones, UA Negative mg/dl      Urobilinogen, UA <2.0 mg/dl      Bilirubin, UA Negative     Occult Blood, UA Moderate    Comprehensive metabolic panel [[id number]]  (Abnormal) Collected: 08/06/23 0924    Lab Status: Final result Specimen: Blood from Arm, Left Updated: 08/06/23 0958     Sodium 139 mmol/L      Potassium 3.9 mmol/L      Chloride 109 mmol/L      CO2 21 mmol/L      ANION GAP 9 mmol/L BUN 19 mg/dL      Creatinine 0.82 mg/dL      Glucose 106 mg/dL      Calcium 8.9 mg/dL      AST 17 U/L      ALT 3 U/L      Alkaline Phosphatase 118 U/L      Total Protein 8.7 g/dL      Albumin 4.2 g/dL      Total Bilirubin 0.29 mg/dL      eGFR 82 ml/min/1.73sq m     Narrative:      National Kidney Disease Foundation guidelines for Chronic Kidney Disease (CKD):   •  Stage 1 with normal or high GFR (GFR > 90 mL/min/1.73 square meters)  •  Stage 2 Mild CKD (GFR = 60-89 mL/min/1.73 square meters)  •  Stage 3A Moderate CKD (GFR = 45-59 mL/min/1.73 square meters)  •  Stage 3B Moderate CKD (GFR = 30-44 mL/min/1.73 square meters)  •  Stage 4 Severe CKD (GFR = 15-29 mL/min/1.73 square meters)  •  Stage 5 End Stage CKD (GFR <15 mL/min/1.73 square meters)  Note: GFR calculation is accurate only with a steady state creatinine    Lipase [[id number]]  (Abnormal) Collected: 08/06/23 0924    Lab Status: Final result Specimen: Blood from Arm, Left Updated: 08/06/23 0958     Lipase 7 u/L     CBC and differential [[id number]]  (Abnormal) Collected: 08/06/23 0823    Lab Status: Final result Specimen: Blood from Arm, Left Updated: 08/06/23 0827     WBC 13.50 Thousand/uL      RBC 5.05 Million/uL      Hemoglobin 9.2 g/dL      Hematocrit 32.6 %      MCV 65 fL      MCH 18.2 pg      MCHC 28.2 g/dL      RDW 21.3 %      MPV 9.6 fL      Platelets 801 Thousands/uL      nRBC 0 /100 WBCs      Neutrophils Relative 84 %      Immat GRANS % 0 %      Lymphocytes Relative 10 %      Monocytes Relative 6 %      Eosinophils Relative 0 %      Basophils Relative 0 %      Neutrophils Absolute 11.23 Thousands/µL      Immature Grans Absolute 0.04 Thousand/uL      Lymphocytes Absolute 1.34 Thousands/µL      Monocytes Absolute 0.82 Thousand/µL      Eosinophils Absolute 0.04 Thousand/µL      Basophils Absolute 0.03 Thousands/µL                  CT abdomen pelvis wo contrast   Final Result by Jing Tao MD (08/06 0178)      4 mm x 2 mm stone in the right proximal ureter with upstream mild to moderate hydronephrosis. Workstation performed: [id number]                    Procedures  Procedures         ED Course                               SBIRT 22yo+    Flowsheet Row Most Recent Value   Initial Alcohol Screen: US AUDIT-C     1. How often do you have a drink containing alcohol? 0 Filed at: 08/06/2023 0814   2. How many drinks containing alcohol do you have on a typical day you are drinking? 0 Filed at: 08/06/2023 0814   3a. Male UNDER 65: How often do you have five or more drinks on one occasion? 0 Filed at: 08/06/2023 0814   3b. FEMALE Any Age, or MALE 65+: How often do you have 4 or more drinks on one occassion? 0 Filed at: 08/06/2023 0814   Audit-C Score 0 Filed at: 08/06/2023 8841   BARTOLOME: How many times in the past year have you. .. Used an illegal drug or used a prescription medication for non-medical reasons? Never Filed at: 08/06/2023 3989                    Medical Decision Making  This is a 41-year-old female presenting for evaluation with complaint of acute right flank pain, nausea and vomiting. History of kidney stones, expressing concern for the same. Differential diagnosis includes but is not limited to: nephrolithiasis, enteritis, colitis, diverticulitis, constipation, musculoskeletal back pain    Initial ED plan: labs, imaging, UA    Final ED Assessment: Vital signs reviewed on ED presentation, examination as above. All labs and imaging independently reviewed with imaging interpreted by the Radiologist.  Lab work demonstrates leukocytosis of 13.5 potentially in setting of stress to margination secondary to vomiting. No acute kidney injury. Urinalysis is without evidence of urinary tract infection. CT reports 4 mm x 2 mm stone in the right proximal ureter with upstream mild to moderate hydronephrosis. Test results reviewed with the patient at bedside.   Patient received IV fluids and multiple rounds of analgesics, without adequate pain control and the patient is comfortable with admission to continue aggressive hydration as well as continued pain management. Discussed case with Hubert Curran, urology PA on-call, stating that the patient can be admitted at this campus for IV hydration and analgesia. There is no indication for urgent surgical intervention at this time. Case discussed with Dr. Lolly Jones, who accepts patient for admission to the internal medicine service. The patient has remained hemodynamically stable without new or worsening symptoms during ED course and is stable for admission, bridging orders placed. Right ureteral stone: acute illness or injury  Amount and/or Complexity of Data Reviewed  Labs: ordered. Radiology: ordered. Risk  Prescription drug management. Decision regarding hospitalization. Disposition  Final diagnoses:   Right ureteral stone     Time reflects when diagnosis was documented in both MDM as applicable and the Disposition within this note     Time User Action Codes Description Comment    8/6/2023 12:15 PM Mitzi Gr Add [N20.1] Right ureteral stone       ED Disposition     ED Disposition   Admit    Condition   Stable    Date/Time   Sun Aug 6, 2023 12:15 PM    Comment   Case was discussed with Dr. Robert Odonnell and the patient's admission status was agreed to be Admission Status: observation status to the service of Dr. Jon Molina . Follow-up Information    None         Patient's Medications   Discharge Prescriptions    No medications on file       No discharge procedures on file.     PDMP Review       Value Time User    PDMP Reviewed  Yes 11/10/2021 10:51 AM Jovan Galloway PA-C          ED Provider  Electronically Signed by           Andre Flores PA-C  08/06/23 0781

## 2023-08-07 LAB
ANION GAP SERPL CALCULATED.3IONS-SCNC: 5 MMOL/L
BUN SERPL-MCNC: 17 MG/DL (ref 5–25)
CALCIUM SERPL-MCNC: 7.8 MG/DL (ref 8.4–10.2)
CHLORIDE SERPL-SCNC: 111 MMOL/L (ref 96–108)
CO2 SERPL-SCNC: 21 MMOL/L (ref 21–32)
CREAT SERPL-MCNC: 0.76 MG/DL (ref 0.6–1.3)
ERYTHROCYTE [DISTWIDTH] IN BLOOD BY AUTOMATED COUNT: 21.2 % (ref 11.6–15.1)
GFR SERPL CREATININE-BSD FRML MDRD: 90 ML/MIN/1.73SQ M
GLUCOSE P FAST SERPL-MCNC: 86 MG/DL (ref 65–99)
GLUCOSE SERPL-MCNC: 86 MG/DL (ref 65–140)
HCT VFR BLD AUTO: 26.1 % (ref 34.8–46.1)
HGB BLD-MCNC: 7.3 G/DL (ref 11.5–15.4)
MCH RBC QN AUTO: 18.1 PG (ref 26.8–34.3)
MCHC RBC AUTO-ENTMCNC: 28 G/DL (ref 31.4–37.4)
MCV RBC AUTO: 65 FL (ref 82–98)
PLATELET # BLD AUTO: 300 THOUSANDS/UL (ref 149–390)
PMV BLD AUTO: 9.6 FL (ref 8.9–12.7)
POTASSIUM SERPL-SCNC: 4 MMOL/L (ref 3.5–5.3)
RBC # BLD AUTO: 4.03 MILLION/UL (ref 3.81–5.12)
SODIUM SERPL-SCNC: 137 MMOL/L (ref 135–147)
WBC # BLD AUTO: 10.02 THOUSAND/UL (ref 4.31–10.16)

## 2023-08-07 PROCEDURE — 85027 COMPLETE CBC AUTOMATED: CPT | Performed by: INTERNAL MEDICINE

## 2023-08-07 PROCEDURE — 80048 BASIC METABOLIC PNL TOTAL CA: CPT | Performed by: INTERNAL MEDICINE

## 2023-08-07 PROCEDURE — 99232 SBSQ HOSP IP/OBS MODERATE 35: CPT | Performed by: INTERNAL MEDICINE

## 2023-08-07 RX ADMIN — ACETAMINOPHEN 650 MG: 325 TABLET, FILM COATED ORAL at 17:52

## 2023-08-07 RX ADMIN — OXYCODONE HYDROCHLORIDE 10 MG: 5 TABLET ORAL at 11:43

## 2023-08-07 RX ADMIN — HYDROMORPHONE HYDROCHLORIDE 0.5 MG: 1 INJECTION, SOLUTION INTRAMUSCULAR; INTRAVENOUS; SUBCUTANEOUS at 19:34

## 2023-08-07 RX ADMIN — OXYCODONE HYDROCHLORIDE 10 MG: 5 TABLET ORAL at 17:53

## 2023-08-07 RX ADMIN — HYDROMORPHONE HYDROCHLORIDE 0.5 MG: 1 INJECTION, SOLUTION INTRAMUSCULAR; INTRAVENOUS; SUBCUTANEOUS at 14:38

## 2023-08-07 RX ADMIN — OXYCODONE HYDROCHLORIDE 10 MG: 5 TABLET ORAL at 22:24

## 2023-08-07 RX ADMIN — OXYCODONE HYDROCHLORIDE 5 MG: 5 TABLET ORAL at 04:43

## 2023-08-07 RX ADMIN — ONDANSETRON 4 MG: 2 INJECTION INTRAMUSCULAR; INTRAVENOUS at 11:43

## 2023-08-07 RX ADMIN — HYDROMORPHONE HYDROCHLORIDE 0.5 MG: 1 INJECTION, SOLUTION INTRAMUSCULAR; INTRAVENOUS; SUBCUTANEOUS at 08:28

## 2023-08-07 RX ADMIN — ANASTROZOLE 1 MG: 1 TABLET, COATED ORAL at 04:43

## 2023-08-07 RX ADMIN — FONDAPARINUX SODIUM 2.5 MG: 2.5 INJECTION, SOLUTION SUBCUTANEOUS at 08:29

## 2023-08-07 RX ADMIN — SODIUM CHLORIDE 125 ML/HR: 0.9 INJECTION, SOLUTION INTRAVENOUS at 17:56

## 2023-08-07 NOTE — MALNUTRITION/BMI
This medical record reflects one or more clinical indicators suggestive of  morbid obesity. BMI Findings:  Adult BMI Classifications: Morbid Obesity 45-49.9        Body mass index is 48.01 kg/m². See Nutrition note dated 8/7/23 for additional details. Completed nutrition assessment is viewable in the nutrition documentation.

## 2023-08-07 NOTE — ASSESSMENT & PLAN NOTE
· Patient presenting secondary to worsening right-sided pain  · CT imaging showing ureteral stone with mild to moderate hydronephrosis  · Kidney function stable at this time  · Case discussed with urology and no need for transfer at this time  · Urology consult placed  · Continue with aggressive IV fluids and pain control  · Follow-up urology evaluation

## 2023-08-07 NOTE — PLAN OF CARE
Problem: MOBILITY - ADULT  Goal: Maintain or return to baseline ADL function  Description: INTERVENTIONS:  -  Assess patient's ability to carry out ADLs; assess patient's baseline for ADL function and identify physical deficits which impact ability to perform ADLs (bathing, care of mouth/teeth, toileting, grooming, dressing, etc.)  - Assess/evaluate cause of self-care deficits   - Assess range of motion  - Assess patient's mobility; develop plan if impaired  - Assess patient's need for assistive devices and provide as appropriate  - Encourage maximum independence but intervene and supervise when necessary  - Involve family in performance of ADLs  - Assess for home care needs following discharge   - Consider OT consult to assist with ADL evaluation and planning for discharge  - Provide patient education as appropriate  Outcome: Progressing  Goal: Maintains/Returns to pre admission functional level  Description: INTERVENTIONS:  - Perform BMAT or MOVE assessment daily.   - Set and communicate daily mobility goal to care team and patient/family/caregiver.    - Collaborate with rehabilitation services on mobility goals if consulted  - Out of bed for toileting  - Record patient progress and toleration of activity level   Outcome: Progressing     Problem: PAIN - ADULT  Goal: Verbalizes/displays adequate comfort level or baseline comfort level  Description: Interventions:  - Encourage patient to monitor pain and request assistance  - Assess pain using appropriate pain scale  - Administer analgesics based on type and severity of pain and evaluate response  - Implement non-pharmacological measures as appropriate and evaluate response  - Consider cultural and social influences on pain and pain management  - Notify physician/advanced practitioner if interventions unsuccessful or patient reports new pain  Outcome: Progressing     Problem: INFECTION - ADULT  Goal: Absence or prevention of progression during hospitalization  Description: INTERVENTIONS:  - Assess and monitor for signs and symptoms of infection  - Monitor lab/diagnostic results  - Monitor all insertion sites, i.e. indwelling lines, tubes, and drains  - Monitor endotracheal if appropriate and nasal secretions for changes in amount and color  - [place] appropriate cooling/warming therapies per order  - Administer medications as ordered  - Instruct and encourage patient and family to use good hand hygiene technique  - Identify and instruct in appropriate isolation precautions for identified infection/condition  Outcome: Progressing  Goal: Absence of fever/infection during neutropenic period  Description: INTERVENTIONS:  - Monitor WBC    Outcome: Progressing     Problem: SAFETY ADULT  Goal: Maintain or return to baseline ADL function  Description: INTERVENTIONS:  -  Assess patient's ability to carry out ADLs; assess patient's baseline for ADL function and identify physical deficits which impact ability to perform ADLs (bathing, care of mouth/teeth, toileting, grooming, dressing, etc.)  - Assess/evaluate cause of self-care deficits   - Assess range of motion  - Assess patient's mobility; develop plan if impaired  - Assess patient's need for assistive devices and provide as appropriate  - Encourage maximum independence but intervene and supervise when necessary  - Involve family in performance of ADLs  - Assess for home care needs following discharge   - Consider OT consult to assist with ADL evaluation and planning for discharge  - Provide patient education as appropriate  Outcome: Progressing  Goal: Maintains/Returns to pre admission functional level  Description: INTERVENTIONS:  - Perform BMAT or MOVE assessment daily.   - Set and communicate daily mobility goal to care team and patient/family/caregiver.    - Collaborate with rehabilitation services on mobility goals if consulted  - Out of bed for toileting  - Record patient progress and toleration of activity level   Outcome: Progressing  Goal: Patient will remain free of falls  Description: INTERVENTIONS:  - Educate patient/family on patient safety including physical limitations  - Instruct patient to call for assistance with activity   - Consult OT/PT to assist with strengthening/mobility   - Keep Call bell within reach  - Keep bed low and locked with side rails adjusted as appropriate  - Keep care items and personal belongings within reach  - Initiate and maintain comfort rounds  - Make Fall Risk Sign visible to staff  - Apply yellow socks and bracelet for high fall risk patients  - Consider moving patient to room near nurses station  Outcome: Progressing     Problem: DISCHARGE PLANNING  Goal: Discharge to home or other facility with appropriate resources  Description: INTERVENTIONS:  - Identify barriers to discharge w/patient and caregiver  - Arrange for needed discharge resources and transportation as appropriate  - Identify discharge learning needs (meds, wound care, etc.)  - Arrange for interpretive services to assist at discharge as needed  - Refer to Case Management Department for coordinating discharge planning if the patient needs post-hospital services based on physician/advanced practitioner order or complex needs related to functional status, cognitive ability, or social support system  Outcome: Progressing     Problem: Knowledge Deficit  Goal: Patient/family/caregiver demonstrates understanding of disease process, treatment plan, medications, and discharge instructions  Description: Complete learning assessment and assess knowledge base.   Interventions:  - Provide teaching at level of understanding  - Provide teaching via preferred learning methods  Outcome: Progressing

## 2023-08-07 NOTE — CONSULTS
Consult - Urology   Jermain Vora 1970, 47 y.o. female MRN: [id number]    Unit/Bed#: -01 Encounter: [id number]      Assessment & Plan:  1. Right proximal ureteral calculus  2. Mild to moderate hydronephrosis  - CT scan showing 4 mm x 2 mm stone in right proximal ureter with mild to moderate hydronephrosis  - Vital signs stable, afebrile  - Mild leukocytosis 13 on admission, now resolved  - Creatinine at baseline  - UA negative  - Persistent pain 7 out of 10  - IV fluids and flomax  - Patient with proximal stone, persistent pain. Will keep n.p.o. after midnight for possible surgical intervention.  - Case requested  - N.p.o. at midnight  - Urology will reassess tomorrow    Subjective:   Amaury Saleem is a 56-year-old female with past medical history anemia, breast cancer status post radiation, bariatric surgery, obesity who presented to the ED with right-sided flank pain. Patient reported her pain started 4 AM yesterday, associated with nausea and vomiting. She reports her pain was uncontrolled with Tylenol at home and she presented to the ED. In the ED CT scan was done showing 4 mm x 2 mm stone in the right proximal ureter with mild to moderate hydronephrosis. Her pain was uncontrolled in the ED and she was admitted for medical expulsive therapy. She had mild leukocytosis 13.5 on admission, now resolved. Patient reports personal history of kidney stones, last in 2016. She reports all prior kidney stones have passed on their own without surgical intervention. Urology was consulted for additional management recommendations    Review of Systems   Constitutional: Negative for chills and fever. Respiratory: Negative for cough and shortness of breath. Cardiovascular: Negative for chest pain and palpitations. Gastrointestinal: Positive for abdominal pain, nausea and vomiting. Genitourinary: Positive for flank pain. Negative for dysuria and hematuria.    Musculoskeletal: Negative for arthralgias and back pain. Skin: Negative for color change and rash. Neurological: Negative for seizures and syncope. All other systems reviewed and are negative. Objective:  Vitals: Blood pressure 128/78, pulse 89, temperature 98.2 °F (36.8 °C), resp. rate 18, height 5' (1.524 m), weight 112 kg (245 lb 13 oz), SpO2 97 %. ,Body mass index is 48.01 kg/m². Physical Exam  Vitals reviewed. Constitutional:       General: She is not in acute distress. Appearance: Normal appearance. She is obese. She is not ill-appearing, toxic-appearing or diaphoretic. HENT:      Head: Normocephalic and atraumatic. Right Ear: External ear normal.      Left Ear: External ear normal.      Nose: Nose normal.      Mouth/Throat:      Mouth: Mucous membranes are moist.   Eyes:      General: No scleral icterus. Conjunctiva/sclera: Conjunctivae normal.   Cardiovascular:      Rate and Rhythm: Normal rate. Pulses: Normal pulses. Pulmonary:      Effort: Pulmonary effort is normal.   Abdominal:      General: Abdomen is flat. There is no distension. Palpations: Abdomen is soft. Tenderness: There is no abdominal tenderness. There is right CVA tenderness. There is no left CVA tenderness or guarding. Musculoskeletal:         General: Normal range of motion. Cervical back: Normal range of motion. Skin:     General: Skin is warm. Findings: No rash. Neurological:      General: No focal deficit present. Mental Status: She is alert and oriented to person, place, and time. Mental status is at baseline. Psychiatric:         Mood and Affect: Mood normal.         Behavior: Behavior normal.         Thought Content:  Thought content normal.         Judgment: Judgment normal.         Imaging:    CT ABDOMEN AND PELVIS WITHOUT IV CONTRAST     INDICATION:   Right flank pain with nausea and vomiting.     COMPARISON: Upper GI series dated 5/10/2019.     TECHNIQUE:  CT examination of the abdomen and pelvis was performed without intravenous contrast. Multiplanar 2D reformatted images were created from the source data.     This examination, like all CT scans performed in the [facility], was performed utilizing techniques to minimize radiation dose exposure, including the use of iterative reconstruction and automated exposure control. Radiation dose length   product (DLP) for this visit:  1419 mGy-cm     Enteric contrast was not administered.     FINDINGS:     ABDOMEN     LOWER CHEST: Bilateral subpectoral silicone implants with normal visualized contours. No clinically significant abnormality identified in the visualized lower chest.     LIVER/BILIARY TREE:  Unremarkable.     GALLBLADDER: Gallbladder is surgically absent.     SPLEEN:  Unremarkable.     PANCREAS:  Unremarkable.     ADRENAL GLANDS:  Unremarkable.     KIDNEYS/URETERS: Right kidney shows mild asymmetric perinephric stranding and mild to moderate pelvicaliectasis with partial forniceal blunting but no cortical thinning. 4 mm x 2 mm stone in the right proximal ureter after which the ureter is   decompressed. Trace residual nephrolithiasis at the right mid kidney.     Left nonobstructive nephrolith at the mid kidney. No left-sided collecting system stones or hydronephrosis.     No suspicious contour distorting masses or perinephric collections on either side.     STOMACH AND BOWEL: Prior gastric bypass. No inflammatory changes around the gastrojejunostomy or jejunojejunostomy. No inflammation or dilation of the pancreaticobiliary limb. No dilated or inflamed loops of small bowel.     APPENDIX:  No findings to suggest appendicitis.     ABDOMINOPELVIC CAVITY:  No ascites. No pneumoperitoneum. No lymphadenopathy.     VESSELS:  Unremarkable for patient's age.     PELVIS     REPRODUCTIVE ORGANS: Surgical changes of prior hysterectomy. No suspicious adnexal masses     URINARY BLADDER:  Unremarkable.  Calcification at the anterior aspect of the bladder base is outside the wall and presumably a phlebolith. No endoluminal stones.     ABDOMINAL WALL/INGUINAL REGIONS: Left predominant fat-containing inguinal hernias.     OSSEOUS STRUCTURES:  No acute fracture or destructive osseous lesion.     IMPRESSION:     4 mm x 2 mm stone in the right proximal ureter with upstream mild to moderate hydronephrosis.            Workstation performed: [id number]  Labs:  Recent Labs     08/06/23  0823 08/07/23  0703   WBC 13.50* 10.02     Recent Labs     08/06/23  0823 08/07/23  0703   HGB 9.2* 7.3*     Recent Labs     08/06/23  0924 08/07/23  0703   CREATININE 0.82 0.76         History:  Social History     Socioeconomic History   • Marital status: Single     Spouse name: None   • Number of children: None   • Years of education: None   • Highest education level: None   Occupational History   • None   Tobacco Use   • Smoking status: Never   • Smokeless tobacco: Never   Vaping Use   • Vaping Use: Never used   Substance and Sexual Activity   • Alcohol use: Not Currently   • Drug use: Never   • Sexual activity: None   Other Topics Concern   • None   Social History Narrative   • None     Social Determinants of Health     Financial Resource Strain: Not on file   Food Insecurity: Not on file   Transportation Needs: Not on file   Physical Activity: Not on file   Stress: Not on file   Social Connections: Not on file   Intimate Partner Violence: Not on file   Housing Stability: Not on file     Past Medical History:   Diagnosis Date   • Anesthesia complication     needs IR preprocedure for IV access/not ASC candidate   • Breast cancer ([address])    • Breast cancer ([address])    • Breast cancer, right breast ([address])    • Cancer ([address])    • Chronic pain disorder    • CPAP (continuous positive airway pressure) dependence     no currently used   • GERD (gastroesophageal reflux disease)    • Heartburn    • Hiatal hernia    • History of bariatric surgery    • Irregular heart beat     pt states it was a side effect of a medication   • Kidney stone    • Lung nodule    • Osteoporosis     stage 3   • Postsurgical malabsorption    • RA (rheumatoid arthritis) ([address])    • Rheumatoid arthritis ([address])    • Sleep apnea    • Stress incontinence    • Thyroid nodule      Past Surgical History:   Procedure Laterality Date   • ABDOMINAL SURGERY     • BRACHIOPLASTY Bilateral     2017   • BREAST IMPLANT Bilateral 9/8/2020    Procedure: BREAST REMOVAL TISSUE EXPANDER/ IMPLANT PLACEMENT BREAST;  Surgeon: Chloe Desouza MD;  [facility];  Service: Plastics   • BREAST IMPLANT Left 2/9/2021    Procedure: BREAST TISSUE EXPANDER/ IMPLANT EXCHANGE;  Surgeon: Chloe Desouza MD;  [facility];  Service: Plastics   • CAPSULOTOMY Bilateral 9/8/2020    Procedure: BREAST CAPSULOTOMY;  Surgeon: Chloe Desouza MD;  [facility];  Service: Plastics   • CAPSULOTOMY Left 2/9/2021    Procedure: BREAST CAPSULOTOMY;  Surgeon: Chloe Desouza MD;  [facility];  Service: Plastics   • CARDIAC CATHETERIZATION     • CARPAL TUNNEL RELEASE Bilateral 2015   • CHOLECYSTECTOMY     • COLONOSCOPY     • GALLBLADDER SURGERY  2015   • GASTRIC BYPASS  2014   • HYSTERECTOMY  05/2013   • INCONTINENCE SURGERY  09/2013   • IR BIOPSY LUNG      lung nodules   • LIPOSUCTION Right 4/12/2022    Procedure: LIPOSUCTION RIGHT BREAST;  Surgeon: Richard Gillespie MD;  [facility];  Service: Plastics   • LIPOSUCTION W/ FAT INJECTION Bilateral 11/10/2021    Procedure: FAT GRAFTING BILATERAL BREAST;  Surgeon: Chloe Desouza MD;  [facility];  Service: Plastics   • MASTECTOMY Bilateral 10/2016   • GA BREAST RECONSTRUCTION W/LATISSIMUS DORSI FLAP Right 8/6/2019    Procedure: BREAST RECONSTRUCTION W/FLAP LATISSIMUS DORSI;  Surgeon: Chloe Desouza MD;  [facility];  Service: Plastics   • GA GRAFTING OF AUTOLOGOUS FAT BY LIPO 50 CC OR LESS Bilateral 1/13/2023    Procedure: BILATERAL BREAST FAT GRAFTING;  Surgeon: Korey Hess MD;  [facility];  Service: Plastics   • CA GRAFTING OF AUTOLOGOUS FAT BY LIPO 50 CC OR LESS Bilateral 6/13/2023    Procedure: FAT GRAFTING TO BILATERAL BREASTS;  Surgeon: Korey Hess MD;  [facility];  Service: Plastics   • CA DOMINGA-IMPLANT CAPSULECTOMY BREAST COMPLETE Bilateral 8/6/2019    Procedure: BREAST CAPSULECTOMY;  Surgeon: Bushra Burroughs MD;  [facility];  Service: Plastics   • CA REMOVAL INTACT BREAST IMPLANT Bilateral 8/6/2019    Procedure: BREAST IMPLANT REMOVAL;  Surgeon: Bushra Burroughs MD;  [facility];  Service: Plastics   • CA REMOVAL INTACT BREAST IMPLANT Right 4/12/2022    Procedure: removal of right breast implant, capsulectomy, placement of right breast implant with acelluar dermal matrix.;  Surgeon: Korey Hess MD;  [facility];  Service: Plastics   • CA REVISION OF RECONSTRUCTED BREAST Right 9/7/2021    Procedure: RIGHT BREAST MOUND REVISION;  Surgeon: Bushra Burroughs MD;  [facility];  Service: Plastics   • CA TISSUE EXPANDER PLACEMENT BREAST RECONSTRUCTION Bilateral 8/6/2019    Procedure: BREAST TISSUE EXPANDER PLACEMENT;  Surgeon: Bushra Burroughs MD;  [facility];  Service: Plastics   • REDUCTION MAMMAPLASTY     • REVISION OF SCAR Bilateral 11/10/2021    Procedure: Serena RAMIREZ;  Surgeon: Bushra Burroughs MD;  [facility];  Service: Plastics   • TRANSFER MUSCLE PECTORALIS Bilateral 8/6/2019    Procedure: Patrick Browning MUSCLE REPAIR;  Surgeon: Bushra Burroughs MD;  [facility];  Service: Plastics     Family History   Problem Relation Age of Onset   • No Known Problems Mother    • Diabetes Father    • Heart disease Father    • No Known Problems Brother        Fina Hermosillo Jaylen  Date: 8/7/2023 Time: 8:08 AM

## 2023-08-07 NOTE — CASE MANAGEMENT
Case Management Assessment & Discharge Planning Note    Patient name Alejandrina Toscano /-80 MRN [id number]  : 1970 Date 2023       Current Admission Date: 2023  Current Admission Diagnosis:Ureteral stone with hydronephrosis   Patient Active Problem List    Diagnosis Date Noted   • Ureteral stone with hydronephrosis 2023   • Leukocytosis 2023   • Gastroesophageal reflux disease 2022   • Rheumatoid arthritis ([address]) 2022   • Hiatal hernia 2022   • Sleep apnea 2021   • History of hysterectomy 2021   • Anemia 2021   • Arthritis 2021   • History of breast reconstruction 2019   • S/P right latissimus dorsi flap 10/11/2019   • Obesity, Class III, BMI 40-49.9 (morbid obesity) ([address]) 2019   • Obesity, Class II, BMI 35-39.9 2019   • Bariatric surgery status 2019   • Postsurgical malabsorption 2019   • Personal history of malignant neoplasm of breast 2019   • Acquired absence of breast and absent nipple, bilateral 2019   • History of radiation therapy 2019      LOS (days): 0  Geometric Mean LOS (GMLOS) (days):   Days to GMLOS:     OBJECTIVE:     Current admission status: Observation    Preferred Pharmacy:   [facility] DR SUSY MORALES [address] [place] [address]  Phone: [phone] Fax: [phone]    Primary Care Provider: Jose Milton MD    Primary Insurance: MEDICARE  Secondary Insurance: [id number]:  [facility] Proxies    There are no active Health Care Proxies on file.        Advance Directives  Does patient have a [address]?: Yes  Does patient have Advance Directives?: Yes  Primary Contact: Daly Randall (Son)   [phone] (M)    Obs Notice Signed: 23    Readmission Root Cause  30 Day Readmission: No    Patient Information  Admitted from[de-identified] Home  Mental Status: Alert  During Assessment patient was accompanied by: Not accompanied during assessment  Assessment information provided by[de-identified] Patient  Primary Caregiver: Self  Support Systems: Parent, Son, Daughter  [place] of Residence: [address] [place] [address] do you live in?: [facility] entry access options.  Select all that apply.: Stairs  Number of steps to enter home.: 3  Do the steps have railings?: Yes  Type of Current Residence: [place]  In the last 12 months, was there a time when you were not able to pay the mortgage or rent on time?: No  In the last 12 months, how many places have you lived?: 1  In the last 12 months, was there a time when you did not have a steady place to sleep or slept in a shelter (including now)?: No  Homeless/housing insecurity resource given?: N/A  Living Arrangements: Lives w/ Parent(s), Lives w/ Son, Lives w/ Daughter    Activities of Daily Living Prior to Admission  Functional Status: Independent  Completes ADLs independently?: Yes  Ambulates independently?: Yes  Does patient use assisted devices?: Yes  Assisted Devices (DME) used: Bretta Keshia (uses prn)  Does patient currently own DME?: Yes  What DME does the patient currently own?: Bretta Keshia (uses prn)  Does patient have a history of Outpatient Therapy (PT/OT)?: No  Does the patient have a history of Short-Term Rehab?: No  Does patient have a history of HHC?: Yes  Does patient currently have [address] [place] [employer] SSD?: No    Patient Information Continued  Income Source: SSI/SSD  Does patient have prescription coverage?: Yes  Within the past 12 months, you worried that your food would run out before you got the money to buy more.: Never true  Within the past 12 months, the food you bought just didn't last and you didn't have money to get more.: Never true  Food insecurity resource given?: N/A  Does patient receive dialysis treatments?: No  Does patient have a history of substance abuse?: No  Does patient have a history of Mental Health Diagnosis?: No    PHQ 2/9 Screening   Reviewed PHQ 2/9 Depression Screening Score?: No    Means of Transportation  Means of Transport to Appts[de-identified] Drives Self  In the past 12 months, has lack of transportation kept you from medical appointments or from getting medications?: No  In the past 12 months, has lack of transportation kept you from meetings, work, or from getting things needed for daily living?: No  Was application for public transport provided?: N/A    DISCHARGE DETAILS:    Discharge planning discussed with[de-identified] Patient  Freedom of Choice: Yes  Comments - Freedom of Choice: Discussed discharge planning and freedom of choice, Patient denies any discharge needs.   CM contacted family/caregiver?: No- see comments (Patient can make her own medical decisions)  Were Treatment Team discharge recommendations reviewed with patient/caregiver?: Yes  Did patient/caregiver verbalize understanding of patient care needs?: Yes    Contacts  Reason/Outcome: Continuity of Care, Discharge [facility]         Is the patient interested in [facility] at discharge?: No    DME Referral Provided  Referral made for DME?: No    Would you like to participate in our [facility] service program?  : No - Declined     Discharge Destination Plan[de-identified] Home  Transport at Discharge : Family

## 2023-08-07 NOTE — ASSESSMENT & PLAN NOTE
· Raise patient with elevated WBC of 13.5  · Likely reactive from stress and pain  · Has since resolved

## 2023-08-07 NOTE — ASSESSMENT & PLAN NOTE
· Patient noted to have hemoglobin fluctuating between 8-9  · No overt bleeding  · Hemoglobin stable  · Continue to monitor

## 2023-08-07 NOTE — PROGRESS NOTES
[address]  Progress Note  Name: Amber Zelaya  MRN: [id number]  Unit/Bed#: -01 I Date of Admission: 8/6/2023   Date of Service: 8/7/2023 I Hospital Day: 0    Assessment/Plan   * Ureteral stone with hydronephrosis  Assessment & Plan  · Patient presenting secondary to worsening right-sided pain  · CT imaging showing ureteral stone with mild to moderate hydronephrosis  · Kidney function stable at this time  · Case discussed with urology and no need for transfer at this time  · Urology consult placed  · Continue with aggressive IV fluids and pain control  · Follow-up urology evaluation    Leukocytosis  Assessment & Plan  · Raise patient with elevated WBC of 13.5  · Likely reactive from stress and pain  · Has since resolved    Anemia  Assessment & Plan  · Patient noted to have hemoglobin fluctuating between 8-9  · No overt bleeding  · Hemoglobin stable  · Continue to monitor    Obesity, Class II, BMI 35-39.9  Assessment & Plan  · Lifestyle modifications advised         VTE Pharmacologic Prophylaxis:   Pharmacologic: Pharmacologic VTE Prophylaxis contraindicated due to low risk  Mechanical VTE Prophylaxis in Place: Yes    Patient Centered Rounds: I have performed bedside rounds with nursing staff today. Discussions with Specialists or Other Care Team Provider: cm, nursing    Education and Discussions with Family / Patient: pt    Time Spent for Care: 30 minutes. More than 50% of total time spent on counseling and coordination of care as described above.     Current Length of Stay: 0 day(s)    Current Patient Status: Observation   Certification Statement: The patient will continue to require additional inpatient hospital stay due to see below    Discharge Plan: Pain control pending improvement anticipate 24 hours  I would like to think imaging with patient      Code Status: Level 1 - Full Code      Subjective:    denies fevers, chills, cough, chest pain    Objective:     Vitals:   Temp (24hrs), Av.1 °F (36.7 °C), Min:97.9 °F (36.6 °C), Max:98.2 °F (36.8 °C)    Temp:  [97.9 °F (36.6 °C)-98.2 °F (36.8 °C)] 98.2 °F (36.8 °C)  HR:  [] 89  Resp:  [18-20] 18  BP: (127-147)/(65-80) 128/78  SpO2:  [95 %-100 %] 97 %  Body mass index is 48.01 kg/m². Input and Output Summary (last 24 hours): Intake/Output Summary (Last 24 hours) at 2023 0901  Last data filed at 2023 0751  Gross per 24 hour   Intake --   Output 900 ml   Net -900 ml       Physical Exam:     Physical Exam  Constitutional:       General: She is not in acute distress. Appearance: She is well-developed. She is not diaphoretic. HENT:      Head: Normocephalic and atraumatic. Nose: Nose normal.      Mouth/Throat:      Pharynx: No oropharyngeal exudate. Eyes:      General: No scleral icterus. Right eye: No discharge. Left eye: No discharge. Conjunctiva/sclera: Conjunctivae normal.   Neck:      Thyroid: No thyromegaly. Vascular: No JVD. Cardiovascular:      Rate and Rhythm: Normal rate and regular rhythm. Heart sounds: Normal heart sounds. No murmur heard. No friction rub. No gallop. Pulmonary:      Effort: Pulmonary effort is normal. No respiratory distress. Breath sounds: Normal breath sounds. No wheezing or rales. Chest:      Chest wall: No tenderness. Abdominal:      General: Bowel sounds are normal. There is no distension. Palpations: Abdomen is soft. Tenderness: There is no abdominal tenderness. There is no guarding or rebound. Musculoskeletal:         General: No tenderness or deformity. Normal range of motion. Cervical back: Normal range of motion and neck supple. Skin:     General: Skin is warm and dry. Findings: No erythema or rash. Neurological:      Mental Status: She is alert. Mental status is at baseline. Cranial Nerves: No cranial nerve deficit. Sensory: No sensory deficit. Motor: No abnormal muscle tone. Coordination: Coordination normal.           Additional Data:     Labs:    Results from last 7 days   Lab Units 08/07/23  0703 08/06/23  0823   WBC Thousand/uL 10.02 13.50*   HEMOGLOBIN g/dL 7.3* 9.2*   HEMATOCRIT % 26.1* 32.6*   PLATELETS Thousands/uL 300 467*   NEUTROS PCT %  --  84*   LYMPHS PCT %  --  10*   MONOS PCT %  --  6   EOS PCT %  --  0     Results from last 7 days   Lab Units 08/07/23  0703 08/06/23  0924   SODIUM mmol/L 137 139   POTASSIUM mmol/L 4.0 3.9   CHLORIDE mmol/L 111* 109*   CO2 mmol/L 21 21   BUN mg/dL 17 19   CREATININE mg/dL 0.76 0.82   ANION GAP mmol/L 5 9   CALCIUM mg/dL 7.8* 8.9   ALBUMIN g/dL  --  4.2   TOTAL BILIRUBIN mg/dL  --  0.29   ALK PHOS U/L  --  118*   ALT U/L  --  3*   AST U/L  --  17   GLUCOSE RANDOM mg/dL 86 106                           * I Have Reviewed All Lab Data Listed Above. * Additional Pertinent Lab Tests Reviewed:  All Labs Within Last 24 Hours Reviewed    Imaging:    Imaging Reports Reviewed Today Include: na  Imaging Personally Reviewed by Myself Includes:  na    Recent Cultures (last 7 days):           Last 24 Hours Medication List:   Current Facility-Administered Medications   Medication Dose Route Frequency Provider Last Rate   • acetaminophen  650 mg Oral Q6H PRN Sylvester Paradise, DO     • anastrozole  1 mg Oral Daily Sylvester Paradise, DO     • fondaparinux  2.5 mg Subcutaneous Daily Sylvester Paradise, DO     • HYDROmorphone  0.5 mg Intravenous Q4H PRN Sylvester Paradise, DO     • ondansetron  4 mg Intravenous Q6H PRN Caterina Pollard PA-C     • oxyCODONE  10 mg Oral Q4H PRN Sylvester Paradise, DO     • oxyCODONE  5 mg Oral Q4H PRN Sylvester Paradise, DO     • sodium chloride  125 mL/hr Intravenous Continuous Sylvester Paradise,  mL/hr (08/06/23 2224)   • tamsulosin  0.4 mg Oral Once Sylvester Paradise, DO          Today, Patient Was Seen By: Gomez Acevedo MD    ** Please Note: Dictation voice to text software may have been used in the creation of this document.  **

## 2023-08-08 ENCOUNTER — TELEPHONE (OUTPATIENT)
Dept: UROLOGY | Facility: CLINIC | Age: 53
End: 2023-08-08

## 2023-08-08 ENCOUNTER — ANESTHESIA EVENT (OUTPATIENT)
Dept: PERIOP | Facility: HOSPITAL | Age: 53
DRG: 660 | End: 2023-08-08
Payer: MEDICARE

## 2023-08-08 ENCOUNTER — ANESTHESIA (OUTPATIENT)
Dept: PERIOP | Facility: HOSPITAL | Age: 53
DRG: 660 | End: 2023-08-08
Payer: MEDICARE

## 2023-08-08 ENCOUNTER — APPOINTMENT (INPATIENT)
Dept: RADIOLOGY | Facility: HOSPITAL | Age: 53
DRG: 660 | End: 2023-08-08
Payer: MEDICARE

## 2023-08-08 DIAGNOSIS — N13.2 URETERAL STONE WITH HYDRONEPHROSIS: Primary | ICD-10-CM

## 2023-08-08 PROCEDURE — BT1D1ZZ FLUOROSCOPY OF RIGHT KIDNEY, URETER AND BLADDER USING LOW OSMOLAR CONTRAST: ICD-10-PCS | Performed by: RADIOLOGY

## 2023-08-08 PROCEDURE — 99232 SBSQ HOSP IP/OBS MODERATE 35: CPT | Performed by: INTERNAL MEDICINE

## 2023-08-08 PROCEDURE — C1894 INTRO/SHEATH, NON-LASER: HCPCS | Performed by: UROLOGY

## 2023-08-08 PROCEDURE — C1758 CATHETER, URETERAL: HCPCS | Performed by: UROLOGY

## 2023-08-08 PROCEDURE — C2617 STENT, NON-COR, TEM W/O DEL: HCPCS | Performed by: UROLOGY

## 2023-08-08 PROCEDURE — 0T768DZ DILATION OF RIGHT URETER WITH INTRALUMINAL DEVICE, VIA NATURAL OR ARTIFICIAL OPENING ENDOSCOPIC: ICD-10-PCS | Performed by: UROLOGY

## 2023-08-08 PROCEDURE — 74420 UROGRAPHY RTRGR +-KUB: CPT

## 2023-08-08 PROCEDURE — C1769 GUIDE WIRE: HCPCS | Performed by: UROLOGY

## 2023-08-08 PROCEDURE — 87086 URINE CULTURE/COLONY COUNT: CPT | Performed by: UROLOGY

## 2023-08-08 DEVICE — INLAY OPTIMA URETERAL STENT W/O GUIDEWIRE
Type: IMPLANTABLE DEVICE | Site: URETER | Status: FUNCTIONAL
Brand: BARD® INLAY OPTIMA® URETERAL STENT

## 2023-08-08 RX ORDER — CIPROFLOXACIN 2 MG/ML
400 INJECTION, SOLUTION INTRAVENOUS ONCE
Status: COMPLETED | OUTPATIENT
Start: 2023-08-08 | End: 2023-08-08

## 2023-08-08 RX ORDER — HYDROMORPHONE HCL/PF 1 MG/ML
0.5 SYRINGE (ML) INJECTION
Status: DISCONTINUED | OUTPATIENT
Start: 2023-08-08 | End: 2023-08-08

## 2023-08-08 RX ORDER — DEXAMETHASONE SODIUM PHOSPHATE 10 MG/ML
INJECTION, SOLUTION INTRAMUSCULAR; INTRAVENOUS AS NEEDED
Status: DISCONTINUED | OUTPATIENT
Start: 2023-08-08 | End: 2023-08-08

## 2023-08-08 RX ORDER — HYDROMORPHONE HCL/PF 1 MG/ML
0.5 SYRINGE (ML) INJECTION ONCE
Status: COMPLETED | OUTPATIENT
Start: 2023-08-08 | End: 2023-08-08

## 2023-08-08 RX ORDER — FENTANYL CITRATE 50 UG/ML
INJECTION, SOLUTION INTRAMUSCULAR; INTRAVENOUS AS NEEDED
Status: DISCONTINUED | OUTPATIENT
Start: 2023-08-08 | End: 2023-08-08

## 2023-08-08 RX ORDER — MAGNESIUM HYDROXIDE 1200 MG/15ML
LIQUID ORAL AS NEEDED
Status: DISCONTINUED | OUTPATIENT
Start: 2023-08-08 | End: 2023-08-08 | Stop reason: HOSPADM

## 2023-08-08 RX ORDER — MIDAZOLAM HYDROCHLORIDE 2 MG/2ML
INJECTION, SOLUTION INTRAMUSCULAR; INTRAVENOUS AS NEEDED
Status: DISCONTINUED | OUTPATIENT
Start: 2023-08-08 | End: 2023-08-08

## 2023-08-08 RX ORDER — ONDANSETRON 2 MG/ML
4 INJECTION INTRAMUSCULAR; INTRAVENOUS ONCE AS NEEDED
Status: DISCONTINUED | OUTPATIENT
Start: 2023-08-08 | End: 2023-08-08

## 2023-08-08 RX ORDER — IPRATROPIUM BROMIDE AND ALBUTEROL SULFATE 2.5; .5 MG/3ML; MG/3ML
3 SOLUTION RESPIRATORY (INHALATION) ONCE AS NEEDED
Status: DISCONTINUED | OUTPATIENT
Start: 2023-08-08 | End: 2023-08-08 | Stop reason: HOSPADM

## 2023-08-08 RX ORDER — PROPOFOL 10 MG/ML
INJECTION, EMULSION INTRAVENOUS AS NEEDED
Status: DISCONTINUED | OUTPATIENT
Start: 2023-08-08 | End: 2023-08-08

## 2023-08-08 RX ORDER — SODIUM CHLORIDE, SODIUM LACTATE, POTASSIUM CHLORIDE, CALCIUM CHLORIDE 600; 310; 30; 20 MG/100ML; MG/100ML; MG/100ML; MG/100ML
INJECTION, SOLUTION INTRAVENOUS CONTINUOUS PRN
Status: DISCONTINUED | OUTPATIENT
Start: 2023-08-08 | End: 2023-08-08

## 2023-08-08 RX ORDER — PHENYLEPHRINE HCL IN 0.9% NACL 1 MG/10 ML
SYRINGE (ML) INTRAVENOUS AS NEEDED
Status: DISCONTINUED | OUTPATIENT
Start: 2023-08-08 | End: 2023-08-08

## 2023-08-08 RX ORDER — CIPROFLOXACIN 500 MG/1
500 TABLET, FILM COATED ORAL 2 TIMES DAILY
Status: DISCONTINUED | OUTPATIENT
Start: 2023-08-08 | End: 2023-08-10 | Stop reason: HOSPADM

## 2023-08-08 RX ORDER — LIDOCAINE HYDROCHLORIDE 10 MG/ML
INJECTION, SOLUTION EPIDURAL; INFILTRATION; INTRACAUDAL; PERINEURAL AS NEEDED
Status: DISCONTINUED | OUTPATIENT
Start: 2023-08-08 | End: 2023-08-08

## 2023-08-08 RX ORDER — FENTANYL CITRATE/PF 50 MCG/ML
50 SYRINGE (ML) INJECTION
Status: DISCONTINUED | OUTPATIENT
Start: 2023-08-08 | End: 2023-08-08

## 2023-08-08 RX ORDER — ONDANSETRON 2 MG/ML
INJECTION INTRAMUSCULAR; INTRAVENOUS AS NEEDED
Status: DISCONTINUED | OUTPATIENT
Start: 2023-08-08 | End: 2023-08-08

## 2023-08-08 RX ADMIN — SODIUM CHLORIDE, SODIUM LACTATE, POTASSIUM CHLORIDE, AND CALCIUM CHLORIDE: .6; .31; .03; .02 INJECTION, SOLUTION INTRAVENOUS at 15:06

## 2023-08-08 RX ADMIN — HYDROMORPHONE HYDROCHLORIDE 0.5 MG: 1 INJECTION, SOLUTION INTRAMUSCULAR; INTRAVENOUS; SUBCUTANEOUS at 14:59

## 2023-08-08 RX ADMIN — SODIUM CHLORIDE 125 ML/HR: 0.9 INJECTION, SOLUTION INTRAVENOUS at 09:24

## 2023-08-08 RX ADMIN — FENTANYL CITRATE 25 MCG: 50 INJECTION INTRAMUSCULAR; INTRAVENOUS at 15:28

## 2023-08-08 RX ADMIN — FENTANYL CITRATE 25 MCG: 50 INJECTION INTRAMUSCULAR; INTRAVENOUS at 15:16

## 2023-08-08 RX ADMIN — PROPOFOL 200 MG: 10 INJECTION, EMULSION INTRAVENOUS at 15:13

## 2023-08-08 RX ADMIN — FENTANYL CITRATE 25 MCG: 50 INJECTION INTRAMUSCULAR; INTRAVENOUS at 15:30

## 2023-08-08 RX ADMIN — OXYCODONE HYDROCHLORIDE 10 MG: 5 TABLET ORAL at 18:16

## 2023-08-08 RX ADMIN — DEXAMETHASONE SODIUM PHOSPHATE 10 MG: 10 INJECTION, SOLUTION INTRAMUSCULAR; INTRAVENOUS at 15:25

## 2023-08-08 RX ADMIN — MIDAZOLAM HYDROCHLORIDE 2 MG: 1 INJECTION, SOLUTION INTRAMUSCULAR; INTRAVENOUS at 15:06

## 2023-08-08 RX ADMIN — HYDROMORPHONE HYDROCHLORIDE 0.5 MG: 1 INJECTION, SOLUTION INTRAMUSCULAR; INTRAVENOUS; SUBCUTANEOUS at 00:10

## 2023-08-08 RX ADMIN — FONDAPARINUX SODIUM 2.5 MG: 2.5 INJECTION, SOLUTION SUBCUTANEOUS at 08:04

## 2023-08-08 RX ADMIN — LIDOCAINE HYDROCHLORIDE 50 MG: 10 INJECTION, SOLUTION EPIDURAL; INFILTRATION; INTRACAUDAL; PERINEURAL at 15:13

## 2023-08-08 RX ADMIN — Medication 100 MCG: at 15:18

## 2023-08-08 RX ADMIN — HYDROMORPHONE HYDROCHLORIDE 0.5 MG: 1 INJECTION, SOLUTION INTRAMUSCULAR; INTRAVENOUS; SUBCUTANEOUS at 06:19

## 2023-08-08 RX ADMIN — HYDROMORPHONE HYDROCHLORIDE 0.5 MG: 1 INJECTION, SOLUTION INTRAMUSCULAR; INTRAVENOUS; SUBCUTANEOUS at 20:09

## 2023-08-08 RX ADMIN — FENTANYL CITRATE 50 MCG: 50 INJECTION INTRAMUSCULAR; INTRAVENOUS at 16:40

## 2023-08-08 RX ADMIN — ONDANSETRON 4 MG: 2 INJECTION INTRAMUSCULAR; INTRAVENOUS at 15:25

## 2023-08-08 RX ADMIN — ACETAMINOPHEN 650 MG: 325 TABLET, FILM COATED ORAL at 12:46

## 2023-08-08 RX ADMIN — OXYCODONE HYDROCHLORIDE 5 MG: 5 TABLET ORAL at 22:43

## 2023-08-08 RX ADMIN — CIPROFLOXACIN: 2 INJECTION, SOLUTION INTRAVENOUS at 15:00

## 2023-08-08 RX ADMIN — ANASTROZOLE 1 MG: 1 TABLET, COATED ORAL at 05:04

## 2023-08-08 RX ADMIN — HYDROMORPHONE HYDROCHLORIDE 0.5 MG: 1 INJECTION, SOLUTION INTRAMUSCULAR; INTRAVENOUS; SUBCUTANEOUS at 12:46

## 2023-08-08 RX ADMIN — FENTANYL CITRATE 25 MCG: 50 INJECTION INTRAMUSCULAR; INTRAVENOUS at 15:13

## 2023-08-08 RX ADMIN — OXYCODONE HYDROCHLORIDE 10 MG: 5 TABLET ORAL at 05:04

## 2023-08-08 RX ADMIN — CIPROFLOXACIN HYDROCHLORIDE 500 MG: 500 TABLET, FILM COATED ORAL at 18:16

## 2023-08-08 RX ADMIN — OXYCODONE HYDROCHLORIDE 5 MG: 5 TABLET ORAL at 08:03

## 2023-08-08 RX ADMIN — HYDROMORPHONE HYDROCHLORIDE 0.5 MG: 1 INJECTION, SOLUTION INTRAMUSCULAR; INTRAVENOUS; SUBCUTANEOUS at 10:09

## 2023-08-08 NOTE — ANESTHESIA PREPROCEDURE EVALUATION
Procedure:  CYSTOSCOPY URETEROSCOPY WITH LITHOTRIPSY HOLMIUM LASER, RETROGRADE PYELOGRAM AND INSERTION STENT URETERAL (Right: Bladder)    Relevant Problems   GI/HEPATIC   (+) Gastroesophageal reflux disease   (+) Hiatal hernia      /RENAL   (+) Ureteral stone with hydronephrosis      GYN   (+) History of hysterectomy      HEMATOLOGY   (+) Anemia      MUSCULOSKELETAL   (+) Arthritis   (+) Hiatal hernia   (+) Rheumatoid arthritis (HCC)      PULMONARY   (+) Sleep apnea      Digestive   (+) Postsurgical malabsorption      Other   (+) Bariatric surgery status   (+) History of radiation therapy   (+) Obesity, Class III, BMI 40-49.9 (morbid obesity) (HCC)   (+) Personal history of malignant neoplasm of breast   (+) S/P right latissimus dorsi flap             Anesthesia Plan  ASA Score- 3     Anesthesia Type- general with ASA Monitors. Additional Monitors:   Airway Plan: LMA. Plan Factors-    Chart reviewed. EKG reviewed. Existing labs reviewed. Patient summary reviewed. Patient is not a current smoker. Induction- intravenous. Postoperative Plan- Plan for postoperative opioid use. Planned trial extubation    Informed Consent- Anesthetic plan and risks discussed with patient. I personally reviewed this patient with the CRNA. Discussed and agreed on the Anesthesia Plan with the CRNA. Tori Warede           VITALS  /74   Pulse 91   Temp 98.1 °F (36.7 °C)   Resp 16   Ht 5' (1.524 m)   Wt 112 kg (246 lb 14.6 oz)   SpO2 100%   BMI 48.22 kg/m²   BP Readings from Last 3 Encounters:   08/08/23 168/74   06/13/23 164/82   01/13/23 163/70     LABS  Results from Last 12 Months   Lab Units 08/07/23  0703 08/06/23  0924 05/30/23  0835 12/05/22  0720   SODIUM mmol/L 137 139   < > 139   POTASSIUM mmol/L 4.0 3.9   < > 3.7   CHLORIDE mmol/L 111* 109*   < > 110*   CO2 mmol/L 21 21   < > 25   ANION GAP mmol/L 5 9   < > 4   BUN mg/dL 17 19   < > 13   CREATININE mg/dL 0.76 0.82   < > 0.60   CALCIUM mg/dL 7.8* 8.9 < > 8.6   GLUCOSE RANDOM mg/dL 86 106  --   --    AST U/L  --  17  --  17   ALT U/L  --  3*  --  12   ALK PHOS U/L  --  118*  --  121*   TOTAL BILIRUBIN mg/dL  --  0.29  --  0.32   ALBUMIN g/dL  --  4.2  --  3.2*    < > = values in this interval not displayed. Results from Last 12 Months   Lab Units 08/07/23  0703 08/06/23  0823   WBC Thousand/uL 10.02 13.50*   HEMOGLOBIN g/dL 7.3* 9.2*   HEMATOCRIT % 26.1* 32.6*   PLATELETS Thousands/uL 300 467*     No results for input(s): "APTT", "INR", "PTT" in the last 8784 hours. ECG  2021   Narrative & Impression    Sinus rhythm with short NM     Confirmed by Leilani Coelho ([id number]) on 10/18/2021 8:45:00 PM         ECHOCARDIOGRAPHY, OTHER CARDIAC TESTING, AND IMAGING  n/a    ANESTHESIA RISK-BENEFIT DISCUSSION  BENEFITS INCLUDE THE FOLLOWING ([address] [address], PMID [id number]): (1) Involvement of a dedicated anesthesia team reduces mortality and morbidity for major surgeries, (2) The team provides as much analgesia/sedation/amnesia/akinesia as is reasonably possible, and (3) The team strives to reduce pain and discomfort as much as reasonably possible. RISKS (AND PLANS TO MITIGATE RISKS) INCLUDES THE FOLLOWING:    Neurologic Risks: IntraOp awareness (Risk is ~1:1,000 - 1:14,000; PMID [id number]), Stroke (Risk ~<0.1-2% for most cases; PMID [id number]), and POCD. Airway and Pulmonary Risks: Dental or mouth injury, throat pain, critical hypoxia, pneumothorax, prolonged intubation, post-op respiratory compromise. • Airway/Intubation risks and information: BMI 40 or higher and Placement Date: 01/13/23; Placement Time: 5286; Mask Ventilation: Ventilated by mask with oral airway (2); Technique: Direct laryngoscopy; Type: Cuffed, Oral; Tube Size: 7 mm; Laryngoscope: Mac; Blade Size: 3; Location: Oral; Grade View: 1; Insertion Attempts: 1; Placement Verification: Cuff palpitation, End tidal CO2, Symmetrical chest wall movement; Placed By: CRNA; Removal Date: 01/13/23;  Removal Time: 1490  • Major ARISCAT risk factors include: none, (Score 0-1= Low risk, 1.6%). Cardiovascular Risks: Hypotension, arrhythmias, and frederick-op cardiac injury (MACE). In cases where specialized vascular access is needed, then additional risks including bleeding, infection, or injury to adjacent structures. If a bypass circuit is needed then risk of stroke, blood clot, and vasoplegia. • Signs of active, severe cardiac instability: none  • Caleb's RCRI score items: none  • MACE risk: An RCRI score of 0= 0.4% risk  • Are frederick-op or intra-op beta blockers indicated?: no   FEN/GI Risks: Aspiration (Approximately 0.5% risk per the IRIS trial) and PONV (10-80% depending on Apfel criteria). • Does the patient meet ASA NPO guidelines?: Yes   Adverse drug reaction risks: Allergic reactions, overdoses, drug-drug interactions, injury to a fetus or  in pregnant or breastfeeding patients, increased risk of injury or accident if performing potentially hazardous tasks before anesthetic medications have been fully excreted/metabolized.   • Recent medications relevant to anesthetic plan: See MAR  • Personal or family history of anesthesia complications: no  • Pregnancy Status: Not applicable   Mortality risks associated with anesthesia based on ASA-PS (PMID [id number]): ASA-PS III: Estimated risk 1:3,500

## 2023-08-08 NOTE — TELEPHONE ENCOUNTER
----- Message from Dandre Patel MD sent at 8/8/2023  4:09 PM EDT -----  Ric Myers underwent right ureteroscopy. She passed her stone. She has a stent in place with a string and was instructed to remove her own stent on Friday morning. Please call patient early next week to ensure that the stone has been removed. Follow-up in the next 6 to 8 weeks with advanced practitioner in the outpatient setting. If the patient is unable to remove her own stent she will require a nurse visit this week Friday or early next week.

## 2023-08-08 NOTE — ANESTHESIA POSTPROCEDURE EVALUATION
Post-Op Assessment Note    CV Status:  Stable  Pain Score: 0    Pain management: adequate     Mental Status:  Awake and sleepy   Hydration Status:  Euvolemic   PONV Controlled:  Controlled   Airway Patency:  Patent and adequate   Two or more mitigation strategies used for obstructive sleep apnea   Post Op Vitals Reviewed: Yes      Staff: CRNA         No notable events documented.     BP   148/87   Temp   98.2   Pulse  100   Resp 20   SpO2 100

## 2023-08-08 NOTE — PROGRESS NOTES
Today Viktoria Ferreira underwent right ureteroscopy. She appeared to have passed her stone. There is no stone identified within the bladder, ureter, or in the collecting system. I placed a right ureteral stent with a string in place. The patient is cleared for discharge from urologic standpoint as long as her pain is controlled, she remains afebrile with stable vital signs, she is able to urinate and tolerate p.o. The patient is instructed to remove her own stent on Friday morning by removing the tape and pulling the string. Please discharge her home with 3 days of Cipro 500 mg twice daily and 6 Norco or Percocet tablets. Outpatient follow-up with urology will be arranged.

## 2023-08-08 NOTE — OP NOTE
OPERATIVE REPORT  PATIENT NAME: Delvin Madden    :  1970  MRN: [id number]  Pt Location: [facility] OR ROOM 03    SURGERY DATE: 2023    Surgeon(s) and Role:     Monica Powell MD - Primary    Preop Diagnosis:  Right ureteral stone [N20.1]  Right proximal ureteral calculus    Post-Op Diagnosis Codes:     * Right ureteral stone [N20.1]   Passed right proximal ureteral calculus    Procedure(s):  Cystoscopy, right retrograde pyelogram, right ureteroscopy, right stent insertion    Specimen(s):  ID Type Source Tests Collected by Time Destination   A : Urine Culture Urine Urine, Other URINE CULTURE Monica Powell MD 2023 1542        Estimated Blood Loss:   Minimal    Drains:  Closed/Suction Drain Right;Lateral Chest Bulb 15 Fr. (Active)   Number of days: 483       Closed/Suction Drain Lateral;Right Chest Bulb 15 Fr. (Active)   Number of days: 483       Ureteral Internal Stent Right ureter 6 Fr. (Active)   Number of days: 0       Anesthesia Type:   General    Operative Indications:  Right ureteral stone [N20.1]      Operative Findings:  Entire length of the right ureter and collecting system was interrogated with both a long semirigid as well as a flexible ureteroscope. There was no sign of ureteral calculi. The patient had likely passed her stone prior to surgery. A right 22-6 [place] double-J ureteral stent with string was placed. Complications:   None    Procedure and Technique:  Bhupinder Jones is a 27-year-old female with right flank pain secondary to a 4 mm right proximal ureteral calculus. She continues to have right-sided flank pain. Fortunately she has no sign of fever or leukocytosis. Risk and benefits of ureteroscopy were discussed and reviewed. Risk of bleeding, infection, ureteral injury, sepsis, and need for additional surgery were discussed and reviewed. Informed consent was obtained. Patient was brought to the operating room on 2023.   After the smooth induction of general LMA anesthesia, patient was placed in dorsolithotomy position. Genitalia prepped and draped in sterile fashion. Intravenous antibiotics were administered. Timeout was performed with all members of the operative team confirming the patient's identity, procedure to be performed, and laterality of the case. A 22 [place] rigid cystoscope with 30 degree lens was inserted. The bladder was thoroughly inspected. The cystocele was noted. There were no stones identified within the bladder. 5 [place] open-ended catheter was inserted into the right ureteral orifice. A gentle right retrograde pyelogram was performed. No filling defects were identified. I passed a wire into the collecting system followed by a long semirigid ureteroscope and inspected the ureter from the UVJ to the UPJ. There were no stones visualized. Additional contrast was injected and there were no filling defects. To ensure that the stone was not pushed back into the kidney on the initial retrograde pyelogram a second wire was inserted followed by ureteral access sheath followed by an Olympus 5.3 [place] flexible ureteroscope. The lower, mid, and upper calyces along with the right renal pelvis were all thoroughly inspected. There were no stones visualized. The scope and the sheath were withdrawn simultaneously through the length of the ureter to ensure that there were no residual stones. There were no stones identified. There was minimal to mild mucosal trauma from the access sheath. The wire was backloaded through the cystoscope and a right 22-6 [place] double-J ureteral stent was then placed in standard fashion. The proximal coil was appreciated within the right renal pelvis and the distal coil was visualized within the bladder. The bladder was emptied and the cystoscope was removed. String was left in place and secured to the patient's inner thigh with Tegaderm.     Overall patient tolerated the procedure well and there were no complications. The patient was extubated in the operating room and transferred the PACU in stable condition at the conclusion of the case.       Patient Disposition:  PACU stable and extubated      SIGNATURE: Macrellus Melendrez MD  DATE: August 8, 2023  TIME: 4:01 PM

## 2023-08-08 NOTE — PROGRESS NOTES
[address]  Progress Note  Name: María Torres  MRN: [id number]  Unit/Bed#: -01 I Date of Admission: 2023   Date of Service: 2023 I Hospital Day: 0    Assessment/Plan   * Ureteral stone with hydronephrosis  Assessment & Plan  · Patient presenting secondary to worsening right-sided pain  · CT imaging showing ureteral stone with mild to moderate hydronephrosis  · Kidney function stable at this time  · Continue with pain control  · Continue IV fluids  · Plan for ureteral stent placement by urology today  · Follow-up further recommendations    Leukocytosis  Assessment & Plan  · Raise patient with elevated WBC of 13.5  · Likely reactive from stress and pain  · Has since resolved    Gastroesophageal reflux disease  Assessment & Plan       Anemia  Assessment & Plan  · Patient noted to have hemoglobin fluctuating between 8-9  · No overt bleeding  · hgb stable           VTE Pharmacologic Prophylaxis:   Pharmacologic: Fondaparinux (Arixtra)  Mechanical VTE Prophylaxis in Place: Yes    Patient Centered Rounds: I have performed bedside rounds with nursing staff today. Discussions with Specialists or Other Care Team Provider: cm, nursing    Education and Discussions with Family / Patient: pt    Time Spent for Care: 30 minutes. More than 50% of total time spent on counseling and coordination of care as described above. Current Length of Stay: 0 day(s)    Current Patient Status: Observation   Certification Statement: The patient will continue to require additional inpatient hospital stay due to see below    Discharge Plan: Pending further pain control.   Anticipate discharge within 24 hours  Code Status: Level 1 - Full Code      Subjective:   Denies fevers, shortness of breath, chest pain, abdominal pain    Objective:     Vitals:   Temp (24hrs), Av °F (36.7 °C), Min:97.7 °F (36.5 °C), Max:98.4 °F (36.9 °C)    Temp:  [97.7 °F (36.5 °C)-98.4 °F (36.9 °C)] 97.7 °F (36.5 °C)  HR: [81-92] 92  Resp:  [18] 18  BP: (132-163)/(75-84) 163/84  SpO2:  [96 %-97 %] 96 %  Body mass index is 48.01 kg/m². Input and Output Summary (last 24 hours): Intake/Output Summary (Last 24 hours) at 8/8/2023 0847  Last data filed at 8/8/2023 3803  Gross per 24 hour   Intake 3120 ml   Output 1500 ml   Net 1620 ml       Physical Exam:     Physical Exam  Constitutional:       General: She is not in acute distress. Appearance: She is well-developed. She is not diaphoretic. HENT:      Head: Normocephalic and atraumatic. Nose: Nose normal.      Mouth/Throat:      Pharynx: No oropharyngeal exudate. Eyes:      General: No scleral icterus. Right eye: No discharge. Left eye: No discharge. Conjunctiva/sclera: Conjunctivae normal.   Neck:      Thyroid: No thyromegaly. Vascular: No JVD. Cardiovascular:      Rate and Rhythm: Normal rate and regular rhythm. Heart sounds: Normal heart sounds. No murmur heard. No friction rub. No gallop. Pulmonary:      Effort: Pulmonary effort is normal. No respiratory distress. Breath sounds: Normal breath sounds. No wheezing or rales. Chest:      Chest wall: No tenderness. Abdominal:      General: Bowel sounds are normal. There is no distension. Palpations: Abdomen is soft. Tenderness: There is no abdominal tenderness. There is no guarding or rebound. Musculoskeletal:         General: No tenderness or deformity. Normal range of motion. Cervical back: Normal range of motion and neck supple. Skin:     General: Skin is warm and dry. Findings: No erythema or rash. Neurological:      Mental Status: She is alert. Mental status is at baseline. Cranial Nerves: No cranial nerve deficit. Sensory: No sensory deficit. Motor: No abnormal muscle tone.       Coordination: Coordination normal.         Additional Data:     Labs:    Results from last 7 days   Lab Units 08/07/23  0703 08/06/23  0823   WBC Thousand/uL 10.02 13.50*   HEMOGLOBIN g/dL 7.3* 9.2*   HEMATOCRIT % 26.1* 32.6*   PLATELETS Thousands/uL 300 467*   NEUTROS PCT %  --  84*   LYMPHS PCT %  --  10*   MONOS PCT %  --  6   EOS PCT %  --  0     Results from last 7 days   Lab Units 08/07/23  0703 08/06/23  0924   SODIUM mmol/L 137 139   POTASSIUM mmol/L 4.0 3.9   CHLORIDE mmol/L 111* 109*   CO2 mmol/L 21 21   BUN mg/dL 17 19   CREATININE mg/dL 0.76 0.82   ANION GAP mmol/L 5 9   CALCIUM mg/dL 7.8* 8.9   ALBUMIN g/dL  --  4.2   TOTAL BILIRUBIN mg/dL  --  0.29   ALK PHOS U/L  --  118*   ALT U/L  --  3*   AST U/L  --  17   GLUCOSE RANDOM mg/dL 86 106                           * I Have Reviewed All Lab Data Listed Above. * Additional Pertinent Lab Tests Reviewed: All Labs Within Last 24 Hours Reviewed    Imaging:    Imaging Reports Reviewed Today Include: na  Imaging Personally Reviewed by Myself Includes:  na    Recent Cultures (last 7 days):           Last 24 Hours Medication List:   Current Facility-Administered Medications   Medication Dose Route Frequency Provider Last Rate   • acetaminophen  650 mg Oral Q6H PRN Jamie Yang DO     • anastrozole  1 mg Oral Daily Jamie Yang DO     • fondaparinux  2.5 mg Subcutaneous Daily Jamie Yang DO     • HYDROmorphone  0.5 mg Intravenous Q4H PRN Jamie Yang DO     • ondansetron  4 mg Intravenous Q6H PRN Kurtis Mead PA-C     • oxyCODONE  10 mg Oral Q4H PRN Jamie Yang DO     • oxyCODONE  5 mg Oral Q4H PRN Jamie Yang DO     • sodium chloride  125 mL/hr Intravenous Continuous Jamie Yang  mL/hr (08/07/23 1756)   • tamsulosin  0.4 mg Oral Once Jamie Yang DO          Today, Patient Was Seen By: Oracio Bermudez MD    ** Please Note: Dictation voice to text software may have been used in the creation of this document.  **

## 2023-08-08 NOTE — PROGRESS NOTES
Progress Note - Urology  Lauren Murillo 1970, 47 y.o. female MRN: [id number]    Unit/Bed#: [facility] 316[facility] Encounter: [id number]    Assessment & Plan:  1. Right proximal ureteral calculus  2. Mild to moderate hydronephrosis  - CT scan showing 4 mm x 2 mm stone in right proximal ureter with mild to moderate hydronephrosis  - Vital signs stable, afebrile  - Mild leukocytosis 13 on admission, now resolved  - Creatinine at baseline  - UA negative   - Continued right-sided flank pain, uncontrolled with oral medications  - Discussed with patient cystoscopy, ureteroscopy, holmium laser lithotripsy, retrograde pyelogram, insertion of right ureteral stent. Discussed risks of procedure including risk of anesthesia, bleeding, infection, damage to kidneys, ureter,  Bladder, inability to treat stone, need for delayed ureteroscopy. Discussed stent colic.  - Consent signed  - Proceed to the OR    Subjective:   HPI: Continued right-sided flank pain. Review of Systems   Constitutional: Negative for chills and fever. Respiratory: Negative for cough and shortness of breath. Cardiovascular: Negative for chest pain and palpitations. Gastrointestinal: Negative for abdominal pain and vomiting. Genitourinary: Positive for flank pain. Negative for dysuria and hematuria. Musculoskeletal: Negative for arthralgias and back pain. Skin: Negative for color change and rash. Neurological: Negative for seizures and syncope. All other systems reviewed and are negative. Objective:  Vitals: Blood pressure 163/84, pulse 92, temperature 97.7 °F (36.5 °C), resp. rate 18, height 5' (1.524 m), weight 112 kg (245 lb 13 oz), SpO2 96 %. ,Body mass index is 48.01 kg/m². Physical Exam  Vitals reviewed. Constitutional:       General: She is not in acute distress. Appearance: Normal appearance. She is normal weight. She is not ill-appearing, toxic-appearing or diaphoretic. HENT:      Head: Normocephalic and atraumatic. Right Ear: External ear normal.      Left Ear: External ear normal.      Nose: Nose normal.      Mouth/Throat:      Mouth: Mucous membranes are moist.   Eyes:      General: No scleral icterus. Conjunctiva/sclera: Conjunctivae normal.   Cardiovascular:      Rate and Rhythm: Normal rate and regular rhythm. Pulses: Normal pulses. Heart sounds: Normal heart sounds. No murmur heard. No friction rub. No gallop. Pulmonary:      Effort: Pulmonary effort is normal. No respiratory distress. Breath sounds: Normal breath sounds. No stridor. No wheezing, rhonchi or rales. Abdominal:      General: Abdomen is flat. There is no distension. Palpations: Abdomen is soft. Tenderness: There is no abdominal tenderness. There is right CVA tenderness. There is no left CVA tenderness or guarding. Musculoskeletal:         General: Normal range of motion. Cervical back: Normal range of motion. Skin:     General: Skin is warm. Findings: No rash. Neurological:      General: No focal deficit present. Mental Status: She is alert and oriented to person, place, and time. Mental status is at baseline. Psychiatric:         Mood and Affect: Mood normal.         Behavior: Behavior normal.         Thought Content:  Thought content normal.         Judgment: Judgment normal.       Labs:  Recent Labs     08/06/23  0823 08/07/23  0703   WBC 13.50* 10.02     Recent Labs     08/06/23  0823 08/07/23  0703   HGB 9.2* 7.3*     Recent Labs     08/06/23  0823 08/07/23  0703   HCT 32.6* 26.1*     Recent Labs     08/06/23  0924 08/07/23  0703   CREATININE 0.82 0.76       History:  Past Medical History:   Diagnosis Date   • Anesthesia complication     needs IR preprocedure for IV access/not ASC candidate   • Breast cancer ([address])    • Breast cancer ([address])    • Breast cancer, right breast (HCC)    • Cancer (HCC)    • Chronic pain disorder    • CPAP (continuous positive airway pressure) dependence     no currently used   • GERD (gastroesophageal reflux disease)    • Heartburn    • Hiatal hernia    • History of bariatric surgery    • Irregular heart beat     pt states it was a side effect of a medication   • Kidney stone    • Lung nodule    • Osteoporosis     stage 3   • Postsurgical malabsorption    • RA (rheumatoid arthritis) (HCC)    • Rheumatoid arthritis (HCC)    • Sleep apnea    • Stress incontinence    • Thyroid nodule      Social History     Socioeconomic History   • Marital status: Single     Spouse name: None   • Number of children: None   • Years of education: None   • Highest education level: None   Occupational History   • None   Tobacco Use   • Smoking status: Never   • Smokeless tobacco: Never   Vaping Use   • Vaping Use: Never used   Substance and Sexual Activity   • Alcohol use: Not Currently   • Drug use: Never   • Sexual activity: None   Other Topics Concern   • None   Social History Narrative   • None     Social Determinants of Health     Financial Resource Strain: Not on file   Food Insecurity: No Food Insecurity (8/7/2023)    Hunger Vital Sign    • Worried About Running Out of Food in the Last Year: Never true    • Ran Out of Food in the Last Year: Never true   Transportation Needs: No Transportation Needs (8/7/2023)    PRAPARE - Transportation    • Lack of Transportation (Medical): No    • Lack of Transportation (Non-Medical):  No   Physical Activity: Not on file   Stress: Not on file   Social Connections: Not on file   Intimate Partner Violence: Not on file   Housing Stability: Low Risk  (8/7/2023)    Housing Stability Vital Sign    • Unable to Pay for Housing in the Last Year: No    • Number of Places Lived in the Last Year: 1    • Unstable Housing in the Last Year: No     Past Surgical History:   Procedure Laterality Date   • ABDOMINAL SURGERY     • BRACHIOPLASTY Bilateral     2017   • BREAST IMPLANT Bilateral 9/8/2020    Procedure: BREAST REMOVAL TISSUE EXPANDER/ IMPLANT PLACEMENT BREAST;  Surgeon: Yogesh Clifford MD;  [facility];  Service: Plastics   • BREAST IMPLANT Left 2/9/2021    Procedure: BREAST TISSUE EXPANDER/ IMPLANT EXCHANGE;  Surgeon: Yogesh Clifford MD;  [facility];  Service: Plastics   • CAPSULOTOMY Bilateral 9/8/2020    Procedure: BREAST CAPSULOTOMY;  Surgeon: Yogesh Clifford MD;  [facility];  Service: Plastics   • CAPSULOTOMY Left 2/9/2021    Procedure: BREAST CAPSULOTOMY;  Surgeon: Yogesh Clifford MD;  [facility];  Service: Plastics   • CARDIAC CATHETERIZATION     • CARPAL TUNNEL RELEASE Bilateral 2015   • CHOLECYSTECTOMY     • COLONOSCOPY     • GALLBLADDER SURGERY  2015   • GASTRIC BYPASS  2014   • HYSTERECTOMY  05/2013   • INCONTINENCE SURGERY  09/2013   • IR BIOPSY LUNG      lung nodules   • LIPOSUCTION Right 4/12/2022    Procedure: LIPOSUCTION RIGHT BREAST;  Surgeon: Thu Corcoran MD;  [facility];  Service: Plastics   • LIPOSUCTION W/ FAT INJECTION Bilateral 11/10/2021    Procedure: FAT GRAFTING BILATERAL BREAST;  Surgeon: Yogesh Clifford MD;  [facility];  Service: Plastics   • MASTECTOMY Bilateral 10/2016   • MN BREAST RECONSTRUCTION W/LATISSIMUS DORSI FLAP Right 8/6/2019    Procedure: BREAST RECONSTRUCTION W/FLAP LATISSIMUS DORSI;  Surgeon: Yogesh Clifford MD;  [facility];  Service: Plastics   • MN GRAFTING OF AUTOLOGOUS FAT BY LIPO 50 CC OR LESS Bilateral 1/13/2023    Procedure: BILATERAL BREAST FAT GRAFTING;  Surgeon: Thu Corcoran MD;  [facility];  Service: Plastics   • MN GRAFTING OF AUTOLOGOUS FAT BY LIPO 50 CC OR LESS Bilateral 6/13/2023    Procedure: FAT GRAFTING TO BILATERAL BREASTS;  Surgeon: Thu Corcoran MD;  [facility];  Service: Plastics   • MN DOMINGA-IMPLANT CAPSULECTOMY BREAST COMPLETE Bilateral 8/6/2019    Procedure: BREAST CAPSULECTOMY;  Surgeon: Yogesh Clifford MD;  [facility];  Service: Plastics   • NE REMOVAL INTACT BREAST IMPLANT Bilateral 8/6/2019    Procedure: BREAST IMPLANT REMOVAL;  Surgeon: Alaina Barrera MD;  [facility];  Service: Plastics   • NE REMOVAL INTACT BREAST IMPLANT Right 4/12/2022    Procedure: removal of right breast implant, capsulectomy, placement of right breast implant with acelluar dermal matrix.;  Surgeon: Kerry Live MD;  [facility];  Service: Plastics   • NE REVISION OF RECONSTRUCTED BREAST Right 9/7/2021    Procedure: RIGHT BREAST MOUND REVISION;  Surgeon: Alaina Barrera MD;  [facility];  Service: Plastics   • NE TISSUE EXPANDER PLACEMENT BREAST RECONSTRUCTION Bilateral 8/6/2019    Procedure: BREAST TISSUE EXPANDER PLACEMENT;  Surgeon: Alaina Barrera MD;  [facility];  Service: Plastics   • REDUCTION MAMMAPLASTY     • REVISION OF SCAR Bilateral 11/10/2021    Procedure: Kobe RAMIREZ;  Surgeon: Alaina Barrera MD;  [facility];  Service: Plastics   • TRANSFER MUSCLE PECTORALIS Bilateral 8/6/2019    Procedure: Janas Olszewski;  Surgeon: Alaina Barrera MD;  [facility];  Service: Plastics     Family History   Problem Relation Age of Onset   • No Known Problems Mother    • Diabetes Father    • Heart disease Father    • No Known Problems Brother        Stephanie Char Miller  Date: 8/8/2023 Time: 8:08 AM

## 2023-08-08 NOTE — ASSESSMENT & PLAN NOTE
· Patient presenting secondary to worsening right-sided pain  · CT imaging showing ureteral stone with mild to moderate hydronephrosis  · Kidney function stable at this time  · Continue with pain control  · Continue IV fluids  · Plan for ureteral stent placement by urology today  · Follow-up further recommendations

## 2023-08-08 NOTE — PLAN OF CARE
Problem: MOBILITY - ADULT  Goal: Maintain or return to baseline ADL function  Description: INTERVENTIONS:  -  Assess patient's ability to carry out ADLs; assess patient's baseline for ADL function and identify physical deficits which impact ability to perform ADLs (bathing, care of mouth/teeth, toileting, grooming, dressing, etc.)  - Assess/evaluate cause of self-care deficits   - Assess range of motion  - Assess patient's mobility; develop plan if impaired  - Assess patient's need for assistive devices and provide as appropriate  - Encourage maximum independence but intervene and supervise when necessary  - Involve family in performance of ADLs  - Assess for home care needs following discharge   - Consider OT consult to assist with ADL evaluation and planning for discharge  - Provide patient education as appropriate  Outcome: Progressing  Goal: Maintains/Returns to pre admission functional level  Description: INTERVENTIONS:  - Perform BMAT or MOVE assessment daily.   - Set and communicate daily mobility goal to care team and patient/family/caregiver.    - Collaborate with rehabilitation services on mobility goals if consulted  - Out of bed for toileting  - Record patient progress and toleration of activity level   Outcome: Progressing     Problem: PAIN - ADULT  Goal: Verbalizes/displays adequate comfort level or baseline comfort level  Description: Interventions:  - Encourage patient to monitor pain and request assistance  - Assess pain using appropriate pain scale  - Administer analgesics based on type and severity of pain and evaluate response  - Implement non-pharmacological measures as appropriate and evaluate response  - Consider cultural and social influences on pain and pain management  - Notify physician/advanced practitioner if interventions unsuccessful or patient reports new pain  Outcome: Progressing     Problem: INFECTION - ADULT  Goal: Absence or prevention of progression during hospitalization  Description: INTERVENTIONS:  - Assess and monitor for signs and symptoms of infection  - Monitor lab/diagnostic results  - Monitor all insertion sites, i.e. indwelling lines, tubes, and drains  - Monitor endotracheal if appropriate and nasal secretions for changes in amount and color  - [place] appropriate cooling/warming therapies per order  - Administer medications as ordered  - Instruct and encourage patient and family to use good hand hygiene technique  - Identify and instruct in appropriate isolation precautions for identified infection/condition  Outcome: Progressing  Goal: Absence of fever/infection during neutropenic period  Description: INTERVENTIONS:  - Monitor WBC    Outcome: Progressing     Problem: SAFETY ADULT  Goal: Maintain or return to baseline ADL function  Description: INTERVENTIONS:  -  Assess patient's ability to carry out ADLs; assess patient's baseline for ADL function and identify physical deficits which impact ability to perform ADLs (bathing, care of mouth/teeth, toileting, grooming, dressing, etc.)  - Assess/evaluate cause of self-care deficits   - Assess range of motion  - Assess patient's mobility; develop plan if impaired  - Assess patient's need for assistive devices and provide as appropriate  - Encourage maximum independence but intervene and supervise when necessary  - Involve family in performance of ADLs  - Assess for home care needs following discharge   - Consider OT consult to assist with ADL evaluation and planning for discharge  - Provide patient education as appropriate  Outcome: Progressing  Goal: Maintains/Returns to pre admission functional level  Description: INTERVENTIONS:  - Perform BMAT or MOVE assessment daily.   - Set and communicate daily mobility goal to care team and patient/family/caregiver.    - Collaborate with rehabilitation services on mobility goals if consulte  - Out of bed for toileting  - Record patient progress and toleration of activity level   Outcome: Progressing  Goal: Patient will remain free of falls  Description: INTERVENTIONS:  - Educate patient/family on patient safety including physical limitations  - Instruct patient to call for assistance with activity   - Consult OT/PT to assist with strengthening/mobility   - Keep Call bell within reach  - Keep bed low and locked with side rails adjusted as appropriate  - Keep care items and personal belongings within reach  - Initiate and maintain comfort rounds  - Make Fall Risk Sign visible to staff  - Apply yellow socks and bracelet for high fall risk patients  - Consider moving patient to room near nurses station  Outcome: Progressing     Problem: DISCHARGE PLANNING  Goal: Discharge to home or other facility with appropriate resources  Description: INTERVENTIONS:  - Identify barriers to discharge w/patient and caregiver  - Arrange for needed discharge resources and transportation as appropriate  - Identify discharge learning needs (meds, wound care, etc.)  - Arrange for interpretive services to assist at discharge as needed  - Refer to Case Management Department for coordinating discharge planning if the patient needs post-hospital services based on physician/advanced practitioner order or complex needs related to functional status, cognitive ability, or social support system  Outcome: Progressing     Problem: Knowledge Deficit  Goal: Patient/family/caregiver demonstrates understanding of disease process, treatment plan, medications, and discharge instructions  Description: Complete learning assessment and assess knowledge base.   Interventions:  - Provide teaching at level of understanding  - Provide teaching via preferred learning methods  Outcome: Progressing

## 2023-08-09 LAB
ANION GAP SERPL CALCULATED.3IONS-SCNC: 7 MMOL/L
BACTERIA UR CULT: NORMAL
BASOPHILS # BLD AUTO: 0 THOUSANDS/ÂΜL (ref 0–0.1)
BASOPHILS NFR BLD AUTO: 0 % (ref 0–1)
BUN SERPL-MCNC: 12 MG/DL (ref 5–25)
CALCIUM SERPL-MCNC: 9 MG/DL (ref 8.4–10.2)
CHLORIDE SERPL-SCNC: 108 MMOL/L (ref 96–108)
CO2 SERPL-SCNC: 21 MMOL/L (ref 21–32)
CREAT SERPL-MCNC: 0.77 MG/DL (ref 0.6–1.3)
EOSINOPHIL # BLD AUTO: 0 THOUSAND/ÂΜL (ref 0–0.61)
EOSINOPHIL NFR BLD AUTO: 0 % (ref 0–6)
ERYTHROCYTE [DISTWIDTH] IN BLOOD BY AUTOMATED COUNT: 21.1 % (ref 11.6–15.1)
GFR SERPL CREATININE-BSD FRML MDRD: 89 ML/MIN/1.73SQ M
GLUCOSE SERPL-MCNC: 137 MG/DL (ref 65–140)
HCT VFR BLD AUTO: 28.5 % (ref 34.8–46.1)
HGB BLD-MCNC: 8.1 G/DL (ref 11.5–15.4)
IMM GRANULOCYTES # BLD AUTO: 0.09 THOUSAND/UL (ref 0–0.2)
IMM GRANULOCYTES NFR BLD AUTO: 1 % (ref 0–2)
LYMPHOCYTES # BLD AUTO: 0.78 THOUSANDS/ÂΜL (ref 0.6–4.47)
LYMPHOCYTES NFR BLD AUTO: 9 % (ref 14–44)
MCH RBC QN AUTO: 18.2 PG (ref 26.8–34.3)
MCHC RBC AUTO-ENTMCNC: 28.4 G/DL (ref 31.4–37.4)
MCV RBC AUTO: 64 FL (ref 82–98)
MONOCYTES # BLD AUTO: 0.14 THOUSAND/ÂΜL (ref 0.17–1.22)
MONOCYTES NFR BLD AUTO: 2 % (ref 4–12)
NEUTROPHILS # BLD AUTO: 8.02 THOUSANDS/ÂΜL (ref 1.85–7.62)
NEUTS SEG NFR BLD AUTO: 88 % (ref 43–75)
NRBC BLD AUTO-RTO: 0 /100 WBCS
PLATELET # BLD AUTO: 393 THOUSANDS/UL (ref 149–390)
PMV BLD AUTO: 8.8 FL (ref 8.9–12.7)
POTASSIUM SERPL-SCNC: 4.4 MMOL/L (ref 3.5–5.3)
RBC # BLD AUTO: 4.44 MILLION/UL (ref 3.81–5.12)
SODIUM SERPL-SCNC: 136 MMOL/L (ref 135–147)
WBC # BLD AUTO: 9.03 THOUSAND/UL (ref 4.31–10.16)

## 2023-08-09 PROCEDURE — 85025 COMPLETE CBC W/AUTO DIFF WBC: CPT | Performed by: INTERNAL MEDICINE

## 2023-08-09 PROCEDURE — 99232 SBSQ HOSP IP/OBS MODERATE 35: CPT | Performed by: INTERNAL MEDICINE

## 2023-08-09 PROCEDURE — 80048 BASIC METABOLIC PNL TOTAL CA: CPT | Performed by: INTERNAL MEDICINE

## 2023-08-09 RX ORDER — HYDROMORPHONE HCL/PF 1 MG/ML
0.5 SYRINGE (ML) INJECTION ONCE
Status: DISCONTINUED | OUTPATIENT
Start: 2023-08-09 | End: 2023-08-09

## 2023-08-09 RX ORDER — HYDROMORPHONE HCL/PF 1 MG/ML
0.5 SYRINGE (ML) INJECTION ONCE
Status: DISCONTINUED | OUTPATIENT
Start: 2023-08-09 | End: 2023-08-10 | Stop reason: HOSPADM

## 2023-08-09 RX ADMIN — HYDROMORPHONE HYDROCHLORIDE 0.5 MG: 1 INJECTION, SOLUTION INTRAMUSCULAR; INTRAVENOUS; SUBCUTANEOUS at 18:17

## 2023-08-09 RX ADMIN — ACETAMINOPHEN 650 MG: 325 TABLET, FILM COATED ORAL at 09:16

## 2023-08-09 RX ADMIN — OXYCODONE HYDROCHLORIDE 10 MG: 5 TABLET ORAL at 09:17

## 2023-08-09 RX ADMIN — CIPROFLOXACIN HYDROCHLORIDE 500 MG: 500 TABLET, FILM COATED ORAL at 09:17

## 2023-08-09 RX ADMIN — ANASTROZOLE 1 MG: 1 TABLET, COATED ORAL at 04:28

## 2023-08-09 RX ADMIN — CIPROFLOXACIN HYDROCHLORIDE 500 MG: 500 TABLET, FILM COATED ORAL at 17:05

## 2023-08-09 RX ADMIN — HYDROMORPHONE HYDROCHLORIDE 0.5 MG: 1 INJECTION, SOLUTION INTRAMUSCULAR; INTRAVENOUS; SUBCUTANEOUS at 14:18

## 2023-08-09 RX ADMIN — OXYCODONE HYDROCHLORIDE 10 MG: 5 TABLET ORAL at 13:11

## 2023-08-09 RX ADMIN — ACETAMINOPHEN 650 MG: 325 TABLET, FILM COATED ORAL at 16:40

## 2023-08-09 RX ADMIN — OXYCODONE HYDROCHLORIDE 10 MG: 5 TABLET ORAL at 17:05

## 2023-08-09 RX ADMIN — HYDROMORPHONE HYDROCHLORIDE 0.5 MG: 1 INJECTION, SOLUTION INTRAMUSCULAR; INTRAVENOUS; SUBCUTANEOUS at 22:40

## 2023-08-09 RX ADMIN — HYDROMORPHONE HYDROCHLORIDE 0.5 MG: 1 INJECTION, SOLUTION INTRAMUSCULAR; INTRAVENOUS; SUBCUTANEOUS at 10:18

## 2023-08-09 RX ADMIN — OXYCODONE HYDROCHLORIDE 10 MG: 5 TABLET ORAL at 04:28

## 2023-08-09 RX ADMIN — HYDROMORPHONE HYDROCHLORIDE 0.5 MG: 1 INJECTION, SOLUTION INTRAMUSCULAR; INTRAVENOUS; SUBCUTANEOUS at 05:47

## 2023-08-09 RX ADMIN — OXYCODONE HYDROCHLORIDE 5 MG: 5 TABLET ORAL at 21:16

## 2023-08-09 RX ADMIN — FONDAPARINUX SODIUM 2.5 MG: 2.5 INJECTION, SOLUTION SUBCUTANEOUS at 09:17

## 2023-08-09 NOTE — PROGRESS NOTES
[address]  Progress Note  Name: Norman Gutierrez  MRN: [id number]  Unit/Bed#: -01 I Date of Admission: 8/6/2023   Date of Service: 8/9/2023 I Hospital Day: 1    Assessment/Plan   * Ureteral stone with hydronephrosis  Assessment & Plan  · Patient presenting secondary to worsening right-sided pain  · CT imaging showing ureteral stone with mild to moderate hydronephrosis  · Kidney function stable at this time  · Continue with pain control  · Continue IV fluids  · Status post ureteral stent placement on 8/8  · Still with significant pain  · Will discuss with urology further recommendation  · Continue pain control    Leukocytosis  Assessment & Plan  · Raise patient with elevated WBC of 13.5  · Likely reactive from stress and pain  · Has since resolved    Anemia  Assessment & Plan  · Patient noted to have hemoglobin fluctuating between 8-9  · No overt bleeding  · Hemoglobin stable    Obesity, Class II, BMI 35-39.9  Assessment & Plan  · Lifestyle modifications advised           VTE Pharmacologic Prophylaxis:   Pharmacologic: Pharmacologic VTE Prophylaxis contraindicated due to low risk  Mechanical VTE Prophylaxis in Place: Yes    Patient Centered Rounds: I have performed bedside rounds with nursing staff today. Discussions with Specialists or Other Care Team Provider: cm, nursing    Education and Discussions with Family / Patient: pt    Time Spent for Care: 30 minutes. More than 50% of total time spent on counseling and coordination of care as described above. Current Length of Stay: 1 day(s)    Current Patient Status: Inpatient   Certification Statement: The patient will continue to require additional inpatient hospital stay due to see below    Discharge Plan: Pending further pain control. Anticipate discharge in the next 24 hours    Code Status: Level 1 - Full Code      Subjective:   Denies fevers, chills. Still reports significant pain.   Denies any nausea or vomiting    Objective:     Vitals:   Temp (24hrs), Av.1 °F (36.7 °C), Min:97.9 °F (36.6 °C), Max:98.3 °F (36.8 °C)    Temp:  [97.9 °F (36.6 °C)-98.3 °F (36.8 °C)] 98.3 °F (36.8 °C)  HR:  [] 85  Resp:  [14-27] 18  BP: (148-196)/(74-95) 176/93  SpO2:  [92 %-100 %] 99 %  Body mass index is 48.22 kg/m². Input and Output Summary (last 24 hours): Intake/Output Summary (Last 24 hours) at 2023 1018  Last data filed at 2023 0820  Gross per 24 hour   Intake 818 ml   Output 700 ml   Net 118 ml       Physical Exam:     Physical Exam  Constitutional:       General: She is not in acute distress. Appearance: She is well-developed. She is not diaphoretic. HENT:      Head: Normocephalic and atraumatic. Nose: Nose normal.      Mouth/Throat:      Pharynx: No oropharyngeal exudate. Eyes:      General: No scleral icterus. Right eye: No discharge. Left eye: No discharge. Conjunctiva/sclera: Conjunctivae normal.   Neck:      Thyroid: No thyromegaly. Vascular: No JVD. Cardiovascular:      Rate and Rhythm: Normal rate and regular rhythm. Heart sounds: Normal heart sounds. No murmur heard. No friction rub. No gallop. Pulmonary:      Effort: Pulmonary effort is normal. No respiratory distress. Breath sounds: Normal breath sounds. No wheezing or rales. Chest:      Chest wall: No tenderness. Abdominal:      General: Bowel sounds are normal. There is no distension. Palpations: Abdomen is soft. Tenderness: There is no abdominal tenderness. There is no guarding or rebound. Musculoskeletal:         General: No tenderness or deformity. Normal range of motion. Cervical back: Normal range of motion and neck supple. Skin:     General: Skin is warm and dry. Findings: No erythema or rash. Neurological:      Mental Status: She is alert. Mental status is at baseline. Cranial Nerves: No cranial nerve deficit.       Sensory: No sensory deficit. Motor: No abnormal muscle tone. Coordination: Coordination normal.         Additional Data:     Labs:    Results from last 7 days   Lab Units 08/09/23  0433   WBC Thousand/uL 9.03   HEMOGLOBIN g/dL 8.1*   HEMATOCRIT % 28.5*   PLATELETS Thousands/uL 393*   NEUTROS PCT % 88*   LYMPHS PCT % 9*   MONOS PCT % 2*   EOS PCT % 0     Results from last 7 days   Lab Units 08/09/23  0433 08/07/23  0703 08/06/23  0924   SODIUM mmol/L 136   < > 139   POTASSIUM mmol/L 4.4   < > 3.9   CHLORIDE mmol/L 108   < > 109*   CO2 mmol/L 21   < > 21   BUN mg/dL 12   < > 19   CREATININE mg/dL 0.77   < > 0.82   ANION GAP mmol/L 7   < > 9   CALCIUM mg/dL 9.0   < > 8.9   ALBUMIN g/dL  --   --  4.2   TOTAL BILIRUBIN mg/dL  --   --  0.29   ALK PHOS U/L  --   --  118*   ALT U/L  --   --  3*   AST U/L  --   --  17   GLUCOSE RANDOM mg/dL 137   < > 106    < > = values in this interval not displayed. * I Have Reviewed All Lab Data Listed Above. * Additional Pertinent Lab Tests Reviewed:  All Labs Within Last 24 Hours Reviewed    Imaging:    Imaging Reports Reviewed Today Include: na  Imaging Personally Reviewed by Myself Includes:  na    Recent Cultures (last 7 days):           Last 24 Hours Medication List:   Current Facility-Administered Medications   Medication Dose Route Frequency Provider Last Rate   • acetaminophen  650 mg Oral Q6H PRN Cassidy Rayo PA-C     • anastrozole  1 mg Oral Daily Jessica Guzman PA-C     • ciprofloxacin  500 mg Oral BID Jessica Guzman PA-C     • fondaparinux  2.5 mg Subcutaneous Daily Jessica Guzman PA-C     • HYDROmorphone  0.5 mg Intravenous Q4H PRN Jessica Guzman PA-C     • HYDROmorphone  0.5 mg Intravenous Once Bruce Padilla MD     • ondansetron  4 mg Intravenous Q6H PRN Cassidy Rayo PA-C     • oxyCODONE  10 mg Oral Q4H PRN Cassidy Rayo PA-C     • oxyCODONE  5 mg Oral Q4H PRN Jessica Stahler, PA-C     • sodium chloride  125 mL/hr Intravenous Continuous CHRISTINA Reed-SCOTT 125 mL/hr (08/08/23 4982)   • tamsulosin  0.4 mg Oral Once Sherlyn Kumari PA-C          Today, Patient Was Seen By: Rosemary Edmond MD    ** Please Note: Dictation voice to text software may have been used in the creation of this document.  **

## 2023-08-09 NOTE — TELEPHONE ENCOUNTER
Post Op Note    Cristina Figueroa is a 47 y.o. female s/p Cystoscopy, right retrograde pyelogram, right ureteroscopy, right stent insertion performed 8/8/2023. Cristina Figueroa was seen on call by Dr. Ariella De La Cruz. How would you rate your pain on a scale from 1 to 10, 10 being the worst pain ever? PRN  Have you had a fever? No  Have your bowel movements been regular? Yes  Do you have any difficulty urinating? No  Do you have any other questions or concerns that I can address at this time? None at this time. Called and spoke with patient. She is doing well over all. She has some off and on pain being managed with medication. 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 Informed we will call back later.

## 2023-08-09 NOTE — NURSING NOTE
Upon assessment this morning, patient stated she has new onset of incontinence since 8/8 after ureteral stent placement. Would saturate pad on bed without having urge to urinate and also has no control of the incontinence. Upon examination noted part of green stent resting between labia and string no longer secured. SLIM notified. Will notify urology as well.

## 2023-08-09 NOTE — ASSESSMENT & PLAN NOTE
· Patient presenting secondary to worsening right-sided pain  · CT imaging showing ureteral stone with mild to moderate hydronephrosis  · Kidney function stable at this time  · Continue with pain control  · Continue IV fluids  · Status post ureteral stent placement on 8/8  · Still with significant pain  · Will discuss with urology further recommendation  · Continue pain control

## 2023-08-09 NOTE — NURSING NOTE
Per Jovanny Median (Urology),  nursing to pull ureteral stent. Stent removed intact. Tolerated well.

## 2023-08-09 NOTE — ASSESSMENT & PLAN NOTE
· Patient noted to have hemoglobin fluctuating between 8-9  · No overt bleeding  · Hemoglobin stable

## 2023-08-09 NOTE — PLAN OF CARE
Problem: MOBILITY - ADULT  Goal: Maintain or return to baseline ADL function  Description: INTERVENTIONS:  -  Assess patient's ability to carry out ADLs; assess patient's baseline for ADL function and identify physical deficits which impact ability to perform ADLs (bathing, care of mouth/teeth, toileting, grooming, dressing, etc.)  - Assess/evaluate cause of self-care deficits   - Assess range of motion  - Assess patient's mobility; develop plan if impaired  - Assess patient's need for assistive devices and provide as appropriate  - Encourage maximum independence but intervene and supervise when necessary  - Involve family in performance of ADLs  - Assess for home care needs following discharge   - Consider OT consult to assist with ADL evaluation and planning for discharge  - Provide patient education as appropriate  Outcome: Progressing  Goal: Maintains/Returns to pre admission functional level  Description: INTERVENTIONS:  - Perform BMAT or MOVE assessment daily.   - Set and communicate daily mobility goal to care team and patient/family/caregiver.    - Collaborate with rehabilitation services on mobility goals if consulted  - Out of bed for toileting  - Record patient progress and toleration of activity level   Outcome: Progressing     Problem: PAIN - ADULT  Goal: Verbalizes/displays adequate comfort level or baseline comfort level  Description: Interventions:  - Encourage patient to monitor pain and request assistance  - Assess pain using appropriate pain scale  - Administer analgesics based on type and severity of pain and evaluate response  - Implement non-pharmacological measures as appropriate and evaluate response  - Consider cultural and social influences on pain and pain management  - Notify physician/advanced practitioner if interventions unsuccessful or patient reports new pain  Outcome: Progressing     Problem: INFECTION - ADULT  Goal: Absence or prevention of progression during hospitalization  Description: INTERVENTIONS:  - Assess and monitor for signs and symptoms of infection  - Monitor lab/diagnostic results  - Monitor all insertion sites, i.e. indwelling lines, tubes, and drains  - Monitor endotracheal if appropriate and nasal secretions for changes in amount and color  - [place] appropriate cooling/warming therapies per order  - Administer medications as ordered  - Instruct and encourage patient and family to use good hand hygiene technique  - Identify and instruct in appropriate isolation precautions for identified infection/condition  Outcome: Progressing  Goal: Absence of fever/infection during neutropenic period  Description: INTERVENTIONS:  - Monitor WBC    Outcome: Progressing     Problem: SAFETY ADULT  Goal: Maintain or return to baseline ADL function  Description: INTERVENTIONS:  -  Assess patient's ability to carry out ADLs; assess patient's baseline for ADL function and identify physical deficits which impact ability to perform ADLs (bathing, care of mouth/teeth, toileting, grooming, dressing, etc.)  - Assess/evaluate cause of self-care deficits   - Assess range of motion  - Assess patient's mobility; develop plan if impaired  - Assess patient's need for assistive devices and provide as appropriate  - Encourage maximum independence but intervene and supervise when necessary  - Involve family in performance of ADLs  - Assess for home care needs following discharge   - Consider OT consult to assist with ADL evaluation and planning for discharge  - Provide patient education as appropriate  Outcome: Progressing  Goal: Maintains/Returns to pre admission functional level  Description: INTERVENTIONS:  - Perform BMAT or MOVE assessment daily.   - Set and communicate daily mobility goal to care team and patient/family/caregiver.    - Collaborate with rehabilitation services on mobility goals if consulted  - Out of bed for toileting  - Record patient progress and toleration of activity level   Outcome: Progressing  Goal: Patient will remain free of falls  Description: INTERVENTIONS:  - Educate patient/family on patient safety including physical limitations  - Instruct patient to call for assistance with activity   - Consult OT/PT to assist with strengthening/mobility   - Keep Call bell within reach  - Keep bed low and locked with side rails adjusted as appropriate  - Keep care items and personal belongings within reach  - Initiate and maintain comfort rounds  - Make Fall Risk Sign visible to staff  - Apply yellow socks and bracelet for high fall risk patients  - Consider moving patient to room near nurses station  Outcome: Progressing     Problem: DISCHARGE PLANNING  Goal: Discharge to home or other facility with appropriate resources  Description: INTERVENTIONS:  - Identify barriers to discharge w/patient and caregiver  - Arrange for needed discharge resources and transportation as appropriate  - Identify discharge learning needs (meds, wound care, etc.)  - Arrange for interpretive services to assist at discharge as needed  - Refer to Case Management Department for coordinating discharge planning if the patient needs post-hospital services based on physician/advanced practitioner order or complex needs related to functional status, cognitive ability, or social support system  Outcome: Progressing     Problem: Knowledge Deficit  Goal: Patient/family/caregiver demonstrates understanding of disease process, treatment plan, medications, and discharge instructions  Description: Complete learning assessment and assess knowledge base.   Interventions:  - Provide teaching at level of understanding  - Provide teaching via preferred learning methods  Outcome: Progressing

## 2023-08-09 NOTE — NURSING NOTE
[facility] (Urology) notified in regards to current ureteral stent position. Awaiting reply.

## 2023-08-10 VITALS
OXYGEN SATURATION: 98 % | HEIGHT: 60 IN | WEIGHT: 246.91 LBS | TEMPERATURE: 99.5 F | RESPIRATION RATE: 15 BRPM | BODY MASS INDEX: 48.48 KG/M2 | SYSTOLIC BLOOD PRESSURE: 146 MMHG | DIASTOLIC BLOOD PRESSURE: 76 MMHG | HEART RATE: 94 BPM

## 2023-08-10 LAB
ANION GAP SERPL CALCULATED.3IONS-SCNC: 5 MMOL/L
BUN SERPL-MCNC: 16 MG/DL (ref 5–25)
CALCIUM SERPL-MCNC: 7.7 MG/DL (ref 8.4–10.2)
CHLORIDE SERPL-SCNC: 114 MMOL/L (ref 96–108)
CO2 SERPL-SCNC: 20 MMOL/L (ref 21–32)
CREAT SERPL-MCNC: 0.68 MG/DL (ref 0.6–1.3)
GFR SERPL CREATININE-BSD FRML MDRD: 100 ML/MIN/1.73SQ M
GLUCOSE SERPL-MCNC: 80 MG/DL (ref 65–140)
POTASSIUM SERPL-SCNC: 3.8 MMOL/L (ref 3.5–5.3)
SODIUM SERPL-SCNC: 139 MMOL/L (ref 135–147)

## 2023-08-10 PROCEDURE — 99239 HOSP IP/OBS DSCHRG MGMT >30: CPT | Performed by: INTERNAL MEDICINE

## 2023-08-10 PROCEDURE — 80048 BASIC METABOLIC PNL TOTAL CA: CPT | Performed by: INTERNAL MEDICINE

## 2023-08-10 RX ORDER — OXYCODONE HYDROCHLORIDE 5 MG/1
5 TABLET ORAL EVERY 4 HOURS PRN
Qty: 30 TABLET | Refills: 0 | Status: SHIPPED | OUTPATIENT
Start: 2023-08-10 | End: 2023-08-20

## 2023-08-10 RX ORDER — CIPROFLOXACIN 500 MG/1
500 TABLET, FILM COATED ORAL 2 TIMES DAILY
Qty: 6 TABLET | Refills: 0 | Status: SHIPPED | OUTPATIENT
Start: 2023-08-10 | End: 2023-08-13

## 2023-08-10 RX ADMIN — OXYCODONE HYDROCHLORIDE 10 MG: 5 TABLET ORAL at 13:11

## 2023-08-10 RX ADMIN — ACETAMINOPHEN 650 MG: 325 TABLET, FILM COATED ORAL at 07:53

## 2023-08-10 RX ADMIN — HYDROMORPHONE HYDROCHLORIDE 0.5 MG: 1 INJECTION, SOLUTION INTRAMUSCULAR; INTRAVENOUS; SUBCUTANEOUS at 06:51

## 2023-08-10 RX ADMIN — ANASTROZOLE 1 MG: 1 TABLET, COATED ORAL at 05:22

## 2023-08-10 RX ADMIN — OXYCODONE HYDROCHLORIDE 5 MG: 5 TABLET ORAL at 05:23

## 2023-08-10 RX ADMIN — FONDAPARINUX SODIUM 2.5 MG: 2.5 INJECTION, SOLUTION SUBCUTANEOUS at 09:07

## 2023-08-10 RX ADMIN — OXYCODONE HYDROCHLORIDE 5 MG: 5 TABLET ORAL at 01:19

## 2023-08-10 RX ADMIN — OXYCODONE HYDROCHLORIDE 10 MG: 5 TABLET ORAL at 09:07

## 2023-08-10 RX ADMIN — HYDROMORPHONE HYDROCHLORIDE 0.5 MG: 1 INJECTION, SOLUTION INTRAMUSCULAR; INTRAVENOUS; SUBCUTANEOUS at 02:41

## 2023-08-10 RX ADMIN — HYDROMORPHONE HYDROCHLORIDE 0.5 MG: 1 INJECTION, SOLUTION INTRAMUSCULAR; INTRAVENOUS; SUBCUTANEOUS at 11:01

## 2023-08-10 RX ADMIN — CIPROFLOXACIN HYDROCHLORIDE 500 MG: 500 TABLET, FILM COATED ORAL at 09:07

## 2023-08-10 NOTE — PLAN OF CARE
Problem: MOBILITY - ADULT  Goal: Maintain or return to baseline ADL function  Description: INTERVENTIONS:  -  Assess patient's ability to carry out ADLs; assess patient's baseline for ADL function and identify physical deficits which impact ability to perform ADLs (bathing, care of mouth/teeth, toileting, grooming, dressing, etc.)  - Assess/evaluate cause of self-care deficits   - Assess range of motion  - Assess patient's mobility; develop plan if impaired  - Assess patient's need for assistive devices and provide as appropriate  - Encourage maximum independence but intervene and supervise when necessary  - Involve family in performance of ADLs  - Assess for home care needs following discharge   - Consider OT consult to assist with ADL evaluation and planning for discharge  - Provide patient education as appropriate  Outcome: Progressing  Goal: Maintains/Returns to pre admission functional level  Description: INTERVENTIONS:  - Perform BMAT or MOVE assessment daily.   - Set and communicate daily mobility goal to care team and patient/family/caregiver.    - Collaborate with rehabilitation services on mobility goals if consulted  - Out of bed for toileting  - Record patient progress and toleration of activity level   Outcome: Progressing     Problem: PAIN - ADULT  Goal: Verbalizes/displays adequate comfort level or baseline comfort level  Description: Interventions:  - Encourage patient to monitor pain and request assistance  - Assess pain using appropriate pain scale  - Administer analgesics based on type and severity of pain and evaluate response  - Implement non-pharmacological measures as appropriate and evaluate response  - Consider cultural and social influences on pain and pain management  - Notify physician/advanced practitioner if interventions unsuccessful or patient reports new pain  Outcome: Progressing     Problem: INFECTION - ADULT  Goal: Absence or prevention of progression during hospitalization  Description: INTERVENTIONS:  - Assess and monitor for signs and symptoms of infection  - Monitor lab/diagnostic results  - Monitor all insertion sites, i.e. indwelling lines, tubes, and drains  - Monitor endotracheal if appropriate and nasal secretions for changes in amount and color  - [place] appropriate cooling/warming therapies per order  - Administer medications as ordered  - Instruct and encourage patient and family to use good hand hygiene technique  - Identify and instruct in appropriate isolation precautions for identified infection/condition  Outcome: Progressing  Goal: Absence of fever/infection during neutropenic period  Description: INTERVENTIONS:  - Monitor WBC    Outcome: Progressing     Problem: SAFETY ADULT  Goal: Maintain or return to baseline ADL function  Description: INTERVENTIONS:  -  Assess patient's ability to carry out ADLs; assess patient's baseline for ADL function and identify physical deficits which impact ability to perform ADLs (bathing, care of mouth/teeth, toileting, grooming, dressing, etc.)  - Assess/evaluate cause of self-care deficits   - Assess range of motion  - Assess patient's mobility; develop plan if impaired  - Assess patient's need for assistive devices and provide as appropriate  - Encourage maximum independence but intervene and supervise when necessary  - Involve family in performance of ADLs  - Assess for home care needs following discharge   - Consider OT consult to assist with ADL evaluation and planning for discharge  - Provide patient education as appropriate  Outcome: Progressing  Goal: Maintains/Returns to pre admission functional level  Description: INTERVENTIONS:  - Perform BMAT or MOVE assessment daily.   - Set and communicate daily mobility goal to care team and patient/family/caregiver.    - Collaborate with rehabilitation services on mobility goals if consulted  - Out of bed for toileting  - Record patient progress and toleration of activity level   Outcome: Progressing  Goal: Patient will remain free of falls  Description: INTERVENTIONS:  - Educate patient/family on patient safety including physical limitations  - Instruct patient to call for assistance with activity   - Consult OT/PT to assist with strengthening/mobility   - Keep Call bell within reach  - Keep bed low and locked with side rails adjusted as appropriate  - Keep care items and personal belongings within reach  - Initiate and maintain comfort rounds  - Make Fall Risk Sign visible to staff  - Apply yellow socks and bracelet for high fall risk patients  - Consider moving patient to room near nurses station  Outcome: Progressing     Problem: DISCHARGE PLANNING  Goal: Discharge to home or other facility with appropriate resources  Description: INTERVENTIONS:  - Identify barriers to discharge w/patient and caregiver  - Arrange for needed discharge resources and transportation as appropriate  - Identify discharge learning needs (meds, wound care, etc.)  - Arrange for interpretive services to assist at discharge as needed  - Refer to Case Management Department for coordinating discharge planning if the patient needs post-hospital services based on physician/advanced practitioner order or complex needs related to functional status, cognitive ability, or social support system  Outcome: Progressing     Problem: Knowledge Deficit  Goal: Patient/family/caregiver demonstrates understanding of disease process, treatment plan, medications, and discharge instructions  Description: Complete learning assessment and assess knowledge base.   Interventions:  - Provide teaching at level of understanding  - Provide teaching via preferred learning methods  Outcome: Progressing     Problem: Prexisting or High Potential for Compromised Skin Integrity  Goal: Skin integrity is maintained or improved  Description: INTERVENTIONS:  - Identify patients at risk for skin breakdown  - Assess and monitor skin integrity  - Assess and monitor nutrition and hydration status  - Monitor labs   - Assess for incontinence   - Turn and reposition patient  - Assist with mobility/ambulation  - Relieve pressure over bony prominences  - Avoid friction and shearing  - Provide appropriate hygiene as needed including keeping skin clean and dry  - Evaluate need for skin moisturizer/barrier cream  - Collaborate with interdisciplinary team   - Patient/family teaching  - Consider wound care consult   Outcome: Progressing

## 2023-08-10 NOTE — ASSESSMENT & PLAN NOTE
· Patient presenting secondary to worsening right-sided pain  · CT imaging showing ureteral stone with mild to moderate hydronephrosis  · Kidney function stable at this time  · Ureteral stone cleared  · Status post stent which has since been removed  · Medically clear for discharge will be discharged on oral antibiotics

## 2023-08-10 NOTE — DISCHARGE SUMMARY
[address]  Discharge- Nola Novak 1970, 47 y.o. female MRN: [id number]  Unit/Bed#: -Jac Encounter: [id number]  Primary Care Provider: Briseyda Mai MD   Date and time admitted to hospital: 8/6/2023  8:10 AM    * Ureteral stone with hydronephrosis  Assessment & Plan  · Patient presenting secondary to worsening right-sided pain  · CT imaging showing ureteral stone with mild to moderate hydronephrosis  · Kidney function stable at this time  · Ureteral stone cleared  · Status post stent which has since been removed  · Medically clear for discharge will be discharged on oral antibiotics    Leukocytosis  Assessment & Plan  · Raise patient with elevated WBC of 13.5  · Likely reactive from stress and pain  · Has since resolved    Anemia  Assessment & Plan  · Patient noted to have hemoglobin fluctuating between 8-9  · No overt bleeding  · Hemoglobin stable    Obesity, Class II, BMI 35-39.9  Assessment & Plan  · Lifestyle modifications advised      Discharging Physician / Practitioner: Shon Harris MD  PCP: Briseyda Mai MD  Admission Date:   Admission Orders (From admission, onward)     Ordered        08/08/23 1028  Inpatient Admission  Once            08/06/23 1300  Place in Observation  Once                      Discharge Date: 08/10/23    Medical Problems     Resolved Problems  Date Reviewed: 8/10/2023   None         Consultations During Hospital Stay:  · Urology    Procedures Performed:   Ureteral stent placement    Significant Findings / Test Results:   CT abdomen pelvis wo contrast    Result Date: 8/6/2023  · Impression: 4 mm x 2 mm stone in the right proximal ureter with upstream mild to moderate hydronephrosis.  Workstation performed: [id number]     Incidental Findings:   · none    Test Results Pending at Discharge (will require follow up):   · none     Outpatient Tests Requested:  · none    Complications:  none    Reason for Admission: Ureteral stone/hydronephrosis    Hospital Course:     Blanche Brewster is a 47 y.o. female patient who originally presented to the hospital on 8/6/2023 who presented with intractable abdominal pain secondary to ureteral stone with hydronephrosis. Had ureteral stent placed and since removed. Stone resolved. Now pain improved will be discharged on oral oxycodone and oral antibiotics. Follow-up outpatient with primary care doctor      Please see above list of diagnoses and related plan for additional information. Condition at Discharge: stable     Discharge Day Visit / Exam:     Subjective:   Denies fevers, chills, abdominal pain, nausea, vomiting  Vitals: Blood Pressure: 146/76 (08/09/23 2119)  Pulse: 94 (08/10/23 0851)  Temperature: 99.5 °F (37.5 °C) (08/10/23 0851)  Temp Source: Oral (08/06/23 0819)  Respirations: 15 (08/10/23 0730)  Height: 5' (152.4 cm) (08/08/23 1206)  Weight - Scale: 112 kg (246 lb 14.6 oz) (08/08/23 1206)  SpO2: 98 % (08/10/23 0851)  Exam:   Physical Exam  Constitutional:       General: She is not in acute distress. Appearance: She is well-developed. She is not diaphoretic. HENT:      Head: Normocephalic and atraumatic. Nose: Nose normal.      Mouth/Throat:      Pharynx: No oropharyngeal exudate. Eyes:      General: No scleral icterus. Right eye: No discharge. Left eye: No discharge. Conjunctiva/sclera: Conjunctivae normal.   Neck:      Thyroid: No thyromegaly. Vascular: No JVD. Cardiovascular:      Rate and Rhythm: Normal rate and regular rhythm. Heart sounds: Normal heart sounds. No murmur heard. No friction rub. No gallop. Pulmonary:      Effort: Pulmonary effort is normal. No respiratory distress. Breath sounds: Normal breath sounds. No wheezing or rales. Chest:      Chest wall: No tenderness. Abdominal:      General: Bowel sounds are normal. There is no distension. Palpations: Abdomen is soft. Tenderness: There is no abdominal tenderness.  There is no guarding or rebound. Musculoskeletal:         General: No tenderness or deformity. Normal range of motion. Cervical back: Normal range of motion and neck supple. Skin:     General: Skin is warm and dry. Findings: No erythema or rash. Neurological:      Mental Status: She is alert. Mental status is at baseline. Cranial Nerves: No cranial nerve deficit. Sensory: No sensory deficit. Motor: No abnormal muscle tone. Coordination: Coordination normal.         Discussion with Family: pt    Discharge instructions/Information to patient and family:   See after visit summary for information provided to patient and family. Provisions for Follow-Up Care:  See after visit summary for information related to follow-up care and any pertinent home health orders. Disposition:     Home    For Discharges to [facility]F:   · Not Applicable to this Patient - Not Applicable to this Patient    Planned Readmission: none     Discharge Statement:  I spent 60 minutes discharging the patient. This time was spent on the day of discharge. I had direct contact with the patient on the day of discharge. Greater than 50% of the total time was spent examining patient, answering all patient questions, arranging and discussing plan of care with patient as well as directly providing post-discharge instructions. Additional time then spent on discharge activities. Discharge Medications:  See after visit summary for reconciled discharge medications provided to patient and family.       ** Please Note: This note has been constructed using a voice recognition system **

## 2023-08-10 NOTE — TELEPHONE ENCOUNTER
Post Op Note    Aurora Stapleton is a 47 y.o. female s/p Cystoscopy, right retrograde pyelogram, right ureteroscopy, right stent insertion  perfomed 8/8/2023. Aurora Stapleton was seen on call Dr. Petros Murcia . Called and spoke with patient. She is still currently inpatient at this time. She is doing well overall and has no further questions or concerns. Advised to call tomorrow after stent is removed. She denied needing extra instructions on this. Informed I will reach out to her again tomorrow to schedule her follow-up appointments.

## 2023-08-11 NOTE — TELEPHONE ENCOUNTER
Called and spoke with patient. She states the stent was removed in the hospital before she left. States that she will review her appt this weekend and call if it needs to be rescheduled. Informed that she will need KUB ptv.

## 2023-09-07 ENCOUNTER — APPOINTMENT (OUTPATIENT)
Dept: RADIOLOGY | Facility: CLINIC | Age: 53
End: 2023-09-07
Payer: MEDICARE

## 2023-09-07 DIAGNOSIS — N13.2 URETERAL STONE WITH HYDRONEPHROSIS: ICD-10-CM

## 2023-09-07 PROCEDURE — 74018 RADEX ABDOMEN 1 VIEW: CPT

## 2023-09-21 ENCOUNTER — OFFICE VISIT (OUTPATIENT)
Dept: UROLOGY | Facility: CLINIC | Age: 53
End: 2023-09-21
Payer: MEDICARE

## 2023-09-21 VITALS
HEIGHT: 60 IN | HEART RATE: 80 BPM | WEIGHT: 234.4 LBS | DIASTOLIC BLOOD PRESSURE: 76 MMHG | OXYGEN SATURATION: 99 % | SYSTOLIC BLOOD PRESSURE: 130 MMHG | BODY MASS INDEX: 46.02 KG/M2

## 2023-09-21 DIAGNOSIS — N13.2 URETERAL STONE WITH HYDRONEPHROSIS: Primary | ICD-10-CM

## 2023-09-21 LAB
SL AMB  POCT GLUCOSE, UA: NORMAL
SL AMB LEUKOCYTE ESTERASE,UA: NORMAL
SL AMB POCT BILIRUBIN,UA: NORMAL
SL AMB POCT BLOOD,UA: NORMAL
SL AMB POCT CLARITY,UA: CLEAR
SL AMB POCT COLOR,UA: YELLOW
SL AMB POCT KETONES,UA: NORMAL
SL AMB POCT NITRITE,UA: NORMAL
SL AMB POCT PH,UA: 5
SL AMB POCT SPECIFIC GRAVITY,UA: 1.02
SL AMB POCT URINE PROTEIN: NORMAL
SL AMB POCT UROBILINOGEN: 0.2

## 2023-09-21 PROCEDURE — 99213 OFFICE O/P EST LOW 20 MIN: CPT | Performed by: PHYSICIAN ASSISTANT

## 2023-09-21 PROCEDURE — 81002 URINALYSIS NONAUTO W/O SCOPE: CPT | Performed by: PHYSICIAN ASSISTANT

## 2023-09-21 NOTE — PROGRESS NOTES
9/21/2023      Chief Complaint   Patient presents with   • Follow-up     Kidney stone         Assessment and Plan    47 y.o. female     1. Nephrolithiasis s/p ureteroscopy 8/8/23  - KUB (9/7/23) negative for obstructing stones  - Asymptomatic  - Follow up in 1 year with KUB and US  - Call with any questions or concerns in the meantime  - All questions answered; patient understands and agrees with plan       History of Present Illness  Frankie Sever is a 47 y.o. female patient with history of nephrolithiasis s/p ureteroscopy here for follow up. Patient underwent ureteroscopy for obstructing stones. Stent removed without difficulty. KUB negative. Asymptomatic. Review of Systems   Constitutional: Negative for activity change, appetite change, chills and fever. HENT: Negative for congestion and trouble swallowing. Respiratory: Negative for cough and shortness of breath. Cardiovascular: Negative for chest pain, palpitations and leg swelling. Gastrointestinal: Negative for abdominal pain, constipation, diarrhea, nausea and vomiting. Genitourinary: Negative for difficulty urinating, dysuria, flank pain, frequency, hematuria and urgency. Musculoskeletal: Negative for back pain and gait problem. Skin: Negative for wound. Allergic/Immunologic: Negative for immunocompromised state. Neurological: Negative for dizziness and syncope. Hematological: Does not bruise/bleed easily. Psychiatric/Behavioral: Negative for confusion. All other systems reviewed and are negative. Vitals  Vitals:    09/21/23 0726   BP: 130/76   Pulse: 80   SpO2: 99%   Weight: 106 kg (234 lb 6.4 oz)   Height: 5' (1.524 m)       Physical Exam  Constitutional:       General: She is not in acute distress. Appearance: Normal appearance. She is not ill-appearing, toxic-appearing or diaphoretic. HENT:      Head: Normocephalic. Nose: No congestion. Eyes:      General: No scleral icterus.         Right eye: No discharge. Left eye: No discharge. Conjunctiva/sclera: Conjunctivae normal.      Pupils: Pupils are equal, round, and reactive to light. Pulmonary:      Effort: Pulmonary effort is normal.   Musculoskeletal:      Cervical back: Normal range of motion. Skin:     General: Skin is warm and dry. Coloration: Skin is not jaundiced or pale. Findings: No bruising, erythema, lesion or rash. Neurological:      General: No focal deficit present. Mental Status: She is alert and oriented to person, place, and time. Mental status is at baseline. Gait: Gait normal.   Psychiatric:         Mood and Affect: Mood normal.         Behavior: Behavior normal.         Thought Content:  Thought content normal.         Judgment: Judgment normal.           Past History  Past Medical History:   Diagnosis Date   • Anesthesia complication     needs IR preprocedure for IV access/not ASC candidate   • Breast cancer (HCC)    • Breast cancer ([address])    • Breast cancer, right breast (HCC)    • Cancer (HCC)    • Chronic pain disorder    • CPAP (continuous positive airway pressure) dependence     no currently used   • GERD (gastroesophageal reflux disease)    • Heartburn    • Hiatal hernia    • History of bariatric surgery    • Irregular heart beat     pt states it was a side effect of a medication   • Kidney stone    • Lung nodule    • Osteoporosis     stage 3   • Postsurgical malabsorption    • RA (rheumatoid arthritis) (HCC)    • Rheumatoid arthritis (HCC)    • Sleep apnea    • Stress incontinence    • Thyroid nodule      Social History     Socioeconomic History   • Marital status: Single     Spouse name: None   • Number of children: None   • Years of education: None   • Highest education level: None   Occupational History   • None   Tobacco Use   • Smoking status: Never   • Smokeless tobacco: Never   Vaping Use   • Vaping Use: Never used   Substance and Sexual Activity   • Alcohol use: Not Currently   • Drug use: Never   • Sexual activity: Not Currently   Other Topics Concern   • None   Social History Narrative   • None     Social Determinants of Health     Financial Resource Strain: Not on file   Food Insecurity: No Food Insecurity (8/7/2023)    Hunger Vital Sign    • Worried About Running Out of Food in the Last Year: Never true    • Ran Out of Food in the Last Year: Never true   Transportation Needs: No Transportation Needs (8/7/2023)    PRAPARE - Transportation    • Lack of Transportation (Medical): No    • Lack of Transportation (Non-Medical):  No   Physical Activity: Not on file   Stress: Not on file   Social Connections: Not on file   Intimate Partner Violence: Not on file   Housing Stability: Low Risk  (8/7/2023)    Housing Stability Vital Sign    • Unable to Pay for Housing in the Last Year: No    • Number of Places Lived in the Last Year: 1    • Unstable Housing in the Last Year: No     Social History     Tobacco Use   Smoking Status Never   Smokeless Tobacco Never     Family History   Problem Relation Age of Onset   • No Known Problems Mother    • Diabetes Father    • Heart disease Father    • No Known Problems Brother        The following portions of the patient's history were reviewed and updated as appropriate: allergies, current medications, past medical history, past social history, past surgical history and problem list.    Results  Recent Results (from the past 1 hour(s))   POCT urine dip    Collection Time: 09/21/23  7:27 AM   Result Value Ref Range    LEUKOCYTE ESTERASE,UA -     NITRITE,UA -     SL AMB POCT UROBILINOGEN 0.2     POCT URINE PROTEIN +      PH,UA 5.0     BLOOD,UA trace     SPECIFIC GRAVITY,UA 1.020     KETONES,UA +     BILIRUBIN,UA -     GLUCOSE, UA -      COLOR,UA yellow     CLARITY,UA clear    ]  No results found for: "PSA"  Lab Results   Component Value Date    CALCIUM 7.7 (L) 08/10/2023    K 3.8 08/10/2023    CO2 20 (L) 08/10/2023     (H) 08/10/2023    BUN 16 08/10/2023    CREATININE 0.68 08/10/2023     Lab Results   Component Value Date    WBC 9.03 08/09/2023    HGB 8.1 (L) 08/09/2023    HCT 28.5 (L) 08/09/2023    MCV 64 (L) 08/09/2023     (H) 08/09/2023       Lenorerobert Gallegos PA-C

## 2023-10-02 ENCOUNTER — TELEPHONE (OUTPATIENT)
Age: 53
End: 2023-10-02

## 2023-10-02 ENCOUNTER — TELEPHONE (OUTPATIENT)
Dept: PLASTIC SURGERY | Facility: CLINIC | Age: 53
End: 2023-10-02

## 2023-10-02 DIAGNOSIS — Z98.890 HISTORY OF BREAST RECONSTRUCTION: Primary | ICD-10-CM

## 2023-10-02 DIAGNOSIS — Z90.13 ACQUIRED ABSENCE OF BREAST AND ABSENT NIPPLE, BILATERAL: ICD-10-CM

## 2023-10-02 DIAGNOSIS — Z85.3 PERSONAL HISTORY OF MALIGNANT NEOPLASM OF BREAST: ICD-10-CM

## 2023-10-02 NOTE — TELEPHONE ENCOUNTER
Rec'd call from from Dr. Cindy Yepez , Ting Martin. She's ready to reschedule the surgery she had to cancel.

## 2023-10-03 ENCOUNTER — TELEPHONE (OUTPATIENT)
Dept: PLASTIC SURGERY | Facility: CLINIC | Age: 53
End: 2023-10-03

## 2023-11-28 PROBLEM — Z79.811 USE OF ANASTROZOLE: Status: ACTIVE | Noted: 2023-11-28

## 2023-11-28 PROBLEM — Z90.13 HISTORY OF BILATERAL MASTECTOMY: Status: ACTIVE | Noted: 2023-11-28

## 2023-11-29 ENCOUNTER — TELEPHONE (OUTPATIENT)
Dept: HEMATOLOGY ONCOLOGY | Facility: CLINIC | Age: 53
End: 2023-11-29

## 2023-11-29 ENCOUNTER — CONSULT (OUTPATIENT)
Dept: SURGICAL ONCOLOGY | Facility: CLINIC | Age: 53
End: 2023-11-29
Payer: MEDICARE

## 2023-11-29 VITALS
BODY MASS INDEX: 45.16 KG/M2 | SYSTOLIC BLOOD PRESSURE: 100 MMHG | HEART RATE: 100 BPM | HEIGHT: 60 IN | WEIGHT: 230 LBS | OXYGEN SATURATION: 97 % | DIASTOLIC BLOOD PRESSURE: 78 MMHG | TEMPERATURE: 98.8 F

## 2023-11-29 DIAGNOSIS — Z85.3 PERSONAL HISTORY OF MALIGNANT NEOPLASM OF BREAST: Primary | ICD-10-CM

## 2023-11-29 DIAGNOSIS — Z79.811 USE OF ANASTROZOLE: ICD-10-CM

## 2023-11-29 DIAGNOSIS — Z90.13 HISTORY OF BILATERAL MASTECTOMY: ICD-10-CM

## 2023-11-29 DIAGNOSIS — N63.11 MASS OF UPPER OUTER QUADRANT OF RIGHT BREAST: ICD-10-CM

## 2023-11-29 DIAGNOSIS — N63.31 MASS OF AXILLARY TAIL OF RIGHT BREAST: ICD-10-CM

## 2023-11-29 DIAGNOSIS — Z98.890 HISTORY OF BREAST RECONSTRUCTION: ICD-10-CM

## 2023-11-29 PROCEDURE — 99205 OFFICE O/P NEW HI 60 MIN: CPT | Performed by: SURGERY

## 2023-11-29 NOTE — TELEPHONE ENCOUNTER
Luisito Villegas and Luiz Almeida, patient needs a US right breast  diagnostics  . Can you please help .     Thank you

## 2023-11-29 NOTE — PROGRESS NOTES
Surgical Oncology Consult Note       [facility]  [facility] [address]  1970  [id number]      Chief Complaint   Patient presents with    Consult        Assessment/Plan    1. Personal history of malignant neoplasm of breast  -     US breast right limited (diagnostic); Future; Expected date: 11/29/2023    2. History of bilateral mastectomy    3. History of breast reconstruction    4. Use of anastrozole    5. Mass of upper outer quadrant of right breast  -     US breast right limited (diagnostic); Future; Expected date: 11/29/2023    6. Mass of axillary tail of right breast  -     US breast right limited (diagnostic); Future; Expected date: 11/29/2023         Oncology History    No history exists. 4year-old female with a history of right breast cancer followed by bilateral mastectomy and reconstruction in 2015 2016. She has been on anastrozole for the last 10 years and tolerating well except osteoporosis and hair loss. She recently has some lost some weight she felt a mass in her right axillary fossa as well as upper outer quadrant. She is here for further work-up and management. Her previous surgeon has ordered an MRI of the breast however this was not performed as she is unable to tolerate without sedation. She          Review of Systems   Constitutional:  Negative for chills and fever. HENT:  Negative for ear pain and sore throat. Eyes:  Negative for pain and visual disturbance. Respiratory:  Negative for cough and shortness of breath. Cardiovascular:  Negative for chest pain and palpitations. Gastrointestinal:  Negative for abdominal pain and vomiting. Genitourinary:  Negative for dysuria and hematuria. Musculoskeletal:  Negative for arthralgias and back pain. Skin:  Negative for color change and rash. Neurological:  Negative for seizures and syncope.    All other systems reviewed and are negative.        Past Medical History:      Patient Active Problem List   Diagnosis    Acquired absence of breast and absent nipple, bilateral    History of radiation therapy    Personal history of malignant neoplasm of breast    Obesity, Class II, BMI 35-39.9    Bariatric surgery status    Postsurgical malabsorption    Obesity, Class III, BMI 40-49.9 (morbid obesity) ([facility])    S/P right latissimus dorsi flap    History of breast reconstruction    Sleep apnea    History of hysterectomy    Anemia    Arthritis    Gastroesophageal reflux disease    Rheumatoid arthritis ([address])    Hiatal hernia    Ureteral stone with hydronephrosis    Leukocytosis    Anesthesia complication    History of bilateral mastectomy    Use of anastrozole        Past Medical History:   Diagnosis Date    Anesthesia complication     needs IR preprocedure for IV access/not ASC candidate    Breast cancer ([address])     Breast cancer ([address])     Breast cancer, right breast ([address])     Cancer ([facility])     Chronic pain disorder     CPAP (continuous positive airway pressure) dependence     no currently used    GERD (gastroesophageal reflux disease)     Heartburn     Hiatal hernia     History of bariatric surgery     Irregular heart beat     pt states it was a side effect of a medication    Kidney stone     Lung nodule     Osteoporosis     stage 3    Postsurgical malabsorption     RA (rheumatoid arthritis) ([facility])     Rheumatoid arthritis ([facility])     Sleep apnea     Stress incontinence     Thyroid nodule         Past Surgical History:   Procedure Laterality Date    ABDOMINAL SURGERY      BRACHIOPLASTY Bilateral     2017    BREAST IMPLANT Bilateral 09/08/2020    Procedure: BREAST REMOVAL TISSUE EXPANDER/ IMPLANT PLACEMENT BREAST;  Surgeon: Mario Lopez MD;  [facility];  Service: Plastics    BREAST IMPLANT Left 02/09/2021    Procedure: BREAST TISSUE EXPANDER/ IMPLANT EXCHANGE;  Surgeon: Mario Lopez MD;  [facility];  Service: Plastics    BREAST LUMPECTOMY  04/2013    CAPSULOTOMY Bilateral 09/08/2020    Procedure: BREAST CAPSULOTOMY;  Surgeon: Tobin Aguilar MD;  [facility];  Service: Plastics    CAPSULOTOMY Left 02/09/2021    Procedure: BREAST CAPSULOTOMY;  Surgeon: Tobin Aguilar MD;  [facility];  Service: Plastics    CARDIAC CATHETERIZATION      CARPAL TUNNEL RELEASE Bilateral 2015    CHOLECYSTECTOMY      COLONOSCOPY      FL RETROGRADE PYELOGRAM  08/08/2023    GALLBLADDER SURGERY  2015    GASTRIC BYPASS  2014    HYSTERECTOMY  05/2013    INCONTINENCE SURGERY  09/2013    IR BIOPSY LUNG      lung nodules    LIPOSUCTION Right 04/12/2022    Procedure: LIPOSUCTION RIGHT BREAST;  Surgeon: Gracy Peters MD;  [facility];  Service: Plastics    LIPOSUCTION W/ FAT INJECTION Bilateral 11/10/2021    Procedure: FAT GRAFTING BILATERAL BREAST;  Surgeon: Tobin Aguilar MD;  [facility];  Service: Plastics    MASTECTOMY Bilateral 10/2016    GA BREAST RECONSTRUCTION W/LATISSIMUS DORSI FLAP Right 08/06/2019    Procedure: BREAST RECONSTRUCTION W/FLAP LATISSIMUS DORSI;  Surgeon: Tobin Aguilar MD;  [facility];  Service: Plastics    GA CYSTO/URETERO W/LITHOTRIPSY &INDWELL STENT INSRT Right 08/08/2023    Procedure: CYSTOSCOPY URETEROSCOPY, RETROGRADE PYELOGRAM AND INSERTION STENT URETERAL;  Surgeon: Jerri Ho MD;  [facility];  Service: Urology    GA GRAFTING OF AUTOLOGOUS FAT BY LIPO 50 CC OR LESS Bilateral 01/13/2023    Procedure: BILATERAL BREAST FAT GRAFTING;  Surgeon: Gracy Peters MD;  [facility];  Service: Plastics    GA GRAFTING OF AUTOLOGOUS FAT BY LIPO 50 CC OR LESS Bilateral 06/13/2023    Procedure: FAT GRAFTING TO BILATERAL BREASTS;  Surgeon: Gracy Peters MD;  [facility];  Service: Plastics    GA DOMINGA-IMPLANT CAPSULECTOMY BREAST COMPLETE Bilateral 08/06/2019    Procedure: BREAST CAPSULECTOMY;  Surgeon: Jody Dietrich MD;  [facility];  Service: Plastics    IL REMOVAL INTACT BREAST IMPLANT Bilateral 08/06/2019    Procedure: BREAST IMPLANT REMOVAL;  Surgeon: Jody Dietrich MD;  [facility];  Service: Plastics    IL REMOVAL INTACT BREAST IMPLANT Right 04/12/2022    Procedure: removal of right breast implant, capsulectomy, placement of right breast implant with acelluar dermal matrix.;  Surgeon: Hilario Decker MD;  [facility];  Service: Plastics    IL REVISION OF RECONSTRUCTED BREAST Right 09/07/2021    Procedure: RIGHT BREAST MOUND REVISION;  Surgeon: Jody Dietrich MD;  [facility];  Service: Plastics    IL TISSUE EXPANDER PLACEMENT BREAST RECONSTRUCTION Bilateral 08/06/2019    Procedure: BREAST TISSUE EXPANDER PLACEMENT;  Surgeon: Jody Dietrich MD;  [facility];  Service: Plastics    REDUCTION MAMMAPLASTY      REVISION OF SCAR Bilateral 11/10/2021    Procedure: SCAR REVISION BILATERAL ARMS;  Surgeon: Jody Dietrich MD;  [facility];  Service: Plastics    TRANSFER MUSCLE PECTORALIS Bilateral 08/06/2019    Procedure: PECTORALIS MUSCLE REPAIR;  Surgeon: Jody Dietrich MD;  [facility];  Service: Plastics        Family History   Problem Relation Age of Onset    No Known Problems Mother     Diabetes Father     Heart disease Father     No Known Problems Brother         Social History     Socioeconomic History    Marital status: Single     Spouse name: Not on file    Number of children: Not on file    Years of education: Not on file    Highest education level: Not on file   Occupational History    Not on file   Tobacco Use    Smoking status: Never    Smokeless tobacco: Never   Vaping Use    Vaping Use: Never used   Substance and Sexual Activity    Alcohol use: Yes    Drug use: Never    Sexual activity: Not Currently     Partners: Male     Birth control/protection: Other Comment: Hysterectomy   Other Topics Concern    Not on file   Social History Narrative    Not on file     Social Determinants of Health     Financial Resource Strain: Not on file   Food Insecurity: No Food Insecurity (8/7/2023)    Hunger Vital Sign     Worried About Running Out of Food in the Last Year: Never true     Ran Out of Food in the Last Year: Never true   Transportation Needs: No Transportation Needs (8/7/2023)    PRAPARE - Transportation     Lack of Transportation (Medical): No     Lack of Transportation (Non-Medical):  No   Physical Activity: Not on file   Stress: Not on file   Social Connections: Not on file   Intimate Partner Violence: Not on file   Housing Stability: Low Risk  (8/7/2023)    Housing Stability Vital Sign     Unable to Pay for Housing in the Last Year: No     Number of Places Lived in the Last Year: 1     Unstable Housing in the Last Year: No        Current Outpatient Medications:     acetaminophen (TYLENOL) 500 mg tablet, Take 1 tablet (500 mg total) by mouth every 4 (four) hours as needed for mild pain or moderate pain, Disp: 30 tablet, Rfl: 2    alendronate (FOSAMAX) 70 mg tablet, every 7 days Weekly on saturdays, Disp: , Rfl:     anastrozole (ARIMIDEX) 1 mg tablet, Take 1 mg by mouth daily, Disp: , Rfl: 4    chlorhexidine (PERIDEX) 0.12 % solution, 2 (two) times a day, Disp: , Rfl:     triamcinolone (KENALOG) 0.5 % cream, Apply topically as needed , Disp: , Rfl:      Allergies   Allergen Reactions    Accolate [Zafirlukast] Itching    Penicillins Anaphylaxis     Unconsciousness, and bronchoconstriction      Shellfish Allergy - Food Allergy Anaphylaxis    Shellfish-Derived Products - Food Allergy Anaphylaxis    Singulair [Montelukast] Itching    Pork-Derived Products - Food Allergy Hives    Medical Tape Rash     P/S specific surgical tape used at last procedure        Physical Exam:     Vitals:    11/29/23 1000   BP: 100/78   Pulse: 100   Temp: 98.8 °F (37.1 °C)   SpO2: 97% Physical Exam  Constitutional:       Appearance: Normal appearance. She is obese. HENT:      Head: Normocephalic and atraumatic. Nose: Nose normal.      Mouth/Throat:      Mouth: Mucous membranes are moist.   Eyes:      Pupils: Pupils are equal, round, and reactive to light. Cardiovascular:      Rate and Rhythm: Normal rate. Pulses: Normal pulses. Heart sounds: Normal heart sounds. Pulmonary:      Effort: Pulmonary effort is normal.      Breath sounds: Normal breath sounds. Chest:          Comments: Right mastectomy reconstruction site no suspicious masses outer quadrant approximately 11:00 to normal-appearing palpable mass probably fat necrosis versus lipoma. Right axillary palpable mass probably a benign lymph node versus again fatty tissue. Right supraclavicular examination no palpable adenopathy. Left mastectomy reconstruction sites skin is intact and no suspicious mass or masses. Left axillary and supraclavicular examination no palpable adenopathy  Abdominal:      General: Bowel sounds are normal.      Palpations: Abdomen is soft. Musculoskeletal:         General: Normal range of motion. Cervical back: Normal range of motion and neck supple. Skin:     General: Skin is warm. Neurological:      General: No focal deficit present. Mental Status: She is alert and oriented to person, place, and time. Psychiatric:         Mood and Affect: Mood normal.         Behavior: Behavior normal.         Thought Content: Thought content normal.         Judgment: Judgment normal.         Results:   Outside medical records were reviewed and discussed. Discussion/Summary: This a 67-year-old female with a history of right breast cancer initial lumpectomy reexcision and radiation followed by bilateral mastectomy and she is on anastrozole she comes here with newly palpable mass in the right axillary fossa as well as upper outer quadrant.   She is here with to discuss further management I did discuss with her in detail these are low index of suspicious masses and very unlikely recurrence however given her history of breast cancer we will obtain ultrasound to delineate better. I also did discussed the benefits of anastrozole as well as complications. I think it is high time her to stop since she has been using for 10 years. She will discuss with her medical oncologist as well. All patient's questions were answered. We will follow-up after her ultrasound. Advance Care Planning/Advance Directives: Veronika Vargas MD discussed the disease status, and treatment plans with Janet Knox today 11/29/23 and will follow-up with the patient.

## 2023-12-05 ENCOUNTER — TELEPHONE (OUTPATIENT)
Dept: HEMATOLOGY ONCOLOGY | Facility: CLINIC | Age: 53
End: 2023-12-05

## 2023-12-05 ENCOUNTER — HOSPITAL ENCOUNTER (OUTPATIENT)
Dept: ULTRASOUND IMAGING | Facility: CLINIC | Age: 53
Discharge: HOME | End: 2023-12-05
Payer: MEDICARE

## 2023-12-05 DIAGNOSIS — N63.11 MASS OF UPPER OUTER QUADRANT OF RIGHT BREAST: ICD-10-CM

## 2023-12-05 DIAGNOSIS — N63.31 MASS OF AXILLARY TAIL OF RIGHT BREAST: ICD-10-CM

## 2023-12-05 DIAGNOSIS — Z85.3 PERSONAL HISTORY OF MALIGNANT NEOPLASM OF BREAST: ICD-10-CM

## 2023-12-05 PROCEDURE — 76642 ULTRASOUND BREAST LIMITED: CPT

## 2023-12-05 NOTE — PROGRESS NOTES
Met with patient and Dr. Henry Ellis regarding recommendation for;    __X___ RIGHT ______LEFT      __X___Ultrasound guided  ______Stereotactic breast biopsy x2.      __X___Verbalized understanding.      Blood thinners:  No: __X___ Yes: ______ What:                 Biopsy teaching sheet given:  Yes: ___X___ No: ________    Pt given contact information and adv to call with any questions/needs    Patient advised to arrive at 0800 for a 0830 appointment    Discussed arrival at [id number] so pt could take anti-anxiety medication prior to signing consent, pt stated understanding, pt also aware she needed a  if taking medication

## 2023-12-05 NOTE — TELEPHONE ENCOUNTER
Appointment Change  Cancel, Reschedule, Change to Virtual      Who are you speaking with? Patient   If it is not the patient, is the caller listed on the communication consent form? N/A   Which provider is the appointment scheduled with? Dr. Scotty Valero   When was the original appointment scheduled? Please list date and time 12/21/2023 @8AM    At which location is the appointment scheduled to take place? [facility]   Was the appointment rescheduled? Was the appointment changed from an in person visit to a virtual visit? If so, please list the details of the change. Yes, 01/03/2024 @ 10:30AM   What is the reason for the appointment change? Biopsy on 12/22/2023.

## 2023-12-08 DIAGNOSIS — F41.9 ANXIETY: Primary | ICD-10-CM

## 2023-12-08 RX ORDER — LORAZEPAM 0.5 MG/1
0.5 TABLET ORAL AS NEEDED
Qty: 2 TABLET | Refills: 0 | Status: SHIPPED | OUTPATIENT
Start: 2023-12-08

## 2023-12-08 NOTE — TELEPHONE ENCOUNTER
Called patient, she wanted versed before her biopsy. Advised its an injection but dr. Haile Ruiz needs to order a pill to take only after she signs consent for the biopsy. Patient stated understanding and agreeable to lorazepam 0.5mg. patient is aware she cannot drive and she stated her parents will be with her. Patient appreciative and confirmed [facility] [place] [address] [address] [place] [address]. All questions answered at this time.

## 2023-12-22 ENCOUNTER — HOSPITAL ENCOUNTER (OUTPATIENT)
Dept: ULTRASOUND IMAGING | Facility: CLINIC | Age: 53
End: 2023-12-22
Payer: COMMERCIAL

## 2023-12-22 VITALS — SYSTOLIC BLOOD PRESSURE: 142 MMHG | DIASTOLIC BLOOD PRESSURE: 75 MMHG | HEART RATE: 82 BPM

## 2023-12-22 DIAGNOSIS — R93.89 ABNORMAL ULTRASOUND: ICD-10-CM

## 2023-12-22 PROCEDURE — 88342 IMHCHEM/IMCYTCHM 1ST ANTB: CPT | Performed by: PATHOLOGY

## 2023-12-22 PROCEDURE — 88305 TISSUE EXAM BY PATHOLOGIST: CPT | Performed by: PATHOLOGY

## 2023-12-22 PROCEDURE — 19083 BX BREAST 1ST LESION US IMAG: CPT

## 2023-12-22 PROCEDURE — 88341 IMHCHEM/IMCYTCHM EA ADD ANTB: CPT | Performed by: PATHOLOGY

## 2023-12-22 PROCEDURE — A4648 IMPLANTABLE TISSUE MARKER: HCPCS

## 2023-12-22 PROCEDURE — 19084 BX BREAST ADD LESION US IMAG: CPT

## 2023-12-22 RX ORDER — LIDOCAINE HYDROCHLORIDE 10 MG/ML
5 INJECTION, SOLUTION EPIDURAL; INFILTRATION; INTRACAUDAL; PERINEURAL ONCE
Status: COMPLETED | OUTPATIENT
Start: 2023-12-22 | End: 2023-12-22

## 2023-12-22 RX ADMIN — LIDOCAINE HYDROCHLORIDE 5 ML: 10 INJECTION, SOLUTION EPIDURAL; INFILTRATION; INTRACAUDAL; PERINEURAL at 08:39

## 2023-12-22 RX ADMIN — LIDOCAINE HYDROCHLORIDE 5 ML: 10 INJECTION, SOLUTION EPIDURAL; INFILTRATION; INTRACAUDAL; PERINEURAL at 08:45

## 2023-12-22 RX ADMIN — LIDOCAINE HYDROCHLORIDE 5 ML: 10 INJECTION, SOLUTION EPIDURAL; INFILTRATION; INTRACAUDAL; PERINEURAL at 08:51

## 2023-12-22 NOTE — PROGRESS NOTES
Procedure type:    __x___ultrasound guided _____stereotactic    Breast:    _____Left ___x__Right    Location: upper inner sternum 7 cmfn     Needle: 12 gauge graham     # of passes: 3    Clip: Open coil    Performed by: Dr. Chidi Chairez     Pressure held for 5 minutes by: Angeli Sandoval     S[place] Strips:    __x___yes _____no    Band aid:    ___x__yes_____no    Tape and guaze:    _____yes ___x__no    Tolerated procedure:    ___x__yes _____no

## 2023-12-22 NOTE — PROGRESS NOTES
Patient arrived via:    __x___ambulatory    _____wheelchair    _____stretcher      Domestic violence screen    ___x___negative______positive    Breast Implants:    _______yes ____x____no

## 2023-12-22 NOTE — PROGRESS NOTES
Procedure type:    __x___ultrasound guided _____stereotactic    Breast:    _____Left ___x__Right    Location: upper midline 2-5 cmfn superior to scar     Needle: 12 gauge graham     # of passes: 3    Clip: Butterfly     Performed by: Dr. Chidi Chairez     Pressure held for 5 minutes by: Angeli Osman Strips:    __x___yes _____no    Band aid:    ___x__yes_____no    Tape and guaze:    _____yes ___x__no    Tolerated procedure:    ___x__yes _____no

## 2023-12-26 NOTE — PROGRESS NOTES
Post procedure call completed on 12/26/23 at 1040    Bleeding: _____yes __X___no (pt denies)    Pain: _____yes ___X___no (pt denies)    Redness/Swelling: ______yes ___X___no (pt denies)    Band aid removed: __X___yes _____no    Steri-Strips intact: ______yes __X___no (removed today)    Pt with no questions at this time, adv Dr. Jerod Dang's office will call when results available, adv to call with any questions or concerns, has name/# for contact

## 2023-12-27 ENCOUNTER — TELEPHONE (OUTPATIENT)
Dept: MAMMOGRAPHY | Facility: CLINIC | Age: 53
End: 2023-12-27

## 2023-12-27 PROCEDURE — 88342 IMHCHEM/IMCYTCHM 1ST ANTB: CPT | Performed by: PATHOLOGY

## 2023-12-27 PROCEDURE — 88305 TISSUE EXAM BY PATHOLOGIST: CPT | Performed by: PATHOLOGY

## 2023-12-27 PROCEDURE — 88341 IMHCHEM/IMCYTCHM EA ADD ANTB: CPT | Performed by: PATHOLOGY

## 2023-12-28 ENCOUNTER — TELEPHONE (OUTPATIENT)
Dept: SURGICAL ONCOLOGY | Facility: CLINIC | Age: 53
End: 2023-12-28

## 2023-12-28 NOTE — TELEPHONE ENCOUNTER
Called patient to review biopsy results. Advised the concordance report isn't back yet and won't be back until the radiologist is back in office. Explained I typically wait for the concordance report to call patients with their results because then I have all the information at hand to make sure the radiologist and pathologist agree. Explained this should be back by her appointment with Dr. Dang on 1/3/24 and Dr. Dang will review everything in full with her at her appointment. Patient appreciative. All questions answered at this time.

## 2023-12-29 PROBLEM — N64.1 FAT NECROSIS OF BREAST: Status: ACTIVE | Noted: 2023-12-29

## 2024-01-03 ENCOUNTER — OFFICE VISIT (OUTPATIENT)
Dept: SURGICAL ONCOLOGY | Facility: CLINIC | Age: 54
End: 2024-01-03
Payer: MEDICARE

## 2024-01-03 VITALS
HEIGHT: 60 IN | DIASTOLIC BLOOD PRESSURE: 72 MMHG | OXYGEN SATURATION: 99 % | SYSTOLIC BLOOD PRESSURE: 142 MMHG | TEMPERATURE: 98.2 F | WEIGHT: 230 LBS | RESPIRATION RATE: 16 BRPM | HEART RATE: 82 BPM | BODY MASS INDEX: 45.16 KG/M2

## 2024-01-03 DIAGNOSIS — Z90.13 HISTORY OF BILATERAL MASTECTOMY: ICD-10-CM

## 2024-01-03 DIAGNOSIS — Z85.3 PERSONAL HISTORY OF MALIGNANT NEOPLASM OF BREAST: Primary | ICD-10-CM

## 2024-01-03 DIAGNOSIS — Z79.811 USE OF ANASTROZOLE: ICD-10-CM

## 2024-01-03 DIAGNOSIS — N64.1 FAT NECROSIS OF BREAST: ICD-10-CM

## 2024-01-03 DIAGNOSIS — Z98.890 HISTORY OF BREAST RECONSTRUCTION: ICD-10-CM

## 2024-01-03 PROCEDURE — 99204 OFFICE O/P NEW MOD 45 MIN: CPT | Performed by: SURGERY

## 2024-01-03 NOTE — PROGRESS NOTES
Surgical Oncology Follow Up  [facility]  [facility] [address] [address]    Dania Ayala  1970  [id number]      No chief complaint on file.       Assessment & Plan:   Is a 53-year-old female with history of breast cancer bilateral mastectomy with reconstruction this is failed and multiple repeated surgeries.  She is in the process of getting fat transfer.  She had a right breast biopsy and she is here after biopsy.  Biopsy is negative for atypia and consistent with fat necrosis and cord concordant.  These findings were discussed with her and copy of the report was given to the patient    Cancer History:     Oncology History    No history exists.         Interval History:   Follow-up after right breast biopsy    Review of Systems:   Review of Systems   Constitutional:  Negative for chills and fever.   HENT:  Negative for ear pain and sore throat.    Eyes:  Negative for pain and visual disturbance.   Respiratory:  Negative for cough and shortness of breath.    Cardiovascular:  Negative for chest pain and palpitations.   Gastrointestinal:  Negative for abdominal pain and vomiting.   Genitourinary:  Negative for dysuria and hematuria.   Musculoskeletal:  Negative for arthralgias and back pain.   Skin:  Negative for color change and rash.   Neurological:  Negative for seizures and syncope.   All other systems reviewed and are negative.      Past Medical History     Patient Active Problem List   Diagnosis    Acquired absence of breast and absent nipple, bilateral    History of radiation therapy    Personal history of malignant neoplasm of breast    Obesity, Class II, BMI 35-39.9    Bariatric surgery status    Postsurgical malabsorption    Obesity, Class III, BMI 40-49.9 (morbid obesity) (HCC)    S/P right latissimus dorsi flap    History of breast reconstruction    Sleep apnea    History of hysterectomy    Anemia    Arthritis     Gastroesophageal reflux disease    Rheumatoid arthritis (HCC)    Hiatal hernia    Ureteral stone with hydronephrosis    Leukocytosis    Anesthesia complication    History of bilateral mastectomy    Use of anastrozole    Fat necrosis of breast     Past Medical History:   Diagnosis Date    Anesthesia complication     needs IR preprocedure for IV access/not ASC candidate    Breast cancer (HCC)     Breast cancer (HCC)     Breast cancer, right breast (HCC)     Cancer (HCC)     Chronic pain disorder     CPAP (continuous positive airway pressure) dependence     no currently used    GERD (gastroesophageal reflux disease)     Heartburn     Hiatal hernia     History of bariatric surgery     Irregular heart beat     pt states it was a side effect of a medication    Kidney stone     Lung nodule     Osteoporosis     stage 3    Postsurgical malabsorption     RA (rheumatoid arthritis) (HCC)     Rheumatoid arthritis (HCC)     Sleep apnea     Stress incontinence     Thyroid nodule      Past Surgical History:   Procedure Laterality Date    ABDOMINAL SURGERY      BRACHIOPLASTY Bilateral     2017    BREAST IMPLANT Bilateral 09/08/2020    Procedure: BREAST REMOVAL TISSUE EXPANDER/ IMPLANT PLACEMENT BREAST;  Surgeon: Parminder Butler MD;  [facility];  Service: Plastics    BREAST IMPLANT Left 02/09/2021    Procedure: BREAST TISSUE EXPANDER/ IMPLANT EXCHANGE;  Surgeon: Parminder Butler MD;  [facility];  Service: Plastics    BREAST LUMPECTOMY  04/2013    CAPSULOTOMY Bilateral 09/08/2020    Procedure: BREAST CAPSULOTOMY;  Surgeon: Parminder Butler MD;  [facility];  Service: Plastics    CAPSULOTOMY Left 02/09/2021    Procedure: BREAST CAPSULOTOMY;  Surgeon: Parminder Butler MD;  [facility];  Service: Plastics    CARDIAC CATHETERIZATION      CARPAL TUNNEL RELEASE Bilateral 2015    CHOLECYSTECTOMY      COLONOSCOPY      FL RETROGRADE PYELOGRAM  08/08/2023    GALLBLADDER  SURGERY  2015    GASTRIC BYPASS  2014    HYSTERECTOMY  05/2013    INCONTINENCE SURGERY  09/2013    IR BIOPSY LUNG      lung nodules    LIPOSUCTION Right 04/12/2022    Procedure: LIPOSUCTION RIGHT BREAST;  Surgeon: Nolberto Winters MD;  [facility];  Service: Plastics    LIPOSUCTION W/ FAT INJECTION Bilateral 11/10/2021    Procedure: FAT GRAFTING BILATERAL BREAST;  Surgeon: Parminder Butler MD;  [facility];  Service: Plastics    MASTECTOMY Bilateral 10/2016    WI BREAST RECONSTRUCTION W/LATISSIMUS DORSI FLAP Right 08/06/2019    Procedure: BREAST RECONSTRUCTION W/FLAP LATISSIMUS DORSI;  Surgeon: Parminder Butler MD;  [facility];  Service: Plastics    WI CYSTO/URETERO W/LITHOTRIPSY &INDWELL STENT INSRT Right 08/08/2023    Procedure: CYSTOSCOPY URETEROSCOPY, RETROGRADE PYELOGRAM AND INSERTION STENT URETERAL;  Surgeon: Gabriel Rene MD;  [facility];  Service: Urology    WI GRAFTING OF AUTOLOGOUS FAT BY LIPO 50 CC OR LESS Bilateral 01/13/2023    Procedure: BILATERAL BREAST FAT GRAFTING;  Surgeon: Nolberto Winters MD;  [facility];  Service: Plastics    WI GRAFTING OF AUTOLOGOUS FAT BY LIPO 50 CC OR LESS Bilateral 06/13/2023    Procedure: FAT GRAFTING TO BILATERAL BREASTS;  Surgeon: Nolberto Winters MD;  [facility];  Service: Plastics    WI DOMINGA-IMPLANT CAPSULECTOMY BREAST COMPLETE Bilateral 08/06/2019    Procedure: BREAST CAPSULECTOMY;  Surgeon: Parminder Butler MD;  [facility];  Service: Plastics    WI REMOVAL INTACT BREAST IMPLANT Bilateral 08/06/2019    Procedure: BREAST IMPLANT REMOVAL;  Surgeon: Parminder Butler MD;  [facility];  Service: Plastics    WI REMOVAL INTACT BREAST IMPLANT Right 04/12/2022    Procedure: removal of right breast implant, capsulectomy, placement of right breast implant with acelluar dermal matrix.;  Surgeon: Nolberto Winters MD;  [facility];  Service: Plastics     WI REVISION OF RECONSTRUCTED BREAST Right 09/07/2021    Procedure: RIGHT BREAST MOUND REVISION;  Surgeon: Parminder Butler MD;  [facility];  Service: Plastics    WI TISSUE EXPANDER PLACEMENT BREAST RECONSTRUCTION Bilateral 08/06/2019    Procedure: BREAST TISSUE EXPANDER PLACEMENT;  Surgeon: Parminder Butler MD;  [facility];  Service: Plastics    REDUCTION MAMMAPLASTY      REVISION OF SCAR Bilateral 11/10/2021    Procedure: SCAR REVISION BILATERAL ARMS;  Surgeon: Parminder Butler MD;  [facility];  Service: Plastics    TRANSFER MUSCLE PECTORALIS Bilateral 08/06/2019    Procedure: PECTORALIS MUSCLE REPAIR;  Surgeon: Parminder Butler MD;  [facility];  Service: Plastics    US GUIDANCE BREAST BIOPSY RIGHT EACH ADDITIONAL Right 12/22/2023    US GUIDED BREAST BIOPSY RIGHT COMPLETE Right 12/22/2023     Family History   Problem Relation Age of Onset    No Known Problems Mother     Diabetes Father     Heart disease Father     No Known Problems Brother      Social History     Socioeconomic History    Marital status: Single     Spouse name: Not on file    Number of children: Not on file    Years of education: Not on file    Highest education level: Not on file   Occupational History    Not on file   Tobacco Use    Smoking status: Never    Smokeless tobacco: Never   Vaping Use    Vaping status: Never Used   Substance and Sexual Activity    Alcohol use: Yes    Drug use: Never    Sexual activity: Not Currently     Partners: Male     Birth control/protection: Other     Comment: Hysterectomy   Other Topics Concern    Not on file   Social History Narrative    Not on file     Social Determinants of Health     Financial Resource Strain: Not on file   Food Insecurity: No Food Insecurity (8/7/2023)    Hunger Vital Sign     Worried About Running Out of Food in the Last Year: Never true     Ran Out of Food in the Last Year: Never true   Transportation Needs: No  Transportation Needs (8/7/2023)    PRAPARE - Transportation     Lack of Transportation (Medical): No     Lack of Transportation (Non-Medical): No   Physical Activity: Not on file   Stress: Not on file   Social Connections: Not on file   Intimate Partner Violence: Not on file   Housing Stability: Low Risk  (8/7/2023)    Housing Stability Vital Sign     Unable to Pay for Housing in the Last Year: No     Number of Places Lived in the Last Year: 1     Unstable Housing in the Last Year: No       Current Outpatient Medications:     acetaminophen (TYLENOL) 500 mg tablet, Take 1 tablet (500 mg total) by mouth every 4 (four) hours as needed for mild pain or moderate pain, Disp: 30 tablet, Rfl: 2    alendronate (FOSAMAX) 70 mg tablet, every 7 days Weekly on saturdays, Disp: , Rfl:     anastrozole (ARIMIDEX) 1 mg tablet, Take 1 mg by mouth daily, Disp: , Rfl: 4    chlorhexidine (PERIDEX) 0.12 % solution, 2 (two) times a day, Disp: , Rfl:     LORazepam (Ativan) 0.5 mg tablet, Take 1 tablet (0.5 mg total) by mouth as needed for anxiety, Disp: 2 tablet, Rfl: 0    triamcinolone (KENALOG) 0.5 % cream, Apply topically as needed , Disp: , Rfl:   Allergies   Allergen Reactions    Accolate [Zafirlukast] Itching    Penicillins Anaphylaxis     Unconsciousness, and bronchoconstriction      Shellfish Allergy - Food Allergy Anaphylaxis    Shellfish-Derived Products - Food Allergy Anaphylaxis    Singulair [Montelukast] Itching    Pork-Derived Products - Food Allergy Hives    Medical Tape Rash     P/S specific surgical tape used at last procedure        Physical Exam:     Vitals:    01/03/24 1018   BP: 142/72   Pulse: 82   Resp: 16   Temp: 98.2 °F (36.8 °C)   SpO2: 99%     Physical Exam  Constitutional:       Appearance: Normal appearance.   HENT:      Head: Normocephalic and atraumatic.      Nose: Nose normal.      Mouth/Throat:      Mouth: Mucous membranes are moist.   Eyes:      Pupils: Pupils are equal, round, and reactive to light.    Cardiovascular:      Rate and Rhythm: Normal rate.      Pulses: Normal pulses.      Heart sounds: Normal heart sounds.   Pulmonary:      Effort: Pulmonary effort is normal.      Breath sounds: Normal breath sounds.   Chest:      Comments: Bilateral breast no s surgical scars.  Bilateral axillary and supraclavicular examination no palpable uspicious mass or masses.  Bilateral axillary and supraclavicular examination no palpable adenopathy.  Abdominal:      General: Bowel sounds are normal.      Palpations: Abdomen is soft.   Musculoskeletal:         General: Normal range of motion.      Cervical back: Normal range of motion and neck supple.   Skin:     General: Skin is warm.   Neurological:      General: No focal deficit present.      Mental Status: She is alert and oriented to person, place, and time.   Psychiatric:         Mood and Affect: Mood normal.         Behavior: Behavior normal.         Thought Content: Thought content normal.         Judgment: Judgment normal.           Results & Discussion:   . Breast, Right, US right brst bx upper midline (#2 Mass B), 3 passes, 12g marquee:  -Fragment of fibrofatty breast tissue with foci of  fat necrosis  -Single detached fragment of benign skin  -No evidence of formal patterns of atypical hyperplasia seen.      B. Breast, Right, US right brst bx upper inner sternum (#4 Mass D), 3 passes, 12g marquee:  -Fragment of fibrofatty breast tissue with  foci of  fat necrosis  -No evidence of formal patterns of atypical hyperplasia seen.    I did review pathology results and discussed with the patient pathology is concordant with imaging findings.  She has a latissimus dorsi flap reconstruction.  She is in the process of fat transfer with Dr. Winters.  I will see her in 6 months time I did discussed in detail nature of breast cancer and follow-up.  she understands and  agrees . All patient questions were answered.       Advance Care Planning/Advance Directives:  [facility]URK  Zhanna Dang MD discussed the disease status with Dania Ayala  today 01/03/24  treatment plans and follow-up with the patient.

## 2024-03-25 ENCOUNTER — TELEPHONE (OUTPATIENT)
Age: 54
End: 2024-03-25

## 2024-03-25 NOTE — TELEPHONE ENCOUNTER
Rec'd call from patient requesting to speak to Jn regarding some questions she neglected to ask him the last time.     unavailable.    Please return call to patient at your earliest convenience.

## 2024-04-24 ENCOUNTER — TELEPHONE (OUTPATIENT)
Age: 54
End: 2024-04-24

## 2024-04-24 NOTE — TELEPHONE ENCOUNTER
Pt of Dr Winters calling again to request letter of permission to fly with implants    Pt requested this 3 weeks ago and hasn't received the letter yet    Advised I would have Artem call her back at [phone]    She cannot print from portal, letter needs to be mailed.     Pt needs letter by 5/15/24

## 2024-07-02 ENCOUNTER — TELEPHONE (OUTPATIENT)
Dept: PLASTIC SURGERY | Facility: CLINIC | Age: 54
End: 2024-07-02

## 2024-07-03 ENCOUNTER — OFFICE VISIT (OUTPATIENT)
Dept: SURGICAL ONCOLOGY | Facility: CLINIC | Age: 54
End: 2024-07-03
Payer: MEDICARE

## 2024-07-03 VITALS
HEIGHT: 60 IN | OXYGEN SATURATION: 99 % | BODY MASS INDEX: 43.98 KG/M2 | WEIGHT: 224 LBS | HEART RATE: 100 BPM | SYSTOLIC BLOOD PRESSURE: 126 MMHG | DIASTOLIC BLOOD PRESSURE: 92 MMHG

## 2024-07-03 DIAGNOSIS — Z85.3 PERSONAL HISTORY OF MALIGNANT NEOPLASM OF BREAST: Primary | ICD-10-CM

## 2024-07-03 DIAGNOSIS — Z85.3 ENCOUNTER FOR FOLLOW-UP SURVEILLANCE OF BREAST CANCER: ICD-10-CM

## 2024-07-03 DIAGNOSIS — Z08 ENCOUNTER FOR FOLLOW-UP SURVEILLANCE OF BREAST CANCER: ICD-10-CM

## 2024-07-03 DIAGNOSIS — Z98.890 HISTORY OF BREAST RECONSTRUCTION: ICD-10-CM

## 2024-07-03 DIAGNOSIS — Z90.13 HISTORY OF BILATERAL MASTECTOMY: ICD-10-CM

## 2024-07-03 DIAGNOSIS — N64.1 FAT NECROSIS OF BREAST: ICD-10-CM

## 2024-07-03 PROCEDURE — 99205 OFFICE O/P NEW HI 60 MIN: CPT | Performed by: SURGERY

## 2024-07-03 NOTE — PROGRESS NOTES
Surgical Oncology Follow Up  [facility]  [facility] [address] [address]    Dania Ayala  1970  [id number]      Chief Complaint   Patient presents with   • Follow-up     6 Month Follow Up        Assessment & Plan:   54-year-old female with history of breast cancer approximately 11 years ago and completed her endocrine therapy with history of radiation.  She had multiple cosmetic postmastectomy surgeries by different plastic surgeons.  She is overall doing well.  Now she follows with Dr. Winters.  Plan for left breast fat graft upcoming September.  She is very concerned about asymmetry on the right side compared to left side.  She has right breast prepectoral implant.  I have emphasized this probably the best outcome she can have it picked.  She may discuss with plastic surgery the option of converting into retropectoral implant.  In my opinion this could even fail given her tissue integrity with a history of radiation.  I emphasized that the best option to leave it as it is.  Clinically no evidence of local regional recurrence.  No palpable bilateral axillary or supraclavicular adenopathy.  I will see her in 1 year time    Cancer History:     Oncology History    No history exists.         Interval History:   Follow-up with breast cancer    Review of Systems:   Review of Systems   Constitutional:  Negative for chills and fever.   HENT:  Negative for ear pain and sore throat.    Eyes:  Negative for pain and visual disturbance.   Respiratory:  Negative for cough and shortness of breath.    Cardiovascular:  Negative for chest pain and palpitations.   Gastrointestinal:  Negative for abdominal pain and vomiting.   Genitourinary:  Negative for dysuria and hematuria.   Musculoskeletal:  Negative for arthralgias and back pain.   Skin:  Negative for color change and rash.   Neurological:  Negative for seizures and syncope.   All other systems  reviewed and are negative.    Past Medical History     Patient Active Problem List   Diagnosis   • Acquired absence of breast and absent nipple, bilateral   • History of radiation therapy   • Personal history of malignant neoplasm of breast   • Obesity, Class II, BMI 35-39.9   • Bariatric surgery status   • Postsurgical malabsorption   • Obesity, Class III, BMI 40-49.9 (morbid obesity) (HCC)   • S/P right latissimus dorsi flap   • History of breast reconstruction   • Sleep apnea   • History of hysterectomy   • Anemia   • Arthritis   • Gastroesophageal reflux disease   • Rheumatoid arthritis (HCC)   • Hiatal hernia   • Ureteral stone with hydronephrosis   • Leukocytosis   • Anesthesia complication   • History of bilateral mastectomy   • Use of anastrozole   • Fat necrosis of breast     Past Medical History:   Diagnosis Date   • Anesthesia complication     needs IR preprocedure for IV access/not ASC candidate   • Breast cancer (HCC)    • Breast cancer (HCC)    • Breast cancer, right breast (HCC)    • Cancer (HCC)    • Chronic pain disorder    • CPAP (continuous positive airway pressure) dependence     no currently used   • GERD (gastroesophageal reflux disease)    • Heartburn    • Hiatal hernia    • History of bariatric surgery    • Irregular heart beat     pt states it was a side effect of a medication   • Kidney stone    • Lung nodule    • Osteoporosis     stage 3   • Postsurgical malabsorption    • RA (rheumatoid arthritis) (HCC)    • Rheumatoid arthritis (HCC)    • Sleep apnea    • Stress incontinence    • Thyroid nodule      Past Surgical History:   Procedure Laterality Date   • ABDOMINAL SURGERY     • BRACHIOPLASTY Bilateral     2017   • BREAST IMPLANT Bilateral 09/08/2020    Procedure: BREAST REMOVAL TISSUE EXPANDER/ IMPLANT PLACEMENT BREAST;  Surgeon: Parminder Butler MD;  [facility];  Service: Plastics   • BREAST IMPLANT Left 02/09/2021    Procedure: BREAST TISSUE EXPANDER/ IMPLANT EXCHANGE;   Surgeon: Parminder Butler MD;  [facility];  Service: Plastics   • BREAST LUMPECTOMY  04/2013   • CAPSULOTOMY Bilateral 09/08/2020    Procedure: BREAST CAPSULOTOMY;  Surgeon: Parminder Butler MD;  [facility];  Service: Plastics   • CAPSULOTOMY Left 02/09/2021    Procedure: BREAST CAPSULOTOMY;  Surgeon: Parminder Butler MD;  [facility];  Service: Plastics   • CARDIAC CATHETERIZATION     • CARPAL TUNNEL RELEASE Bilateral 2015   • CHOLECYSTECTOMY     • COLONOSCOPY     • FL RETROGRADE PYELOGRAM  08/08/2023   • GALLBLADDER SURGERY  2015   • GASTRIC BYPASS  2014   • HYSTERECTOMY  05/2013   • INCONTINENCE SURGERY  09/2013   • IR BIOPSY LUNG      lung nodules   • LIPOSUCTION Right 04/12/2022    Procedure: LIPOSUCTION RIGHT BREAST;  Surgeon: Nolberto Winters MD;  [facility];  Service: Plastics   • LIPOSUCTION W/ FAT INJECTION Bilateral 11/10/2021    Procedure: FAT GRAFTING BILATERAL BREAST;  Surgeon: Parminder Butler MD;  [facility];  Service: Plastics   • MASTECTOMY Bilateral 10/2016   • AZ BREAST RECONSTRUCTION W/LATISSIMUS DORSI FLAP Right 08/06/2019    Procedure: BREAST RECONSTRUCTION W/FLAP LATISSIMUS DORSI;  Surgeon: Parminder Butler MD;  [facility];  Service: Plastics   • AZ CYSTO/URETERO W/LITHOTRIPSY &INDWELL STENT INSRT Right 08/08/2023    Procedure: CYSTOSCOPY URETEROSCOPY, RETROGRADE PYELOGRAM AND INSERTION STENT URETERAL;  Surgeon: Gabriel Rene MD;  [facility];  Service: Urology   • AZ GRAFTING OF AUTOLOGOUS FAT BY LIPO 50 CC OR LESS Bilateral 01/13/2023    Procedure: BILATERAL BREAST FAT GRAFTING;  Surgeon: Nolberto Winters MD;  [facility];  Service: Plastics   • AZ GRAFTING OF AUTOLOGOUS FAT BY LIPO 50 CC OR LESS Bilateral 06/13/2023    Procedure: FAT GRAFTING TO BILATERAL BREASTS;  Surgeon: Nolberto Winters MD;  [facility];  Service: Plastics   • AZ DOMINGA-IMPLANT  CAPSULECTOMY BREAST COMPLETE Bilateral 08/06/2019    Procedure: BREAST CAPSULECTOMY;  Surgeon: Parminder Butler MD;  [facility];  Service: Plastics   • ND REMOVAL INTACT BREAST IMPLANT Bilateral 08/06/2019    Procedure: BREAST IMPLANT REMOVAL;  Surgeon: Parminder Butler MD;  [facility];  Service: Plastics   • ND REMOVAL INTACT BREAST IMPLANT Right 04/12/2022    Procedure: removal of right breast implant, capsulectomy, placement of right breast implant with acelluar dermal matrix.;  Surgeon: Nolberto Winters MD;  [facility];  Service: Plastics   • ND REVISION OF RECONSTRUCTED BREAST Right 09/07/2021    Procedure: RIGHT BREAST MOUND REVISION;  Surgeon: Parminder Butler MD;  [facility];  Service: Plastics   • ND TISSUE EXPANDER PLACEMENT BREAST RECONSTRUCTION Bilateral 08/06/2019    Procedure: BREAST TISSUE EXPANDER PLACEMENT;  Surgeon: Parminder Butler MD;  [facility];  Service: Plastics   • REDUCTION MAMMAPLASTY     • REVISION OF SCAR Bilateral 11/10/2021    Procedure: SCAR REVISION BILATERAL ARMS;  Surgeon: Parminder Butler MD;  [facility];  Service: Plastics   • TRANSFER MUSCLE PECTORALIS Bilateral 08/06/2019    Procedure: PECTORALIS MUSCLE REPAIR;  Surgeon: Parminder Butler MD;  [facility];  Service: Plastics   • US GUIDANCE BREAST BIOPSY RIGHT EACH ADDITIONAL Right 12/22/2023   • US GUIDED BREAST BIOPSY RIGHT COMPLETE Right 12/22/2023     Family History   Problem Relation Age of Onset   • No Known Problems Mother    • Diabetes Father    • Heart disease Father    • No Known Problems Brother      Social History     Socioeconomic History   • Marital status: Single     Spouse name: Not on file   • Number of children: Not on file   • Years of education: Not on file   • Highest education level: Not on file   Occupational History   • Not on file   Tobacco Use   • Smoking status: Never   • Smokeless  tobacco: Never   Vaping Use   • Vaping status: Never Used   Substance and Sexual Activity   • Alcohol use: Yes   • Drug use: Never   • Sexual activity: Not Currently     Partners: Male     Birth control/protection: Other     Comment: Hysterectomy   Other Topics Concern   • Not on file   Social History Narrative   • Not on file     Social Determinants of Health     Financial Resource Strain: Not on file   Food Insecurity: No Food Insecurity (8/7/2023)    Hunger Vital Sign    • Worried About Running Out of Food in the Last Year: Never true    • Ran Out of Food in the Last Year: Never true   Transportation Needs: No Transportation Needs (8/7/2023)    PRAPARE - Transportation    • Lack of Transportation (Medical): No    • Lack of Transportation (Non-Medical): No   Physical Activity: Not on file   Stress: Not on file   Social Connections: Unknown (6/18/2024)    Received from [facility]    Social SEWORKS    • How often do you feel lonely or isolated from those around you? (Adult - for ages 18 years and over): Not on file   Intimate Partner Violence: Not on file   Housing Stability: Low Risk  (8/7/2023)    Housing Stability Vital Sign    • Unable to Pay for Housing in the Last Year: No    • Number of Times Moved in the Last Year: 1    • Homeless in the Last Year: No       Current Outpatient Medications:   •  acetaminophen (TYLENOL) 500 mg tablet, Take 1 tablet (500 mg total) by mouth every 4 (four) hours as needed for mild pain or moderate pain, Disp: 30 tablet, Rfl: 2  •  alendronate (FOSAMAX) 70 mg tablet, every 7 days Weekly on saturdays, Disp: , Rfl:   •  chlorhexidine (PERIDEX) 0.12 % solution, 2 (two) times a day, Disp: , Rfl:   •  LORazepam (Ativan) 0.5 mg tablet, Take 1 tablet (0.5 mg total) by mouth as needed for anxiety, Disp: 2 tablet, Rfl: 0  •  triamcinolone (KENALOG) 0.5 % cream, Apply topically as needed , Disp: , Rfl:   •  anastrozole (ARIMIDEX) 1 mg tablet, Take 1 mg by mouth daily (Patient not taking:  Reported on 7/3/2024), Disp: , Rfl: 4  Allergies   Allergen Reactions   • Accolate [Zafirlukast] Itching   • Penicillins Anaphylaxis     Unconsciousness, and bronchoconstriction     • Shellfish Allergy - Food Allergy Anaphylaxis   • Shellfish-Derived Products - Food Allergy Anaphylaxis   • Singulair [Montelukast] Itching   • Pork-Derived Products - Food Allergy Hives   • Medical Tape Rash     P/S specific surgical tape used at last procedure        Physical Exam:     Vitals:    07/03/24 0940   BP: 126/92   Pulse: 100   SpO2: 99%     Physical Exam  Constitutional:       Appearance: Normal appearance.   HENT:      Head: Normocephalic and atraumatic.      Nose: Nose normal.      Mouth/Throat:      Mouth: Mucous membranes are moist.   Eyes:      Pupils: Pupils are equal, round, and reactive to light.   Cardiovascular:      Rate and Rhythm: Normal rate.      Pulses: Normal pulses.      Heart sounds: Normal heart sounds.   Pulmonary:      Effort: Pulmonary effort is normal.      Breath sounds: Normal breath sounds.   Chest:      Comments: Bilateral breast implants are in place.  With a history of left is most dose I flap reconstruction in the right side as well.  Well-healed surgical scars.  No palpable mass or masses on the flaps.  Bilateral axillary and supraclavicular examination no palpable adenopathy.  Patient was examined seated as well as supine position.  Abdominal:      General: Bowel sounds are normal.      Palpations: Abdomen is soft.   Musculoskeletal:         General: Normal range of motion.      Cervical back: Normal range of motion and neck supple.   Skin:     General: Skin is warm.   Neurological:      General: No focal deficit present.      Mental Status: She is alert and oriented to person, place, and time.   Psychiatric:         Mood and Affect: Mood normal.         Behavior: Behavior normal.         Thought Content: Thought content normal.         Judgment: Judgment normal.       Results & Discussion:    We did discuss in detail the plastic surgical options.  At this point I would recommend her to discuss further options with plastic surgery with regard to her concern about asymmetry.  She has an upcoming appointment with Dr. Winters that she will discuss.  I did discussed in detail nature of breast cancer risk of recurrence after 10 years and need for follow-up and close surveillance with surveillance were discussed.  she understands and  agrees . All patient questions were answered.       Advance Care Planning/Advance Directives:  I Jailyn Dang MD discussed the disease status with Dania Ayala  today 07/03/24  treatment plans and follow-up with the patient.

## 2024-08-18 NOTE — TELEPHONE ENCOUNTER
Karlene Cifuentes, called from pharmacy, stated that patient is allergic to Penicillin, and is prescribed Ancef for surgery tomorrow  S/W Dr Stacey Gonzalez and he stated that he would like 3 bags that consist of 1g Vancomycin; 80 mg Gentamycin, 50,000 units Bacitracin  Karlene Cifuentes stated understanding  right lower quadrant/suprapubic

## 2024-09-03 ENCOUNTER — APPOINTMENT (OUTPATIENT)
Dept: LAB | Facility: HOSPITAL | Age: 54
End: 2024-09-03
Payer: MEDICARE

## 2024-09-03 DIAGNOSIS — Z90.13 ACQUIRED ABSENCE OF BREAST AND ABSENT NIPPLE, BILATERAL: ICD-10-CM

## 2024-09-03 DIAGNOSIS — Z98.890 HISTORY OF BREAST RECONSTRUCTION: ICD-10-CM

## 2024-09-03 DIAGNOSIS — Z85.3 PERSONAL HISTORY OF MALIGNANT NEOPLASM OF BREAST: ICD-10-CM

## 2024-09-03 LAB
ANION GAP SERPL CALCULATED.3IONS-SCNC: 7 MMOL/L (ref 4–13)
BASOPHILS # BLD AUTO: 0.05 THOUSANDS/ÂΜL (ref 0–0.1)
BASOPHILS NFR BLD AUTO: 1 % (ref 0–1)
BUN SERPL-MCNC: 12 MG/DL (ref 5–25)
CALCIUM SERPL-MCNC: 8.5 MG/DL (ref 8.4–10.2)
CHLORIDE SERPL-SCNC: 106 MMOL/L (ref 96–108)
CO2 SERPL-SCNC: 25 MMOL/L (ref 21–32)
CREAT SERPL-MCNC: 0.55 MG/DL (ref 0.6–1.3)
EOSINOPHIL # BLD AUTO: 0.18 THOUSAND/ÂΜL (ref 0–0.61)
EOSINOPHIL NFR BLD AUTO: 2 % (ref 0–6)
ERYTHROCYTE [DISTWIDTH] IN BLOOD BY AUTOMATED COUNT: 22.1 % (ref 11.6–15.1)
GFR SERPL CREATININE-BSD FRML MDRD: 107 ML/MIN/1.73SQ M
GLUCOSE P FAST SERPL-MCNC: 88 MG/DL (ref 65–99)
HCT VFR BLD AUTO: 30.2 % (ref 34.8–46.1)
HGB BLD-MCNC: 8.3 G/DL (ref 11.5–15.4)
IMM GRANULOCYTES # BLD AUTO: 0.03 THOUSAND/UL (ref 0–0.2)
IMM GRANULOCYTES NFR BLD AUTO: 0 % (ref 0–2)
LYMPHOCYTES # BLD AUTO: 2.19 THOUSANDS/ÂΜL (ref 0.6–4.47)
LYMPHOCYTES NFR BLD AUTO: 28 % (ref 14–44)
MCH RBC QN AUTO: 18.2 PG (ref 26.8–34.3)
MCHC RBC AUTO-ENTMCNC: 27.5 G/DL (ref 31.4–37.4)
MCV RBC AUTO: 66 FL (ref 82–98)
MONOCYTES # BLD AUTO: 0.69 THOUSAND/ÂΜL (ref 0.17–1.22)
MONOCYTES NFR BLD AUTO: 9 % (ref 4–12)
NEUTROPHILS # BLD AUTO: 4.83 THOUSANDS/ÂΜL (ref 1.85–7.62)
NEUTS SEG NFR BLD AUTO: 60 % (ref 43–75)
NRBC BLD AUTO-RTO: 0 /100 WBCS
PLATELET # BLD AUTO: 398 THOUSANDS/UL (ref 149–390)
PMV BLD AUTO: 9.6 FL (ref 8.9–12.7)
POTASSIUM SERPL-SCNC: 3.8 MMOL/L (ref 3.5–5.3)
RBC # BLD AUTO: 4.55 MILLION/UL (ref 3.81–5.12)
SODIUM SERPL-SCNC: 138 MMOL/L (ref 135–147)
WBC # BLD AUTO: 7.97 THOUSAND/UL (ref 4.31–10.16)

## 2024-09-03 PROCEDURE — 85025 COMPLETE CBC W/AUTO DIFF WBC: CPT

## 2024-09-03 PROCEDURE — 80048 BASIC METABOLIC PNL TOTAL CA: CPT

## 2024-09-03 PROCEDURE — 36415 COLL VENOUS BLD VENIPUNCTURE: CPT

## 2024-09-04 ENCOUNTER — TELEPHONE (OUTPATIENT)
Age: 54
End: 2024-09-04

## 2024-09-05 NOTE — TELEPHONE ENCOUNTER
A call received from patient's insurance asking to send Dr Winters a message about the appeal request of the left breast that it will be processed once info received from patient.  
Patient calling again requesting to speak to Artem    Transferred to Artem.      
Pt called wanting to speak with Artem     Please call patient back when available.  
6

## 2024-09-12 ENCOUNTER — OFFICE VISIT (OUTPATIENT)
Dept: PLASTIC SURGERY | Facility: CLINIC | Age: 54
End: 2024-09-12
Payer: MEDICARE

## 2024-09-12 DIAGNOSIS — Z98.890 HISTORY OF BREAST RECONSTRUCTION: Primary | ICD-10-CM

## 2024-09-12 DIAGNOSIS — Z90.13 ACQUIRED ABSENCE OF BREAST AND ABSENT NIPPLE, BILATERAL: ICD-10-CM

## 2024-09-12 DIAGNOSIS — Z85.3 PERSONAL HISTORY OF MALIGNANT NEOPLASM OF BREAST: ICD-10-CM

## 2024-09-12 PROCEDURE — 99215 OFFICE O/P EST HI 40 MIN: CPT | Performed by: STUDENT IN AN ORGANIZED HEALTH CARE EDUCATION/TRAINING PROGRAM

## 2024-09-13 ENCOUNTER — PREP FOR PROCEDURE (OUTPATIENT)
Dept: PLASTIC SURGERY | Facility: CLINIC | Age: 54
End: 2024-09-13

## 2024-09-13 DIAGNOSIS — Z85.3 PERSONAL HISTORY OF MALIGNANT NEOPLASM OF BREAST: ICD-10-CM

## 2024-09-13 DIAGNOSIS — Z90.13 HISTORY OF BILATERAL MASTECTOMY: ICD-10-CM

## 2024-09-13 DIAGNOSIS — Z85.3 PERSONAL HISTORY OF BREAST CANCER: Primary | ICD-10-CM

## 2024-09-13 DIAGNOSIS — N64.1 FAT NECROSIS OF BREAST: ICD-10-CM

## 2024-09-13 NOTE — PRE-PROCEDURE INSTRUCTIONS
Pre-Surgery Instructions:   Medication Instructions    acetaminophen (TYLENOL) 500 mg tablet Uses PRN- OK to take day of surgery    alendronate (FOSAMAX) 70 mg tablet Takes on Saturdays    chlorhexidine (PERIDEX) 0.12 % solution Continue    LORazepam (Ativan) 0.5 mg tablet Uses PRN- OK to take day of surgery    triamcinolone (KENALOG) 0.5 % cream Stop taking 1 day prior to surgery.   Medication instructions for day surgery reviewed. Please use only a sip of water to take your instructed medications. Avoid all over the counter vitamins, supplements and NSAIDS for one week prior to surgery per anesthesia guidelines. Tylenol is ok to take as needed.     You will receive a call one business day prior to surgery with an arrival time and hospital directions. If your surgery is scheduled on a Monday, the hospital will be calling you on the Friday prior to your surgery. If you have not heard from anyone by 8pm, please call the hospital supervisor through the hospital  at [phone]. ([place] [phone] or [employer] [phone]).    Do not eat or drink anything after midnight the night before your surgery, including candy, mints, lifesavers, or chewing gum. Do not drink alcohol 24hrs before your surgery. Try not to smoke at least 24hrs before your surgery.       Follow the pre surgery showering instructions as listed in the “My Surgical Experience Booklet” or otherwise provided by your surgeon's office. Do not use a blade to shave the surgical area 1 week before surgery. It is okay to use a clean electric clippers up to 24 hours before surgery. Do not apply any lotions, creams, including makeup, cologne, deodorant, or perfumes after showering on the day of your surgery. Do not use dry shampoo, hair spray, hair gel, or any type of hair products.     No contact lenses, eye make-up, or artificial eyelashes. Remove nail polish, including gel polish, and any artificial, gel, or acrylic nails if possible. Remove all  jewelry including rings and body piercing jewelry.     Wear causal clothing that is easy to take on and off. Consider your type of surgery.    Keep any valuables, jewelry, piercings at home. Please bring any specially ordered equipment (sling, braces) if indicated.    Arrange for a responsible person to drive you to and from the hospital on the day of your surgery. Please confirm the visitor policy for the day of your procedure when you receive your phone call with an arrival time.     Call the surgeon's office with any new illnesses, exposures, or additional questions prior to surgery.    Please reference your “My Surgical Experience Booklet” for additional information to prepare for your upcoming surgery.

## 2024-09-13 NOTE — PROGRESS NOTES
PRS Note    Reason for visit: further discussion of revision of reconstruction    HPI from 9/12/24  Patient is well known to me having undergone multiple procedures for breast reconstruction. She presents with right breast implant malposition and left breast excess skin at scar site. She wishes for both to be treated.    She has 790 cc implants which is the cause of her inferior migration of the implant. She is unhappy with the right side due to the extent of migration. She also has history of latissimus flap performed by Dr Humphries. When the right breast IMF is manipulated to a symmetrical position to the contralateral breast, the breast is significantly larger due to history of fat grafting.     I discussed IMF fold incision and using ADM to recreate the inframammary fold at a higher level in addition to medial and superior capsulotomies, and downsizing the implant. The downsizing of the implant is needed as the right chest is larger than the left due to history of multiple fat grafting sessions. Patient agreed and acknowledged.    Her left medial breast has an area of excess skin and scar which she would like excised.    I discussed the procedure, risks, and complications including recurrent inferior migration/malposition due to the size and weight of the implant. I discussed supportive bra for at least 6 weeks to allow for the new IMF position to scar into position. Patient acknowledged.    Plan:  -Will proceed with revision of right reconstructed breast with implant exchange (downsize) and excision of left breast skin with complex closure, patient acknowledged and agreed.  -Will order implants and ADM  -All questions answered, concerns addressed.  -Photos taken, consent obtained  -Patient is a difficult IV access and will require ultrasound guidance  -Spent 45 minutes in consultation with patient. Greater than 50% of the total time was spent obtaining history, evaluation, performing exam, discussion of  management options including post-operative care, answering patient's questions and concerns, chart reviewing, and documentation      Nolberto Winters MD   [facility]   [address], [address]   [place] [address]   Office: [phone]

## 2024-09-16 ENCOUNTER — ANESTHESIA EVENT (OUTPATIENT)
Dept: PERIOP | Facility: HOSPITAL | Age: 54
End: 2024-09-16
Payer: MEDICARE

## 2024-09-17 ENCOUNTER — TELEPHONE (OUTPATIENT)
Age: 54
End: 2024-09-17

## 2024-09-17 NOTE — TELEPHONE ENCOUNTER
Sydnie from [facility] called stating the surgical procedure was denied because it is following under cosmetic, they need additional information to support the appeal and if medicare approved it if they could have the explanation of benefits faxed to them at [phone]

## 2024-09-18 ENCOUNTER — TELEPHONE (OUTPATIENT)
Age: 54
End: 2024-09-18

## 2024-09-18 NOTE — TELEPHONE ENCOUNTER
Pt called, said she has not received a call from the hospital about what time her surgery is tomorrow.    Please call pt when available

## 2024-09-19 ENCOUNTER — ANESTHESIA (OUTPATIENT)
Dept: PERIOP | Facility: HOSPITAL | Age: 54
End: 2024-09-19
Payer: MEDICARE

## 2024-09-19 ENCOUNTER — HOSPITAL ENCOUNTER (OUTPATIENT)
Facility: HOSPITAL | Age: 54
Setting detail: OUTPATIENT SURGERY
Discharge: HOME | End: 2024-09-19
Attending: STUDENT IN AN ORGANIZED HEALTH CARE EDUCATION/TRAINING PROGRAM | Admitting: STUDENT IN AN ORGANIZED HEALTH CARE EDUCATION/TRAINING PROGRAM
Payer: MEDICARE

## 2024-09-19 VITALS
TEMPERATURE: 97.3 F | SYSTOLIC BLOOD PRESSURE: 134 MMHG | OXYGEN SATURATION: 98 % | WEIGHT: 224 LBS | RESPIRATION RATE: 18 BRPM | HEIGHT: 60 IN | BODY MASS INDEX: 43.98 KG/M2 | HEART RATE: 82 BPM | DIASTOLIC BLOOD PRESSURE: 85 MMHG

## 2024-09-19 DIAGNOSIS — Z90.13 HISTORY OF BILATERAL MASTECTOMY: Primary | ICD-10-CM

## 2024-09-19 LAB
ABO GROUP BLD: NORMAL
ABO GROUP BLD: NORMAL
BLD GP AB SCN SERPL QL: NEGATIVE
RH BLD: POSITIVE
RH BLD: POSITIVE
SPECIMEN EXPIRATION DATE: NORMAL

## 2024-09-19 PROCEDURE — NC001 PR NO CHARGE: Performed by: STUDENT IN AN ORGANIZED HEALTH CARE EDUCATION/TRAINING PROGRAM

## 2024-09-19 PROCEDURE — 86850 RBC ANTIBODY SCREEN: CPT | Performed by: STUDENT IN AN ORGANIZED HEALTH CARE EDUCATION/TRAINING PROGRAM

## 2024-09-19 PROCEDURE — 36430 TRANSFUSION BLD/BLD COMPNT: CPT

## 2024-09-19 PROCEDURE — L8600 IMPLANT BREAST SILICONE/EQ: HCPCS | Performed by: STUDENT IN AN ORGANIZED HEALTH CARE EDUCATION/TRAINING PROGRAM

## 2024-09-19 PROCEDURE — 19342 INSJ/RPLCMT BRST IMPLT SEP D: CPT | Performed by: STUDENT IN AN ORGANIZED HEALTH CARE EDUCATION/TRAINING PROGRAM

## 2024-09-19 PROCEDURE — P9016 RBC LEUKOCYTES REDUCED: HCPCS

## 2024-09-19 PROCEDURE — NC001 PR NO CHARGE: Performed by: PHYSICIAN ASSISTANT

## 2024-09-19 PROCEDURE — 13102 CMPLX RPR TRUNK ADDL 5CM/<: CPT | Performed by: STUDENT IN AN ORGANIZED HEALTH CARE EDUCATION/TRAINING PROGRAM

## 2024-09-19 PROCEDURE — C1781 MESH (IMPLANTABLE): HCPCS | Performed by: STUDENT IN AN ORGANIZED HEALTH CARE EDUCATION/TRAINING PROGRAM

## 2024-09-19 PROCEDURE — 15777 ACELLULAR DERM MATRIX IMPLT: CPT | Performed by: STUDENT IN AN ORGANIZED HEALTH CARE EDUCATION/TRAINING PROGRAM

## 2024-09-19 PROCEDURE — 86901 BLOOD TYPING SEROLOGIC RH(D): CPT | Performed by: STUDENT IN AN ORGANIZED HEALTH CARE EDUCATION/TRAINING PROGRAM

## 2024-09-19 PROCEDURE — 11406 EXC TR-EXT B9+MARG >4.0 CM: CPT | Performed by: STUDENT IN AN ORGANIZED HEALTH CARE EDUCATION/TRAINING PROGRAM

## 2024-09-19 PROCEDURE — 19380 REVJ RECONSTRUCTED BREAST: CPT | Performed by: STUDENT IN AN ORGANIZED HEALTH CARE EDUCATION/TRAINING PROGRAM

## 2024-09-19 PROCEDURE — 13101 CMPLX RPR TRUNK 2.6-7.5 CM: CPT | Performed by: STUDENT IN AN ORGANIZED HEALTH CARE EDUCATION/TRAINING PROGRAM

## 2024-09-19 PROCEDURE — 86923 COMPATIBILITY TEST ELECTRIC: CPT

## 2024-09-19 PROCEDURE — 86900 BLOOD TYPING SEROLOGIC ABO: CPT | Performed by: STUDENT IN AN ORGANIZED HEALTH CARE EDUCATION/TRAINING PROGRAM

## 2024-09-19 DEVICE — IMPLANTABLE DEVICE: Type: IMPLANTABLE DEVICE | Site: BREAST | Status: FUNCTIONAL

## 2024-09-19 RX ORDER — ONDANSETRON 2 MG/ML
INJECTION INTRAMUSCULAR; INTRAVENOUS AS NEEDED
Status: DISCONTINUED | OUTPATIENT
Start: 2024-09-19 | End: 2024-09-19

## 2024-09-19 RX ORDER — DIPHENHYDRAMINE HYDROCHLORIDE 50 MG/ML
12.5 INJECTION INTRAMUSCULAR; INTRAVENOUS ONCE AS NEEDED
Status: DISCONTINUED | OUTPATIENT
Start: 2024-09-19 | End: 2024-09-19 | Stop reason: HOSPADM

## 2024-09-19 RX ORDER — CLINDAMYCIN PHOSPHATE 900 MG/50ML
900 INJECTION, SOLUTION INTRAVENOUS ONCE
Status: COMPLETED | OUTPATIENT
Start: 2024-09-19 | End: 2024-09-19

## 2024-09-19 RX ORDER — LIDOCAINE HYDROCHLORIDE 10 MG/ML
INJECTION, SOLUTION EPIDURAL; INFILTRATION; INTRACAUDAL; PERINEURAL AS NEEDED
Status: DISCONTINUED | OUTPATIENT
Start: 2024-09-19 | End: 2024-09-19

## 2024-09-19 RX ORDER — SODIUM CHLORIDE, SODIUM LACTATE, POTASSIUM CHLORIDE, CALCIUM CHLORIDE 600; 310; 30; 20 MG/100ML; MG/100ML; MG/100ML; MG/100ML
125 INJECTION, SOLUTION INTRAVENOUS CONTINUOUS
Status: DISCONTINUED | OUTPATIENT
Start: 2024-09-19 | End: 2024-09-19 | Stop reason: HOSPADM

## 2024-09-19 RX ORDER — ONDANSETRON 4 MG/1
4 TABLET, FILM COATED ORAL EVERY 8 HOURS PRN
Qty: 20 TABLET | Refills: 0 | Status: SHIPPED | OUTPATIENT
Start: 2024-09-19

## 2024-09-19 RX ORDER — ACETAMINOPHEN 500 MG
500 TABLET ORAL EVERY 4 HOURS PRN
Qty: 30 TABLET | Refills: 2 | Status: SHIPPED | OUTPATIENT
Start: 2024-09-19

## 2024-09-19 RX ORDER — ONDANSETRON 2 MG/ML
4 INJECTION INTRAMUSCULAR; INTRAVENOUS ONCE AS NEEDED
Status: DISCONTINUED | OUTPATIENT
Start: 2024-09-19 | End: 2024-09-19 | Stop reason: HOSPADM

## 2024-09-19 RX ORDER — CIPROFLOXACIN 500 MG/1
500 TABLET, FILM COATED ORAL EVERY 12 HOURS SCHEDULED
Qty: 16 TABLET | Refills: 0 | Status: SHIPPED | OUTPATIENT
Start: 2024-09-19 | End: 2024-09-27

## 2024-09-19 RX ORDER — SODIUM CHLORIDE, SODIUM LACTATE, POTASSIUM CHLORIDE, CALCIUM CHLORIDE 600; 310; 30; 20 MG/100ML; MG/100ML; MG/100ML; MG/100ML
100 INJECTION, SOLUTION INTRAVENOUS CONTINUOUS
Status: DISCONTINUED | OUTPATIENT
Start: 2024-09-19 | End: 2024-09-19 | Stop reason: HOSPADM

## 2024-09-19 RX ORDER — DEXAMETHASONE SODIUM PHOSPHATE 10 MG/ML
INJECTION, SOLUTION INTRAMUSCULAR; INTRAVENOUS AS NEEDED
Status: DISCONTINUED | OUTPATIENT
Start: 2024-09-19 | End: 2024-09-19

## 2024-09-19 RX ORDER — PROPOFOL 10 MG/ML
INJECTION, EMULSION INTRAVENOUS CONTINUOUS PRN
Status: DISCONTINUED | OUTPATIENT
Start: 2024-09-19 | End: 2024-09-19

## 2024-09-19 RX ORDER — HYDROMORPHONE HYDROCHLORIDE 2 MG/ML
INJECTION, SOLUTION INTRAMUSCULAR; INTRAVENOUS; SUBCUTANEOUS AS NEEDED
Status: DISCONTINUED | OUTPATIENT
Start: 2024-09-19 | End: 2024-09-19

## 2024-09-19 RX ORDER — OXYCODONE HYDROCHLORIDE 5 MG/1
5 TABLET ORAL EVERY 6 HOURS PRN
Qty: 24 TABLET | Refills: 0 | Status: SHIPPED | OUTPATIENT
Start: 2024-09-19 | End: 2024-09-29

## 2024-09-19 RX ORDER — GABAPENTIN 300 MG/1
300 CAPSULE ORAL ONCE
Status: DISCONTINUED | OUTPATIENT
Start: 2024-09-19 | End: 2024-09-19 | Stop reason: HOSPADM

## 2024-09-19 RX ORDER — OXYCODONE HYDROCHLORIDE 5 MG/1
5 TABLET ORAL EVERY 4 HOURS PRN
Status: DISCONTINUED | OUTPATIENT
Start: 2024-09-19 | End: 2024-09-19 | Stop reason: HOSPADM

## 2024-09-19 RX ORDER — FENTANYL CITRATE 50 UG/ML
INJECTION, SOLUTION INTRAMUSCULAR; INTRAVENOUS AS NEEDED
Status: DISCONTINUED | OUTPATIENT
Start: 2024-09-19 | End: 2024-09-19

## 2024-09-19 RX ORDER — FENTANYL CITRATE/PF 50 MCG/ML
25 SYRINGE (ML) INJECTION
Status: COMPLETED | OUTPATIENT
Start: 2024-09-19 | End: 2024-09-19

## 2024-09-19 RX ORDER — DOCUSATE SODIUM 100 MG/1
100 CAPSULE, LIQUID FILLED ORAL 2 TIMES DAILY PRN
Qty: 20 CAPSULE | Refills: 1 | Status: SHIPPED | OUTPATIENT
Start: 2024-09-19

## 2024-09-19 RX ORDER — MAGNESIUM HYDROXIDE 1200 MG/15ML
LIQUID ORAL AS NEEDED
Status: DISCONTINUED | OUTPATIENT
Start: 2024-09-19 | End: 2024-09-19 | Stop reason: HOSPADM

## 2024-09-19 RX ORDER — PROPOFOL 10 MG/ML
INJECTION, EMULSION INTRAVENOUS AS NEEDED
Status: DISCONTINUED | OUTPATIENT
Start: 2024-09-19 | End: 2024-09-19

## 2024-09-19 RX ORDER — HYDROMORPHONE HCL/PF 1 MG/ML
0.5 SYRINGE (ML) INJECTION
Status: DISCONTINUED | OUTPATIENT
Start: 2024-09-19 | End: 2024-09-19 | Stop reason: HOSPADM

## 2024-09-19 RX ORDER — ACETAMINOPHEN 325 MG/1
975 TABLET ORAL ONCE
Status: COMPLETED | OUTPATIENT
Start: 2024-09-19 | End: 2024-09-19

## 2024-09-19 RX ORDER — ROCURONIUM BROMIDE 10 MG/ML
INJECTION, SOLUTION INTRAVENOUS AS NEEDED
Status: DISCONTINUED | OUTPATIENT
Start: 2024-09-19 | End: 2024-09-19

## 2024-09-19 RX ORDER — MIDAZOLAM HYDROCHLORIDE 2 MG/2ML
INJECTION, SOLUTION INTRAMUSCULAR; INTRAVENOUS AS NEEDED
Status: DISCONTINUED | OUTPATIENT
Start: 2024-09-19 | End: 2024-09-19

## 2024-09-19 RX ADMIN — HYDROMORPHONE HYDROCHLORIDE 0.5 MG: 1 INJECTION, SOLUTION INTRAMUSCULAR; INTRAVENOUS; SUBCUTANEOUS at 15:09

## 2024-09-19 RX ADMIN — FENTANYL CITRATE 100 MCG: 50 INJECTION INTRAMUSCULAR; INTRAVENOUS at 10:58

## 2024-09-19 RX ADMIN — FENTANYL CITRATE 25 MCG: 50 INJECTION INTRAMUSCULAR; INTRAVENOUS at 14:19

## 2024-09-19 RX ADMIN — HYDROMORPHONE HYDROCHLORIDE 0.5 MG: 1 INJECTION, SOLUTION INTRAMUSCULAR; INTRAVENOUS; SUBCUTANEOUS at 14:56

## 2024-09-19 RX ADMIN — PROPOFOL 150 MG: 10 INJECTION, EMULSION INTRAVENOUS at 10:58

## 2024-09-19 RX ADMIN — LIDOCAINE HYDROCHLORIDE 80 MG: 10 INJECTION, SOLUTION EPIDURAL; INFILTRATION; INTRACAUDAL; PERINEURAL at 10:58

## 2024-09-19 RX ADMIN — ROCURONIUM BROMIDE 60 MG: 10 INJECTION INTRAVENOUS at 10:58

## 2024-09-19 RX ADMIN — ROCURONIUM BROMIDE 20 MG: 10 INJECTION INTRAVENOUS at 12:33

## 2024-09-19 RX ADMIN — SODIUM CHLORIDE, SODIUM LACTATE, POTASSIUM CHLORIDE, AND CALCIUM CHLORIDE: .6; .31; .03; .02 INJECTION, SOLUTION INTRAVENOUS at 10:48

## 2024-09-19 RX ADMIN — ONDANSETRON 4 MG: 2 INJECTION INTRAMUSCULAR; INTRAVENOUS at 11:01

## 2024-09-19 RX ADMIN — HYDROMORPHONE HYDROCHLORIDE 0.5 MG: 1 INJECTION, SOLUTION INTRAMUSCULAR; INTRAVENOUS; SUBCUTANEOUS at 15:21

## 2024-09-19 RX ADMIN — FENTANYL CITRATE 50 MCG: 50 INJECTION INTRAMUSCULAR; INTRAVENOUS at 12:51

## 2024-09-19 RX ADMIN — PROPOFOL 100 MCG/KG/MIN: 10 INJECTION, EMULSION INTRAVENOUS at 11:02

## 2024-09-19 RX ADMIN — FENTANYL CITRATE 25 MCG: 50 INJECTION INTRAMUSCULAR; INTRAVENOUS at 14:41

## 2024-09-19 RX ADMIN — FENTANYL CITRATE 25 MCG: 50 INJECTION INTRAMUSCULAR; INTRAVENOUS at 14:24

## 2024-09-19 RX ADMIN — SUGAMMADEX 203 MG: 100 INJECTION, SOLUTION INTRAVENOUS at 13:44

## 2024-09-19 RX ADMIN — HYDROMORPHONE HYDROCHLORIDE 0.5 MG: 2 INJECTION INTRAMUSCULAR; INTRAVENOUS; SUBCUTANEOUS at 11:18

## 2024-09-19 RX ADMIN — DEXAMETHASONE SODIUM PHOSPHATE 10 MG: 10 INJECTION, SOLUTION INTRAMUSCULAR; INTRAVENOUS at 11:01

## 2024-09-19 RX ADMIN — FENTANYL CITRATE 25 MCG: 50 INJECTION INTRAMUSCULAR; INTRAVENOUS at 14:31

## 2024-09-19 RX ADMIN — ACETAMINOPHEN 975 MG: 325 TABLET ORAL at 09:09

## 2024-09-19 RX ADMIN — OXYCODONE HYDROCHLORIDE 5 MG: 5 TABLET ORAL at 15:46

## 2024-09-19 RX ADMIN — MIDAZOLAM 2 MG: 1 INJECTION INTRAMUSCULAR; INTRAVENOUS at 10:46

## 2024-09-19 RX ADMIN — CLINDAMYCIN PHOSPHATE 900 MG: 900 INJECTION, SOLUTION INTRAVENOUS at 10:46

## 2024-09-19 NOTE — H&P
Plastic Surgery Attending    H&P reviewed, no new changes.    Nolberto Winters MD   [facility] Plastic and Reconstructive Surgery   [address] [place] [address], [address]   [place] [address]   Office: [phone]    Past Medical History:   Diagnosis Date    Anesthesia complication     needs IR preprocedure for IV access/not ASC candidate    Breast cancer (HCC)     Breast cancer (HCC)     Breast cancer, right breast (HCC)     Cancer (HCC)     Chronic pain disorder     CPAP (continuous positive airway pressure) dependence     no currently used    GERD (gastroesophageal reflux disease)     Heartburn     Hiatal hernia     History of bariatric surgery     Irregular heart beat     pt states it was a side effect of a medication    Kidney stone     Lung nodule     Osteoporosis     stage 3    Postsurgical malabsorption     RA (rheumatoid arthritis) (HCC)     Rheumatoid arthritis (HCC)     Sleep apnea     Stress incontinence     Thyroid nodule      Past Surgical History:   Procedure Laterality Date    ABDOMINAL SURGERY      BRACHIOPLASTY Bilateral     2017    BREAST IMPLANT Bilateral 09/08/2020    Procedure: BREAST REMOVAL TISSUE EXPANDER/ IMPLANT PLACEMENT BREAST;  Surgeon: Parminder Butler MD;  [facility];  Service: Plastics    BREAST IMPLANT Left 02/09/2021    Procedure: BREAST TISSUE EXPANDER/ IMPLANT EXCHANGE;  Surgeon: Parminder Butler MD;  [facility];  Service: Plastics    BREAST LUMPECTOMY  04/2013    CAPSULOTOMY Bilateral 09/08/2020    Procedure: BREAST CAPSULOTOMY;  Surgeon: Parminder Butler MD;  [facility];  Service: Plastics    CAPSULOTOMY Left 02/09/2021    Procedure: BREAST CAPSULOTOMY;  Surgeon: Parminder Butler MD;  [facility];  Service: Plastics    CARDIAC CATHETERIZATION      CARPAL TUNNEL RELEASE Bilateral 2015    CHOLECYSTECTOMY      COLONOSCOPY      FL RETROGRADE PYELOGRAM  08/08/2023    GALLBLADDER SURGERY  2015    GASTRIC BYPASS  2014     HYSTERECTOMY  05/2013    INCONTINENCE SURGERY  09/2013    IR BIOPSY LUNG      lung nodules    LIPOSUCTION Right 04/12/2022    Procedure: LIPOSUCTION RIGHT BREAST;  Surgeon: Nolberto Winters MD;  [facility];  Service: Plastics    LIPOSUCTION W/ FAT INJECTION Bilateral 11/10/2021    Procedure: FAT GRAFTING BILATERAL BREAST;  Surgeon: Parminder Butler MD;  [facility];  Service: Plastics    MASTECTOMY Bilateral 10/2016    ND BREAST RECONSTRUCTION W/LATISSIMUS DORSI FLAP Right 08/06/2019    Procedure: BREAST RECONSTRUCTION W/FLAP LATISSIMUS DORSI;  Surgeon: Parminder Butler MD;  [facility];  Service: Plastics    ND CYSTO/URETERO W/LITHOTRIPSY &INDWELL STENT INSRT Right 08/08/2023    Procedure: CYSTOSCOPY URETEROSCOPY, RETROGRADE PYELOGRAM AND INSERTION STENT URETERAL;  Surgeon: Gabriel Rene MD;  [facility];  Service: Urology    ND GRAFTING OF AUTOLOGOUS FAT BY LIPO 50 CC OR LESS Bilateral 01/13/2023    Procedure: BILATERAL BREAST FAT GRAFTING;  Surgeon: Nolberto Winters MD;  [facility];  Service: Plastics    ND GRAFTING OF AUTOLOGOUS FAT BY LIPO 50 CC OR LESS Bilateral 06/13/2023    Procedure: FAT GRAFTING TO BILATERAL BREASTS;  Surgeon: Nolberto Winters MD;  [facility];  Service: Plastics    ND DOMINGA-IMPLANT CAPSULECTOMY BREAST COMPLETE Bilateral 08/06/2019    Procedure: BREAST CAPSULECTOMY;  Surgeon: Parminder Butler MD;  [facility];  Service: Plastics    ND REMOVAL INTACT BREAST IMPLANT Bilateral 08/06/2019    Procedure: BREAST IMPLANT REMOVAL;  Surgeon: Parminder Butler MD;  [facility];  Service: Plastics    ND REMOVAL INTACT BREAST IMPLANT Right 04/12/2022    Procedure: removal of right breast implant, capsulectomy, placement of right breast implant with acelluar dermal matrix.;  Surgeon: Nolberto Winters MD;  [facility];  Service: Plastics    ND REVISION OF RECONSTRUCTED BREAST  Right 09/07/2021    Procedure: RIGHT BREAST MOUND REVISION;  Surgeon: Parminder Butler MD;  [facility];  Service: Plastics    SD TISSUE EXPANDER PLACEMENT BREAST RECONSTRUCTION Bilateral 08/06/2019    Procedure: BREAST TISSUE EXPANDER PLACEMENT;  Surgeon: Parminder Butler MD;  [facility];  Service: Plastics    REDUCTION MAMMAPLASTY      REVISION OF SCAR Bilateral 11/10/2021    Procedure: SCAR REVISION BILATERAL ARMS;  Surgeon: Parminder Butler MD;  [facility];  Service: Plastics    TRANSFER MUSCLE PECTORALIS Bilateral 08/06/2019    Procedure: PECTORALIS MUSCLE REPAIR;  Surgeon: Parminder Butler MD;  [facility];  Service: Plastics    US GUIDANCE BREAST BIOPSY RIGHT EACH ADDITIONAL Right 12/22/2023    US GUIDED BREAST BIOPSY RIGHT COMPLETE Right 12/22/2023     Family History   Problem Relation Age of Onset    No Known Problems Mother     Diabetes Father     Heart disease Father     No Known Problems Brother      Social History     Socioeconomic History    Marital status: Single     Spouse name: Not on file    Number of children: Not on file    Years of education: Not on file    Highest education level: Not on file   Occupational History    Not on file   Tobacco Use    Smoking status: Never    Smokeless tobacco: Never   Vaping Use    Vaping status: Never Used   Substance and Sexual Activity    Alcohol use: Yes     Comment: very rare    Drug use: Never    Sexual activity: Not Currently     Partners: Male     Birth control/protection: Other     Comment: Hysterectomy   Other Topics Concern    Not on file   Social History Narrative    Not on file     Social Determinants of Health     Financial Resource Strain: Not on file   Food Insecurity: No Food Insecurity (8/7/2023)    Hunger Vital Sign     Worried About Running Out of Food in the Last Year: Never true     Ran Out of Food in the Last Year: Never true   Transportation Needs: No Transportation Needs  (8/7/2023)    PRAPARE - Transportation     Lack of Transportation (Medical): No     Lack of Transportation (Non-Medical): No   Physical Activity: Not on file   Stress: Not on file   Social Connections: Unknown (6/18/2024)    Received from [employer]    Social [employer]     How often do you feel lonely or isolated from those around you? (Adult - for ages 18 years and over): Not on file   Intimate Partner Violence: Not on file   Housing Stability: Low Risk  (8/7/2023)    Housing Stability Vital Sign     Unable to Pay for Housing in the Last Year: No     Number of Times Moved in the Last Year: 1     Homeless in the Last Year: No     Allergies   Allergen Reactions    Accolate [Zafirlukast] Itching    Penicillins Anaphylaxis     Unconsciousness, and bronchoconstriction      Shellfish Allergy - Food Allergy Anaphylaxis    Shellfish-Derived Products - Food Allergy Anaphylaxis    Singulair [Montelukast] Itching    Pork-Derived Products - Food Allergy Hives    Medical Tape Rash     P/S specific surgical tape used at last procedure --- ok w/ paper tape     No current facility-administered medications on file prior to encounter.     Current Outpatient Medications on File Prior to Encounter   Medication Sig Dispense Refill    alendronate (FOSAMAX) 70 mg tablet every 7 days Weekly on saturdays      chlorhexidine (PERIDEX) 0.12 % solution 2 (two) times a day      triamcinolone (KENALOG) 0.5 % cream Apply topically as needed       acetaminophen (TYLENOL) 500 mg tablet Take 1 tablet (500 mg total) by mouth every 4 (four) hours as needed for mild pain or moderate pain 30 tablet 2    LORazepam (Ativan) 0.5 mg tablet Take 1 tablet (0.5 mg total) by mouth as needed for anxiety 2 tablet 0   PE:  CTAB  RRR      PRS Note     Reason for visit: further discussion of revision of reconstruction     HPI from 9/12/24  Patient is well known to me having undergone multiple procedures for breast reconstruction. She presents with right breast  implant malposition and left breast excess skin at scar site. She wishes for both to be treated.     She has 790 cc implants which is the cause of her inferior migration of the implant. She is unhappy with the right side due to the extent of migration. She also has history of latissimus flap performed by Dr Humphries. When the right breast IMF is manipulated to a symmetrical position to the contralateral breast, the breast is significantly larger due to history of fat grafting.      I discussed IMF fold incision and using ADM to recreate the inframammary fold at a higher level in addition to medial and superior capsulotomies, and downsizing the implant. The downsizing of the implant is needed as the right chest is larger than the left due to history of multiple fat grafting sessions. Patient agreed and acknowledged.     Her left medial breast has an area of excess skin and scar which she would like excised.     I discussed the procedure, risks, and complications including recurrent inferior migration/malposition due to the size and weight of the implant. I discussed supportive bra for at least 6 weeks to allow for the new IMF position to scar into position. Patient acknowledged.     Plan:  -Will proceed with revision of right reconstructed breast with implant exchange (downsize) and excision of left breast skin with complex closure, patient acknowledged and agreed.  -Will order implants and ADM  -All questions answered, concerns addressed.  -Photos taken, consent obtained  -Patient is a difficult IV access and will require ultrasound guidance  -Spent 45 minutes in consultation with patient. Greater than 50% of the total time was spent obtaining history, evaluation, performing exam, discussion of management options including post-operative care, answering patient's questions and concerns, chart reviewing, and documentation        Nolberto Winters MD   [facility] Plastic and Reconstructive Surgery   [facility],  [address]   [place] [address]   Office: [phone]

## 2024-09-19 NOTE — OP NOTE
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 (Active)   Number of days: 891       Closed/Suction Drain Lateral;Right Chest Bulb 15 Fr. (Active)   Number of days: 891       Closed/Suction Drain Lateral;Right Chest Bulb 15 Fr. 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 Lot [id number]. SN [id number]  FlexHD serial number: [id number]      Complications:   None    Procedure and Technique:  Patient was brought to the operating room, transferred to the operating table in supine fashion. After undergoing general anesthesia, a timeout was performed at which  point all patient identifiers were deemed to be correct. The chest was prepped and draped in the normal sterile fashion.  I first began on the left breast symptomatic scar which was marked and excised down to healthy subcutaneous tissue. The scar excision size was 15x 2 cm. I then proceeded with complex closure to allow for tension free closure of the wound. The surrounding skin flaps were raised at least one width of the defect in all directions to allow for tension free closure of the wound. The dermis was reapproximated with 3-0 vicryl and skin with 3-0 stratafix followed by exofin skin glue. Next, I directed my attention to the right breast. An incision was made at the inferior border of the latissimus flap at the level of the inframammary fold. I dissected down to the latissimus muscle. The muscle was released from the chest wall and I entered the capsule. The existing implant was intact and was removed and was noted to have a biofilm overlying. The remainder of the pocket was normal and soft and pliable. A lateral suture capsulorrhaphy was performed with 1-0 stratafix in double layer fashion. Next, superior capsuolotomy was performed. Next, sizers were then placed and the patient was sat upright to determine size and symmetry. It was decided to place a new 790 cc implant as a smaller implant did not match the contralateral breast as well. The pocket was irrigated and hemostasis was achieved with electrocautery. The pocket was irrigated with dilute betadine, iricept, and double antibiotic solution. The new silicone implant was irrigated with antibiotic solution. Using minimal touch technique, gloves were changed and the new implant was placed into the pocket. Next, acellular dermal matrix was prepared and a piece was cut to reinforce the lower pole and sutured directly to the chest wall at the new inframammary fold with 0-vicryl sutures. The superior aspect of the matrix was smoothed and flattened and sutured  to the overlying capsule. It was noted that the tissue of the abdomen pulled inferiorly and to prevent inferior migration of the fold, I reinforced the fold site with 1-0 stratafix from the chest wall to the fascia of the abdominal skin to prevent migration. I then placed a 15 Latvian perry ALEIDA under the soft tissue at the fold and secured with 3-0 nylon. The remainder of the excess skin and lateral chest fullness which was tailortacked was excised. I then proceeded with closure with 3-0 vicryl for the dermis and 3-0 stratafix for the skin. Exofin skin glue was applied to the incision.    This concluded the procedure. Patient tolerated the procedure well without complications. At the end of the case, all sponge, needle, and instrument counts were correct. Patient was awakened from anesthesia and taken to the PACU in stable condition.    I was present for the entire procedure, A qualified resident physician was available and A physician assistant was required during the procedure for retraction, tissue handling, dissection and suturing      Patient Disposition:  PACU       SIGNATURE: Nolberto Winters MD  DATE: September 19, 2024  TIME: 1:25 PM

## 2024-09-19 NOTE — DISCHARGE INSTR - AVS FIRST PAGE
Discharge instructions    -Take tylenol 500 mg as scheduled for baseline mild to moderate pain control. For severe breakthrough pain, can use oxycodone.  -Do not drive or operate heavy machinery when taking narcotic pain medicine (oxycodone) as it can cause drowsiness.  -Do not get incisions wet for 3 days. After 3 days (starting Sept 22), can remove all dressings and shower with drain. Be careful with drain, do not let it hang and pull on skin.  -No submerging incisions (no baths, pools, hottubs). Pat dry incision and dress with gauze.  -Wear supportive bra at all times. Sports bra is okay. No underwire. Should be snug for compression but not too tight.  -No strenuous activity, no heavy lifting (nothing over 5 lbs), no reaching above head, no hard pushing or pulling with arms.   -Record and empty drains at least three-times-per day, more if needed. Bring recorded log to clinic as this determines when drains can be removed.  -Take all medicines as prescribed.   -Take antibiotics as prescribed until completion.  -Resume regular diet and home medications  -Call Plastic Surgery office to schedule post-op follow in 7 days.    Nolberto Winters MD   [facility] Plastic and Reconstructive Surgery   [address], [address]   [place] [address]   Office: [phone]

## 2024-09-19 NOTE — ANESTHESIA POSTPROCEDURE EVALUATION
Post-Op Assessment Note    CV Status:  Stable  Pain Score: 0    Pain management: adequate       Mental Status:  Sleepy, arousable and awake   Hydration Status:  Stable   PONV Controlled:  None   Airway Patency:  Patent     Post Op Vitals Reviewed: Yes    No anethesia notable event occurred.    Staff: CRNA               BP   139/78   Temp   97.3   Pulse  81   Resp   14   SpO2   100% 4 l mask

## 2024-09-19 NOTE — ANESTHESIA PREPROCEDURE EVALUATION
Procedure:  RIGHT BREAST IMPLANT DOWNSIZE FOR SMALLER IMPLANT. (Right: Breast)  REVISION OF RECONSTRUCTED BREAST. (Left: Breast)  LEFT BREAST SCAR EXCISION WITH COMPLEX CLOSURE (Left: Breast)    Relevant Problems   ANESTHESIA (within normal limits)      CARDIO (within normal limits)      ENDO (within normal limits)      GI/HEPATIC   (+) Gastroesophageal reflux disease   (+) Hiatal hernia      /RENAL   (+) Ureteral stone with hydronephrosis      GYN   (+) History of hysterectomy      HEMATOLOGY   (+) Anemia      MUSCULOSKELETAL   (+) Arthritis   (+) Hiatal hernia   (+) Rheumatoid arthritis (HCC)      NEURO/PSYCH (within normal limits)      PULMONARY   (+) Sleep apnea        Physical Exam    Airway    Mallampati score: III  TM Distance: >3 FB  Neck ROM: full     Dental   No notable dental hx     Cardiovascular  Rhythm: regular, Rate: normal, Cardiovascular exam normal    Pulmonary  Pulmonary exam normal Breath sounds clear to auscultation    Other Findings  post-pubertal.      Anesthesia Plan  ASA Score- 3     Anesthesia Type- general with ASA Monitors.         Additional Monitors:     Airway Plan: ETT.           Plan Factors-Exercise tolerance (METS): >4 METS.    Chart reviewed. EKG reviewed. Imaging results reviewed. Existing labs reviewed. Patient summary reviewed.    Patient is not a current smoker.  Patient instructed to abstain from smoking on day of procedure. Patient did not smoke on day of surgery.    Obstructive sleep apnea risk education given perioperatively.        Induction- intravenous.    Postoperative Plan- Plan for postoperative opioid use. Planned trial extubation    Perioperative Resuscitation Plan - Level 1 - Full Code.       Informed Consent- Anesthetic plan and risks discussed with patient.  I personally reviewed this patient with the CRNA. Discussed and agreed on the Anesthesia Plan with the CRNA..

## 2024-09-20 LAB
ABO GROUP BLD BPU: NORMAL
BPU ID: NORMAL
CROSSMATCH: NORMAL
UNIT DISPENSE STATUS: NORMAL
UNIT PRODUCT CODE: NORMAL
UNIT PRODUCT VOLUME: 500 ML
UNIT RH: NORMAL

## 2024-09-26 ENCOUNTER — OFFICE VISIT (OUTPATIENT)
Dept: PLASTIC SURGERY | Facility: CLINIC | Age: 54
End: 2024-09-26

## 2024-09-26 DIAGNOSIS — Z98.890 POST-OPERATIVE STATE: Primary | ICD-10-CM

## 2024-09-27 NOTE — PROGRESS NOTES
Patient presents today for first post-op, S/P  RIGHT BREAST IMPLANT EXCHANGE (Right: Breast)       REVISION OF RECONSTRUCTED BREAST. (Right: Breast)       LEFT BREAST SCAR EXCISION WITH COMPLEX CLOSURE (Left: Breast)    on 9/19/2024.      Patient stated that she is doing well.  Dr. Winters in to examine patient. Incision are clean, dry, and intact. Mild swelling on the right side noted. Sutures in place for 1 more week. Drain was removed. Patient instructed to keep compression and to keep fold under bilateral breasts dry. Patient okay to shower, pat dry and place ABDs, then compression. Photo obtained. Patient to follow up in 7- 10 days for suture removal.

## 2024-09-30 ENCOUNTER — HOSPITAL ENCOUNTER (OUTPATIENT)
Dept: ULTRASOUND IMAGING | Facility: HOSPITAL | Age: 54
Discharge: HOME | End: 2024-09-30
Payer: MEDICARE

## 2024-09-30 ENCOUNTER — HOSPITAL ENCOUNTER (OUTPATIENT)
Dept: RADIOLOGY | Facility: HOSPITAL | Age: 54
Discharge: HOME | End: 2024-09-30
Payer: MEDICARE

## 2024-09-30 ENCOUNTER — TELEPHONE (OUTPATIENT)
Age: 54
End: 2024-09-30

## 2024-09-30 DIAGNOSIS — N13.2 URETERAL STONE WITH HYDRONEPHROSIS: ICD-10-CM

## 2024-09-30 PROCEDURE — 76775 US EXAM ABDO BACK WALL LIM: CPT

## 2024-09-30 PROCEDURE — 74018 RADEX ABDOMEN 1 VIEW: CPT

## 2024-09-30 NOTE — TELEPHONE ENCOUNTER
Please let patient know that the KUB is negative.  Ultrasound is still in process and may take a few weeks to return.  If negative and patient asymptomatic, can follow-up as needed.  Thank you

## 2024-09-30 NOTE — TELEPHONE ENCOUNTER
Patient seen by Aleta at [facility]    Patient called stating she had u/s done today and she saw results on my chart and wants to know what it all means. She wants to know if she would need a follow up appointment.     Patient can be reached at [phone]

## 2024-10-01 NOTE — TELEPHONE ENCOUNTER
This nurse spoke with patient to relay Aleta REYES message concerning recent US results. Patient verbalized understanding and was thankful for phone call.

## 2024-10-01 NOTE — TELEPHONE ENCOUNTER
HISTORY    The patient is a 57 year old female who comes in for a complete physical exam.  She is doing well and will be seeing her endocrinologist soon.    Diet:  [x] Fruit [x] Veg [x] Meat [x] Poultry [x] Fish [x] Whole grain carbs [] Junk food  [] Juice  Calcium:  [x] Milk 1 cup/day; [x] Yogurt 1/day; [x] Cheese 1/day [] Supplement  Caffeine:  [x]  1 cup coffee daily [] 1 can daily [] None  Dining out:  no  Exercise:  [x] Aerobic 7 x weekly, swims and walks; [] Strength 3 x weekly; [] None    MEDICATIONS  Outpatient Medications Marked as Taking for the 8/27/20 encounter (Office Visit) with Cecilia Shah MD   Medication Sig Dispense Refill   • insulin aspart (NOVOLOG) 100 UNIT/ML injectable solution Inject into the skin as directed. Per insulin pump.   BASAL RATE:   6776-3097 =1.55 units/hr; 8359-3600 = 1.8 units/hr;                               6837-9623=1.65 units/hr; 6740-3539=1.65 units/hr;                              3477-5937=1.55 units/hr; 9992-0989=1.7 units/hr.      Correction factor per continuous glucose monitoring is 1 extra unit for every 35-40 points >target blood sugar of 110     • glucagon 1 MG injection kit Inject 1 mg into the skin as needed.     • triamcinolone 0.025 % lotion Apply topically 3 times daily as needed (ear itching). 60 mL 1   • fluticasone (FLONASE) 50 MCG/ACT nasal spray Spray 2 sprays in each nostril daily. (Patient taking differently: Spray 2 sprays in each nostril daily as needed. ) 1 Bottle 12   • levothyroxine (SYNTHROID, LEVOTHROID) 175 MCG tablet Take 175 mcg by mouth daily.       • LISINOPRIL PO Take 5 mg by mouth at bedtime.      • Multiple Vitamins-Minerals (MULTIVITAL PO) Take 1 tablet by mouth at bedtime.      • VITAMIN D, CHOLECALCIFEROL, PO Take 2,000 mg by mouth at bedtime.      • aspirin 81 MG tablet Take 81 mg by mouth at bedtime.      • atorvastatin (LIPITOR) 10 MG tablet Take 10 mg by mouth at bedtime.          ALLERGIES  Allergies as of 08/27/2020   • (No  US negative   Known Allergies)       HISTORIES  Past Medical History:   Diagnosis Date   • Chronic pain     L4, L5 degenerative discs   • Diabetes mellitus type 1 (CMS/HCC)     insulin pump   • Diverticulosis 10/2/2014   • Herniated nucleus pulposis of lumbosacral region     L4,5 L5-S1 Worker's Comp   • Hypercholesteremia    • Hypertension    • Hypothyroidism    • Internal hemorrhoids 10/2/2014   • Kidney stone on left side 2020    ureteral calculus, calcium oxalate   • Other synovitis and tenosynovitis, right forearm 2019   • Right carpal tunnel syndrome 2019   • Spinal headache     after    • Urinary incontinence     sneezing and coughing   • Wears glasses        Past Surgical History:   Procedure Laterality Date   •  section, classic  1990   •  section, classic  1994   • Colonoscopy  10/2/2014    Diverticulosis, int hemorrhoids, Dr. Burdick   • Cystoscopy w/ ureteral stent removal Left 2020    Dr. Chandler Ibrahim   • Skin biopsy Right 2018    upper arm benign   • Tonsillectomy and adenoidectomy     • Total abdominal hysterectomy w/ bilateral salpingoophorectomy  3/11/2016    Dr. Kylee Sheth   • Tubal ligation     • Ureteroscopy Left 2020    Dr Chandler Ibrahim:  STENT PLACEMENT   • Vein ligation and stripping Bilateral        Family History   Problem Relation Age of Onset   • Thyroid Mother    • High cholesterol Mother    • High blood pressure Father    • Diabetes Father         type 1, late 20s, kid/pancreas transplant    • Heart disease Father         CAD   • Kidney disease Father    • Diabetes Sister         twin   • Patient is unaware of any medical problems Brother    • Stroke Maternal Grandfather    • Hyperlipidemia Maternal Grandfather    • Cancer Paternal Grandmother         uterine CA   • Patient is unaware of any medical problems Daughter    • Patient is unaware of any medical problems Daughter    • Cancer  Paternal Aunt         breast CA   • Cancer Paternal Uncle         colon CA   • Cancer Paternal Aunt         breast CA   • Cancer Paternal Aunt         breast CA   • Diabetes Paternal Aunt    • Diabetes Nephew        Social History     Occupational History   • Occupation:      Employer: [employer]     Comment: [employer]   Tobacco Use   • Smoking status: Former Smoker     Packs/day: 0.20     Years: 5.00     Pack years: 1.00     Types: Cigarettes     Quit date: 1989     Years since quittin.5   • Smokeless tobacco: Never Used   Substance and Sexual Activity   • Alcohol use: Yes     Alcohol/week: 2.0 standard drinks     Types: 2 Standard drinks or equivalent per week     Frequency: 2-4 times a month     Drinks per session: 1 or 2     Binge frequency: Never     Comment: 2 glasses of wine wkly   • Drug use: No   • Sexual activity: Yes     Partners: Male     Birth control/protection: Surgical       REVIEW OF SYSTEMS    Constitutional:  Denies unintentional weight loss, generalized fatigue, fever, chills, night sweats.  Eyes:  Denies change in visual acuity, ocular itching or discharge.   HENT:  Denies hearing loss, nasal congestion, rhinorrhea, sore throat.  Respiratory:  Denies shortness of breath, cough, sputum production, wheezing.  Cardiovascular:  Denies chest pain, palpitations, dyspnea on exertion.  Gastrointestinal:  Denies difficulty swallowing, heartburn, abdominal pain, nausea, vomiting, diarrhea, constipation, melena, changes in bowel habits.  Genitourinary:  Denies dysuria, hematuria, frequency, urinary incontinence.  Musculoskeletal:  Denies back pain or joint pain.   Skin:  Denies rash, changes in moles, non-healing lesions.  Neurologic:  Denies headache, focal weakness or sensory changes.   Endocrine:  Denies temperature intolerance.   Lymphatic:  Denies swollen glands.   Psychiatric:  Denies changes in mood, depression, anxiety, insomnia.    PHYSICAL  EXAM  Vitals:  Blood pressure 132/80, pulse 88, temperature 98.9 °F (37.2 °C), temperature source Temporal, resp. rate 16, height 5' 5\" (1.651 m), weight 109.8 kg, last menstrual period 02/29/2016.   Wt Readings from Last 3 Encounters:   08/27/20 109.8 kg   06/17/20 110.6 kg   05/28/20 110.8 kg     General:  Well-nourished, well-developed in no acute distress.  Eyes:  Pupils equal, round, reactive to light and accommodation.  Extraocular movements intact.  No conjunctival injection or scleral icterus.  HENT:  External ears and canals clear bilaterally. Tympanic membranes normal bilaterally.  Nose normal without lesions or discharge.  Oropharynx normal.  Neck:  Supple without palpable adenopathy.  No thyromegaly. No carotid bruit.  Lungs:  Clear to auscultation; no rales or wheezes; normal respiratory effort without use of accessory muscles.   Heart:  Regular rate and rhythm without murmur, rub or gallop.   Abdomen:  Normoactive bowel sounds, soft, nontender, no masses, no hepatosplenomegaly.  Extremities:  No clubbing, cyanosis or edema.  Deep tendon reflexes 2+ and symmetric at the knees.  Pedal pulses are 2+ and symmetric.  Neurologic:  Alert and oriented x 3, answers all questions appropriately and follows all commands correctly.  Moves all extremities with equal strength.  No focal deficits.  Skin:  No abnormal nevi or suspicious lesions.    Diabetic Foot Exam Documentation     Normal Bilateral Foot Exam:  Skin integrity is normal. Dorsalis pedis and posterior tibial pulses are present.  Pressure sensation using the Premier-Penelope monofilament is present.    ASSESSMENT/PLAN    Kimmie was seen today for physical.    Diagnoses and all orders for this visit:    Annual physical exam  -     LIPID PANEL WITH REFLEX; Future  -     MICROALBUMIN URINE RANDOM; Future  -     THYROID STIMULATING HORMONE; Future    Need for vaccination  -     HEP B VACC ADULT 3 DOSE, IM    Body mass index (BMI) 40.0-44.9, adult  (CMS/HCC)   Follow lower carbohydrate diet and/or reduce portion sizes.     Cecilia Shah MD

## 2024-10-09 ENCOUNTER — OFFICE VISIT (OUTPATIENT)
Dept: PLASTIC SURGERY | Facility: CLINIC | Age: 54
End: 2024-10-09

## 2024-10-09 DIAGNOSIS — Z98.890 POST-OPERATIVE STATE: Primary | ICD-10-CM

## 2024-10-09 NOTE — PROGRESS NOTES
Patient presents today for an incision check S/P R breast implant downsize with revision of reconstructed breast and L breast scar excision on 9/12/24 with Dr. Winters. Patient's incision are dry and intact. Remaining two sutures on lateral R breast incision were removed. Dr. Winters available during time of visit for questions.     Remaining tissue glue was removed from both R and L side incisions per Dr. Winters.   Pt advised she is able to take a warm shower and gently scrub the remaining tissue glue from incision sites. Pt not to apply any Aquaphor or ointment to incisions to avoid moisture gathering under the breast. Pt advised to continue wearing compression bra until 6-8 weeks postop to control swelling with ABD pad to absorb moisture. Patient verbalized understanding. Patient to follow up in 2 weeks for an incision check but advised to call earlier if she notices any changes to the incision sites or if she is experiencing any symptoms or signs of infection. Photos obtained and uploaded to media. Patient encouraged to call with questions or concerns.

## 2024-10-10 ENCOUNTER — TELEPHONE (OUTPATIENT)
Age: 54
End: 2024-10-10

## 2024-10-10 NOTE — TELEPHONE ENCOUNTER
Patient called asking to speak to Artem about paperwork she received.    Teams Communication to Artem/he will call patient back

## 2024-10-10 NOTE — TELEPHONE ENCOUNTER
Patient called again wants to share with you some information from the insurance   hard copy, drawn during this pregnancy

## 2024-10-30 ENCOUNTER — OFFICE VISIT (OUTPATIENT)
Dept: PLASTIC SURGERY | Facility: CLINIC | Age: 54
End: 2024-10-30

## 2024-10-30 DIAGNOSIS — Z85.3 PERSONAL HISTORY OF MALIGNANT NEOPLASM OF BREAST: Primary | ICD-10-CM

## 2024-10-30 DIAGNOSIS — Z90.13 HISTORY OF BILATERAL MASTECTOMY: ICD-10-CM

## 2024-10-30 NOTE — PROGRESS NOTES
Assessment/Plan:     Diagnoses and all orders for this visit:    Personal history of malignant neoplasm of breast  History of bilateral mastectomy  She underwent right implant exchange (smooth high profile xtra mentor memory gel 790cc), revision of reconstructed right breast, left breast scar excision, complex closure on 9/19 by Dr. Winters. Bilateral breasts are soft, supple & symmetric. Incisions are C/D/I. We will see her back in 6 weeks.           Subjective:      Patient ID: Dania Ayala is a 54 y.o. female.    HPI    Pt is here for a post-op visit. She underwent right implant exchange (smooth high profile xtra mentor memory gel 790cc), revision of reconstructed right breast, left breast scar excision, complex closure on 9/19 by Dr. Winters. She is doing well without complaints.     Patient Active Problem List   Diagnosis    Acquired absence of breast and absent nipple, bilateral    History of radiation therapy    Personal history of malignant neoplasm of breast    Obesity, Class II, BMI 35-39.9    Bariatric surgery status    Postsurgical malabsorption    Obesity, Class III, BMI 40-49.9 (morbid obesity) (HCC)    S/P right latissimus dorsi flap    History of breast reconstruction    Sleep apnea    History of hysterectomy    Anemia    Arthritis    Gastroesophageal reflux disease    Rheumatoid arthritis (HCC)    Hiatal hernia    Ureteral stone with hydronephrosis    Leukocytosis    Anesthesia complication    History of bilateral mastectomy    Use of anastrozole    Fat necrosis of breast    Encounter for follow-up surveillance of breast cancer     Allergies   Allergen Reactions    Accolate [Zafirlukast] Itching    Penicillins Anaphylaxis     Unconsciousness, and bronchoconstriction      Shellfish Allergy - Food Allergy Anaphylaxis    Shellfish-Derived Products - Food Allergy Anaphylaxis    Singulair [Montelukast] Itching    Pork-Derived Products - Food Allergy Hives    Medical Tape Rash     P/S specific surgical tape  used at last procedure --- ok w/ paper tape     Current Outpatient Medications on File Prior to Visit   Medication Sig    acetaminophen (TYLENOL) 500 mg tablet Take 1 tablet (500 mg total) by mouth every 4 (four) hours as needed for mild pain or moderate pain    alendronate (FOSAMAX) 70 mg tablet every 7 days Weekly on saturdays    chlorhexidine (PERIDEX) 0.12 % solution 2 (two) times a day    docusate sodium (COLACE) 100 mg capsule Take 1 capsule (100 mg total) by mouth 2 (two) times a day as needed for constipation    LORazepam (Ativan) 0.5 mg tablet Take 1 tablet (0.5 mg total) by mouth as needed for anxiety    ondansetron (ZOFRAN) 4 mg tablet Take 1 tablet (4 mg total) by mouth every 8 (eight) hours as needed for nausea or vomiting    triamcinolone (KENALOG) 0.5 % cream Apply topically as needed      No current facility-administered medications on file prior to visit.     Family History   Problem Relation Age of Onset    No Known Problems Mother     Diabetes Father     Heart disease Father     No Known Problems Brother      Past Medical History:   Diagnosis Date    Anesthesia complication     needs IR preprocedure for IV access/not ASC candidate    Breast cancer (HCC)     Breast cancer (HCC)     Breast cancer, right breast (HCC)     Cancer (HCC)     Chronic pain disorder     CPAP (continuous positive airway pressure) dependence     no currently used    GERD (gastroesophageal reflux disease)     Heartburn     Hiatal hernia     History of bariatric surgery     Irregular heart beat     pt states it was a side effect of a medication    Kidney stone     Lung nodule     Osteoporosis     stage 3    Postsurgical malabsorption     RA (rheumatoid arthritis) (HCC)     Rheumatoid arthritis (HCC)     Sleep apnea     Stress incontinence     Thyroid nodule      Social History     Socioeconomic History    Marital status: Single     Spouse name: None    Number of children: None    Years of education: None    Highest education  level: None   Occupational History    None   Tobacco Use    Smoking status: Never    Smokeless tobacco: Never   Vaping Use    Vaping status: Never Used   Substance and Sexual Activity    Alcohol use: Yes     Comment: very rare    Drug use: Never    Sexual activity: Not Currently     Partners: Male     Birth control/protection: Other     Comment: Hysterectomy   Other Topics Concern    None   Social History Narrative    None     Social Determinants of Health     Financial Resource Strain: Low Risk  (10/21/2024)    Received from [facility]    Overall Financial Resource Strain (CARDIA)     Difficulty of Paying Living Expenses: Not very hard   Food Insecurity: Food Insecurity Present (10/21/2024)    Received from [facility]    Hunger Vital Sign     Worried About Running Out of Food in the Last Year: Sometimes true     Ran Out of Food in the Last Year: Sometimes true   Transportation Needs: No Transportation Needs (10/21/2024)    Received from [facility]    PRAPARE - Transportation     Lack of Transportation (Medical): No     Lack of Transportation (Non-Medical): No   Physical Activity: Not on file   Stress: No Stress Concern Present (10/21/2024)    Received from [facility]    [employer] - Occupational Stress Questionnaire     Feeling of Stress : Not at all   Social Connections: Feeling Socially Integrated (10/21/2024)    Received from [facility]    OASIS : Social Isolation     How often do you feel lonely or isolated from those around you?: Rarely   Intimate Partner Violence: Not At Risk (10/21/2024)    Received from [facility]    Humiliation, Afraid, Rape, and Kick questionnaire     Fear of Current or Ex-Partner: No     Emotionally Abused: No     Physically Abused: No     Sexually Abused: No   Housing Stability: Low Risk  (10/21/2024)    Received from [facility]     Housing Stability Vital Sign     Unable to Pay for Housing in the Last Year: No     Number of Times Moved in the Last Year: 0     Homeless in the Last Year: No     Past Surgical History:   Procedure Laterality Date    ABDOMINAL SURGERY      BRACHIOPLASTY Bilateral     2017    BREAST IMPLANT Bilateral 09/08/2020    Procedure: BREAST REMOVAL TISSUE EXPANDER/ IMPLANT PLACEMENT BREAST;  Surgeon: Parminder Butler MD;  [facility];  Service: Plastics    BREAST IMPLANT Left 02/09/2021    Procedure: BREAST TISSUE EXPANDER/ IMPLANT EXCHANGE;  Surgeon: Parminder Butler MD;  [facility];  Service: Plastics    BREAST IMPLANT Right 9/19/2024    Procedure: RIGHT BREAST IMPLANT EXCHANGE;  Surgeon: Nolberto Winters MD;  [facility];  Service: Plastics    BREAST LUMPECTOMY  04/2013    CAPSULOTOMY Bilateral 09/08/2020    Procedure: BREAST CAPSULOTOMY;  Surgeon: Parminder Butler MD;  [facility];  Service: Plastics    CAPSULOTOMY Left 02/09/2021    Procedure: BREAST CAPSULOTOMY;  Surgeon: Parminder Butler MD;  [facility];  Service: Plastics    CARDIAC CATHETERIZATION      CARPAL TUNNEL RELEASE Bilateral 2015    CHOLECYSTECTOMY      COLONOSCOPY      FL RETROGRADE PYELOGRAM  08/08/2023    GALLBLADDER SURGERY  2015    GASTRIC BYPASS  2014    HYSTERECTOMY  05/2013    INCONTINENCE SURGERY  09/2013    IR BIOPSY LUNG      lung nodules    LIPOSUCTION Right 04/12/2022    Procedure: LIPOSUCTION RIGHT BREAST;  Surgeon: Nolberto Winters MD;  [facility];  Service: Plastics    LIPOSUCTION W/ FAT INJECTION Bilateral 11/10/2021    Procedure: FAT GRAFTING BILATERAL BREAST;  Surgeon: Parminder Butler MD;  [facility];  Service: Plastics    MASTECTOMY Bilateral 10/2016    [facility] BREAST RECONSTRUCTION W/LATISSIMUS DORSI FLAP Right 08/06/2019    Procedure: BREAST RECONSTRUCTION W/FLAP LATISSIMUS DORSI;  Surgeon: Parminder Butler MD;  [facility];  Service: Plastics    MA CYSTO/URETERO W/LITHOTRIPSY &INDWELL STENT INSRT Right 08/08/2023    Procedure: CYSTOSCOPY URETEROSCOPY, RETROGRADE PYELOGRAM AND INSERTION STENT URETERAL;  Surgeon: Gabriel Rene MD;  [facility];  Service: Urology    MA GRAFTING OF AUTOLOGOUS FAT BY LIPO 50 CC OR LESS Bilateral 01/13/2023    Procedure: BILATERAL BREAST FAT GRAFTING;  Surgeon: Nolberto Winters MD;  [facility];  Service: Plastics    MA GRAFTING OF AUTOLOGOUS FAT BY LIPO 50 CC OR LESS Bilateral 06/13/2023    Procedure: FAT GRAFTING TO BILATERAL BREASTS;  Surgeon: Nolberto Winters MD;  [facility];  Service: Plastics    MA DOMINGA-IMPLANT CAPSULECTOMY BREAST COMPLETE Bilateral 08/06/2019    Procedure: BREAST CAPSULECTOMY;  Surgeon: Parminder Butler MD;  [facility];  Service: Plastics    MA REMOVAL INTACT BREAST IMPLANT Bilateral 08/06/2019    Procedure: BREAST IMPLANT REMOVAL;  Surgeon: Parminder Butler MD;  [facility];  Service: Plastics    MA REMOVAL INTACT BREAST IMPLANT Right 04/12/2022    Procedure: removal of right breast implant, capsulectomy, placement of right breast implant with acelluar dermal matrix.;  Surgeon: Nolberto Winters MD;  [facility];  Service: Plastics    MA REVISION OF RECONSTRUCTED BREAST Right 09/07/2021    Procedure: RIGHT BREAST MOUND REVISION;  Surgeon: Parminder Butler MD;  [facility];  Service: Plastics    MA REVISION OF RECONSTRUCTED BREAST Right 9/19/2024    Procedure: REVISION OF RECONSTRUCTED BREAST.;  Surgeon: Nolberto Winters MD;  [facility];  Service: Plastics    MA TISSUE EXPANDER PLACEMENT BREAST RECONSTRUCTION Bilateral 08/06/2019    Procedure: BREAST TISSUE EXPANDER PLACEMENT;  Surgeon: Parminder Butler MD;  [facility];  Service: Plastics    REDUCTION MAMMAPLASTY      REVISION OF SCAR Bilateral 11/10/2021    Procedure: SCAR REVISION BILATERAL ARMS;   Surgeon: Parminder Butler MD;  [facility];  Service: Plastics    SKIN LESION EXCISION Left 9/19/2024    Procedure: LEFT BREAST SCAR EXCISION WITH COMPLEX CLOSURE;  Surgeon: Nolberto Winters MD;  [facility];  Service: Plastics    TRANSFER MUSCLE PECTORALIS Bilateral 08/06/2019    Procedure: PECTORALIS MUSCLE REPAIR;  Surgeon: Parminder Butler MD;  [facility];  Service: Plastics    US GUIDANCE BREAST BIOPSY RIGHT EACH ADDITIONAL Right 12/22/2023    US GUIDED BREAST BIOPSY RIGHT COMPLETE Right 12/22/2023         Review of Systems   All other systems reviewed and are negative.        Objective:      There were no vitals taken for this visit.         Physical Exam  Constitutional:       Appearance: She is well-developed. She is obese.   HENT:      Head: Normocephalic and atraumatic.   Eyes:      Conjunctiva/sclera: Conjunctivae normal.   Pulmonary:      Effort: Pulmonary effort is normal.      Comments: Bilateral breasts are soft, supple & symmetric. Incisions are C/D/I. See photo in media.   Musculoskeletal:         General: Normal range of motion.      Cervical back: Normal range of motion.   Skin:     General: Skin is warm and dry.   Neurological:      Mental Status: She is alert and oriented to person, place, and time.   Psychiatric:         Mood and Affect: Mood normal.         Behavior: Behavior normal.

## 2024-12-23 ENCOUNTER — TELEPHONE (OUTPATIENT)
Age: 54
End: 2024-12-23

## 2024-12-23 NOTE — TELEPHONE ENCOUNTER
Rec'd  call from patient will like to speak with you regarding appointment please reach out to patient

## 2025-02-21 ENCOUNTER — OFFICE VISIT (OUTPATIENT)
Dept: PLASTIC SURGERY | Facility: CLINIC | Age: 55
End: 2025-02-21
Payer: MEDICARE

## 2025-02-21 DIAGNOSIS — Z98.890 HISTORY OF BREAST RECONSTRUCTION: ICD-10-CM

## 2025-02-21 DIAGNOSIS — Z85.3 PERSONAL HISTORY OF MALIGNANT NEOPLASM OF BREAST: Primary | ICD-10-CM

## 2025-02-21 DIAGNOSIS — Z90.13 HISTORY OF BILATERAL MASTECTOMY: ICD-10-CM

## 2025-02-21 PROCEDURE — 99215 OFFICE O/P EST HI 40 MIN: CPT | Performed by: STUDENT IN AN ORGANIZED HEALTH CARE EDUCATION/TRAINING PROGRAM

## 2025-02-21 NOTE — PROGRESS NOTES
Plastic Surgery Consult    Reason for visit: right breast pain    HPI: from 2/21/25  Patient is a 56 y/o female who presents with right breast pain. In brief, patient has history of right breast cancer which required mastectomy, latissimus flap and radiation therapy. She has undergone numerous breast procedures, most recently in Sept of 2024 which was exchange of right breast implant, revision of reconstructed breast, symptomatic scar excision of the left side. She had been doing well up until recently when she began to have pain in the right breast with some mild firmness.     ROS: 12 pt ROS negative, except as otherwise noted in HPI      Past Medical History:   Diagnosis Date    Anesthesia complication     needs IR preprocedure for IV access/not ASC candidate    Breast cancer (HCC)     Breast cancer (HCC)     Breast cancer, right breast (HCC)     Cancer (HCC)     Chronic pain disorder     CPAP (continuous positive airway pressure) dependence     no currently used    GERD (gastroesophageal reflux disease)     Heartburn     Hiatal hernia     History of bariatric surgery     Irregular heart beat     pt states it was a side effect of a medication    Kidney stone     Lung nodule     Osteoporosis     stage 3    Postsurgical malabsorption     RA (rheumatoid arthritis) (HCC)     Rheumatoid arthritis (HCC)     Sleep apnea     Stress incontinence     Thyroid nodule        FamHx: non-contrib  Past Surgical History:   Procedure Laterality Date    ABDOMINAL SURGERY      BRACHIOPLASTY Bilateral     2017    BREAST IMPLANT Bilateral 09/08/2020    Procedure: BREAST REMOVAL TISSUE EXPANDER/ IMPLANT PLACEMENT BREAST;  Surgeon: Parminder Butler MD;  [facility];  Service: Plastics    BREAST IMPLANT Left 02/09/2021    Procedure: BREAST TISSUE EXPANDER/ IMPLANT EXCHANGE;  Surgeon: Parminder Butler MD;  [facility];  Service: Plastics    BREAST IMPLANT Right 9/19/2024    Procedure: RIGHT BREAST IMPLANT  EXCHANGE;  Surgeon: Nolberto Winters MD;  [facility];  Service: Plastics    BREAST LUMPECTOMY  04/2013    CAPSULOTOMY Bilateral 09/08/2020    Procedure: BREAST CAPSULOTOMY;  Surgeon: Parminder Butler MD;  [facility];  Service: Plastics    CAPSULOTOMY Left 02/09/2021    Procedure: BREAST CAPSULOTOMY;  Surgeon: Parminder Butler MD;  [facility];  Service: Plastics    CARDIAC CATHETERIZATION      CARPAL TUNNEL RELEASE Bilateral 2015    CHOLECYSTECTOMY      COLONOSCOPY      FL RETROGRADE PYELOGRAM  08/08/2023    GALLBLADDER SURGERY  2015    GASTRIC BYPASS  2014    HYSTERECTOMY  05/2013    INCONTINENCE SURGERY  09/2013    IR BIOPSY LUNG      lung nodules    LIPOSUCTION Right 04/12/2022    Procedure: LIPOSUCTION RIGHT BREAST;  Surgeon: Nolberto Winters MD;  [facility];  Service: Plastics    LIPOSUCTION W/ FAT INJECTION Bilateral 11/10/2021    Procedure: FAT GRAFTING BILATERAL BREAST;  Surgeon: Parminder Butler MD;  [facility];  Service: Plastics    MASTECTOMY Bilateral 10/2016    MN BREAST RECONSTRUCTION W/LATISSIMUS DORSI FLAP Right 08/06/2019    Procedure: BREAST RECONSTRUCTION W/FLAP LATISSIMUS DORSI;  Surgeon: Parminder Butler MD;  [facility];  Service: Plastics    MN CYSTO/URETERO W/LITHOTRIPSY &INDWELL STENT INSRT Right 08/08/2023    Procedure: CYSTOSCOPY URETEROSCOPY, RETROGRADE PYELOGRAM AND INSERTION STENT URETERAL;  Surgeon: Gabriel Rene MD;  [facility];  Service: Urology    MN GRAFTING OF AUTOLOGOUS FAT BY LIPO 50 CC OR LESS Bilateral 01/13/2023    Procedure: BILATERAL BREAST FAT GRAFTING;  Surgeon: Nolberto Winters MD;  [facility];  Service: Plastics    MN GRAFTING OF AUTOLOGOUS FAT BY LIPO 50 CC OR LESS Bilateral 06/13/2023    Procedure: FAT GRAFTING TO BILATERAL BREASTS;  Surgeon: Nolberto Winters MD;  [facility];  Service: Plastics    MN DOMINGA-IMPLANT CAPSULECTOMY BREAST COMPLETE  Bilateral 08/06/2019    Procedure: BREAST CAPSULECTOMY;  Surgeon: Parminder Butler MD;  [facility];  Service: Plastics    PA REMOVAL INTACT BREAST IMPLANT Bilateral 08/06/2019    Procedure: BREAST IMPLANT REMOVAL;  Surgeon: Parminder Butler MD;  [facility];  Service: Plastics    PA REMOVAL INTACT BREAST IMPLANT Right 04/12/2022    Procedure: removal of right breast implant, capsulectomy, placement of right breast implant with acelluar dermal matrix.;  Surgeon: Nolberto Winters MD;  [facility];  Service: Plastics    PA REVISION OF RECONSTRUCTED BREAST Right 09/07/2021    Procedure: RIGHT BREAST MOUND REVISION;  Surgeon: Parminder Butler MD;  [facility];  Service: Plastics    PA REVISION OF RECONSTRUCTED BREAST Right 9/19/2024    Procedure: REVISION OF RECONSTRUCTED BREAST.;  Surgeon: Nolberto Winters MD;  [facility];  Service: Plastics    PA TISSUE EXPANDER PLACEMENT BREAST RECONSTRUCTION Bilateral 08/06/2019    Procedure: BREAST TISSUE EXPANDER PLACEMENT;  Surgeon: Parminder Butler MD;  [facility];  Service: Plastics    REDUCTION MAMMAPLASTY      REVISION OF SCAR Bilateral 11/10/2021    Procedure: SCAR REVISION BILATERAL ARMS;  Surgeon: Parminder Butler MD;  [facility];  Service: Plastics    SKIN LESION EXCISION Left 9/19/2024    Procedure: LEFT BREAST SCAR EXCISION WITH COMPLEX CLOSURE;  Surgeon: Nolberto Winters MD;  [facility];  Service: Plastics    TRANSFER MUSCLE PECTORALIS Bilateral 08/06/2019    Procedure: PECTORALIS MUSCLE REPAIR;  Surgeon: Parminder Butler MD;  [facility];  Service: Plastics    US GUIDANCE BREAST BIOPSY RIGHT EACH ADDITIONAL Right 12/22/2023    US GUIDED BREAST BIOPSY RIGHT COMPLETE Right 12/22/2023       SocHx: no tobacco, no nicotine  Meds: no blood thinners, no steroids  Allergies   Allergen Reactions    Accolate [Zafirlukast] Itching    Penicillins  Anaphylaxis     Unconsciousness, and bronchoconstriction      Shellfish Allergy - Food Allergy Anaphylaxis    Shellfish-Derived Products - Food Allergy Anaphylaxis    Singulair [Montelukast] Itching    Pork-Derived Products - Food Allergy Hives    Medical Tape Rash     P/S specific surgical tape used at last procedure --- ok w/ paper tape         PE:  There were no vitals filed for this visit.    General: NC/AT, breathing comfortably on RA  Neuro: CN II-XII grossly intact, symmetric reflexes  HEENT: PERRLA, EOMI, external ears normal, no lesions or deformities, neck supple, trachea midline  Respiratory: CTAB, normal respiratory effort  Cardio: RRR, normal S1, S2, no murmur, rubs, gallops  GI: soft, non-tender, non-distended  Extremities/MSK: normal alignment, mobility, gait, no edema  Skin: no rashes, lesions, subcutaneous nodules    Right breast with mild firmness and tenderness with palpation  Mild inferior and lateral displacement of implant  Inferior and lateral IMF tenderness with palpation    A/P: 54 y/o with right breast pain likely due to Baker III capsular contracture, and inferior-lateral migration of the implant.  -I discussed with patient that I would perform removal of implant, partial vs total capsulectomy, placement of slightly smaller implant, revision of reconstructed breast with raising of the breast fold and re-inforcing the inferior breast capsule. Patient acknowledged.  -Of note, patient is a hard IV stick and requires hospital and ultrasound guidance. She is irrigated by surgical bra and requires ace-wrap only with steri strips. She is allergic to medical tape.  -I informed her that I cannot guarantee that her right inferior-lateral pain would be treated or resolved, patient acknowledged. I discussed the increased risks of surgery iven her BMI, history of multiple surgeries, and history of radiation. Patient acknowledged.  -Will order implants    -Spent 45 minutes in consultation with patient.  Greater than 50% of the total time was spent obtaining history, evaluation, performing exam, discussion of management options including post-operative care, answering patient's questions and concerns, chart reviewing, and documentation    Nolberto Winters MD   [facility]   [address] [place] [address], [address]   [place] [address]   Office: [phone]

## 2025-02-21 NOTE — H&P (VIEW-ONLY)
Plastic Surgery Consult    Reason for visit: right breast pain    HPI: from 2/21/25  Patient is a 54 y/o female who presents with right breast pain. In brief, patient has history of right breast cancer which required mastectomy, latissimus flap and radiation therapy. She has undergone numerous breast procedures, most recently in Sept of 2024 which was exchange of right breast implant, revision of reconstructed breast, symptomatic scar excision of the left side. She had been doing well up until recently when she began to have pain in the right breast with some mild firmness.     ROS: 12 pt ROS negative, except as otherwise noted in HPI      Past Medical History:   Diagnosis Date    Anesthesia complication     needs IR preprocedure for IV access/not ASC candidate    Breast cancer (HCC)     Breast cancer (HCC)     Breast cancer, right breast (HCC)     Cancer (HCC)     Chronic pain disorder     CPAP (continuous positive airway pressure) dependence     no currently used    GERD (gastroesophageal reflux disease)     Heartburn     Hiatal hernia     History of bariatric surgery     Irregular heart beat     pt states it was a side effect of a medication    Kidney stone     Lung nodule     Osteoporosis     stage 3    Postsurgical malabsorption     RA (rheumatoid arthritis) (HCC)     Rheumatoid arthritis (HCC)     Sleep apnea     Stress incontinence     Thyroid nodule        FamHx: non-contrib  Past Surgical History:   Procedure Laterality Date    ABDOMINAL SURGERY      BRACHIOPLASTY Bilateral     2017    BREAST IMPLANT Bilateral 09/08/2020    Procedure: BREAST REMOVAL TISSUE EXPANDER/ IMPLANT PLACEMENT BREAST;  Surgeon: Parminder Butler MD;  [facility];  Service: Plastics    BREAST IMPLANT Left 02/09/2021    Procedure: BREAST TISSUE EXPANDER/ IMPLANT EXCHANGE;  Surgeon: Parminder Butler MD;  [facility];  Service: Plastics    BREAST IMPLANT Right 9/19/2024    Procedure: RIGHT BREAST IMPLANT  EXCHANGE;  Surgeon: Nolberto Winters MD;  [facility];  Service: Plastics    BREAST LUMPECTOMY  04/2013    CAPSULOTOMY Bilateral 09/08/2020    Procedure: BREAST CAPSULOTOMY;  Surgeon: Parminder Butler MD;  [facility];  Service: Plastics    CAPSULOTOMY Left 02/09/2021    Procedure: BREAST CAPSULOTOMY;  Surgeon: Parminder Butler MD;  [facility];  Service: Plastics    CARDIAC CATHETERIZATION      CARPAL TUNNEL RELEASE Bilateral 2015    CHOLECYSTECTOMY      COLONOSCOPY      FL RETROGRADE PYELOGRAM  08/08/2023    GALLBLADDER SURGERY  2015    GASTRIC BYPASS  2014    HYSTERECTOMY  05/2013    INCONTINENCE SURGERY  09/2013    IR BIOPSY LUNG      lung nodules    LIPOSUCTION Right 04/12/2022    Procedure: LIPOSUCTION RIGHT BREAST;  Surgeon: Nolberto Winters MD;  [facility];  Service: Plastics    LIPOSUCTION W/ FAT INJECTION Bilateral 11/10/2021    Procedure: FAT GRAFTING BILATERAL BREAST;  Surgeon: Parminder Butler MD;  [facility];  Service: Plastics    MASTECTOMY Bilateral 10/2016    DE BREAST RECONSTRUCTION W/LATISSIMUS DORSI FLAP Right 08/06/2019    Procedure: BREAST RECONSTRUCTION W/FLAP LATISSIMUS DORSI;  Surgeon: Parminder Butler MD;  [facility];  Service: Plastics    DE CYSTO/URETERO W/LITHOTRIPSY &INDWELL STENT INSRT Right 08/08/2023    Procedure: CYSTOSCOPY URETEROSCOPY, RETROGRADE PYELOGRAM AND INSERTION STENT URETERAL;  Surgeon: Gabriel Rene MD;  [facility];  Service: Urology    DE GRAFTING OF AUTOLOGOUS FAT BY LIPO 50 CC OR LESS Bilateral 01/13/2023    Procedure: BILATERAL BREAST FAT GRAFTING;  Surgeon: Nolberto Winters MD;  [facility];  Service: Plastics    DE GRAFTING OF AUTOLOGOUS FAT BY LIPO 50 CC OR LESS Bilateral 06/13/2023    Procedure: FAT GRAFTING TO BILATERAL BREASTS;  Surgeon: Nolberto Winters MD;  [facility];  Service: Plastics    DE DOMINGA-IMPLANT CAPSULECTOMY BREAST COMPLETE  Bilateral 08/06/2019    Procedure: BREAST CAPSULECTOMY;  Surgeon: Parminder Butler MD;  [facility];  Service: Plastics    IN REMOVAL INTACT BREAST IMPLANT Bilateral 08/06/2019    Procedure: BREAST IMPLANT REMOVAL;  Surgeon: Parminder Butler MD;  [facility];  Service: Plastics    IN REMOVAL INTACT BREAST IMPLANT Right 04/12/2022    Procedure: removal of right breast implant, capsulectomy, placement of right breast implant with acelluar dermal matrix.;  Surgeon: Nolberto Winters MD;  [facility];  Service: Plastics    IN REVISION OF RECONSTRUCTED BREAST Right 09/07/2021    Procedure: RIGHT BREAST MOUND REVISION;  Surgeon: Parminder Butler MD;  [facility];  Service: Plastics    IN REVISION OF RECONSTRUCTED BREAST Right 9/19/2024    Procedure: REVISION OF RECONSTRUCTED BREAST.;  Surgeon: Nolberto Winters MD;  [facility];  Service: Plastics    IN TISSUE EXPANDER PLACEMENT BREAST RECONSTRUCTION Bilateral 08/06/2019    Procedure: BREAST TISSUE EXPANDER PLACEMENT;  Surgeon: Parminder Butler MD;  [facility];  Service: Plastics    REDUCTION MAMMAPLASTY      REVISION OF SCAR Bilateral 11/10/2021    Procedure: SCAR REVISION BILATERAL ARMS;  Surgeon: Parminder Butler MD;  [facility];  Service: Plastics    SKIN LESION EXCISION Left 9/19/2024    Procedure: LEFT BREAST SCAR EXCISION WITH COMPLEX CLOSURE;  Surgeon: Nolberto Winters MD;  [facility];  Service: Plastics    TRANSFER MUSCLE PECTORALIS Bilateral 08/06/2019    Procedure: PECTORALIS MUSCLE REPAIR;  Surgeon: Parminder Butler MD;  [facility];  Service: Plastics    US GUIDANCE BREAST BIOPSY RIGHT EACH ADDITIONAL Right 12/22/2023    US GUIDED BREAST BIOPSY RIGHT COMPLETE Right 12/22/2023       SocHx: no tobacco, no nicotine  Meds: no blood thinners, no steroids  Allergies   Allergen Reactions    Accolate [Zafirlukast] Itching    Penicillins  Anaphylaxis     Unconsciousness, and bronchoconstriction      Shellfish Allergy - Food Allergy Anaphylaxis    Shellfish-Derived Products - Food Allergy Anaphylaxis    Singulair [Montelukast] Itching    Pork-Derived Products - Food Allergy Hives    Medical Tape Rash     P/S specific surgical tape used at last procedure --- ok w/ paper tape         PE:  There were no vitals filed for this visit.    General: NC/AT, breathing comfortably on RA  Neuro: CN II-XII grossly intact, symmetric reflexes  HEENT: PERRLA, EOMI, external ears normal, no lesions or deformities, neck supple, trachea midline  Respiratory: CTAB, normal respiratory effort  Cardio: RRR, normal S1, S2, no murmur, rubs, gallops  GI: soft, non-tender, non-distended  Extremities/MSK: normal alignment, mobility, gait, no edema  Skin: no rashes, lesions, subcutaneous nodules    Right breast with mild firmness and tenderness with palpation  Mild inferior and lateral displacement of implant  Inferior and lateral IMF tenderness with palpation    A/P: 56 y/o with right breast pain likely due to Baker III capsular contracture, and inferior-lateral migration of the implant.  -I discussed with patient that I would perform removal of implant, partial vs total capsulectomy, placement of slightly smaller implant, revision of reconstructed breast with raising of the breast fold and re-inforcing the inferior breast capsule. Patient acknowledged.  -Of note, patient is a hard IV stick and requires hospital and ultrasound guidance. She is irrigated by surgical bra and requires ace-wrap only with steri strips. She is allergic to medical tape.  -I informed her that I cannot guarantee that her right inferior-lateral pain would be treated or resolved, patient acknowledged. I discussed the increased risks of surgery iven her BMI, history of multiple surgeries, and history of radiation. Patient acknowledged.  -Will order implants    -Spent 45 minutes in consultation with patient.  Greater than 50% of the total time was spent obtaining history, evaluation, performing exam, discussion of management options including post-operative care, answering patient's questions and concerns, chart reviewing, and documentation    Nolberto Winters MD   [facility]   [address] [place] [address], [address]   [place] [address]   Office: [phone]

## 2025-02-24 ENCOUNTER — TELEPHONE (OUTPATIENT)
Dept: PLASTIC SURGERY | Facility: CLINIC | Age: 55
End: 2025-02-24

## 2025-02-24 NOTE — TELEPHONE ENCOUNTER
Received call from Patient asking for Artem VARGAS. (He was away from desk at time of call.)    Patient verbalized she signed all consent forms with Dr. Winters on Friday. The 2 dates she is looking for are March 28th Friday  in the afternoon, April 18th Friday she can do morning (Because it's Easter Break).     Artem VARGAS/Plastics Clinical [place]: Please call Patient back: [phone]

## 2025-02-25 NOTE — TELEPHONE ENCOUNTER
Pt calling again askng to speak to Artem, pt would like to schedule surgery w/Dr Winters    Advised Artem is currently working on her case and will be calling her back    Pt can be reached at [phone]

## 2025-02-27 ENCOUNTER — PREP FOR PROCEDURE (OUTPATIENT)
Dept: PLASTIC SURGERY | Facility: CLINIC | Age: 55
End: 2025-02-27

## 2025-02-27 DIAGNOSIS — Z85.3 PERSONAL HISTORY OF MALIGNANT NEOPLASM OF BREAST: Primary | ICD-10-CM

## 2025-02-27 DIAGNOSIS — Z90.13 HISTORY OF BILATERAL MASTECTOMY: ICD-10-CM

## 2025-02-27 DIAGNOSIS — Z98.890 HISTORY OF RECONSTRUCTION OF BOTH BREASTS: ICD-10-CM

## 2025-02-28 ENCOUNTER — APPOINTMENT (OUTPATIENT)
Dept: LAB | Facility: HOSPITAL | Age: 55
End: 2025-02-28
Payer: MEDICARE

## 2025-02-28 DIAGNOSIS — Z90.13 HISTORY OF BILATERAL MASTECTOMY: ICD-10-CM

## 2025-02-28 DIAGNOSIS — Z98.890 HISTORY OF RECONSTRUCTION OF BOTH BREASTS: ICD-10-CM

## 2025-02-28 DIAGNOSIS — Z85.3 PERSONAL HISTORY OF MALIGNANT NEOPLASM OF BREAST: ICD-10-CM

## 2025-02-28 LAB
ANION GAP SERPL CALCULATED.3IONS-SCNC: 6 MMOL/L (ref 4–13)
BASOPHILS # BLD AUTO: 0.04 THOUSANDS/ÂΜL (ref 0–0.1)
BASOPHILS NFR BLD AUTO: 1 % (ref 0–1)
BUN SERPL-MCNC: 12 MG/DL (ref 5–25)
CALCIUM SERPL-MCNC: 8.7 MG/DL (ref 8.4–10.2)
CHLORIDE SERPL-SCNC: 106 MMOL/L (ref 96–108)
CO2 SERPL-SCNC: 25 MMOL/L (ref 21–32)
CREAT SERPL-MCNC: 0.57 MG/DL (ref 0.6–1.3)
EOSINOPHIL # BLD AUTO: 0.2 THOUSAND/ÂΜL (ref 0–0.61)
EOSINOPHIL NFR BLD AUTO: 3 % (ref 0–6)
ERYTHROCYTE [DISTWIDTH] IN BLOOD BY AUTOMATED COUNT: 20.9 % (ref 11.6–15.1)
GFR SERPL CREATININE-BSD FRML MDRD: 105 ML/MIN/1.73SQ M
GLUCOSE P FAST SERPL-MCNC: 86 MG/DL (ref 65–99)
HCT VFR BLD AUTO: 32.8 % (ref 34.8–46.1)
HGB BLD-MCNC: 9.7 G/DL (ref 11.5–15.4)
IMM GRANULOCYTES # BLD AUTO: 0.03 THOUSAND/UL (ref 0–0.2)
IMM GRANULOCYTES NFR BLD AUTO: 0 % (ref 0–2)
LYMPHOCYTES # BLD AUTO: 2.18 THOUSANDS/ÂΜL (ref 0.6–4.47)
LYMPHOCYTES NFR BLD AUTO: 29 % (ref 14–44)
MCH RBC QN AUTO: 20.1 PG (ref 26.8–34.3)
MCHC RBC AUTO-ENTMCNC: 29.6 G/DL (ref 31.4–37.4)
MCV RBC AUTO: 68 FL (ref 82–98)
MONOCYTES # BLD AUTO: 0.71 THOUSAND/ÂΜL (ref 0.17–1.22)
MONOCYTES NFR BLD AUTO: 9 % (ref 4–12)
NEUTROPHILS # BLD AUTO: 4.36 THOUSANDS/ÂΜL (ref 1.85–7.62)
NEUTS SEG NFR BLD AUTO: 58 % (ref 43–75)
NRBC BLD AUTO-RTO: 0 /100 WBCS
PLATELET # BLD AUTO: 367 THOUSANDS/UL (ref 149–390)
PMV BLD AUTO: 9.6 FL (ref 8.9–12.7)
POTASSIUM SERPL-SCNC: 4.3 MMOL/L (ref 3.5–5.3)
RBC # BLD AUTO: 4.83 MILLION/UL (ref 3.81–5.12)
SODIUM SERPL-SCNC: 137 MMOL/L (ref 135–147)
WBC # BLD AUTO: 7.52 THOUSAND/UL (ref 4.31–10.16)

## 2025-02-28 PROCEDURE — 85025 COMPLETE CBC W/AUTO DIFF WBC: CPT

## 2025-02-28 PROCEDURE — 36415 COLL VENOUS BLD VENIPUNCTURE: CPT

## 2025-02-28 PROCEDURE — 80048 BASIC METABOLIC PNL TOTAL CA: CPT

## 2025-03-04 ENCOUNTER — ANESTHESIA EVENT (OUTPATIENT)
Dept: PERIOP | Facility: HOSPITAL | Age: 55
End: 2025-03-04
Payer: MEDICARE

## 2025-03-06 NOTE — PRE-PROCEDURE INSTRUCTIONS
Pre-Surgery Instructions:   Medication Instructions    acetaminophen (TYLENOL) 500 mg tablet Hold day of surgery.    alendronate (FOSAMAX) 70 mg tablet Hold day of surgery.    chlorhexidine (PERIDEX) 0.12 % solution Hold day of surgery.    triamcinolone (KENALOG) 0.5 % cream Hold day of surgery.    Medication instructions for day of surgery reviewed. Please take all instructed medications with only a sip of water.       You will receive a call one business day prior to surgery with an arrival time and hospital directions. If your surgery is scheduled on a Monday, the hospital will be calling you on the Friday prior to your surgery. If you have not heard from anyone by 8pm, please call the hospital supervisor through the hospital  at [phone]. ([place] [phone] or [place] [phone]).    Do not eat or drink anything after midnight the night before your surgery, including candy, mints, lifesavers, or chewing gum. Do not drink alcohol 24hrs before your surgery. Try not to smoke at least 24hrs before your surgery.       Follow the pre surgery showering instructions as listed in the “My Surgical Experience Booklet” or otherwise provided by your surgeon's office. Do not use a blade to shave the surgical area 1 week before surgery. It is okay to use a clean electric clippers up to 24 hours before surgery. Do not apply any lotions, creams, including makeup, cologne, deodorant, or perfumes after showering on the day of your surgery. Do not use dry shampoo, hair spray, hair gel, or any type of hair products.     No contact lenses, eye make-up, or artificial eyelashes. Remove nail polish, including gel polish, and any artificial, gel, or acrylic nails if possible. Remove all jewelry including rings and body piercing jewelry.     Wear causal clothing that is easy to take on and off. Consider your type of surgery.    Keep any valuables, jewelry, piercings at home. Please bring any specially ordered equipment  (sling, braces) if indicated.    Arrange for a responsible person to drive you to and from the hospital on the day of your surgery. Please confirm the visitor policy for the day of your procedure when you receive your phone call with an arrival time.     Call the surgeon's office with any new illnesses, exposures, or additional questions prior to surgery.    Please reference your “My Surgical Experience Booklet” for additional information to prepare for your upcoming surgery.

## 2025-03-14 ENCOUNTER — TELEPHONE (OUTPATIENT)
Age: 55
End: 2025-03-14

## 2025-03-14 NOTE — TELEPHONE ENCOUNTER
Received call from patient asking to speak with Artem.    Patient stated that the hospital has not called her with the time for her surgery with Dr Winters. She also stated that she has been calling the hospital and they have just had her on hold.    I told patient that he hospital always calls with the time for surgery around dinner time.    Patient stated that is not true they have always called me before 3 pm.    I told patient that no one in the office would have the time for surgery to provide to her.    Patient stated that is not true Artem has been able to provide her with her surgery time in the past.    Patient then stated she will just continue to call the hospital.

## 2025-03-17 ENCOUNTER — ANESTHESIA (OUTPATIENT)
Dept: PERIOP | Facility: HOSPITAL | Age: 55
End: 2025-03-17
Payer: MEDICARE

## 2025-03-17 ENCOUNTER — HOSPITAL ENCOUNTER (OUTPATIENT)
Facility: HOSPITAL | Age: 55
Setting detail: OUTPATIENT SURGERY
Discharge: HOME | End: 2025-03-17
Attending: STUDENT IN AN ORGANIZED HEALTH CARE EDUCATION/TRAINING PROGRAM | Admitting: STUDENT IN AN ORGANIZED HEALTH CARE EDUCATION/TRAINING PROGRAM
Payer: MEDICARE

## 2025-03-17 VITALS
OXYGEN SATURATION: 95 % | TEMPERATURE: 97.2 F | HEIGHT: 60 IN | SYSTOLIC BLOOD PRESSURE: 120 MMHG | RESPIRATION RATE: 17 BRPM | BODY MASS INDEX: 44.17 KG/M2 | HEART RATE: 99 BPM | DIASTOLIC BLOOD PRESSURE: 69 MMHG | WEIGHT: 225 LBS

## 2025-03-17 DIAGNOSIS — Z98.890 HISTORY OF BREAST RECONSTRUCTION: Primary | ICD-10-CM

## 2025-03-17 DIAGNOSIS — Z98.890 HISTORY OF BREAST RECONSTRUCTION: ICD-10-CM

## 2025-03-17 PROCEDURE — L8600 IMPLANT BREAST SILICONE/EQ: HCPCS | Performed by: STUDENT IN AN ORGANIZED HEALTH CARE EDUCATION/TRAINING PROGRAM

## 2025-03-17 PROCEDURE — 19380 REVJ RECONSTRUCTED BREAST: CPT | Performed by: STUDENT IN AN ORGANIZED HEALTH CARE EDUCATION/TRAINING PROGRAM

## 2025-03-17 PROCEDURE — 19380 REVJ RECONSTRUCTED BREAST: CPT

## 2025-03-17 PROCEDURE — 19342 INSJ/RPLCMT BRST IMPLT SEP D: CPT

## 2025-03-17 PROCEDURE — 19342 INSJ/RPLCMT BRST IMPLT SEP D: CPT | Performed by: STUDENT IN AN ORGANIZED HEALTH CARE EDUCATION/TRAINING PROGRAM

## 2025-03-17 RX ORDER — OXYCODONE HYDROCHLORIDE 5 MG/1
5 TABLET ORAL ONCE
Refills: 0 | Status: CANCELLED | OUTPATIENT
Start: 2025-03-17

## 2025-03-17 RX ORDER — CLINDAMYCIN HYDROCHLORIDE 150 MG/1
150 CAPSULE ORAL EVERY 6 HOURS SCHEDULED
Qty: 28 CAPSULE | Refills: 0 | Status: SHIPPED | OUTPATIENT
Start: 2025-03-17 | End: 2025-03-24

## 2025-03-17 RX ORDER — ONDANSETRON 2 MG/ML
INJECTION INTRAMUSCULAR; INTRAVENOUS AS NEEDED
Status: DISCONTINUED | OUTPATIENT
Start: 2025-03-17 | End: 2025-03-17

## 2025-03-17 RX ORDER — HYDROMORPHONE HCL/PF 1 MG/ML
0.5 SYRINGE (ML) INJECTION
Status: DISCONTINUED | OUTPATIENT
Start: 2025-03-17 | End: 2025-03-17 | Stop reason: HOSPADM

## 2025-03-17 RX ORDER — SODIUM CHLORIDE, SODIUM LACTATE, POTASSIUM CHLORIDE, CALCIUM CHLORIDE 600; 310; 30; 20 MG/100ML; MG/100ML; MG/100ML; MG/100ML
125 INJECTION, SOLUTION INTRAVENOUS CONTINUOUS
Status: DISCONTINUED | OUTPATIENT
Start: 2025-03-17 | End: 2025-03-17 | Stop reason: HOSPADM

## 2025-03-17 RX ORDER — FENTANYL CITRATE 50 UG/ML
INJECTION, SOLUTION INTRAMUSCULAR; INTRAVENOUS AS NEEDED
Status: DISCONTINUED | OUTPATIENT
Start: 2025-03-17 | End: 2025-03-17

## 2025-03-17 RX ORDER — OXYCODONE HYDROCHLORIDE 5 MG/1
5 TABLET ORAL EVERY 4 HOURS PRN
Qty: 12 TABLET | Refills: 0 | Status: SHIPPED | OUTPATIENT
Start: 2025-03-17 | End: 2025-03-27

## 2025-03-17 RX ORDER — OXYCODONE HYDROCHLORIDE 5 MG/1
5 TABLET ORAL EVERY 4 HOURS PRN
Status: COMPLETED | OUTPATIENT
Start: 2025-03-17 | End: 2025-03-17

## 2025-03-17 RX ORDER — CLINDAMYCIN PHOSPHATE 900 MG/50ML
900 INJECTION, SOLUTION INTRAVENOUS ONCE
Status: COMPLETED | OUTPATIENT
Start: 2025-03-17 | End: 2025-03-17

## 2025-03-17 RX ORDER — PROPOFOL 10 MG/ML
INJECTION, EMULSION INTRAVENOUS AS NEEDED
Status: DISCONTINUED | OUTPATIENT
Start: 2025-03-17 | End: 2025-03-17

## 2025-03-17 RX ORDER — FENTANYL CITRATE/PF 50 MCG/ML
50 SYRINGE (ML) INJECTION
Status: COMPLETED | OUTPATIENT
Start: 2025-03-17 | End: 2025-03-17

## 2025-03-17 RX ORDER — DEXAMETHASONE SODIUM PHOSPHATE 10 MG/ML
INJECTION, SOLUTION INTRAMUSCULAR; INTRAVENOUS AS NEEDED
Status: DISCONTINUED | OUTPATIENT
Start: 2025-03-17 | End: 2025-03-17

## 2025-03-17 RX ORDER — LIDOCAINE HYDROCHLORIDE 10 MG/ML
INJECTION, SOLUTION EPIDURAL; INFILTRATION; INTRACAUDAL; PERINEURAL AS NEEDED
Status: DISCONTINUED | OUTPATIENT
Start: 2025-03-17 | End: 2025-03-17

## 2025-03-17 RX ORDER — ONDANSETRON 4 MG/1
4 TABLET, FILM COATED ORAL EVERY 8 HOURS PRN
Qty: 20 TABLET | Refills: 0 | Status: SHIPPED | OUTPATIENT
Start: 2025-03-17

## 2025-03-17 RX ORDER — CIPROFLOXACIN 500 MG/1
500 TABLET, FILM COATED ORAL EVERY 12 HOURS SCHEDULED
Qty: 14 TABLET | Refills: 0 | Status: SHIPPED | OUTPATIENT
Start: 2025-03-17 | End: 2025-03-24

## 2025-03-17 RX ORDER — MIDAZOLAM HYDROCHLORIDE 2 MG/2ML
INJECTION, SOLUTION INTRAMUSCULAR; INTRAVENOUS AS NEEDED
Status: DISCONTINUED | OUTPATIENT
Start: 2025-03-17 | End: 2025-03-17

## 2025-03-17 RX ORDER — DOCUSATE SODIUM 100 MG/1
100 CAPSULE, LIQUID FILLED ORAL 2 TIMES DAILY
Qty: 30 CAPSULE | Refills: 0 | Status: SHIPPED | OUTPATIENT
Start: 2025-03-17

## 2025-03-17 RX ORDER — SUCCINYLCHOLINE/SOD CL,ISO/PF 100 MG/5ML
SYRINGE (ML) INTRAVENOUS AS NEEDED
Status: DISCONTINUED | OUTPATIENT
Start: 2025-03-17 | End: 2025-03-17

## 2025-03-17 RX ORDER — MAGNESIUM HYDROXIDE 1200 MG/15ML
LIQUID ORAL AS NEEDED
Status: DISCONTINUED | OUTPATIENT
Start: 2025-03-17 | End: 2025-03-17 | Stop reason: HOSPADM

## 2025-03-17 RX ORDER — OXYCODONE AND ACETAMINOPHEN 5; 325 MG/1; MG/1
1 TABLET ORAL EVERY 4 HOURS PRN
Status: DISCONTINUED | OUTPATIENT
Start: 2025-03-17 | End: 2025-03-17

## 2025-03-17 RX ORDER — ACETAMINOPHEN 325 MG/1
975 TABLET ORAL ONCE
Status: COMPLETED | OUTPATIENT
Start: 2025-03-17 | End: 2025-03-17

## 2025-03-17 RX ORDER — PROPOFOL 10 MG/ML
INJECTION, EMULSION INTRAVENOUS CONTINUOUS PRN
Status: DISCONTINUED | OUTPATIENT
Start: 2025-03-17 | End: 2025-03-17

## 2025-03-17 RX ORDER — ROCURONIUM BROMIDE 10 MG/ML
INJECTION, SOLUTION INTRAVENOUS AS NEEDED
Status: DISCONTINUED | OUTPATIENT
Start: 2025-03-17 | End: 2025-03-17

## 2025-03-17 RX ADMIN — DEXAMETHASONE SODIUM PHOSPHATE 10 MG: 10 INJECTION INTRAMUSCULAR; INTRAVENOUS at 10:09

## 2025-03-17 RX ADMIN — PROPOFOL 80 MCG/KG/MIN: 10 INJECTION, EMULSION INTRAVENOUS at 10:09

## 2025-03-17 RX ADMIN — LIDOCAINE HYDROCHLORIDE 50 MG: 10 INJECTION, SOLUTION EPIDURAL; INFILTRATION; INTRACAUDAL at 10:09

## 2025-03-17 RX ADMIN — DEXMEDETOMIDINE 8 MCG: 100 INJECTION, SOLUTION INTRAVENOUS at 10:04

## 2025-03-17 RX ADMIN — FENTANYL CITRATE 50 MCG: 50 INJECTION INTRAMUSCULAR; INTRAVENOUS at 12:49

## 2025-03-17 RX ADMIN — PROPOFOL 200 MG: 10 INJECTION, EMULSION INTRAVENOUS at 10:09

## 2025-03-17 RX ADMIN — Medication 100 MG: at 10:09

## 2025-03-17 RX ADMIN — CLINDAMYCIN PHOSPHATE 900 MG: 900 INJECTION, SOLUTION INTRAVENOUS at 10:15

## 2025-03-17 RX ADMIN — ONDANSETRON 4 MG: 2 INJECTION INTRAMUSCULAR; INTRAVENOUS at 11:53

## 2025-03-17 RX ADMIN — FENTANYL CITRATE 100 MCG: 50 INJECTION INTRAMUSCULAR; INTRAVENOUS at 10:09

## 2025-03-17 RX ADMIN — ROCURONIUM 50 MG: 50 INJECTION, SOLUTION INTRAVENOUS at 10:15

## 2025-03-17 RX ADMIN — OXYCODONE HYDROCHLORIDE 5 MG: 5 TABLET ORAL at 13:50

## 2025-03-17 RX ADMIN — MIDAZOLAM 2 MG: 1 INJECTION INTRAMUSCULAR; INTRAVENOUS at 09:59

## 2025-03-17 RX ADMIN — FENTANYL CITRATE 50 MCG: 50 INJECTION INTRAMUSCULAR; INTRAVENOUS at 12:54

## 2025-03-17 RX ADMIN — DEXMEDETOMIDINE 8 MCG: 100 INJECTION, SOLUTION INTRAVENOUS at 12:18

## 2025-03-17 RX ADMIN — SODIUM CHLORIDE, SODIUM LACTATE, POTASSIUM CHLORIDE, AND CALCIUM CHLORIDE: .6; .31; .03; .02 INJECTION, SOLUTION INTRAVENOUS at 11:48

## 2025-03-17 RX ADMIN — PHENYLEPHRINE HYDROCHLORIDE 20 MCG/MIN: 50 INJECTION INTRAVENOUS at 10:27

## 2025-03-17 RX ADMIN — OXYCODONE HYDROCHLORIDE 5 MG: 5 TABLET ORAL at 14:53

## 2025-03-17 RX ADMIN — HYDROMORPHONE HYDROCHLORIDE 0.5 MG: 1 INJECTION, SOLUTION INTRAMUSCULAR; INTRAVENOUS; SUBCUTANEOUS at 13:08

## 2025-03-17 RX ADMIN — SODIUM CHLORIDE, SODIUM LACTATE, POTASSIUM CHLORIDE, AND CALCIUM CHLORIDE: .6; .31; .03; .02 INJECTION, SOLUTION INTRAVENOUS at 10:03

## 2025-03-17 RX ADMIN — ACETAMINOPHEN 975 MG: 325 TABLET, FILM COATED ORAL at 07:26

## 2025-03-17 RX ADMIN — ROCURONIUM 10 MG: 50 INJECTION, SOLUTION INTRAVENOUS at 10:38

## 2025-03-17 NOTE — ANESTHESIA PREPROCEDURE EVALUATION
Procedure:  REVISION OF RIGHT RECONSTRUCTED BREAST (Right: Breast)  CAPSULECTOMY (Right: Breast)  INSERTION/PLACEMENT IMPLANT BREAST (EXCHANGE) UNILATERAL (Right: Breast)    Relevant Problems   ANESTHESIA (within normal limits)      CARDIO (within normal limits)      ENDO (within normal limits)      GI/HEPATIC   (+) Gastroesophageal reflux disease   (+) Hiatal hernia      /RENAL   (+) Ureteral stone with hydronephrosis      GYN   (+) History of hysterectomy      HEMATOLOGY   (+) Anemia      MUSCULOSKELETAL   (+) Arthritis   (+) Hiatal hernia   (+) Rheumatoid arthritis (HCC)      NEURO/PSYCH (within normal limits)      PULMONARY   (+) Sleep apnea        Physical Exam    Airway    Mallampati score: III  TM Distance: >3 FB  Neck ROM: full     Dental   No notable dental hx     Cardiovascular  Rhythm: regular, Rate: normal, Cardiovascular exam normal    Pulmonary  Pulmonary exam normal Breath sounds clear to auscultation    Other Findings  post-pubertal.      Anesthesia Plan  ASA Score- 3     Anesthesia Type- general with ASA Monitors.         Additional Monitors:     Airway Plan: ETT.           Plan Factors-Exercise tolerance (METS): >4 METS.    Chart reviewed. EKG reviewed. Imaging results reviewed. Existing labs reviewed. Patient summary reviewed.    Patient is not a current smoker.  Patient instructed to abstain from smoking on day of procedure. Patient did not smoke on day of surgery.    Obstructive sleep apnea risk education given perioperatively.        Induction- intravenous.    Postoperative Plan- Plan for postoperative opioid use. Planned trial extubation    Perioperative Resuscitation Plan - Level 1 - Full Code.       Informed Consent- Anesthetic plan and risks discussed with patient.  I personally reviewed this patient with the CRNA. Discussed and agreed on the Anesthesia Plan with the CRNA..

## 2025-03-17 NOTE — ANESTHESIA POSTPROCEDURE EVALUATION
Post-Op Assessment Note    CV Status:  Stable    Pain management: adequate       Mental Status:  Alert and awake   Hydration Status:  Euvolemic   PONV Controlled:  Controlled   Airway Patency:  Patent     Post Op Vitals Reviewed: Yes    No anethesia notable event occurred.    Staff: CRNA           Last Filed PACU Vitals:  Vitals Value Taken Time   Temp 97.7 °F (36.5 °C) 03/17/25 1229   Pulse 85 03/17/25 1229   /77 03/17/25 1229   Resp 17 03/17/25 1229   SpO2 100 % 03/17/25 1229       Modified Jose David:     Vitals Value Taken Time   Activity 0 03/17/25 1229   Respiration 2 03/17/25 1229   Circulation 2 03/17/25 1229   Consciousness 0 03/17/25 1229   Oxygen Saturation 1 03/17/25 1229     Modified Jose David Score: 5

## 2025-03-17 NOTE — INTERVAL H&P NOTE
H&P reviewed. After examining the patient I find no changes in the patients condition since the H&P had been written.    Vitals:    03/17/25 0720   BP: 143/73   Pulse: 87   Resp: 18   Temp: 97.5 °F (36.4 °C)   SpO2: 99%

## 2025-03-17 NOTE — DISCHARGE INSTR - AVS FIRST PAGE
Discharge instructions    -Take tylenol 500 mg as scheduled for baseline mild to moderate pain control. For severe breakthrough pain, can use oxycodone (Was sent to your [facility])   -Do not drive or operate heavy machinery when taking narcotic pain medicine (oxycodone) as it can cause drowsiness.  -Do not get incisions wet for 3 days. After 3 days, can remove all dressings and shower.  -No submerging incisions (no baths, pools, hottubs). Pat dry incision and dress with gauze.  -Wear supportive bra or ACE bandage at all times for compression. No underwire.   -No strenuous activity, no heavy lifting (nothing over 5 lbs), no reaching above head, no hard pushing or pulling with arms.   -Take all medicines as prescribed.   -Take antibiotics as prescribed until completion - Clindamycin was sent to your [facility]   -Resume regular diet and home medications  -Call Plastic Surgery office to schedule post-op follow in 7-10 days.    Nolberto Winters MD   [facility]   [address] [place] [address], [address]   [place] [address]   Office: [phone]

## 2025-03-17 NOTE — OP NOTE
OPERATIVE REPORT  PATIENT NAME: Dania Ayala    :  1970  MRN: [id number]  Pt Location: [facility] OR ROOM 03    SURGERY DATE: 3/17/2025    Surgeons and Role:     * Nolberto Winters MD - Primary     * Vanessa Arredondo PA-C - Assisting    Preop Diagnosis:  Personal history of malignant neoplasm of breast [Z85.3]  History of bilateral mastectomy [Z90.13]  History of reconstruction of both breasts [Z98.890]    Post-Op Diagnosis Codes:     * Personal history of malignant neoplasm of breast [Z85.3]     * History of bilateral mastectomy [Z90.13]     * History of reconstruction of both breasts [Z98.890]    Procedure(s):  Removal of current right breast implant.  Placement of new silicone right breast implant  Revision of reconstructed right breast (partial capsulectomy, capsulorraphy, capsulotomy, excision of excess breast skin and tissue)    Specimen(s):  * No specimens in log *    Estimated Blood Loss:   Minimal    Drains:  * No LDAs found *    Anesthesia Type:   General    Operative Indications:  Personal history of malignant neoplasm of breast [Z85.3]  History of bilateral mastectomy [Z90.13]  History of reconstruction of both breasts [Z98.890]    Operative Findings:  Removal of intact SHPX-790 cc  Placement of SMPX-755 Moderate plus Xtra. Lot: [id number]. SN: [id number].  Total of 20 cc of exparel used for right multi-level intercostal nerve block    Complications:   None    Procedure and Technique:  Patient was brought to the operating room, transferred to the operating table in supine fashion. After undergoing general anesthesia, a timeout was performed at which point all patient identifiers were deemed to be correct. The chest was prepped and draped in the normal sterile fashion. I first began by incising the prior superior flap incision and dissected down to the capsule and entered the breast capsule. The intact SHPBX-790 was removed. The lateral breast pocket was inspected and there was noted to a small area of  thickened capsule. I then performed partial capsulectomy of this area. Due to the laxity in the lateral breast pocket, I then proceeded with lateral pocket suture capsulorrhaphy of the remaining capsule to tighten the lateral pocket using 1-0 stratafix. I also performed suture capsulorrhaphy with 1-0 stratafix to the inferior lateral fold region. I then performed mirror medial capsulotomies. Next, sizers were placed and the patient was sat upright to compare size and symmetry. After the appropriate sized implant was selected, the excess breast skin and tissue was tailortacked and marked for excision. The sizer was removed and exparel multilevel intercostal nerve block was performed. The pocket was irrigated with double antibiotic solution and iricept and hemostasis was achieved with electrocautery. . Gloves were changed, the selected SMPX-755 implant was irrigated with double antibiotic solution and placed into the pocket. I then closed the capsule with 3-0 stratafix. I then proceeded with revision of reconstructed breast with excision of the excess breast skin and tissue. After this was completed, I proceeded with closure with 3-0 vicryl for the dermis, followed by 3-0 stratafix for the skin. Incisions were dressed with xeroform, ace-wrap, and patient's compression bra.    This concluded the procedure. Patient tolerated the procedure well without complications. At the end of the case, all sponge, needle, and instrument counts were correct. Patient was awakened from anesthesia and taken to the PACU in stable condition.    I was present for the entire procedure, A qualified resident physician was not available and A physician assistant was required during the procedure for retraction, tissue handling, dissection and suturing        Patient Disposition:  PACU          SIGNATURE: Nolberto Winters MD  DATE: March 17, 2025  TIME: 12:00 PM

## 2025-03-18 NOTE — ANESTHESIA POSTPROCEDURE EVALUATION
Post-Op Assessment Note    CV Status:  Stable    Pain management: adequate       Mental Status:  Alert and awake   Hydration Status:  Euvolemic   PONV Controlled:  Controlled   Airway Patency:  Patent     Post Op Vitals Reviewed: Yes    No anethesia notable event occurred.    Staff: CRNA, Anesthesiologist           Last Filed PACU Vitals:  Vitals Value Taken Time   Temp 97.7 °F (36.5 °C) 03/17/25 1229   Pulse 85 03/17/25 1229   /77 03/17/25 1229   Resp 17 03/17/25 1229   SpO2 100 % 03/17/25 1229       Modified Jose David:     Vitals Value Taken Time   Activity 2 03/17/25 1318   Respiration 2 03/17/25 1318   Circulation 2 03/17/25 1318   Consciousness 2 03/17/25 1318   Oxygen Saturation 2 03/17/25 1318     Modified Jose David Score: 10

## 2025-03-26 ENCOUNTER — OFFICE VISIT (OUTPATIENT)
Age: 55
End: 2025-03-26

## 2025-03-26 DIAGNOSIS — Z90.13 HISTORY OF BILATERAL MASTECTOMY: Primary | ICD-10-CM

## 2025-03-26 PROCEDURE — 99024 POSTOP FOLLOW-UP VISIT: CPT | Performed by: PHYSICIAN ASSISTANT

## 2025-03-26 NOTE — PROGRESS NOTES
POST-OP EVALUATION  3/26/2025    Subjective   Dania Ayala is a 55 y.o. female is here today for routine post-operative follow up.  03/17/25 1003         Procedures:      REVISION OF RIGHT RECONSTRUCTED BREAST (Right: Breast)      CAPSULECTOMY (Right: Breast)      INSERTION/PLACEMENT IMPLANT BREAST (EXCHANGE) UNILATERAL (Right: Breast)     Pt feels well and has no issues.    Past Medical History:   Diagnosis Date    Anesthesia complication     needs IR preprocedure for IV access/not ASC candidate    Breast cancer (HCC)     Breast cancer (HCC)     Breast cancer, right breast (HCC)     Cancer (HCC)     Chronic pain disorder     CPAP (continuous positive airway pressure) dependence     no currently used    GERD (gastroesophageal reflux disease)     Heartburn     Hiatal hernia     History of bariatric surgery     Irregular heart beat     pt states it was a side effect of a medication    Kidney stone     Lung nodule     Osteoporosis     stage 3    Postsurgical malabsorption     RA (rheumatoid arthritis) (HCC)     Rheumatoid arthritis (HCC)     Sleep apnea     Stress incontinence     Thyroid nodule      Past Surgical History:   Procedure Laterality Date    ABDOMINAL SURGERY      BRACHIOPLASTY Bilateral     2017    BREAST IMPLANT Bilateral 09/08/2020    Procedure: BREAST REMOVAL TISSUE EXPANDER/ IMPLANT PLACEMENT BREAST;  Surgeon: Parminder Butler MD;  [facility];  Service: Plastics    BREAST IMPLANT Left 02/09/2021    Procedure: BREAST TISSUE EXPANDER/ IMPLANT EXCHANGE;  Surgeon: Parminder Butler MD;  [facility];  Service: Plastics    BREAST IMPLANT Right 9/19/2024    Procedure: RIGHT BREAST IMPLANT EXCHANGE;  Surgeon: Nolberto Winters MD;  [facility];  Service: Plastics    BREAST LUMPECTOMY  04/2013    CAPSULOTOMY Bilateral 09/08/2020    Procedure: BREAST CAPSULOTOMY;  Surgeon: Parminder Butler MD;  [facility];  Service: Plastics    CAPSULOTOMY Left  02/09/2021    Procedure: BREAST CAPSULOTOMY;  Surgeon: Parminder Butler MD;  [facility];  Service: Plastics    CAPSULOTOMY Right 3/17/2025    Procedure: CAPSULECTOMY;  Surgeon: Nolberto Winters MD;  [facility];  Service: Plastics    CARDIAC CATHETERIZATION      CARPAL TUNNEL RELEASE Bilateral 2015    CHOLECYSTECTOMY      COLONOSCOPY      FL RETROGRADE PYELOGRAM  08/08/2023    GALLBLADDER SURGERY  2015    GASTRIC BYPASS  2014    HYSTERECTOMY  05/2013    INCONTINENCE SURGERY  09/2013    IR BIOPSY LUNG      lung nodules    LIPOSUCTION Right 04/12/2022    Procedure: LIPOSUCTION RIGHT BREAST;  Surgeon: Nolberto Winters MD;  [facility];  Service: Plastics    LIPOSUCTION W/ FAT INJECTION Bilateral 11/10/2021    Procedure: FAT GRAFTING BILATERAL BREAST;  Surgeon: Parminder Butler MD;  [facility];  Service: Plastics    MASTECTOMY Bilateral 10/2016    MN BREAST RECONSTRUCTION W/LATISSIMUS DORSI FLAP Right 08/06/2019    Procedure: BREAST RECONSTRUCTION W/FLAP LATISSIMUS DORSI;  Surgeon: Parminder Butler MD;  [facility];  Service: Plastics    MN CYSTO/URETERO W/LITHOTRIPSY &INDWELL STENT INSRT Right 08/08/2023    Procedure: CYSTOSCOPY URETEROSCOPY, RETROGRADE PYELOGRAM AND INSERTION STENT URETERAL;  Surgeon: Gabriel Rene MD;  [facility];  Service: Urology    MN GRAFTING OF AUTOLOGOUS FAT BY LIPO 50 CC OR LESS Bilateral 01/13/2023    Procedure: BILATERAL BREAST FAT GRAFTING;  Surgeon: Nolberto Winters MD;  [facility];  Service: Plastics    MN GRAFTING OF AUTOLOGOUS FAT BY LIPO 50 CC OR LESS Bilateral 06/13/2023    Procedure: FAT GRAFTING TO BILATERAL BREASTS;  Surgeon: Nolberto Winters MD;  [facility];  Service: Plastics    MN INSERTION BREAST IMPLANT SAME DAY OF MASTECTOMY Right 3/17/2025    Procedure: INSERTION/PLACEMENT IMPLANT BREAST (EXCHANGE) UNILATERAL;  Surgeon: Nolberto Winters MD;  [facility];   Service: Plastics    NM DOMINGA-IMPLANT CAPSULECTOMY BREAST COMPLETE Bilateral 08/06/2019    Procedure: BREAST CAPSULECTOMY;  Surgeon: Parminder Butler MD;  [facility];  Service: Plastics    NM REMOVAL INTACT BREAST IMPLANT Bilateral 08/06/2019    Procedure: BREAST IMPLANT REMOVAL;  Surgeon: Parminder Butler MD;  [facility];  Service: Plastics    NM REMOVAL INTACT BREAST IMPLANT Right 04/12/2022    Procedure: removal of right breast implant, capsulectomy, placement of right breast implant with acelluar dermal matrix.;  Surgeon: Nolberto Winters MD;  [facility];  Service: Plastics    NM REVISION OF RECONSTRUCTED BREAST Right 09/07/2021    Procedure: RIGHT BREAST MOUND REVISION;  Surgeon: Parminder Butler MD;  [facility];  Service: Plastics    NM REVISION OF RECONSTRUCTED BREAST Right 9/19/2024    Procedure: REVISION OF RECONSTRUCTED BREAST.;  Surgeon: Nolberto Winters MD;  [facility];  Service: Plastics    NM REVISION OF RECONSTRUCTED BREAST Right 3/17/2025    Procedure: REVISION OF RIGHT RECONSTRUCTED BREAST;  Surgeon: Nolberto Winters MD;  [facility];  Service: Plastics    NM TISSUE EXPANDER PLACEMENT BREAST RECONSTRUCTION Bilateral 08/06/2019    Procedure: BREAST TISSUE EXPANDER PLACEMENT;  Surgeon: Parminder Butler MD;  [facility];  Service: Plastics    REDUCTION MAMMAPLASTY      REVISION OF SCAR Bilateral 11/10/2021    Procedure: SCAR REVISION BILATERAL ARMS;  Surgeon: Parminder Butler MD;  [facility];  Service: Plastics    SKIN LESION EXCISION Left 9/19/2024    Procedure: LEFT BREAST SCAR EXCISION WITH COMPLEX CLOSURE;  Surgeon: Nolberto Winters MD;  [facility];  Service: Plastics    TRANSFER MUSCLE PECTORALIS Bilateral 08/06/2019    Procedure: PECTORALIS MUSCLE REPAIR;  Surgeon: Parminder Butler MD;  [facility];  Service: Plastics    US GUIDANCE BREAST BIOPSY RIGHT  EACH ADDITIONAL Right 12/22/2023    US GUIDED BREAST BIOPSY RIGHT COMPLETE Right 12/22/2023        Current Outpatient Medications:     acetaminophen (TYLENOL) 500 mg tablet, Take 1 tablet (500 mg total) by mouth every 4 (four) hours as needed for mild pain or moderate pain, Disp: 30 tablet, Rfl: 2    alendronate (FOSAMAX) 70 mg tablet, every 7 days Weekly on saturdays, Disp: , Rfl:     chlorhexidine (PERIDEX) 0.12 % solution, 2 (two) times a day, Disp: , Rfl:     docusate sodium (COLACE) 100 mg capsule, Take 1 capsule (100 mg total) by mouth 2 (two) times a day, Disp: 30 capsule, Rfl: 0    ondansetron (ZOFRAN) 4 mg tablet, Take 1 tablet (4 mg total) by mouth every 8 (eight) hours as needed for nausea or vomiting, Disp: 20 tablet, Rfl: 0    oxyCODONE (Roxicodone) 5 immediate release tablet, Take 1 tablet (5 mg total) by mouth every 4 (four) hours as needed for severe pain for up to 10 days Max Daily Amount: 30 mg, Disp: 12 tablet, Rfl: 0    triamcinolone (KENALOG) 0.5 % cream, Apply topically as needed , Disp: , Rfl:   Allergies: Accolate [zafirlukast], Penicillins, Shellfish allergy - food allergy, Shellfish-derived products - food allergy, Singulair [montelukast], Pork-derived products - food allergy, and Medical tape    Objective      Incision is clean, dry, intact.   No sign of fluid collection. (See photo)    Assessment & Plan   Diagnoses and all orders for this visit:    History of bilateral mastectomy    Revision    Doing very well.  Aquaphor and ABD's  Followup in 3 weeks.    Jovan Gutierrez PA-C

## 2025-04-09 ENCOUNTER — TELEPHONE (OUTPATIENT)
Dept: PLASTIC SURGERY | Facility: CLINIC | Age: 55
End: 2025-04-09

## 2025-04-09 ENCOUNTER — OFFICE VISIT (OUTPATIENT)
Dept: OBGYN CLINIC | Facility: CLINIC | Age: 55
End: 2025-04-09
Payer: MEDICARE

## 2025-04-09 VITALS — BODY MASS INDEX: 44.17 KG/M2 | HEIGHT: 60 IN | WEIGHT: 225 LBS

## 2025-04-09 DIAGNOSIS — G56.01 CARPAL TUNNEL SYNDROME ON RIGHT: Primary | ICD-10-CM

## 2025-04-09 DIAGNOSIS — G56.21 CUBITAL TUNNEL SYNDROME ON RIGHT: ICD-10-CM

## 2025-04-09 DIAGNOSIS — M19.041 ARTHRITIS OF RIGHT HAND: ICD-10-CM

## 2025-04-09 DIAGNOSIS — E66.813 OBESITY, CLASS III, BMI 40-49.9 (MORBID OBESITY): ICD-10-CM

## 2025-04-09 DIAGNOSIS — R45.89 ANXIETY ABOUT TREATMENT: ICD-10-CM

## 2025-04-09 DIAGNOSIS — M06.9 RHEUMATOID ARTHRITIS, INVOLVING UNSPECIFIED SITE, UNSPECIFIED WHETHER RHEUMATOID FACTOR PRESENT (HCC): ICD-10-CM

## 2025-04-09 PROCEDURE — 99204 OFFICE O/P NEW MOD 45 MIN: CPT | Performed by: SURGERY

## 2025-04-09 RX ORDER — LORAZEPAM 1 MG/1
1 TABLET ORAL ONCE
Qty: 1 TABLET | Refills: 0 | Status: SHIPPED | OUTPATIENT
Start: 2025-04-09 | End: 2025-04-09

## 2025-04-09 RX ORDER — LORAZEPAM 0.5 MG/1
TABLET ORAL
Refills: 0 | Status: CANCELLED | OUTPATIENT
Start: 2025-04-09

## 2025-04-09 NOTE — TELEPHONE ENCOUNTER
Received call from Patient asking if she could schedule an appt with Dr. Winters to sign papers to do the implant exchange, scheduled after her upcoming appt with Jovan. Informed Patient that they will make that determination after she sees Jovan on 4/16/25 10:30 am in [facility].     Patient verbalized understanding.

## 2025-04-09 NOTE — PROGRESS NOTES
Karen Schrader M.D.  Attending, Orthopaedic Surgery  Hand, Wrist, and Elbow Surgery  [facility]      ORTHOPAEDIC HAND, WRIST, AND ELBOW OFFICE  VISIT       ASSESSMENT/PLAN:        Assessment & Plan  Carpal tunnel syndrome on right  Possible recurrent right carpal tunnel syndrome and possible right cubital tunnel syndrome   The etiology of recurrent carpal tunnel syndrome and cubital tunnel syndrome was discussed.   It was discussed with the patient that carpal tunnel and cubital tunnel syndrome does not cause numbness and tingling to the dorsal aspect of the hand. This generally is due to a neck issue.   We also discussed that her history of lymph node dissection as well as 37 sessions of radiation is also likely playing a role in her symptoms as this does affect nerves and nerve recovery.   I would recommend a EMG to further evaluate for recurrent right carpal tunnel syndrome, right cubital tunnel syndrome and cervical radiculopathy. Unable to do the procedure under anesthesia.   Oral antianxiety medication may be prescribed, to be taken 30 minutes prior to the procedure. Ativan was prescribed and sent to her pharmacy electronically, to be taken 30 minutes prior to the procedure.   I will see her back in the office once the EMG is complete to review results and further discuss treatment options.   Orders:    EMG; Future    Cubital tunnel syndrome on right  Possible recurrent right carpal tunnel syndrome and possible right cubital tunnel syndrome   The etiology of recurrent carpal tunnel syndrome and cubital tunnel syndrome was discussed.   It was discussed with the patient that carpal tunnel and cubital tunnel syndrome does not cause numbness and tingling to the dorsal aspect of the hand. This generally is due to a neck issue.   We also discussed that her history of lymph node dissection as well as 37 sessions of radiation is also likely playing a role in her symptoms as this does affect  nerves and nerve recovery.   I would recommend a EMG to further evaluate for recurrent right carpal tunnel syndrome, right cubital tunnel syndrome and cervical radiculopathy. Unable to do the procedure under anesthesia.   Oral antianxiety medication may be prescribed, to be taken 30 minutes prior to the procedure. Ativan was prescribed and sent to her pharmacy electronically, to be taken 30 minutes prior to the procedure.   I will see her back in the office once the EMG is complete to review results and further discuss treatment options.   Orders:    EMG; Future    Arthritis of right hand  It was discussed with the patient that hand stiffness in the AM is secondary to hand arthritis.   Warm moist heat can be beneficial for arthritic pain.   Hand stiffness in the AM will not improve following a carpal tunnel or cubital tunnel release.           The patient verbalized understanding of exam findings and treatment plan. We engaged in the shared decision-making process and treatment options were discussed at length with the patient. Surgical and conservative management discussed today along with risks and benefits.    Diagnoses and all orders for this visit:    Carpal tunnel syndrome on right  -     EMG; Future    Cubital tunnel syndrome on right  -     EMG; Future    Arthritis of right hand      Follow Up:  Return for after EMG.    To Do Next Visit:  Re-evaluation of current issue      General Discussions:  Carpal Tunnel Syndrome: The anatomy and physiology of carpal tunnel syndrome was discussed with the patient today.  Increase pressure localized under the transverse carpal ligament can cause pain, numbness, tingling, or dysesthesias within the median nerve distribution as well as feelings of fatigue, clumsiness, or awkwardness.  These symptoms typically occur at night and worse in the morning upon waking.  Eventually, untreated carpal tunnel syndrome can result in weakness and permanent loss of muscle within the  thenar compartment of the hand.  Treatment options were discussed with the patient.  Conservative treatment includes nocturnal resting splints to keep the nerve in a neutral position, ergonomic changes within the work or home environment, activity modification, and tendon gliding exercises. Vitamin B6 one tablet daily over the counter may helpful to reduce symptoms.   Steroid injections within the carpal canal can help a majority of patients, however this is often self-limited in a majority of patients.  Surgical intervention to divide the transverse carpal ligament typically results in a long-lasting relief of the patient's complaints, with the recurrence rate of less than 1%.                                                                                                                                                                                   Cubital Tunnel Syndrome: The anatomy and physiology of cubital tunnel syndrome were discussed with the patient today in the office.  Typically, increased elbow flexion activities decrease blood flow within the intraneural spaces, resulting in a feeling of numbness, tingling, weakness, or clumsiness within the hand and fingers.  Occasionally, anatomic structures such as medial elbow osteophytes, the medial head of the triceps, were subluxing ulnar nerve may result in increased pressure or aggravation at the cubital tunnel.  Typical signs and symptoms usually include numbness and tingling within the ring and small finger, weakness with , and weakness with pinch.  Conservative treatment and includes nocturnal bracing to keep the elbow in a semi-extended position, activity modification, therapy, and avoiding excessive elbow flexion activities.  Vitamin B6 one tablet daily over the counter may helpful to reduce symptoms.  A majority of patients typically respond to conservative treatment over a period of approximately 3-6 months.  EMG/NCV testing of the ulnar nerve at  "the elbow is not as reliable as carpal tunnel syndrome.  Surgical intervention in the form of in situ release of the ulnar nerve at the elbow or ulnar nerve transposition may be required in up to 20% of patients.     Osteoarthritis:  The anatomy and physiology of osteoarthritis was discussed with the patient today in the office.  Deterioration of the articular cartilage eventually leads to altered mobility at the joint, resulting in joint subluxation, osteophyte formation, cystic changes, as well as subchondral sclerosis.  Eventually, pain, limited mobility, and compensatory hypermobility at surrounding joints may develop.  While normal activity and usage of the joint may provide a painful experience to the patient, this typically does not result in damage to the limb.  Treatment options include splints to decreased joint edema, pain, and inflammation.  Therapy exercises to strengthen the surrounding musculature may relieve pain, but do not alter the overall continued development of osteoarthritis.  Oral medications, topical medications, corticosteroid injections may decrease pain and increase overall function.  Eventually, some patients may require surgical intervention.     ____________________________________________________________________________________________________________________________________________      CHIEF COMPLAINT:  Chief Complaint   Patient presents with    Right Wrist - Pain     2nd opinion, wants to discuss sx       SUBJECTIVE:  Dania Ayala is a 55 y.o. year old RHD female who presents to the office for evaluation of her right hand. She feels her hand \"locks in place\" in the AM. She has to use heat over the hand or use hot water to improve her symptoms. She feels she is dropping objects, such as when doing dishes. She is unsure as to why she is dropping objects. She notes numbness and tingling to both hands, right worse then left. She feels this primarily affects the dorsal aspect of her " "hand and affects the \"whole hand\", all digits. She has a history of bilateral carpal tunnel releases 10-11 years ago. She notes she was a  at the time and was experiencing wrist clicking and difficulty making a fist at that time. She also had numbness and tingling at that time. Symptoms did resolve following surgery, carpal tunnel releases. She states that she underwent a EMG at that time under anesthesia and went right from the EMG procedure to surgery as she is unable to tolerate needles. She has a history of breast cancer with lymph node dissection x6 was well as 37 sessions of radiation.     On record review she recently was evaluated by Dr. Finesse Cardona regarding this issue on 4/1/25 and was recommended to attend OT and undergo a EMG.      Pain/symptom timing:  Worse during the day when active  Pain/symptom context:  Worse with activites and work  Pain/symptom modifying factors:  Rest makes better, activities make worse  Pain/symptom associated signs/symptoms: none    Prior treatment   NSAIDsNo   Injections No   Bracing/Orthotics No    Physical Therapy No     I have personally reviewed all the relevant PMH, PSH, SH, FH, Medications and allergies      PAST MEDICAL HISTORY:  Past Medical History:   Diagnosis Date    Anesthesia complication     needs IR preprocedure for IV access/not ASC candidate    Breast cancer (HCC)     Breast cancer (HCC)     Breast cancer, right breast (HCC)     Cancer (HCC)     Chronic pain disorder     CPAP (continuous positive airway pressure) dependence     no currently used    GERD (gastroesophageal reflux disease)     Heartburn     Hiatal hernia     History of bariatric surgery     Irregular heart beat     pt states it was a side effect of a medication    Kidney stone     Lung nodule     Osteoporosis     stage 3    Postsurgical malabsorption     RA (rheumatoid arthritis) (HCC)     Rheumatoid arthritis (HCC)     Sleep apnea     Stress incontinence     Thyroid nodule  "       PAST SURGICAL HISTORY:  Past Surgical History:   Procedure Laterality Date    ABDOMINAL SURGERY      BRACHIOPLASTY Bilateral     2017    BREAST IMPLANT Bilateral 09/08/2020    Procedure: BREAST REMOVAL TISSUE EXPANDER/ IMPLANT PLACEMENT BREAST;  Surgeon: Parminder Butler MD;  [facility];  Service: Plastics    BREAST IMPLANT Left 02/09/2021    Procedure: BREAST TISSUE EXPANDER/ IMPLANT EXCHANGE;  Surgeon: Parminder Butler MD;  [facility];  Service: Plastics    BREAST IMPLANT Right 9/19/2024    Procedure: RIGHT BREAST IMPLANT EXCHANGE;  Surgeon: Nolberto Winters MD;  [facility];  Service: Plastics    BREAST LUMPECTOMY  04/2013    CAPSULOTOMY Bilateral 09/08/2020    Procedure: BREAST CAPSULOTOMY;  Surgeon: Parminder Butler MD;  [facility];  Service: Plastics    CAPSULOTOMY Left 02/09/2021    Procedure: BREAST CAPSULOTOMY;  Surgeon: Parminder Butler MD;  [facility];  Service: Plastics    CAPSULOTOMY Right 3/17/2025    Procedure: CAPSULECTOMY;  Surgeon: Nolberto Winters MD;  [facility];  Service: Plastics    CARDIAC CATHETERIZATION      CARPAL TUNNEL RELEASE Bilateral 2015    CHOLECYSTECTOMY      COLONOSCOPY      FL RETROGRADE PYELOGRAM  08/08/2023    GALLBLADDER SURGERY  2015    GASTRIC BYPASS  2014    HYSTERECTOMY  05/2013    INCONTINENCE SURGERY  09/2013    IR BIOPSY LUNG      lung nodules    LIPOSUCTION Right 04/12/2022    Procedure: LIPOSUCTION RIGHT BREAST;  Surgeon: Nolberto Winters MD;  [facility];  Service: Plastics    LIPOSUCTION W/ FAT INJECTION Bilateral 11/10/2021    Procedure: FAT GRAFTING BILATERAL BREAST;  Surgeon: Parminder Butler MD;  [facility];  Service: Plastics    MASTECTOMY Bilateral 10/2016    MI BREAST RECONSTRUCTION W/LATISSIMUS DORSI FLAP Right 08/06/2019    Procedure: BREAST RECONSTRUCTION W/FLAP LATISSIMUS DORSI;  Surgeon: Parminder Butler MD;  [facility];  Service: Plastics    AR CYSTO/URETERO W/LITHOTRIPSY &INDWELL STENT INSRT Right 08/08/2023    Procedure: CYSTOSCOPY URETEROSCOPY, RETROGRADE PYELOGRAM AND INSERTION STENT URETERAL;  Surgeon: Gabriel Rene MD;  [facility];  Service: Urology    AR GRAFTING OF AUTOLOGOUS FAT BY LIPO 50 CC OR LESS Bilateral 01/13/2023    Procedure: BILATERAL BREAST FAT GRAFTING;  Surgeon: Nolberto Winters MD;  [facility];  Service: Plastics    AR GRAFTING OF AUTOLOGOUS FAT BY LIPO 50 CC OR LESS Bilateral 06/13/2023    Procedure: FAT GRAFTING TO BILATERAL BREASTS;  Surgeon: Nolberto Winters MD;  [facility];  Service: Plastics    AR INSERTION BREAST IMPLANT SAME DAY OF MASTECTOMY Right 3/17/2025    Procedure: INSERTION/PLACEMENT IMPLANT BREAST (EXCHANGE) UNILATERAL;  Surgeon: Nolberto Winters MD;  [facility];  Service: Plastics    AR DOMINGA-IMPLANT CAPSULECTOMY BREAST COMPLETE Bilateral 08/06/2019    Procedure: BREAST CAPSULECTOMY;  Surgeon: Parminder Butler MD;  [facility];  Service: Plastics    AR REMOVAL INTACT BREAST IMPLANT Bilateral 08/06/2019    Procedure: BREAST IMPLANT REMOVAL;  Surgeon: Parminder Butler MD;  [facility];  Service: Plastics    AR REMOVAL INTACT BREAST IMPLANT Right 04/12/2022    Procedure: removal of right breast implant, capsulectomy, placement of right breast implant with acelluar dermal matrix.;  Surgeon: Nolberto Winters MD;  [facility];  Service: Plastics    AR REVISION OF RECONSTRUCTED BREAST Right 09/07/2021    Procedure: RIGHT BREAST MOUND REVISION;  Surgeon: Parminder Butler MD;  [facility];  Service: Plastics    AR REVISION OF RECONSTRUCTED BREAST Right 9/19/2024    Procedure: REVISION OF RECONSTRUCTED BREAST.;  Surgeon: Nolberto Winters MD;  [facility];  Service: Plastics    AR REVISION OF RECONSTRUCTED BREAST Right 3/17/2025    Procedure: REVISION OF RIGHT RECONSTRUCTED  BREAST;  Surgeon: Nolberto Winters MD;  [facility];  Service: Plastics    KY TISSUE EXPANDER PLACEMENT BREAST RECONSTRUCTION Bilateral 08/06/2019    Procedure: BREAST TISSUE EXPANDER PLACEMENT;  Surgeon: Parminder Butler MD;  [facility];  Service: Plastics    REDUCTION MAMMAPLASTY      REVISION OF SCAR Bilateral 11/10/2021    Procedure: SCAR REVISION BILATERAL ARMS;  Surgeon: Parminder Butler MD;  [facility];  Service: Plastics    SKIN LESION EXCISION Left 9/19/2024    Procedure: LEFT BREAST SCAR EXCISION WITH COMPLEX CLOSURE;  Surgeon: Nolberto Winters MD;  [facility];  Service: Plastics    TRANSFER MUSCLE PECTORALIS Bilateral 08/06/2019    Procedure: PECTORALIS MUSCLE REPAIR;  Surgeon: Parminder Butler MD;  [facility];  Service: Plastics    US GUIDANCE BREAST BIOPSY RIGHT EACH ADDITIONAL Right 12/22/2023    US GUIDED BREAST BIOPSY RIGHT COMPLETE Right 12/22/2023       FAMILY HISTORY:  Family History   Problem Relation Age of Onset    No Known Problems Mother     Diabetes Father     Heart disease Father     No Known Problems Brother        SOCIAL HISTORY:  Social History     Tobacco Use    Smoking status: Never    Smokeless tobacco: Never   Vaping Use    Vaping status: Never Used   Substance Use Topics    Alcohol use: Not Currently    Drug use: Never       MEDICATIONS:    Current Outpatient Medications:     alendronate (FOSAMAX) 70 mg tablet, every 7 days Weekly on saturdays, Disp: , Rfl:     triamcinolone (KENALOG) 0.5 % cream, Apply topically as needed , Disp: , Rfl:     acetaminophen (TYLENOL) 500 mg tablet, Take 1 tablet (500 mg total) by mouth every 4 (four) hours as needed for mild pain or moderate pain, Disp: 30 tablet, Rfl: 2    chlorhexidine (PERIDEX) 0.12 % solution, 2 (two) times a day, Disp: , Rfl:     docusate sodium (COLACE) 100 mg capsule, Take 1 capsule (100 mg total) by mouth 2 (two) times a day, Disp: 30 capsule, Rfl: 0     ondansetron (ZOFRAN) 4 mg tablet, Take 1 tablet (4 mg total) by mouth every 8 (eight) hours as needed for nausea or vomiting, Disp: 20 tablet, Rfl: 0    ALLERGIES:  Allergies   Allergen Reactions    Accolate [Zafirlukast] Itching    Penicillins Anaphylaxis     Unconsciousness, and bronchoconstriction      Shellfish Allergy - Food Allergy Anaphylaxis    Shellfish-Derived Products - Food Allergy Anaphylaxis    Singulair [Montelukast] Itching    Pork-Derived Products - Food Allergy Hives    Medical Tape Rash     P/S specific surgical tape used at last procedure --- ok w/ paper tape           REVIEW OF SYSTEMS:  Review of Systems   Constitutional:  Negative for chills, fever and unexpected weight change.   HENT:  Negative for hearing loss, nosebleeds and sore throat.    Eyes:  Negative for pain, redness and visual disturbance.   Respiratory:  Negative for cough, shortness of breath and wheezing.    Cardiovascular:  Negative for chest pain, palpitations and leg swelling.   Gastrointestinal:  Negative for abdominal pain, nausea and vomiting.   Endocrine: Negative for polydipsia and polyuria.   Genitourinary:  Negative for difficulty urinating and hematuria.   Musculoskeletal:  Positive for arthralgias. Negative for joint swelling and myalgias.   Skin:  Negative for rash and wound.   Neurological:  Positive for numbness. Negative for dizziness and headaches.   Psychiatric/Behavioral:  Negative for decreased concentration, dysphoric mood and suicidal ideas. The patient is not nervous/anxious.        VITALS:  There were no vitals filed for this visit.    LABS:      _____________________________________________________  PHYSICAL EXAMINATION:  General: well developed and well nourished, alert, oriented times 3, and appears comfortable  Psychiatric: Normal  HEENT: Normocephalic, Atraumatic Trachea Midline, No torticollis  Pulmonary: No audible wheezing or respiratory distress   Abdomen/GI: Non tender, non distended  "  Cardiovascular: No pitting edema, 2+ radial pulse   Skin: No masses, erythema, lacerations, fluctation, ulcerations  Neurovascular: Sensation Intact to the Median, Ulnar, Radial Nerve, Motor Intact to the Median, Ulnar, Radial Nerve, and Pulses Intact  Musculoskeletal: Normal, except as noted in detailed exam and in HPI.      MUSCULOSKELETAL EXAMINATION:    Right Carpal Tunnel Exam:  Negative thenar atrophy. Negative phalen's test. Positive carpal tunnel compression. Negative tinels over median nerve at the wrist.  Opposition strength 5/5.  Abduction strength 5/5. Well healed carpal tunnel release incisions.     Right Ulnar Nerve Exam:  Negative intrinsic atrophy.  Negative  deformity at the elbow.   Full range of motion with flexion and extension of the elbow.   Cubital tunnel is tender to palpation. 2 heads of the FCU is tender to palpation. Positive tinels over the ulnar nerve at the elbow. Difficulty with small finger adduction, patient states she injured this finger as a child and she has never been able to adduct the small finger sense then.     ___________________________________________________  STUDIES REVIEWED:  No imaging to review       LABS REVIEWED:    HgA1c: No results found for: \"HGBA1C\"  BMP:   Lab Results   Component Value Date    CALCIUM 8.7 02/28/2025    K 4.3 02/28/2025    CO2 25 02/28/2025     02/28/2025    BUN 12 02/28/2025    CREATININE 0.57 (L) 02/28/2025               PROCEDURES PERFORMED:  Procedures  No Procedures performed today    _____________________________________________________      Scribe Attestation      I,:  Perla Boles MA am acting as a scribe while in the presence of the attending physician.:       I,:  Karen Schrader MD personally performed the services described in this documentation    as scribed in my presence.:                   "

## 2025-04-16 ENCOUNTER — OFFICE VISIT (OUTPATIENT)
Age: 55
End: 2025-04-16

## 2025-04-16 DIAGNOSIS — Z90.13 HISTORY OF BILATERAL MASTECTOMY: ICD-10-CM

## 2025-04-16 DIAGNOSIS — Z90.13 ACQUIRED ABSENCE OF BREAST AND ABSENT NIPPLE, BILATERAL: Primary | ICD-10-CM

## 2025-04-16 PROCEDURE — 99024 POSTOP FOLLOW-UP VISIT: CPT | Performed by: PHYSICIAN ASSISTANT

## 2025-04-16 NOTE — PROGRESS NOTES
POST-OP EVALUATION  4/16/2025    Subjective   Dania Ayala is a 55 y.o. female is here today for routine post-operative follow up.  03/17/25 1003               Procedures:      REVISION OF RIGHT RECONSTRUCTED BREAST (Right: Breast)      CAPSULECTOMY (Right: Breast)      INSERTION/PLACEMENT IMPLANT BREAST (EXCHANGE) UNILATERAL (Right: Breast)     Dania has no concerns today.    Past Medical History:   Diagnosis Date    Anesthesia complication     needs IR preprocedure for IV access/not ASC candidate    Breast cancer (HCC)     Breast cancer (HCC)     Breast cancer, right breast (HCC)     Cancer (HCC)     Chronic pain disorder     CPAP (continuous positive airway pressure) dependence     no currently used    GERD (gastroesophageal reflux disease)     Heartburn     Hiatal hernia     History of bariatric surgery     Irregular heart beat     pt states it was a side effect of a medication    Kidney stone     Lung nodule     Osteoporosis     stage 3    Postsurgical malabsorption     RA (rheumatoid arthritis) (HCC)     Rheumatoid arthritis (HCC)     Sleep apnea     Stress incontinence     Thyroid nodule      Past Surgical History:   Procedure Laterality Date    ABDOMINAL SURGERY      BRACHIOPLASTY Bilateral     2017    BREAST IMPLANT Bilateral 09/08/2020    Procedure: BREAST REMOVAL TISSUE EXPANDER/ IMPLANT PLACEMENT BREAST;  Surgeon: Parminder Butler MD;  [facility];  Service: Plastics    BREAST IMPLANT Left 02/09/2021    Procedure: BREAST TISSUE EXPANDER/ IMPLANT EXCHANGE;  Surgeon: Parminder Butler MD;  [facility];  Service: Plastics    BREAST IMPLANT Right 9/19/2024    Procedure: RIGHT BREAST IMPLANT EXCHANGE;  Surgeon: Nolberto Winters MD;  [facility];  Service: Plastics    BREAST LUMPECTOMY  04/2013    CAPSULOTOMY Bilateral 09/08/2020    Procedure: BREAST CAPSULOTOMY;  Surgeon: Parminder Butler MD;  [facility];  Service: Plastics    CAPSULOTOMY Left  02/09/2021    Procedure: BREAST CAPSULOTOMY;  Surgeon: Parminder Butler MD;  [facility];  Service: Plastics    CAPSULOTOMY Right 3/17/2025    Procedure: CAPSULECTOMY;  Surgeon: Nolberto Winters MD;  [facility];  Service: Plastics    CARDIAC CATHETERIZATION      CARPAL TUNNEL RELEASE Bilateral 2015    CHOLECYSTECTOMY      COLONOSCOPY      FL RETROGRADE PYELOGRAM  08/08/2023    GALLBLADDER SURGERY  2015    GASTRIC BYPASS  2014    HYSTERECTOMY  05/2013    INCONTINENCE SURGERY  09/2013    IR BIOPSY LUNG      lung nodules    LIPOSUCTION Right 04/12/2022    Procedure: LIPOSUCTION RIGHT BREAST;  Surgeon: Nolberto Winters MD;  [facility];  Service: Plastics    LIPOSUCTION W/ FAT INJECTION Bilateral 11/10/2021    Procedure: FAT GRAFTING BILATERAL BREAST;  Surgeon: Parminder Butler MD;  [facility];  Service: Plastics    MASTECTOMY Bilateral 10/2016    RI BREAST RECONSTRUCTION W/LATISSIMUS DORSI FLAP Right 08/06/2019    Procedure: BREAST RECONSTRUCTION W/FLAP LATISSIMUS DORSI;  Surgeon: Parminder Butler MD;  [facility];  Service: Plastics    RI CYSTO/URETERO W/LITHOTRIPSY &INDWELL STENT INSRT Right 08/08/2023    Procedure: CYSTOSCOPY URETEROSCOPY, RETROGRADE PYELOGRAM AND INSERTION STENT URETERAL;  Surgeon: Gabriel Rene MD;  [facility];  Service: Urology    RI GRAFTING OF AUTOLOGOUS FAT BY LIPO 50 CC OR LESS Bilateral 01/13/2023    Procedure: BILATERAL BREAST FAT GRAFTING;  Surgeon: Nolberto Winters MD;  [facility];  Service: Plastics    RI GRAFTING OF AUTOLOGOUS FAT BY LIPO 50 CC OR LESS Bilateral 06/13/2023    Procedure: FAT GRAFTING TO BILATERAL BREASTS;  Surgeon: Nolberto Winters MD;  [facility];  Service: Plastics    RI INSERTION BREAST IMPLANT SAME DAY OF MASTECTOMY Right 3/17/2025    Procedure: INSERTION/PLACEMENT IMPLANT BREAST (EXCHANGE) UNILATERAL;  Surgeon: Nolberto Winters MD;  [facility];   Service: Plastics    PA DOMINGA-IMPLANT CAPSULECTOMY BREAST COMPLETE Bilateral 08/06/2019    Procedure: BREAST CAPSULECTOMY;  Surgeon: Parminder Butler MD;  [facility];  Service: Plastics    PA REMOVAL INTACT BREAST IMPLANT Bilateral 08/06/2019    Procedure: BREAST IMPLANT REMOVAL;  Surgeon: Parminder Butler MD;  [facility];  Service: Plastics    PA REMOVAL INTACT BREAST IMPLANT Right 04/12/2022    Procedure: removal of right breast implant, capsulectomy, placement of right breast implant with acelluar dermal matrix.;  Surgeon: Nolberto Winters MD;  [facility];  Service: Plastics    PA REVISION OF RECONSTRUCTED BREAST Right 09/07/2021    Procedure: RIGHT BREAST MOUND REVISION;  Surgeon: Parminder Butler MD;  [facility];  Service: Plastics    PA REVISION OF RECONSTRUCTED BREAST Right 9/19/2024    Procedure: REVISION OF RECONSTRUCTED BREAST.;  Surgeon: Nolberto Winters MD;  [facility];  Service: Plastics    PA REVISION OF RECONSTRUCTED BREAST Right 3/17/2025    Procedure: REVISION OF RIGHT RECONSTRUCTED BREAST;  Surgeon: Nolberto Winters MD;  [facility];  Service: Plastics    PA TISSUE EXPANDER PLACEMENT BREAST RECONSTRUCTION Bilateral 08/06/2019    Procedure: BREAST TISSUE EXPANDER PLACEMENT;  Surgeon: Parminder Butler MD;  [facility];  Service: Plastics    REDUCTION MAMMAPLASTY      REVISION OF SCAR Bilateral 11/10/2021    Procedure: SCAR REVISION BILATERAL ARMS;  Surgeon: Parminder Butler MD;  [facility];  Service: Plastics    SKIN LESION EXCISION Left 9/19/2024    Procedure: LEFT BREAST SCAR EXCISION WITH COMPLEX CLOSURE;  Surgeon: Nolberto Winters MD;  [facility];  Service: Plastics    TRANSFER MUSCLE PECTORALIS Bilateral 08/06/2019    Procedure: PECTORALIS MUSCLE REPAIR;  Surgeon: Parminder Butler MD;  [facility];  Service: Plastics    US GUIDANCE BREAST BIOPSY RIGHT  EACH ADDITIONAL Right 12/22/2023    US GUIDED BREAST BIOPSY RIGHT COMPLETE Right 12/22/2023        Current Outpatient Medications:     acetaminophen (TYLENOL) 500 mg tablet, Take 1 tablet (500 mg total) by mouth every 4 (four) hours as needed for mild pain or moderate pain, Disp: 30 tablet, Rfl: 2    alendronate (FOSAMAX) 70 mg tablet, every 7 days Weekly on saturdays, Disp: , Rfl:     chlorhexidine (PERIDEX) 0.12 % solution, 2 (two) times a day, Disp: , Rfl:     docusate sodium (COLACE) 100 mg capsule, Take 1 capsule (100 mg total) by mouth 2 (two) times a day, Disp: 30 capsule, Rfl: 0    LORazepam (ATIVAN) 1 mg tablet, Take 1 tablet (1 mg total) by mouth 1 (one) time for 1 dose Take 30 minutes prior to EMG/NCS, Disp: 1 tablet, Rfl: 0    ondansetron (ZOFRAN) 4 mg tablet, Take 1 tablet (4 mg total) by mouth every 8 (eight) hours as needed for nausea or vomiting, Disp: 20 tablet, Rfl: 0    triamcinolone (KENALOG) 0.5 % cream, Apply topically as needed , Disp: , Rfl:   Allergies: Accolate [zafirlukast], Penicillins, Shellfish allergy - food allergy, Shellfish-derived products - food allergy, Singulair [montelukast], Pork-derived products - food allergy, and Medical tape    Objective      Incisions all well healed. Breast is soft.  (See photo)      Assessment & Plan   Diagnoses and all orders for this visit:    Acquired absence of breast and absent nipple, bilateral    History of bilateral mastectomy    Continue with moisture.  Pt wants nipple graft. She will meet with Dr. Winters in 2 months and hopes for surgery in Aug.  She is asked to call with any concerns.    Jovan Gutierrez PA-C

## 2025-04-28 ENCOUNTER — HOSPITAL ENCOUNTER (OUTPATIENT)
Dept: NEUROLOGY | Facility: CLINIC | Age: 55
Discharge: HOME | End: 2025-04-28
Attending: SURGERY
Payer: MEDICARE

## 2025-04-28 DIAGNOSIS — G56.21 CUBITAL TUNNEL SYNDROME ON RIGHT: ICD-10-CM

## 2025-04-28 DIAGNOSIS — G56.01 CARPAL TUNNEL SYNDROME ON RIGHT: ICD-10-CM

## 2025-04-28 PROBLEM — R20.2 ARM PARESTHESIA, RIGHT: Status: ACTIVE | Noted: 2025-04-28

## 2025-04-28 PROCEDURE — 95909 NRV CNDJ TST 5-6 STUDIES: CPT | Performed by: PSYCHIATRY & NEUROLOGY

## 2025-04-28 PROCEDURE — 95886 MUSC TEST DONE W/N TEST COMP: CPT | Performed by: PSYCHIATRY & NEUROLOGY

## 2025-04-30 ENCOUNTER — OFFICE VISIT (OUTPATIENT)
Dept: OBGYN CLINIC | Facility: CLINIC | Age: 55
End: 2025-04-30
Payer: MEDICARE

## 2025-04-30 VITALS — BODY MASS INDEX: 44.17 KG/M2 | HEIGHT: 60 IN | WEIGHT: 225 LBS

## 2025-04-30 DIAGNOSIS — R20.2 PARESTHESIAS IN RIGHT HAND: Primary | ICD-10-CM

## 2025-04-30 DIAGNOSIS — G56.03 CARPAL TUNNEL SYNDROME, BILATERAL: ICD-10-CM

## 2025-04-30 DIAGNOSIS — G56.23 CUBITAL TUNNEL SYNDROME, BILATERAL: ICD-10-CM

## 2025-04-30 PROCEDURE — 99213 OFFICE O/P EST LOW 20 MIN: CPT | Performed by: SURGERY

## 2025-04-30 NOTE — PROGRESS NOTES
Karen Schrader M.D.  Attending, Orthopaedic Surgery  Hand, Wrist, and Elbow Surgery  [facility]      ORTHOPAEDIC HAND, WRIST, AND ELBOW OFFICE  VISIT       ASSESSMENT/PLAN:        Assessment & Plan  Paresthesias in right hand  EMG was normal, no evidence of cervical, median, radial, or ulnar nerve compromise   Patient continues to note bothersome and painful paresthesias, worse on the right  No improvement with PT        Carpal tunnel syndrome, bilateral  In light of negative EMG and continued paresthesias we discussed possibly ordering an ultrasound to look at her median and ulnar nerves at the arms.  US ordered today for BL UE   Advise night time bracing trial   Discussed that if these are positive we can discuss possibly trying to do something to help take pressure off the affected nerves, however any releases we do wont help her dorsal arm and hand paresthesias.   Will follow up after results   Consider neurology referral     Orders:    US Carpal Tunnel; Future    Cubital tunnel syndrome, bilateral  See above     Orders:    US Cubital Tunnel; Future             The patient verbalized understanding of exam findings and treatment plan. We engaged in the shared decision-making process and treatment options were discussed at length with the patient. Surgical and conservative management discussed today along with risks and benefits.      Follow Up:  Return for After Testing.    To Do Next Visit:  Re-evaluation of current issue    ____________________________________________________________________________________________________________________________________________      CHIEF COMPLAINT:  Chief Complaint   Patient presents with    Follow-up     Emg review       SUBJECTIVE:  Dania Ayala is a 55 y.o. year old RHD female who presents for follow up evaluation of the bilateral upper extremities and EMG review. She continues to note upper extremity paresthesias, worse on the right. Her  symptoms affect the dorsum of the arm and hand but also cause volar hand paresthesias and pain. She has hx of bilateral carpal tunnel releases 15 years ago. Also has hx of lymph node dissection and radiation.       I have personally reviewed all the relevant PMH, PSH, SH, FH, Medications and allergies      PAST MEDICAL HISTORY:  Past Medical History:   Diagnosis Date    Anesthesia complication     needs IR preprocedure for IV access/not ASC candidate    Breast cancer (HCC)     Breast cancer (HCC)     Breast cancer, right breast (HCC)     Cancer (HCC)     Chronic pain disorder     CPAP (continuous positive airway pressure) dependence     no currently used    GERD (gastroesophageal reflux disease)     Heartburn     Hiatal hernia     History of bariatric surgery     Irregular heart beat     pt states it was a side effect of a medication    Kidney stone     Lung nodule     Osteoporosis     stage 3    Postsurgical malabsorption     RA (rheumatoid arthritis) (HCC)     Rheumatoid arthritis (HCC)     Sleep apnea     Stress incontinence     Thyroid nodule        PAST SURGICAL HISTORY:  Past Surgical History:   Procedure Laterality Date    ABDOMINAL SURGERY      BRACHIOPLASTY Bilateral     2017    BREAST IMPLANT Bilateral 09/08/2020    Procedure: BREAST REMOVAL TISSUE EXPANDER/ IMPLANT PLACEMENT BREAST;  Surgeon: Parminder Butler MD;  [facility];  Service: Plastics    BREAST IMPLANT Left 02/09/2021    Procedure: BREAST TISSUE EXPANDER/ IMPLANT EXCHANGE;  Surgeon: Parminder Butler MD;  [facility];  Service: Plastics    BREAST IMPLANT Right 9/19/2024    Procedure: RIGHT BREAST IMPLANT EXCHANGE;  Surgeon: Nolberto Winters MD;  [facility];  Service: Plastics    BREAST LUMPECTOMY  04/2013    CAPSULOTOMY Bilateral 09/08/2020    Procedure: BREAST CAPSULOTOMY;  Surgeon: aPrminder Butler MD;  [facility];  Service: Plastics    CAPSULOTOMY Left 02/09/2021    Procedure:  BREAST CAPSULOTOMY;  Surgeon: Parminder Butler MD;  [facility];  Service: Plastics    CAPSULOTOMY Right 3/17/2025    Procedure: CAPSULECTOMY;  Surgeon: Nolberto Winters MD;  [facility];  Service: Plastics    CARDIAC CATHETERIZATION      CARPAL TUNNEL RELEASE Bilateral 2015    CHOLECYSTECTOMY      COLONOSCOPY      FL RETROGRADE PYELOGRAM  08/08/2023    GALLBLADDER SURGERY  2015    GASTRIC BYPASS  2014    HYSTERECTOMY  05/2013    INCONTINENCE SURGERY  09/2013    IR BIOPSY LUNG      lung nodules    LIPOSUCTION Right 04/12/2022    Procedure: LIPOSUCTION RIGHT BREAST;  Surgeon: Nolberto Winters MD;  [facility];  Service: Plastics    LIPOSUCTION W/ FAT INJECTION Bilateral 11/10/2021    Procedure: FAT GRAFTING BILATERAL BREAST;  Surgeon: Parminder Butler MD;  [facility];  Service: Plastics    MASTECTOMY Bilateral 10/2016    NV BREAST RECONSTRUCTION W/LATISSIMUS DORSI FLAP Right 08/06/2019    Procedure: BREAST RECONSTRUCTION W/FLAP LATISSIMUS DORSI;  Surgeon: Parminder Butler MD;  [facility];  Service: Plastics    NV CYSTO/URETERO W/LITHOTRIPSY &INDWELL STENT INSRT Right 08/08/2023    Procedure: CYSTOSCOPY URETEROSCOPY, RETROGRADE PYELOGRAM AND INSERTION STENT URETERAL;  Surgeon: Gabriel Rene MD;  [facility];  Service: Urology    NV GRAFTING OF AUTOLOGOUS FAT BY LIPO 50 CC OR LESS Bilateral 01/13/2023    Procedure: BILATERAL BREAST FAT GRAFTING;  Surgeon: Nolberto Winters MD;  [facility];  Service: Plastics    NV GRAFTING OF AUTOLOGOUS FAT BY LIPO 50 CC OR LESS Bilateral 06/13/2023    Procedure: FAT GRAFTING TO BILATERAL BREASTS;  Surgeon: Nolberto Winters MD;  [facility];  Service: Plastics    NV INSERTION BREAST IMPLANT SAME DAY OF MASTECTOMY Right 3/17/2025    Procedure: INSERTION/PLACEMENT IMPLANT BREAST (EXCHANGE) UNILATERAL;  Surgeon: Nolberto Winters MD;  [facility];  Service: Plastics    NV  DOMINGA-IMPLANT CAPSULECTOMY BREAST COMPLETE Bilateral 08/06/2019    Procedure: BREAST CAPSULECTOMY;  Surgeon: Parminder Butler MD;  [facility];  Service: Plastics    ND REMOVAL INTACT BREAST IMPLANT Bilateral 08/06/2019    Procedure: BREAST IMPLANT REMOVAL;  Surgeon: Parminder Butler MD;  [facility];  Service: Plastics    ND REMOVAL INTACT BREAST IMPLANT Right 04/12/2022    Procedure: removal of right breast implant, capsulectomy, placement of right breast implant with acelluar dermal matrix.;  Surgeon: Nolberto Winters MD;  [facility];  Service: Plastics    ND REVISION OF RECONSTRUCTED BREAST Right 09/07/2021    Procedure: RIGHT BREAST MOUND REVISION;  Surgeon: Parminder Butler MD;  [facility];  Service: Plastics    ND REVISION OF RECONSTRUCTED BREAST Right 9/19/2024    Procedure: REVISION OF RECONSTRUCTED BREAST.;  Surgeon: Nolberto Winters MD;  [facility];  Service: Plastics    ND REVISION OF RECONSTRUCTED BREAST Right 3/17/2025    Procedure: REVISION OF RIGHT RECONSTRUCTED BREAST;  Surgeon: Nolberto Winters MD;  [facility];  Service: Plastics    ND TISSUE EXPANDER PLACEMENT BREAST RECONSTRUCTION Bilateral 08/06/2019    Procedure: BREAST TISSUE EXPANDER PLACEMENT;  Surgeon: Parminder Butler MD;  [facility];  Service: Plastics    REDUCTION MAMMAPLASTY      REVISION OF SCAR Bilateral 11/10/2021    Procedure: SCAR REVISION BILATERAL ARMS;  Surgeon: Parminder Butler MD;  [facility];  Service: Plastics    SKIN LESION EXCISION Left 9/19/2024    Procedure: LEFT BREAST SCAR EXCISION WITH COMPLEX CLOSURE;  Surgeon: Nolberto Winters MD;  [facility];  Service: Plastics    TRANSFER MUSCLE PECTORALIS Bilateral 08/06/2019    Procedure: PECTORALIS MUSCLE REPAIR;  Surgeon: Parminder Butler MD;  [facility];  Service: Plastics    US GUIDANCE BREAST BIOPSY RIGHT EACH ADDITIONAL Right  12/22/2023    US GUIDED BREAST BIOPSY RIGHT COMPLETE Right 12/22/2023       FAMILY HISTORY:  Family History   Problem Relation Age of Onset    No Known Problems Mother     Diabetes Father     Heart disease Father     No Known Problems Brother        SOCIAL HISTORY:  Social History     Tobacco Use    Smoking status: Never    Smokeless tobacco: Never   Vaping Use    Vaping status: Never Used   Substance Use Topics    Alcohol use: Not Currently    Drug use: Never       MEDICATIONS:    Current Outpatient Medications:     alendronate (FOSAMAX) 70 mg tablet, every 7 days Weekly on saturdays, Disp: , Rfl:     triamcinolone (KENALOG) 0.5 % cream, Apply topically as needed , Disp: , Rfl:     acetaminophen (TYLENOL) 500 mg tablet, Take 1 tablet (500 mg total) by mouth every 4 (four) hours as needed for mild pain or moderate pain, Disp: 30 tablet, Rfl: 2    chlorhexidine (PERIDEX) 0.12 % solution, 2 (two) times a day, Disp: , Rfl:     docusate sodium (COLACE) 100 mg capsule, Take 1 capsule (100 mg total) by mouth 2 (two) times a day, Disp: 30 capsule, Rfl: 0    LORazepam (ATIVAN) 1 mg tablet, Take 1 tablet (1 mg total) by mouth 1 (one) time for 1 dose Take 30 minutes prior to EMG/NCS, Disp: 1 tablet, Rfl: 0    ondansetron (ZOFRAN) 4 mg tablet, Take 1 tablet (4 mg total) by mouth every 8 (eight) hours as needed for nausea or vomiting, Disp: 20 tablet, Rfl: 0    ALLERGIES:  Allergies   Allergen Reactions    Accolate [Zafirlukast] Itching    Penicillins Anaphylaxis     Unconsciousness, and bronchoconstriction      Shellfish Allergy - Food Allergy Anaphylaxis    Shellfish-Derived Products - Food Allergy Anaphylaxis    Singulair [Montelukast] Itching    Pork-Derived Products - Food Allergy Hives    Medical Tape Rash     P/S specific surgical tape used at last procedure --- ok w/ paper tape           REVIEW OF SYSTEMS:  Review of Systems   Constitutional:  Negative for chills and fever.   HENT:  Negative for ear pain and sore  "throat.    Eyes:  Negative for pain and visual disturbance.   Respiratory:  Negative for cough and shortness of breath.    Cardiovascular:  Negative for chest pain and palpitations.   Gastrointestinal:  Negative for abdominal pain and vomiting.   Genitourinary:  Negative for dysuria and hematuria.   Musculoskeletal:  Negative for arthralgias and back pain.   Skin:  Negative for color change and rash.   Neurological:  Positive for numbness. Negative for seizures and syncope.   All other systems reviewed and are negative.      VITALS:  There were no vitals filed for this visit.    LABS:      _____________________________________________________  PHYSICAL EXAMINATION:  General: well developed and well nourished, alert, oriented times 3, and appears comfortable  Psychiatric: Normal  HEENT: Normocephalic, Atraumatic Trachea Midline, No torticollis  Pulmonary: No audible wheezing or respiratory distress   Abdomen/GI: Non tender, non distended   Cardiovascular: No pitting edema, 2+ radial pulse   Skin: No masses, erythema, lacerations, fluctation, ulcerations  Neurovascular: Sensation Intact to the Median, Ulnar, Radial Nerve, Motor Intact to the Median, Ulnar, Radial Nerve, and Pulses Intact  Musculoskeletal: Normal, except as noted in detailed exam and in HPI.      MUSCULOSKELETAL EXAMINATION:    Right Carpal Tunnel Exam:    Negative thenar atrophy. Positive phalen's test. Positive carpal tunnel compression. Positive tinels over median nerve at the wrist.  Opposition strength 5/5.  Abduction strength 5/5.      Negative cubital tunnel testing     ___________________________________________________  STUDIES REVIEWED:  I have personally reviewed and interpreted  EMG of the bilateral upper extremities which was normal, no evidence of nerve compression or compromise          LABS REVIEWED:    HgA1c: No results found for: \"HGBA1C\"  BMP:   Lab Results   Component Value Date    CALCIUM 8.7 02/28/2025    K 4.3 02/28/2025    CO2 " 25 02/28/2025     02/28/2025    BUN 12 02/28/2025    CREATININE 0.57 (L) 02/28/2025               PROCEDURES PERFORMED:  Procedures  No Procedures performed today    _____________________________________________________

## 2025-05-01 ENCOUNTER — HOSPITAL ENCOUNTER (OUTPATIENT)
Dept: ULTRASOUND IMAGING | Facility: HOSPITAL | Age: 55
Discharge: HOME | End: 2025-05-01
Attending: PHYSICIAN ASSISTANT
Payer: MEDICARE

## 2025-05-01 DIAGNOSIS — G56.23 CUBITAL TUNNEL SYNDROME, BILATERAL: ICD-10-CM

## 2025-05-01 DIAGNOSIS — G56.03 CARPAL TUNNEL SYNDROME, BILATERAL: ICD-10-CM

## 2025-05-01 PROCEDURE — 76882 US LMTD JT/FCL EVL NVASC XTR: CPT

## 2025-05-08 ENCOUNTER — TELEPHONE (OUTPATIENT)
Dept: PLASTIC SURGERY | Facility: CLINIC | Age: 55
End: 2025-05-08

## 2025-05-08 ENCOUNTER — TELEPHONE (OUTPATIENT)
Dept: OBGYN CLINIC | Facility: CLINIC | Age: 55
End: 2025-05-08

## 2025-05-08 ENCOUNTER — TELEPHONE (OUTPATIENT)
Age: 55
End: 2025-05-08

## 2025-05-08 ENCOUNTER — OFFICE VISIT (OUTPATIENT)
Dept: OBGYN CLINIC | Facility: CLINIC | Age: 55
End: 2025-05-08
Payer: MEDICARE

## 2025-05-08 VITALS — HEIGHT: 60 IN | BODY MASS INDEX: 44.17 KG/M2 | WEIGHT: 225 LBS

## 2025-05-08 DIAGNOSIS — G56.01 CARPAL TUNNEL SYNDROME ON RIGHT: Primary | ICD-10-CM

## 2025-05-08 DIAGNOSIS — Z98.890 HISTORY OF CARPAL TUNNEL RELEASE: ICD-10-CM

## 2025-05-08 PROCEDURE — 99214 OFFICE O/P EST MOD 30 MIN: CPT | Performed by: SURGERY

## 2025-05-08 RX ORDER — CHLORHEXIDINE GLUCONATE ORAL RINSE 1.2 MG/ML
15 SOLUTION DENTAL ONCE
Status: CANCELLED | OUTPATIENT
Start: 2025-05-08 | End: 2025-05-08

## 2025-05-08 RX ORDER — CHLORHEXIDINE GLUCONATE 40 MG/ML
SOLUTION TOPICAL DAILY PRN
Status: CANCELLED | OUTPATIENT
Start: 2025-05-08

## 2025-05-08 NOTE — TELEPHONE ENCOUNTER
Caller: patient     Doctor: Dr. Schrader     Reason for call: trying to reach Cherise to schedule sx     Call back#: [phone]

## 2025-05-08 NOTE — PROGRESS NOTES
Karen Schrader M.D.  Attending, Orthopaedic Surgery  Hand, Wrist, and Elbow Surgery  [facility]      ORTHOPAEDIC HAND, WRIST, AND ELBOW OFFICE  VISIT       ASSESSMENT/PLAN:        Assessment & Plan  Carpal tunnel syndrome on right  US of right wrist reviewed does demonstrate findings suspicious for carpal tunnel. Based on this and patients clinical exam a right carpal tunnel revision is warranted. Risks and benefits of surgery were discussed and outlined below. Pre and post operative expectations reviewed. Consent was signed in clinic, she will meet with my surgery scheduler today. We will see her back in clinic post operatively.   Orders:    Case request operating room: RELEASE CARPAL TUNNEL - mini open, revision; Standing    History of carpal tunnel release    Orders:    Case request operating room: RELEASE CARPAL TUNNEL - mini open, revision; Standing          The patient verbalized understanding of exam findings and treatment plan. We engaged in the shared decision-making process and treatment options were discussed at length with the patient. Surgical and conservative management discussed today along with risks and benefits.    Diagnoses and all orders for this visit:    Carpal tunnel syndrome on right  -     Case request operating room: RELEASE CARPAL TUNNEL - mini open, revision; Standing  -     Case request operating room: RELEASE CARPAL TUNNEL - mini open, revision    History of carpal tunnel release  -     Case request operating room: RELEASE CARPAL TUNNEL - mini open, revision; Standing  -     Case request operating room: RELEASE CARPAL TUNNEL - mini open, revision    Other orders  -     Nursing Communication Warmimg Interventions Implemented; Standing  -     Nursing Communication CHG bath, have staff wash entire body (neck down) per pre-op bathing protocol. Routine, evening prior to, and day of surgery.; Standing  -     Nursing Communication Swab both nares with Povidone-Iodine  solution, EXCLUDE if patient has shellfish/Iodine allergy, and replace with nasal alcohol swabstick. Routine, day of surgery, on call to OR; Standing  -     Void on call to OR; Standing  -     Insert peripheral IV; Standing        Follow Up:  No follow-ups on file.    To Do Next Visit:  Sutures out        Operative Discussions:  Open Carpal Tunnel Release:   The anatomy and physiology of carpal tunnel syndrome was discussed with the patient today.  Increase pressure localized under the transverse carpal ligament can cause pain, numbness, tingling, or dysesthesias within the median nerve distribution as well as feelings of fatigue, clumsiness, or awkwardness.  These symptoms typically occur at night and worse in the morning upon waking.  Eventually, untreated carpal tunnel syndrome can result in weakness and permanent loss of muscle within the thenar compartment of the hand.  Treatment options were discussed with the patient.  Conservative treatment includes nocturnal resting splints to keep the nerve in a neutral position, ergonomic changes within the work or home environment, activity modification, and tendon gliding exercises.  Vitamin B6 one tablet daily over the counter may helpful to reduce symptoms.  Steroid injections within the carpal canal can help a majority of patients, however this is often self-limited in a majority of patients.  Surgical intervention to divide the transverse carpal ligament typically results in a long-lasting relief of the patient's complaints, with the recurrence rate of less than 1%. The patient has elected to undergo an open carpal tunnel release.  The palmar incision technique was discussed in the office with the patient today.   In the postoperative period, light activities are allowed immediately, driving is allowed when narcotic medication has stopped, and the bandages may be removed and incision may get wet after 2 days.  Heavy activities (lifting more than approximately 10  pounds) will be allowed after follow up appointment in 1-2 weeks.  While night symptoms (waking from sleep, pain, and discomfort in the hands) generally improves rapidly, the numbness and tingling as well as the strength will slowly improve over weeks to months depending on the chronicity and severity of the carpal tunnel syndrome.  Pillar pain and scar discomfort were discussed with the patient which are self-limiting conditions.  The risks of bleeding and infection from the surgery are less than 1%.  Risk of recurrence is approximately 0.5%.  The risks of nerve injury or nerve damage or damage to the blood vessels is approximately 1 in 1200. The patient has an understanding of the above mentioned discussion.The risks and benefits of the procedure were explained to the patient, which include, but are not limited to: Bleeding, infection, recurrence, pain, scar, damage to tendons, damage to nerves, and damage to blood vessels, failure to give desired results and complications related to anesthesia.  These risks, along with alternative conservative treatment options, and postoperative protocols were voiced back and understood by the patient.  All questions were answered to the patient's satisfaction.  The patient agrees to comply with a standard postoperative protocol, and is willing to proceed.  Education was provided via written and auditory forms.  There were no barriers to learning. Written handouts regarding wound care, incision and scar care, and general preoperative information was provided to the patient.  Prior to surgery, the patient may be requested to stop all anti-inflammatory medications.  Prophylactic aspirin, Plavix, and Coumadin may be allowed to be continued.  Medications including vitamin E., ginkgo, and fish oil are requested to be stopped approximately one week prior to surgery.  Hypertensive medications and beta blockers, if taken,  s      ____________________________________________________________________________________________________________________________________________      CHIEF COMPLAINT:  No chief complaint on file.      SUBJECTIVE:  Dania Ayala is a 55 y.o. year old RHD female who presents for follow up evaluation of bilateral hand numbness and ultra sound review. She continues to note upper extremity paresthesias, worse on the right. Her symptoms affect the dorsum of the arm and hand but also cause volar hand paresthesias and pain. She has hx of bilateral carpal tunnel releases 15 years ago. Also has hx of lymph node dissection and radiation.      I have personally reviewed all the relevant PMH, PSH, SH, FH, Medications and allergies      PAST MEDICAL HISTORY:  Past Medical History:   Diagnosis Date    Anesthesia complication     needs IR preprocedure for IV access/not ASC candidate    Breast cancer (HCC)     Breast cancer (HCC)     Breast cancer, right breast (HCC)     Cancer (HCC)     Chronic pain disorder     CPAP (continuous positive airway pressure) dependence     no currently used    GERD (gastroesophageal reflux disease)     Heartburn     Hiatal hernia     History of bariatric surgery     Irregular heart beat     pt states it was a side effect of a medication    Kidney stone     Lung nodule     Osteoporosis     stage 3    Postsurgical malabsorption     RA (rheumatoid arthritis) (HCC)     Rheumatoid arthritis (HCC)     Sleep apnea     Stress incontinence     Thyroid nodule        PAST SURGICAL HISTORY:  Past Surgical History:   Procedure Laterality Date    ABDOMINAL SURGERY      BRACHIOPLASTY Bilateral     2017    BREAST IMPLANT Bilateral 09/08/2020    Procedure: BREAST REMOVAL TISSUE EXPANDER/ IMPLANT PLACEMENT BREAST;  Surgeon: Parminder Butler MD;  [facility];  Service: Plastics    BREAST IMPLANT Left 02/09/2021    Procedure: BREAST TISSUE EXPANDER/ IMPLANT EXCHANGE;  Surgeon: Parminder Butler,  MD;  [facility];  Service: Plastics    BREAST IMPLANT Right 9/19/2024    Procedure: RIGHT BREAST IMPLANT EXCHANGE;  Surgeon: Nolberto Winters MD;  [facility];  Service: Plastics    BREAST LUMPECTOMY  04/2013    CAPSULOTOMY Bilateral 09/08/2020    Procedure: BREAST CAPSULOTOMY;  Surgeon: Parminder Butler MD;  [facility];  Service: Plastics    CAPSULOTOMY Left 02/09/2021    Procedure: BREAST CAPSULOTOMY;  Surgeon: Parminder Butler MD;  [facility];  Service: Plastics    CAPSULOTOMY Right 3/17/2025    Procedure: CAPSULECTOMY;  Surgeon: Nolberto Winters MD;  [facility];  Service: Plastics    CARDIAC CATHETERIZATION      CARPAL TUNNEL RELEASE Bilateral 2015    CHOLECYSTECTOMY      COLONOSCOPY      FL RETROGRADE PYELOGRAM  08/08/2023    GALLBLADDER SURGERY  2015    GASTRIC BYPASS  2014    HYSTERECTOMY  05/2013    INCONTINENCE SURGERY  09/2013    IR BIOPSY LUNG      lung nodules    LIPOSUCTION Right 04/12/2022    Procedure: LIPOSUCTION RIGHT BREAST;  Surgeon: Nolbreto Winters MD;  [facility];  Service: Plastics    LIPOSUCTION W/ FAT INJECTION Bilateral 11/10/2021    Procedure: FAT GRAFTING BILATERAL BREAST;  Surgeon: Parminder Butler MD;  [facility];  Service: Plastics    MASTECTOMY Bilateral 10/2016    SD BREAST RECONSTRUCTION W/LATISSIMUS DORSI FLAP Right 08/06/2019    Procedure: BREAST RECONSTRUCTION W/FLAP LATISSIMUS DORSI;  Surgeon: Parminder Butler MD;  [facility];  Service: Plastics    SD CYSTO/URETERO W/LITHOTRIPSY &INDWELL STENT INSRT Right 08/08/2023    Procedure: CYSTOSCOPY URETEROSCOPY, RETROGRADE PYELOGRAM AND INSERTION STENT URETERAL;  Surgeon: Gabriel Rene MD;  [facility];  Service: Urology    SD GRAFTING OF AUTOLOGOUS FAT BY LIPO 50 CC OR LESS Bilateral 01/13/2023    Procedure: BILATERAL BREAST FAT GRAFTING;  Surgeon: Nolberto Winters MD;  [facility];  Service:  Plastics    NM GRAFTING OF AUTOLOGOUS FAT BY LIPO 50 CC OR LESS Bilateral 06/13/2023    Procedure: FAT GRAFTING TO BILATERAL BREASTS;  Surgeon: Nolberto Winters MD;  [facility];  Service: Plastics    NM INSERTION BREAST IMPLANT SAME DAY OF MASTECTOMY Right 3/17/2025    Procedure: INSERTION/PLACEMENT IMPLANT BREAST (EXCHANGE) UNILATERAL;  Surgeon: Nolberto Winters MD;  [facility];  Service: Plastics    NM DOMINGA-IMPLANT CAPSULECTOMY BREAST COMPLETE Bilateral 08/06/2019    Procedure: BREAST CAPSULECTOMY;  Surgeon: Parminder Butler MD;  [facility];  Service: Plastics    NM REMOVAL INTACT BREAST IMPLANT Bilateral 08/06/2019    Procedure: BREAST IMPLANT REMOVAL;  Surgeon: Parminder Butler MD;  [facility];  Service: Plastics    NM REMOVAL INTACT BREAST IMPLANT Right 04/12/2022    Procedure: removal of right breast implant, capsulectomy, placement of right breast implant with acelluar dermal matrix.;  Surgeon: Nolberto Winters MD;  [facility];  Service: Plastics    NM REVISION OF RECONSTRUCTED BREAST Right 09/07/2021    Procedure: RIGHT BREAST MOUND REVISION;  Surgeon: Parminder Butler MD;  [facility];  Service: Plastics    NM REVISION OF RECONSTRUCTED BREAST Right 9/19/2024    Procedure: REVISION OF RECONSTRUCTED BREAST.;  Surgeon: Nolberto Winters MD;  [facility];  Service: Plastics    NM REVISION OF RECONSTRUCTED BREAST Right 3/17/2025    Procedure: REVISION OF RIGHT RECONSTRUCTED BREAST;  Surgeon: Nolberto Winters MD;  [facility];  Service: Plastics    NM TISSUE EXPANDER PLACEMENT BREAST RECONSTRUCTION Bilateral 08/06/2019    Procedure: BREAST TISSUE EXPANDER PLACEMENT;  Surgeon: Parminder Butler MD;  [facility];  Service: Plastics    REDUCTION MAMMAPLASTY      REVISION OF SCAR Bilateral 11/10/2021    Procedure: SCAR REVISION BILATERAL ARMS;  Surgeon: Parminder Butler MD;  [facility];   Service: Plastics    SKIN LESION EXCISION Left 9/19/2024    Procedure: LEFT BREAST SCAR EXCISION WITH COMPLEX CLOSURE;  Surgeon: Nolberto Winters MD;  [facility];  Service: Plastics    TRANSFER MUSCLE PECTORALIS Bilateral 08/06/2019    Procedure: PECTORALIS MUSCLE REPAIR;  Surgeon: Parminder Butler MD;  [facility];  Service: Plastics    US GUIDANCE BREAST BIOPSY RIGHT EACH ADDITIONAL Right 12/22/2023    US GUIDED BREAST BIOPSY RIGHT COMPLETE Right 12/22/2023       FAMILY HISTORY:  Family History   Problem Relation Age of Onset    No Known Problems Mother     Diabetes Father     Heart disease Father     No Known Problems Brother        SOCIAL HISTORY:  Social History     Tobacco Use    Smoking status: Never    Smokeless tobacco: Never   Vaping Use    Vaping status: Never Used   Substance Use Topics    Alcohol use: Not Currently    Drug use: Never       MEDICATIONS:    Current Outpatient Medications:     acetaminophen (TYLENOL) 500 mg tablet, Take 1 tablet (500 mg total) by mouth every 4 (four) hours as needed for mild pain or moderate pain, Disp: 30 tablet, Rfl: 2    alendronate (FOSAMAX) 70 mg tablet, every 7 days Weekly on saturdays, Disp: , Rfl:     chlorhexidine (PERIDEX) 0.12 % solution, 2 (two) times a day, Disp: , Rfl:     triamcinolone (KENALOG) 0.5 % cream, Apply topically as needed , Disp: , Rfl:     docusate sodium (COLACE) 100 mg capsule, Take 1 capsule (100 mg total) by mouth 2 (two) times a day, Disp: 30 capsule, Rfl: 0    LORazepam (ATIVAN) 1 mg tablet, Take 1 tablet (1 mg total) by mouth 1 (one) time for 1 dose Take 30 minutes prior to EMG/NCS, Disp: 1 tablet, Rfl: 0    ondansetron (ZOFRAN) 4 mg tablet, Take 1 tablet (4 mg total) by mouth every 8 (eight) hours as needed for nausea or vomiting, Disp: 20 tablet, Rfl: 0    ALLERGIES:  Allergies   Allergen Reactions    Accolate [Zafirlukast] Itching    Penicillins Anaphylaxis     Unconsciousness, and bronchoconstriction       Shellfish Allergy - Food Allergy Anaphylaxis    Shellfish-Derived Products - Food Allergy Anaphylaxis    Singulair [Montelukast] Itching    Pork-Derived Products - Food Allergy Hives    Medical Tape Rash     P/S specific surgical tape used at last procedure --- ok w/ paper tape           REVIEW OF SYSTEMS:  Review of Systems   Constitutional:  Negative for chills and fever.   HENT:  Negative for ear pain and sore throat.    Eyes:  Negative for pain and visual disturbance.   Respiratory:  Negative for cough and shortness of breath.    Cardiovascular:  Negative for chest pain and palpitations.   Gastrointestinal:  Negative for abdominal pain and vomiting.   Genitourinary:  Negative for dysuria and hematuria.   Musculoskeletal:  Negative for arthralgias and back pain.   Skin:  Negative for color change and rash.   Neurological:  Negative for seizures and syncope.   All other systems reviewed and are negative.      VITALS:  There were no vitals filed for this visit.    LABS:      _____________________________________________________  PHYSICAL EXAMINATION:  General: well developed and well nourished, alert, oriented times 3, and appears comfortable  Psychiatric: Normal  HEENT: Normocephalic, Atraumatic Trachea Midline, No torticollis  Pulmonary: No audible wheezing or respiratory distress   Abdomen/GI: Non tender, non distended   Cardiovascular: No pitting edema, 2+ radial pulse   Skin: No masses, erythema, lacerations, fluctation, ulcerations  Neurovascular: Sensation Intact to the Median, Ulnar, Radial Nerve, Motor Intact to the Median, Ulnar, Radial Nerve, and Pulses Intact  Musculoskeletal: Normal, except as noted in detailed exam and in HPI.      MUSCULOSKELETAL EXAMINATION:    Right Carpal Tunnel Exam:     Negative thenar atrophy. Positive phalen's test. Positive carpal tunnel compression. Positive tinels over median nerve at the wrist.  Opposition strength 5/5.  Abduction strength 5/5.       Negative cubital  "tunnel testing   ___________________________________________________  STUDIES REVIEWED:    I have personally reviewed and interpreted  US of the right wrist which demonstrate findings suspicious for carpal tunnel. US of right elbow show no sonographic findings of ulnar neuropathy.     LABS REVIEWED:    HgA1c: No results found for: \"HGBA1C\"  BMP:   Lab Results   Component Value Date    CALCIUM 8.7 02/28/2025    K 4.3 02/28/2025    CO2 25 02/28/2025     02/28/2025    BUN 12 02/28/2025    CREATININE 0.57 (L) 02/28/2025               PROCEDURES PERFORMED:  Procedures  No Procedures performed today    _____________________________________________________      Scribe Attestation      I,:  Jackie Wright am acting as a scribe while in the presence of the attending physician.:       I,:  Karen Schrader MD personally performed the services described in this documentation    as scribed in my presence.:                    "

## 2025-05-08 NOTE — TELEPHONE ENCOUNTER
Received call from Patient asking to reschedule her 6/11/25 appt, however, none of the days she wanted to reschedule into were available. Patient opted to keep appt as scheduled.     Additionally, Patient asked to speak to Artem regarding August dates for her surgery. Artem was not available at time of call.     Artem VARGAS/[facility]: -Please call Patient back regarding August dates for surgery. Patient: [phone]

## 2025-05-08 NOTE — TELEPHONE ENCOUNTER
Caller: Dania    Doctor: Dr. Schrader    Reason for call: please call patient to schedule for SX.  Thank you    Call back#: [phone]

## 2025-05-08 NOTE — H&P
Karen Schrader M.D.  Attending, Orthopaedic Surgery  Hand, Wrist, and Elbow Surgery  [facility]      ORTHOPAEDIC HAND, WRIST, AND ELBOW OFFICE  VISIT       ASSESSMENT/PLAN:        Assessment & Plan  Carpal tunnel syndrome on right  US of right wrist reviewed does demonstrate findings suspicious for carpal tunnel. Based on this and patients clinical exam a right carpal tunnel revision is warranted. Risks and benefits of surgery were discussed and outlined below. Pre and post operative expectations reviewed. Consent was signed in clinic, she will meet with my surgery scheduler today. We will see her back in clinic post operatively.   Orders:    Case request operating room: RELEASE CARPAL TUNNEL - mini open, revision; Standing    History of carpal tunnel release    Orders:    Case request operating room: RELEASE CARPAL TUNNEL - mini open, revision; Standing          The patient verbalized understanding of exam findings and treatment plan. We engaged in the shared decision-making process and treatment options were discussed at length with the patient. Surgical and conservative management discussed today along with risks and benefits.    Diagnoses and all orders for this visit:    Carpal tunnel syndrome on right  -     Case request operating room: RELEASE CARPAL TUNNEL - mini open, revision; Standing  -     Case request operating room: RELEASE CARPAL TUNNEL - mini open, revision    History of carpal tunnel release  -     Case request operating room: RELEASE CARPAL TUNNEL - mini open, revision; Standing  -     Case request operating room: RELEASE CARPAL TUNNEL - mini open, revision    Other orders  -     Nursing Communication Warmimg Interventions Implemented; Standing  -     Nursing Communication CHG bath, have staff wash entire body (neck down) per pre-op bathing protocol. Routine, evening prior to, and day of surgery.; Standing  -     Nursing Communication Swab both nares with Povidone-Iodine  solution, EXCLUDE if patient has shellfish/Iodine allergy, and replace with nasal alcohol swabstick. Routine, day of surgery, on call to OR; Standing  -     Void on call to OR; Standing  -     Insert peripheral IV; Standing        Follow Up:  No follow-ups on file.    To Do Next Visit:  Sutures out        Operative Discussions:  Open Carpal Tunnel Release:   The anatomy and physiology of carpal tunnel syndrome was discussed with the patient today.  Increase pressure localized under the transverse carpal ligament can cause pain, numbness, tingling, or dysesthesias within the median nerve distribution as well as feelings of fatigue, clumsiness, or awkwardness.  These symptoms typically occur at night and worse in the morning upon waking.  Eventually, untreated carpal tunnel syndrome can result in weakness and permanent loss of muscle within the thenar compartment of the hand.  Treatment options were discussed with the patient.  Conservative treatment includes nocturnal resting splints to keep the nerve in a neutral position, ergonomic changes within the work or home environment, activity modification, and tendon gliding exercises.  Vitamin B6 one tablet daily over the counter may helpful to reduce symptoms.  Steroid injections within the carpal canal can help a majority of patients, however this is often self-limited in a majority of patients.  Surgical intervention to divide the transverse carpal ligament typically results in a long-lasting relief of the patient's complaints, with the recurrence rate of less than 1%. The patient has elected to undergo an open carpal tunnel release.  The palmar incision technique was discussed in the office with the patient today.   In the postoperative period, light activities are allowed immediately, driving is allowed when narcotic medication has stopped, and the bandages may be removed and incision may get wet after 2 days.  Heavy activities (lifting more than approximately 10  pounds) will be allowed after follow up appointment in 1-2 weeks.  While night symptoms (waking from sleep, pain, and discomfort in the hands) generally improves rapidly, the numbness and tingling as well as the strength will slowly improve over weeks to months depending on the chronicity and severity of the carpal tunnel syndrome.  Pillar pain and scar discomfort were discussed with the patient which are self-limiting conditions.  The risks of bleeding and infection from the surgery are less than 1%.  Risk of recurrence is approximately 0.5%.  The risks of nerve injury or nerve damage or damage to the blood vessels is approximately 1 in 1200. The patient has an understanding of the above mentioned discussion.The risks and benefits of the procedure were explained to the patient, which include, but are not limited to: Bleeding, infection, recurrence, pain, scar, damage to tendons, damage to nerves, and damage to blood vessels, failure to give desired results and complications related to anesthesia.  These risks, along with alternative conservative treatment options, and postoperative protocols were voiced back and understood by the patient.  All questions were answered to the patient's satisfaction.  The patient agrees to comply with a standard postoperative protocol, and is willing to proceed.  Education was provided via written and auditory forms.  There were no barriers to learning. Written handouts regarding wound care, incision and scar care, and general preoperative information was provided to the patient.  Prior to surgery, the patient may be requested to stop all anti-inflammatory medications.  Prophylactic aspirin, Plavix, and Coumadin may be allowed to be continued.  Medications including vitamin E., ginkgo, and fish oil are requested to be stopped approximately one week prior to surgery.  Hypertensive medications and beta blockers, if taken,  s      ____________________________________________________________________________________________________________________________________________      CHIEF COMPLAINT:  No chief complaint on file.      SUBJECTIVE:  Dania Ayala is a 55 y.o. year old RHD female who presents for follow up evaluation of bilateral hand numbness and ultra sound review. She continues to note upper extremity paresthesias, worse on the right. Her symptoms affect the dorsum of the arm and hand but also cause volar hand paresthesias and pain. She has hx of bilateral carpal tunnel releases 15 years ago. Also has hx of lymph node dissection and radiation.      I have personally reviewed all the relevant PMH, PSH, SH, FH, Medications and allergies      PAST MEDICAL HISTORY:  Past Medical History:   Diagnosis Date    Anesthesia complication     needs IR preprocedure for IV access/not ASC candidate    Breast cancer (HCC)     Breast cancer (HCC)     Breast cancer, right breast (HCC)     Cancer (HCC)     Chronic pain disorder     CPAP (continuous positive airway pressure) dependence     no currently used    GERD (gastroesophageal reflux disease)     Heartburn     Hiatal hernia     History of bariatric surgery     Irregular heart beat     pt states it was a side effect of a medication    Kidney stone     Lung nodule     Osteoporosis     stage 3    Postsurgical malabsorption     RA (rheumatoid arthritis) (HCC)     Rheumatoid arthritis (HCC)     Sleep apnea     Stress incontinence     Thyroid nodule        PAST SURGICAL HISTORY:  Past Surgical History:   Procedure Laterality Date    ABDOMINAL SURGERY      BRACHIOPLASTY Bilateral     2017    BREAST IMPLANT Bilateral 09/08/2020    Procedure: BREAST REMOVAL TISSUE EXPANDER/ IMPLANT PLACEMENT BREAST;  Surgeon: Parminder Butler MD;  [facility];  Service: Plastics    BREAST IMPLANT Left 02/09/2021    Procedure: BREAST TISSUE EXPANDER/ IMPLANT EXCHANGE;  Surgeon: Parminder Butler,  MD;  [facility];  Service: Plastics    BREAST IMPLANT Right 9/19/2024    Procedure: RIGHT BREAST IMPLANT EXCHANGE;  Surgeon: Nolberto Winters MD;  [facility];  Service: Plastics    BREAST LUMPECTOMY  04/2013    CAPSULOTOMY Bilateral 09/08/2020    Procedure: BREAST CAPSULOTOMY;  Surgeon: Parminder Butler MD;  [facility];  Service: Plastics    CAPSULOTOMY Left 02/09/2021    Procedure: BREAST CAPSULOTOMY;  Surgeon: Parminder Butler MD;  [facility];  Service: Plastics    CAPSULOTOMY Right 3/17/2025    Procedure: CAPSULECTOMY;  Surgeon: Nolberto Winters MD;  [facility];  Service: Plastics    CARDIAC CATHETERIZATION      CARPAL TUNNEL RELEASE Bilateral 2015    CHOLECYSTECTOMY      COLONOSCOPY      FL RETROGRADE PYELOGRAM  08/08/2023    GALLBLADDER SURGERY  2015    GASTRIC BYPASS  2014    HYSTERECTOMY  05/2013    INCONTINENCE SURGERY  09/2013    IR BIOPSY LUNG      lung nodules    LIPOSUCTION Right 04/12/2022    Procedure: LIPOSUCTION RIGHT BREAST;  Surgeon: Nolberto Winters MD;  [facility];  Service: Plastics    LIPOSUCTION W/ FAT INJECTION Bilateral 11/10/2021    Procedure: FAT GRAFTING BILATERAL BREAST;  Surgeon: Parminder Butler MD;  [facility];  Service: Plastics    MASTECTOMY Bilateral 10/2016    ME BREAST RECONSTRUCTION W/LATISSIMUS DORSI FLAP Right 08/06/2019    Procedure: BREAST RECONSTRUCTION W/FLAP LATISSIMUS DORSI;  Surgeon: Parminder Butler MD;  [facility];  Service: Plastics    ME CYSTO/URETERO W/LITHOTRIPSY &INDWELL STENT INSRT Right 08/08/2023    Procedure: CYSTOSCOPY URETEROSCOPY, RETROGRADE PYELOGRAM AND INSERTION STENT URETERAL;  Surgeon: Gabriel Rene MD;  [facility];  Service: Urology    ME GRAFTING OF AUTOLOGOUS FAT BY LIPO 50 CC OR LESS Bilateral 01/13/2023    Procedure: BILATERAL BREAST FAT GRAFTING;  Surgeon: Nolberto Winters MD;  [facility];  Service:  Plastics    UT GRAFTING OF AUTOLOGOUS FAT BY LIPO 50 CC OR LESS Bilateral 06/13/2023    Procedure: FAT GRAFTING TO BILATERAL BREASTS;  Surgeon: Nolberto Wintres MD;  [facility];  Service: Plastics    UT INSERTION BREAST IMPLANT SAME DAY OF MASTECTOMY Right 3/17/2025    Procedure: INSERTION/PLACEMENT IMPLANT BREAST (EXCHANGE) UNILATERAL;  Surgeon: Nolberto Winters MD;  [facility];  Service: Plastics    UT DOMINGA-IMPLANT CAPSULECTOMY BREAST COMPLETE Bilateral 08/06/2019    Procedure: BREAST CAPSULECTOMY;  Surgeon: Parminder Butler MD;  [facility];  Service: Plastics    UT REMOVAL INTACT BREAST IMPLANT Bilateral 08/06/2019    Procedure: BREAST IMPLANT REMOVAL;  Surgeon: Parminder Butler MD;  [facility];  Service: Plastics    UT REMOVAL INTACT BREAST IMPLANT Right 04/12/2022    Procedure: removal of right breast implant, capsulectomy, placement of right breast implant with acelluar dermal matrix.;  Surgeon: Nolberto Wintesr MD;  [facility];  Service: Plastics    UT REVISION OF RECONSTRUCTED BREAST Right 09/07/2021    Procedure: RIGHT BREAST MOUND REVISION;  Surgeon: Parminder Butler MD;  [facility];  Service: Plastics    UT REVISION OF RECONSTRUCTED BREAST Right 9/19/2024    Procedure: REVISION OF RECONSTRUCTED BREAST.;  Surgeon: Nolberto Winters MD;  [facility];  Service: Plastics    UT REVISION OF RECONSTRUCTED BREAST Right 3/17/2025    Procedure: REVISION OF RIGHT RECONSTRUCTED BREAST;  Surgeon: Nolberto Winters MD;  [facility];  Service: Plastics    UT TISSUE EXPANDER PLACEMENT BREAST RECONSTRUCTION Bilateral 08/06/2019    Procedure: BREAST TISSUE EXPANDER PLACEMENT;  Surgeon: Parminder Butler MD;  [facility];  Service: Plastics    REDUCTION MAMMAPLASTY      REVISION OF SCAR Bilateral 11/10/2021    Procedure: SCAR REVISION BILATERAL ARMS;  Surgeon: Parminder Butler MD;  [facility];   Service: Plastics    SKIN LESION EXCISION Left 9/19/2024    Procedure: LEFT BREAST SCAR EXCISION WITH COMPLEX CLOSURE;  Surgeon: Nolberto Winters MD;  [facility];  Service: Plastics    TRANSFER MUSCLE PECTORALIS Bilateral 08/06/2019    Procedure: PECTORALIS MUSCLE REPAIR;  Surgeon: Parminder Butler MD;  [facility];  Service: Plastics    US GUIDANCE BREAST BIOPSY RIGHT EACH ADDITIONAL Right 12/22/2023    US GUIDED BREAST BIOPSY RIGHT COMPLETE Right 12/22/2023       FAMILY HISTORY:  Family History   Problem Relation Age of Onset    No Known Problems Mother     Diabetes Father     Heart disease Father     No Known Problems Brother        SOCIAL HISTORY:  Social History     Tobacco Use    Smoking status: Never    Smokeless tobacco: Never   Vaping Use    Vaping status: Never Used   Substance Use Topics    Alcohol use: Not Currently    Drug use: Never       MEDICATIONS:    Current Outpatient Medications:     acetaminophen (TYLENOL) 500 mg tablet, Take 1 tablet (500 mg total) by mouth every 4 (four) hours as needed for mild pain or moderate pain, Disp: 30 tablet, Rfl: 2    alendronate (FOSAMAX) 70 mg tablet, every 7 days Weekly on saturdays, Disp: , Rfl:     chlorhexidine (PERIDEX) 0.12 % solution, 2 (two) times a day, Disp: , Rfl:     triamcinolone (KENALOG) 0.5 % cream, Apply topically as needed , Disp: , Rfl:     docusate sodium (COLACE) 100 mg capsule, Take 1 capsule (100 mg total) by mouth 2 (two) times a day, Disp: 30 capsule, Rfl: 0    LORazepam (ATIVAN) 1 mg tablet, Take 1 tablet (1 mg total) by mouth 1 (one) time for 1 dose Take 30 minutes prior to EMG/NCS, Disp: 1 tablet, Rfl: 0    ondansetron (ZOFRAN) 4 mg tablet, Take 1 tablet (4 mg total) by mouth every 8 (eight) hours as needed for nausea or vomiting, Disp: 20 tablet, Rfl: 0    ALLERGIES:  Allergies   Allergen Reactions    Accolate [Zafirlukast] Itching    Penicillins Anaphylaxis     Unconsciousness, and bronchoconstriction       Shellfish Allergy - Food Allergy Anaphylaxis    Shellfish-Derived Products - Food Allergy Anaphylaxis    Singulair [Montelukast] Itching    Pork-Derived Products - Food Allergy Hives    Medical Tape Rash     P/S specific surgical tape used at last procedure --- ok w/ paper tape           REVIEW OF SYSTEMS:  Review of Systems   Constitutional:  Negative for chills and fever.   HENT:  Negative for ear pain and sore throat.    Eyes:  Negative for pain and visual disturbance.   Respiratory:  Negative for cough and shortness of breath.    Cardiovascular:  Negative for chest pain and palpitations.   Gastrointestinal:  Negative for abdominal pain and vomiting.   Genitourinary:  Negative for dysuria and hematuria.   Musculoskeletal:  Negative for arthralgias and back pain.   Skin:  Negative for color change and rash.   Neurological:  Negative for seizures and syncope.   All other systems reviewed and are negative.      VITALS:  There were no vitals filed for this visit.    LABS:      _____________________________________________________  PHYSICAL EXAMINATION:  General: well developed and well nourished, alert, oriented times 3, and appears comfortable  Psychiatric: Normal  HEENT: Normocephalic, Atraumatic Trachea Midline, No torticollis  Pulmonary: No audible wheezing or respiratory distress   Abdomen/GI: Non tender, non distended   Cardiovascular: No pitting edema, 2+ radial pulse   Skin: No masses, erythema, lacerations, fluctation, ulcerations  Neurovascular: Sensation Intact to the Median, Ulnar, Radial Nerve, Motor Intact to the Median, Ulnar, Radial Nerve, and Pulses Intact  Musculoskeletal: Normal, except as noted in detailed exam and in HPI.      MUSCULOSKELETAL EXAMINATION:    Right Carpal Tunnel Exam:     Negative thenar atrophy. Positive phalen's test. Positive carpal tunnel compression. Positive tinels over median nerve at the wrist.  Opposition strength 5/5.  Abduction strength 5/5.       Negative cubital  "tunnel testing   ___________________________________________________  STUDIES REVIEWED:    I have personally reviewed and interpreted  US of the right wrist which demonstrate findings suspicious for carpal tunnel. US of right elbow show no sonographic findings of ulnar neuropathy.     LABS REVIEWED:    HgA1c: No results found for: \"HGBA1C\"  BMP:   Lab Results   Component Value Date    CALCIUM 8.7 02/28/2025    K 4.3 02/28/2025    CO2 25 02/28/2025     02/28/2025    BUN 12 02/28/2025    CREATININE 0.57 (L) 02/28/2025               PROCEDURES PERFORMED:  Procedures  No Procedures performed today    _____________________________________________________      Scribe Attestation      I,:  Jackie Wright am acting as a scribe while in the presence of the attending physician.:       I,:  Karen Schrader MD personally performed the services described in this documentation    as scribed in my presence.:                      "

## 2025-05-08 NOTE — H&P (VIEW-ONLY)
Karen Schrader M.D.  Attending, Orthopaedic Surgery  Hand, Wrist, and Elbow Surgery  [facility]      ORTHOPAEDIC HAND, WRIST, AND ELBOW OFFICE  VISIT       ASSESSMENT/PLAN:        Assessment & Plan  Carpal tunnel syndrome on right  US of right wrist reviewed does demonstrate findings suspicious for carpal tunnel. Based on this and patients clinical exam a right carpal tunnel revision is warranted. Risks and benefits of surgery were discussed and outlined below. Pre and post operative expectations reviewed. Consent was signed in clinic, she will meet with my surgery scheduler today. We will see her back in clinic post operatively.   Orders:    Case request operating room: RELEASE CARPAL TUNNEL - mini open, revision; Standing    History of carpal tunnel release    Orders:    Case request operating room: RELEASE CARPAL TUNNEL - mini open, revision; Standing          The patient verbalized understanding of exam findings and treatment plan. We engaged in the shared decision-making process and treatment options were discussed at length with the patient. Surgical and conservative management discussed today along with risks and benefits.    Diagnoses and all orders for this visit:    Carpal tunnel syndrome on right  -     Case request operating room: RELEASE CARPAL TUNNEL - mini open, revision; Standing  -     Case request operating room: RELEASE CARPAL TUNNEL - mini open, revision    History of carpal tunnel release  -     Case request operating room: RELEASE CARPAL TUNNEL - mini open, revision; Standing  -     Case request operating room: RELEASE CARPAL TUNNEL - mini open, revision    Other orders  -     Nursing Communication Warmimg Interventions Implemented; Standing  -     Nursing Communication CHG bath, have staff wash entire body (neck down) per pre-op bathing protocol. Routine, evening prior to, and day of surgery.; Standing  -     Nursing Communication Swab both nares with Povidone-Iodine  solution, EXCLUDE if patient has shellfish/Iodine allergy, and replace with nasal alcohol swabstick. Routine, day of surgery, on call to OR; Standing  -     Void on call to OR; Standing  -     Insert peripheral IV; Standing        Follow Up:  No follow-ups on file.    To Do Next Visit:  Sutures out        Operative Discussions:  Open Carpal Tunnel Release:   The anatomy and physiology of carpal tunnel syndrome was discussed with the patient today.  Increase pressure localized under the transverse carpal ligament can cause pain, numbness, tingling, or dysesthesias within the median nerve distribution as well as feelings of fatigue, clumsiness, or awkwardness.  These symptoms typically occur at night and worse in the morning upon waking.  Eventually, untreated carpal tunnel syndrome can result in weakness and permanent loss of muscle within the thenar compartment of the hand.  Treatment options were discussed with the patient.  Conservative treatment includes nocturnal resting splints to keep the nerve in a neutral position, ergonomic changes within the work or home environment, activity modification, and tendon gliding exercises.  Vitamin B6 one tablet daily over the counter may helpful to reduce symptoms.  Steroid injections within the carpal canal can help a majority of patients, however this is often self-limited in a majority of patients.  Surgical intervention to divide the transverse carpal ligament typically results in a long-lasting relief of the patient's complaints, with the recurrence rate of less than 1%. The patient has elected to undergo an open carpal tunnel release.  The palmar incision technique was discussed in the office with the patient today.   In the postoperative period, light activities are allowed immediately, driving is allowed when narcotic medication has stopped, and the bandages may be removed and incision may get wet after 2 days.  Heavy activities (lifting more than approximately 10  pounds) will be allowed after follow up appointment in 1-2 weeks.  While night symptoms (waking from sleep, pain, and discomfort in the hands) generally improves rapidly, the numbness and tingling as well as the strength will slowly improve over weeks to months depending on the chronicity and severity of the carpal tunnel syndrome.  Pillar pain and scar discomfort were discussed with the patient which are self-limiting conditions.  The risks of bleeding and infection from the surgery are less than 1%.  Risk of recurrence is approximately 0.5%.  The risks of nerve injury or nerve damage or damage to the blood vessels is approximately 1 in 1200. The patient has an understanding of the above mentioned discussion.The risks and benefits of the procedure were explained to the patient, which include, but are not limited to: Bleeding, infection, recurrence, pain, scar, damage to tendons, damage to nerves, and damage to blood vessels, failure to give desired results and complications related to anesthesia.  These risks, along with alternative conservative treatment options, and postoperative protocols were voiced back and understood by the patient.  All questions were answered to the patient's satisfaction.  The patient agrees to comply with a standard postoperative protocol, and is willing to proceed.  Education was provided via written and auditory forms.  There were no barriers to learning. Written handouts regarding wound care, incision and scar care, and general preoperative information was provided to the patient.  Prior to surgery, the patient may be requested to stop all anti-inflammatory medications.  Prophylactic aspirin, Plavix, and Coumadin may be allowed to be continued.  Medications including vitamin E., ginkgo, and fish oil are requested to be stopped approximately one week prior to surgery.  Hypertensive medications and beta blockers, if taken,  s      ____________________________________________________________________________________________________________________________________________      CHIEF COMPLAINT:  No chief complaint on file.      SUBJECTIVE:  Dania Ayala is a 55 y.o. year old RHD female who presents for follow up evaluation of bilateral hand numbness and ultra sound review. She continues to note upper extremity paresthesias, worse on the right. Her symptoms affect the dorsum of the arm and hand but also cause volar hand paresthesias and pain. She has hx of bilateral carpal tunnel releases 15 years ago. Also has hx of lymph node dissection and radiation.      I have personally reviewed all the relevant PMH, PSH, SH, FH, Medications and allergies      PAST MEDICAL HISTORY:  Past Medical History:   Diagnosis Date    Anesthesia complication     needs IR preprocedure for IV access/not ASC candidate    Breast cancer (HCC)     Breast cancer (HCC)     Breast cancer, right breast (HCC)     Cancer (HCC)     Chronic pain disorder     CPAP (continuous positive airway pressure) dependence     no currently used    GERD (gastroesophageal reflux disease)     Heartburn     Hiatal hernia     History of bariatric surgery     Irregular heart beat     pt states it was a side effect of a medication    Kidney stone     Lung nodule     Osteoporosis     stage 3    Postsurgical malabsorption     RA (rheumatoid arthritis) (HCC)     Rheumatoid arthritis (HCC)     Sleep apnea     Stress incontinence     Thyroid nodule        PAST SURGICAL HISTORY:  Past Surgical History:   Procedure Laterality Date    ABDOMINAL SURGERY      BRACHIOPLASTY Bilateral     2017    BREAST IMPLANT Bilateral 09/08/2020    Procedure: BREAST REMOVAL TISSUE EXPANDER/ IMPLANT PLACEMENT BREAST;  Surgeon: Parminder Butler MD;  [facility];  Service: Plastics    BREAST IMPLANT Left 02/09/2021    Procedure: BREAST TISSUE EXPANDER/ IMPLANT EXCHANGE;  Surgeon: Parminder Butler,  MD;  [facility];  Service: Plastics    BREAST IMPLANT Right 9/19/2024    Procedure: RIGHT BREAST IMPLANT EXCHANGE;  Surgeon: Nolberto Winters MD;  [facility];  Service: Plastics    BREAST LUMPECTOMY  04/2013    CAPSULOTOMY Bilateral 09/08/2020    Procedure: BREAST CAPSULOTOMY;  Surgeon: Parminder Butler MD;  [facility];  Service: Plastics    CAPSULOTOMY Left 02/09/2021    Procedure: BREAST CAPSULOTOMY;  Surgeon: Parminder Butler MD;  [facility];  Service: Plastics    CAPSULOTOMY Right 3/17/2025    Procedure: CAPSULECTOMY;  Surgeon: Nolberto Winters MD;  [facility];  Service: Plastics    CARDIAC CATHETERIZATION      CARPAL TUNNEL RELEASE Bilateral 2015    CHOLECYSTECTOMY      COLONOSCOPY      FL RETROGRADE PYELOGRAM  08/08/2023    GALLBLADDER SURGERY  2015    GASTRIC BYPASS  2014    HYSTERECTOMY  05/2013    INCONTINENCE SURGERY  09/2013    IR BIOPSY LUNG      lung nodules    LIPOSUCTION Right 04/12/2022    Procedure: LIPOSUCTION RIGHT BREAST;  Surgeon: Nolberto Winters MD;  [facility];  Service: Plastics    LIPOSUCTION W/ FAT INJECTION Bilateral 11/10/2021    Procedure: FAT GRAFTING BILATERAL BREAST;  Surgeon: Parminder Butler MD;  [facility];  Service: Plastics    MASTECTOMY Bilateral 10/2016    RI BREAST RECONSTRUCTION W/LATISSIMUS DORSI FLAP Right 08/06/2019    Procedure: BREAST RECONSTRUCTION W/FLAP LATISSIMUS DORSI;  Surgeon: Parminder Butler MD;  [facility];  Service: Plastics    RI CYSTO/URETERO W/LITHOTRIPSY &INDWELL STENT INSRT Right 08/08/2023    Procedure: CYSTOSCOPY URETEROSCOPY, RETROGRADE PYELOGRAM AND INSERTION STENT URETERAL;  Surgeon: Gabriel Rene MD;  [facility];  Service: Urology    RI GRAFTING OF AUTOLOGOUS FAT BY LIPO 50 CC OR LESS Bilateral 01/13/2023    Procedure: BILATERAL BREAST FAT GRAFTING;  Surgeon: Nolberto Winters MD;  [facility];  Service:  Plastics    TN GRAFTING OF AUTOLOGOUS FAT BY LIPO 50 CC OR LESS Bilateral 06/13/2023    Procedure: FAT GRAFTING TO BILATERAL BREASTS;  Surgeon: Nolberto Winters MD;  [facility];  Service: Plastics    TN INSERTION BREAST IMPLANT SAME DAY OF MASTECTOMY Right 3/17/2025    Procedure: INSERTION/PLACEMENT IMPLANT BREAST (EXCHANGE) UNILATERAL;  Surgeon: Nolberto Winters MD;  [facility];  Service: Plastics    TN DOMINGA-IMPLANT CAPSULECTOMY BREAST COMPLETE Bilateral 08/06/2019    Procedure: BREAST CAPSULECTOMY;  Surgeon: Parminder Butler MD;  [facility];  Service: Plastics    TN REMOVAL INTACT BREAST IMPLANT Bilateral 08/06/2019    Procedure: BREAST IMPLANT REMOVAL;  Surgeon: Parminder Butler MD;  [facility];  Service: Plastics    TN REMOVAL INTACT BREAST IMPLANT Right 04/12/2022    Procedure: removal of right breast implant, capsulectomy, placement of right breast implant with acelluar dermal matrix.;  Surgeon: Nolberto Winters MD;  [facility];  Service: Plastics    TN REVISION OF RECONSTRUCTED BREAST Right 09/07/2021    Procedure: RIGHT BREAST MOUND REVISION;  Surgeon: Parminder Butler MD;  [facility];  Service: Plastics    TN REVISION OF RECONSTRUCTED BREAST Right 9/19/2024    Procedure: REVISION OF RECONSTRUCTED BREAST.;  Surgeon: Nolberto Winters MD;  [facility];  Service: Plastics    TN REVISION OF RECONSTRUCTED BREAST Right 3/17/2025    Procedure: REVISION OF RIGHT RECONSTRUCTED BREAST;  Surgeon: Nolberto Winters MD;  [facility];  Service: Plastics    TN TISSUE EXPANDER PLACEMENT BREAST RECONSTRUCTION Bilateral 08/06/2019    Procedure: BREAST TISSUE EXPANDER PLACEMENT;  Surgeon: Parminder Butler MD;  [facility];  Service: Plastics    REDUCTION MAMMAPLASTY      REVISION OF SCAR Bilateral 11/10/2021    Procedure: SCAR REVISION BILATERAL ARMS;  Surgeon: Parminder Butler MD;  [facility];   Service: Plastics    SKIN LESION EXCISION Left 9/19/2024    Procedure: LEFT BREAST SCAR EXCISION WITH COMPLEX CLOSURE;  Surgeon: Nolberto Winters MD;  [facility];  Service: Plastics    TRANSFER MUSCLE PECTORALIS Bilateral 08/06/2019    Procedure: PECTORALIS MUSCLE REPAIR;  Surgeon: Parminder Butler MD;  [facility];  Service: Plastics    US GUIDANCE BREAST BIOPSY RIGHT EACH ADDITIONAL Right 12/22/2023    US GUIDED BREAST BIOPSY RIGHT COMPLETE Right 12/22/2023       FAMILY HISTORY:  Family History   Problem Relation Age of Onset    No Known Problems Mother     Diabetes Father     Heart disease Father     No Known Problems Brother        SOCIAL HISTORY:  Social History     Tobacco Use    Smoking status: Never    Smokeless tobacco: Never   Vaping Use    Vaping status: Never Used   Substance Use Topics    Alcohol use: Not Currently    Drug use: Never       MEDICATIONS:    Current Outpatient Medications:     acetaminophen (TYLENOL) 500 mg tablet, Take 1 tablet (500 mg total) by mouth every 4 (four) hours as needed for mild pain or moderate pain, Disp: 30 tablet, Rfl: 2    alendronate (FOSAMAX) 70 mg tablet, every 7 days Weekly on saturdays, Disp: , Rfl:     chlorhexidine (PERIDEX) 0.12 % solution, 2 (two) times a day, Disp: , Rfl:     triamcinolone (KENALOG) 0.5 % cream, Apply topically as needed , Disp: , Rfl:     docusate sodium (COLACE) 100 mg capsule, Take 1 capsule (100 mg total) by mouth 2 (two) times a day, Disp: 30 capsule, Rfl: 0    LORazepam (ATIVAN) 1 mg tablet, Take 1 tablet (1 mg total) by mouth 1 (one) time for 1 dose Take 30 minutes prior to EMG/NCS, Disp: 1 tablet, Rfl: 0    ondansetron (ZOFRAN) 4 mg tablet, Take 1 tablet (4 mg total) by mouth every 8 (eight) hours as needed for nausea or vomiting, Disp: 20 tablet, Rfl: 0    ALLERGIES:  Allergies   Allergen Reactions    Accolate [Zafirlukast] Itching    Penicillins Anaphylaxis     Unconsciousness, and bronchoconstriction       Shellfish Allergy - Food Allergy Anaphylaxis    Shellfish-Derived Products - Food Allergy Anaphylaxis    Singulair [Montelukast] Itching    Pork-Derived Products - Food Allergy Hives    Medical Tape Rash     P/S specific surgical tape used at last procedure --- ok w/ paper tape           REVIEW OF SYSTEMS:  Review of Systems   Constitutional:  Negative for chills and fever.   HENT:  Negative for ear pain and sore throat.    Eyes:  Negative for pain and visual disturbance.   Respiratory:  Negative for cough and shortness of breath.    Cardiovascular:  Negative for chest pain and palpitations.   Gastrointestinal:  Negative for abdominal pain and vomiting.   Genitourinary:  Negative for dysuria and hematuria.   Musculoskeletal:  Negative for arthralgias and back pain.   Skin:  Negative for color change and rash.   Neurological:  Negative for seizures and syncope.   All other systems reviewed and are negative.      VITALS:  There were no vitals filed for this visit.    LABS:      _____________________________________________________  PHYSICAL EXAMINATION:  General: well developed and well nourished, alert, oriented times 3, and appears comfortable  Psychiatric: Normal  HEENT: Normocephalic, Atraumatic Trachea Midline, No torticollis  Pulmonary: No audible wheezing or respiratory distress   Abdomen/GI: Non tender, non distended   Cardiovascular: No pitting edema, 2+ radial pulse   Skin: No masses, erythema, lacerations, fluctation, ulcerations  Neurovascular: Sensation Intact to the Median, Ulnar, Radial Nerve, Motor Intact to the Median, Ulnar, Radial Nerve, and Pulses Intact  Musculoskeletal: Normal, except as noted in detailed exam and in HPI.      MUSCULOSKELETAL EXAMINATION:    Right Carpal Tunnel Exam:     Negative thenar atrophy. Positive phalen's test. Positive carpal tunnel compression. Positive tinels over median nerve at the wrist.  Opposition strength 5/5.  Abduction strength 5/5.       Negative cubital  "tunnel testing   ___________________________________________________  STUDIES REVIEWED:    I have personally reviewed and interpreted  US of the right wrist which demonstrate findings suspicious for carpal tunnel. US of right elbow show no sonographic findings of ulnar neuropathy.     LABS REVIEWED:    HgA1c: No results found for: \"HGBA1C\"  BMP:   Lab Results   Component Value Date    CALCIUM 8.7 02/28/2025    K 4.3 02/28/2025    CO2 25 02/28/2025     02/28/2025    BUN 12 02/28/2025    CREATININE 0.57 (L) 02/28/2025               PROCEDURES PERFORMED:  Procedures  No Procedures performed today    _____________________________________________________      Scribe Attestation      I,:  Jackie Wright am acting as a scribe while in the presence of the attending physician.:       I,:  Karen Schrader MD personally performed the services described in this documentation    as scribed in my presence.:                      "

## 2025-05-12 NOTE — TELEPHONE ENCOUNTER
Received call from patient asking to speak with Artem to look at future dates for her to maxine surgery.      She would like to have surgery on Thursday Aug 7, Friday August 8 or Monday August 11.    Please call her back at [phone].

## 2025-05-13 ENCOUNTER — TELEPHONE (OUTPATIENT)
Dept: OBGYN CLINIC | Facility: CLINIC | Age: 55
End: 2025-05-13

## 2025-05-14 ENCOUNTER — ANESTHESIA EVENT (OUTPATIENT)
Dept: PERIOP | Facility: AMBULARY SURGERY CENTER | Age: 55
End: 2025-05-14
Payer: MEDICARE

## 2025-05-14 NOTE — PRE-PROCEDURE INSTRUCTIONS
Pre-Surgery Instructions:   Medication Instructions    acetaminophen (TYLENOL) 500 mg tablet Uses PRN- OK to take day of surgery    alendronate (FOSAMAX) 70 mg tablet Takes Saturdays    chlorhexidine (PERIDEX) 0.12 % solution Instructions provided by MD    triamcinolone (KENALOG) 0.5 % cream Hold day of surgery.   Medication instructions for day of surgery reviewed. Please take all instructed medications with only a sip of water.       You will receive a call one business day prior to surgery with an arrival time and hospital directions. If your surgery is scheduled on a Monday, the hospital will be calling you on the Friday prior to your surgery. If you have not heard from anyone by 8pm, please call the hospital supervisor through the hospital  at [phone]. ([place] [phone] or [place] [phone]).    Do not eat or drink anything after midnight the night before your surgery, including candy, mints, lifesavers, or chewing gum. Do not drink alcohol 24hrs before your surgery. Try not to smoke at least 24hrs before your surgery.       Follow the pre surgery showering instructions as listed in the “My Surgical Experience Booklet” or otherwise provided by your surgeon's office. Do not use a blade to shave the surgical area 1 week before surgery. It is okay to use a clean electric clippers up to 24 hours before surgery. Do not apply any lotions, creams, including makeup, cologne, deodorant, or perfumes after showering on the day of your surgery. Do not use dry shampoo, hair spray, hair gel, or any type of hair products.     No contact lenses, eye make-up, or artificial eyelashes. Remove nail polish, including gel polish, and any artificial, gel, or acrylic nails if possible. Remove all jewelry including rings and body piercing jewelry.     Wear causal clothing that is easy to take on and off. Consider your type of surgery.    Keep any valuables, jewelry, piercings at home. Please bring any specially  ordered equipment (sling, braces) if indicated.    Arrange for a responsible person to drive you to and from the hospital on the day of your surgery. Please confirm the visitor policy for the day of your procedure when you receive your phone call with an arrival time.     Call the surgeon's office with any new illnesses, exposures, or additional questions prior to surgery.    Please reference your “My Surgical Experience Booklet” for additional information to prepare for your upcoming surgery.

## 2025-05-20 ENCOUNTER — HOSPITAL ENCOUNTER (OUTPATIENT)
Facility: AMBULARY SURGERY CENTER | Age: 55
Setting detail: OUTPATIENT SURGERY
Discharge: HOME | End: 2025-05-20
Attending: SURGERY | Admitting: SURGERY
Payer: MEDICARE

## 2025-05-20 ENCOUNTER — ANESTHESIA (OUTPATIENT)
Dept: PERIOP | Facility: AMBULARY SURGERY CENTER | Age: 55
End: 2025-05-20
Payer: MEDICARE

## 2025-05-20 VITALS
RESPIRATION RATE: 16 BRPM | HEART RATE: 83 BPM | OXYGEN SATURATION: 98 % | HEIGHT: 60 IN | BODY MASS INDEX: 45.55 KG/M2 | DIASTOLIC BLOOD PRESSURE: 68 MMHG | SYSTOLIC BLOOD PRESSURE: 111 MMHG | TEMPERATURE: 97.5 F | WEIGHT: 232 LBS

## 2025-05-20 DIAGNOSIS — G56.01 CARPAL TUNNEL SYNDROME ON RIGHT: Primary | ICD-10-CM

## 2025-05-20 DIAGNOSIS — Z47.89 AFTERCARE FOLLOWING SURGERY OF THE MUSCULOSKELETAL SYSTEM: ICD-10-CM

## 2025-05-20 PROCEDURE — 64721 CARPAL TUNNEL SURGERY: CPT | Performed by: SURGERY

## 2025-05-20 PROCEDURE — 64721 CARPAL TUNNEL SURGERY: CPT | Performed by: PHYSICIAN ASSISTANT

## 2025-05-20 RX ORDER — SODIUM CHLORIDE, SODIUM LACTATE, POTASSIUM CHLORIDE, CALCIUM CHLORIDE 600; 310; 30; 20 MG/100ML; MG/100ML; MG/100ML; MG/100ML
125 INJECTION, SOLUTION INTRAVENOUS CONTINUOUS
Status: DISCONTINUED | OUTPATIENT
Start: 2025-05-20 | End: 2025-05-20 | Stop reason: HOSPADM

## 2025-05-20 RX ORDER — PROPOFOL 10 MG/ML
INJECTION, EMULSION INTRAVENOUS AS NEEDED
Status: DISCONTINUED | OUTPATIENT
Start: 2025-05-20 | End: 2025-05-20

## 2025-05-20 RX ORDER — ONDANSETRON 2 MG/ML
4 INJECTION INTRAMUSCULAR; INTRAVENOUS ONCE AS NEEDED
Status: DISCONTINUED | OUTPATIENT
Start: 2025-05-20 | End: 2025-05-20 | Stop reason: HOSPADM

## 2025-05-20 RX ORDER — FENTANYL CITRATE 50 UG/ML
INJECTION, SOLUTION INTRAMUSCULAR; INTRAVENOUS AS NEEDED
Status: DISCONTINUED | OUTPATIENT
Start: 2025-05-20 | End: 2025-05-20

## 2025-05-20 RX ORDER — OXYCODONE HYDROCHLORIDE 5 MG/1
5 TABLET ORAL EVERY 4 HOURS PRN
Qty: 5 TABLET | Refills: 0 | Status: SHIPPED | OUTPATIENT
Start: 2025-05-20 | End: 2025-05-30

## 2025-05-20 RX ORDER — FENTANYL CITRATE/PF 50 MCG/ML
25 SYRINGE (ML) INJECTION
Status: COMPLETED | OUTPATIENT
Start: 2025-05-20 | End: 2025-05-20

## 2025-05-20 RX ORDER — HYDROMORPHONE HCL IN WATER/PF 6 MG/30 ML
0.2 PATIENT CONTROLLED ANALGESIA SYRINGE INTRAVENOUS
Status: DISCONTINUED | OUTPATIENT
Start: 2025-05-20 | End: 2025-05-20 | Stop reason: HOSPADM

## 2025-05-20 RX ORDER — MAGNESIUM HYDROXIDE 1200 MG/15ML
LIQUID ORAL AS NEEDED
Status: DISCONTINUED | OUTPATIENT
Start: 2025-05-20 | End: 2025-05-20 | Stop reason: HOSPADM

## 2025-05-20 RX ORDER — PHENYLEPHRINE HCL IN 0.9% NACL 1 MG/10 ML
SYRINGE (ML) INTRAVENOUS AS NEEDED
Status: DISCONTINUED | OUTPATIENT
Start: 2025-05-20 | End: 2025-05-20

## 2025-05-20 RX ORDER — PROPOFOL 10 MG/ML
INJECTION, EMULSION INTRAVENOUS CONTINUOUS PRN
Status: DISCONTINUED | OUTPATIENT
Start: 2025-05-20 | End: 2025-05-20

## 2025-05-20 RX ORDER — CLINDAMYCIN PHOSPHATE 900 MG/50ML
900 INJECTION, SOLUTION INTRAVENOUS ONCE
Status: COMPLETED | OUTPATIENT
Start: 2025-05-20 | End: 2025-05-20

## 2025-05-20 RX ORDER — LIDOCAINE HYDROCHLORIDE 10 MG/ML
0.5 INJECTION, SOLUTION EPIDURAL; INFILTRATION; INTRACAUDAL; PERINEURAL ONCE AS NEEDED
Status: DISCONTINUED | OUTPATIENT
Start: 2025-05-20 | End: 2025-05-20 | Stop reason: HOSPADM

## 2025-05-20 RX ORDER — MIDAZOLAM HYDROCHLORIDE 2 MG/2ML
INJECTION, SOLUTION INTRAMUSCULAR; INTRAVENOUS AS NEEDED
Status: DISCONTINUED | OUTPATIENT
Start: 2025-05-20 | End: 2025-05-20

## 2025-05-20 RX ORDER — ONDANSETRON 2 MG/ML
INJECTION INTRAMUSCULAR; INTRAVENOUS AS NEEDED
Status: DISCONTINUED | OUTPATIENT
Start: 2025-05-20 | End: 2025-05-20

## 2025-05-20 RX ORDER — LIDOCAINE HYDROCHLORIDE 10 MG/ML
INJECTION, SOLUTION EPIDURAL; INFILTRATION; INTRACAUDAL; PERINEURAL AS NEEDED
Status: DISCONTINUED | OUTPATIENT
Start: 2025-05-20 | End: 2025-05-20

## 2025-05-20 RX ORDER — OXYCODONE HYDROCHLORIDE 5 MG/1
5 TABLET ORAL EVERY 4 HOURS PRN
Refills: 0 | Status: DISCONTINUED | OUTPATIENT
Start: 2025-05-20 | End: 2025-05-20 | Stop reason: HOSPADM

## 2025-05-20 RX ORDER — SODIUM CHLORIDE, SODIUM LACTATE, POTASSIUM CHLORIDE, CALCIUM CHLORIDE 600; 310; 30; 20 MG/100ML; MG/100ML; MG/100ML; MG/100ML
INJECTION, SOLUTION INTRAVENOUS CONTINUOUS PRN
Status: DISCONTINUED | OUTPATIENT
Start: 2025-05-20 | End: 2025-05-20

## 2025-05-20 RX ADMIN — ONDANSETRON 4 MG: 2 INJECTION INTRAMUSCULAR; INTRAVENOUS at 10:05

## 2025-05-20 RX ADMIN — FENTANYL CITRATE 25 MCG: 50 INJECTION INTRAMUSCULAR; INTRAVENOUS at 10:05

## 2025-05-20 RX ADMIN — PROPOFOL 80 MCG/KG/MIN: 10 INJECTION, EMULSION INTRAVENOUS at 10:05

## 2025-05-20 RX ADMIN — FENTANYL CITRATE 25 MCG: 50 INJECTION INTRAMUSCULAR; INTRAVENOUS at 10:39

## 2025-05-20 RX ADMIN — FENTANYL CITRATE 25 MCG: 50 INJECTION INTRAMUSCULAR; INTRAVENOUS at 10:17

## 2025-05-20 RX ADMIN — CLINDAMYCIN PHOSPHATE 900 MG: 900 INJECTION, SOLUTION INTRAVENOUS at 10:06

## 2025-05-20 RX ADMIN — Medication 100 MCG: at 10:25

## 2025-05-20 RX ADMIN — FENTANYL CITRATE 25 MCG: 50 INJECTION INTRAMUSCULAR; INTRAVENOUS at 10:02

## 2025-05-20 RX ADMIN — LIDOCAINE HYDROCHLORIDE 50 MG: 10 INJECTION, SOLUTION EPIDURAL; INFILTRATION; INTRACAUDAL at 10:05

## 2025-05-20 RX ADMIN — FENTANYL CITRATE 25 MCG: 50 INJECTION INTRAMUSCULAR; INTRAVENOUS at 10:44

## 2025-05-20 RX ADMIN — MIDAZOLAM 2 MG: 1 INJECTION INTRAMUSCULAR; INTRAVENOUS at 10:02

## 2025-05-20 RX ADMIN — FENTANYL CITRATE 25 MCG: 50 INJECTION INTRAMUSCULAR; INTRAVENOUS at 10:49

## 2025-05-20 RX ADMIN — DEXMEDETOMIDINE 4 MCG: 100 INJECTION, SOLUTION INTRAVENOUS at 10:04

## 2025-05-20 RX ADMIN — FENTANYL CITRATE 25 MCG: 50 INJECTION INTRAMUSCULAR; INTRAVENOUS at 10:56

## 2025-05-20 RX ADMIN — SODIUM CHLORIDE, SODIUM LACTATE, POTASSIUM CHLORIDE, AND CALCIUM CHLORIDE: .6; .31; .03; .02 INJECTION, SOLUTION INTRAVENOUS at 10:02

## 2025-05-20 RX ADMIN — PROPOFOL 60 MG: 10 INJECTION, EMULSION INTRAVENOUS at 10:05

## 2025-05-20 RX ADMIN — OXYCODONE HYDROCHLORIDE 5 MG: 5 TABLET ORAL at 11:18

## 2025-05-20 NOTE — ANESTHESIA POSTPROCEDURE EVALUATION
Post-Op Assessment Note    CV Status:  Stable  Pain Score: 0    Pain management: adequate       Mental Status:  Sleepy   Hydration Status:  Euvolemic   PONV Controlled:  Controlled   Airway Patency:  Patent     Post Op Vitals Reviewed: Yes    No anethesia notable event occurred.    Staff: CRNA           Last Filed PACU Vitals:  Vitals Value Taken Time   Temp     Pulse 72    /65    Resp 18    SpO2 100%

## 2025-05-20 NOTE — OP NOTE
OPERATIVE REPORT  PATIENT NAME: Dania Ayala  :  1970  MRN: [id number]  Pt Location: [facility] MA[facility] OR    SURGERY DATE: 25    Surgeons and Role:     * Karen Schrader MD - Primary     * Lelo Bradley PA-C - Assisting    Pre-Op Diagnosis:  Carpal tunnel syndrome on right [G56.01]  History of carpal tunnel release [Z98.890]    Post-Op Diagnosis Codes:     * Carpal tunnel syndrome on right [G56.01]     * History of carpal tunnel release [Z98.890]    Procedure(s):  RELEASE CARPAL TUNNEL - mini open, revision (Right)    Specimen(s):  No specimens collected during this procedure.    Estimated Blood Loss:   Minimal    Anesthesia Type:   Conscious Sedation     IMPLANTS:  * No implants in log *    PERIOPERATIVE ANTIBIOTICS:    cefazolin, 2 grams    Tourniquet Time: 7 min  at 250  mmHg          Operative Indications:  The patient has a history of right recurrent carpal tunnel syndrome that was recalcitrant to conservative management.  The decision was made to bring the patient to the operating room for right revision carpal tunnel release  Risks of the procedure were explained which include, but are not limited to bleeding; infection; damage to nerves, arteries,veins, tendons; scar; pain; need for reoperation; failure to give desired result; and risks of anaesthesia.  All questions were answered to satisfaction and they were willing to proceed.         Operative Findings:  Hypertrophic scar at TCL remnant     Complications:   None    Procedure and Technique:  After the patient, site, and procedure were identified, the patient was brought into the operating room in a supine position.  Local anaesthesia and sedation were provided.  A well padded tourniquet was applied to the extremity, set at 250 mmHg.  The  right upper extremity was then prepped and drapped in a normal, sterile, orthopedic fashion.    An anterior approach to the carpal tunnel was undertaken. A 2 cm incision was made in line with the fourth digit,  proximal to Tang's line.  The skin and the subcutaneous tissue were sharply incised.  Under loupe magnification, dissection was carried down to the palmar fascia remnant and it was incised. The transverse carpal ligament remnant and hypertrophic scar tissue was visualized and  Released distally with a #64 blade. A freer was was passed above and below the TCL in a retrograde fashion, freeing up any adhesions. A Knife Lite was used to release the proximal portion of the transverse carpal ligament under direct visualization.  Distally the superficial arch was identified.  A complete release was carried out and exploration of the carpal canal revealed no mild  tenosynovitis. The median nerve and its branches were intact.        At the completion of the procedure, hemostasis was obtained with cautery and direct pressure.  The wounds were copiously irrigated with sterile solution.  The wounds were closed with Prolene.  Sterile dressings were applied, including Xeroform, gauze, tweeners, webril, ACE.  Please note, all sponge, needle, and instrument counts were correct prior to closure.  Loupe magnification was utilized.  The patient tolerated the procedure well.     I was present for the entire procedure., A qualified resident physician was not available., and A physician assistant was required during the procedure for retraction, tissue handling, dissection and suturing.    Patient Disposition:  PACU     SIGNATURE: Karen Schrader MD  DATE: 05/20/25  TIME: 10:27 AM

## 2025-05-20 NOTE — DISCHARGE INSTR - AVS FIRST PAGE
Post Operative Instructions    You have had surgery on your arm today, please read and follow the information below:  Elevate your hand above your elbow during the next 24-48 hours to help with swelling.  Place your hand and arm over your head with motion at your shoulder three times a day.  Do not apply any cream/ointment/oil to your incisions including antibiotics.  Do not soak your hands in standing water (dishwater, tubs, Jacuzzi's, pools, etc.) until given permission (typically 2-3 weeks after injury)    Call the office if you notice any:  Increased numbness or tingling of your hand or fingers that is not relieved with elevation.  Increasing pain that is not controlled with medication.  Difficulty chewing, breathing, swallowing.  Chest pains or shortness of breath.  Fever over 101.4 degrees.    Bandage: Please keep bandages clean and dry. Remove bandage after 5 days. Once bandages are removed you may wash hands with soap and water. Short showers are okay as well, but please avoid soaking the hand as described above (ie no pools, baths, dirty dish water, hot tubs, ocean/lake water, etc). Sutures will be removed in the office at your first follow up visit, please do not remove them yourself.    Please do NOT put any topical agents on the surgical wound including neosporin, peroxide, tea tree oil, vitamin E, etc. as these can delay wound healing.    Motion: Move fingers into a fist 5 times a day, DO NOT move any splinted fingers.    Weight bearing status: Avoid heavy lifting (>5 pounds) with the extremity that was operated on until follow up appointment. Normal activities of daily living are OK.    Ice: Ice for 10 minutes every hour as needed for swelling x 24 hours.    Sling: No sling necessary.    Pain medication:   Naproxen 220 mg two times a day (this is over the counter!)  *do not take this medication if you were told by your doctor that you cannot take anti-inflammatories or NSAIDS  Tylenol Extended Release  650 mg every 8 hours (this is over the counter!)   Oxycodone one tab every 6 hours ONLY AS NEEDED for severe pain        Follow-up Appointment: 10-14 days with Dr Schrader        Please call the office if you have any questions or concerns regarding your post-operative care.

## 2025-05-20 NOTE — ANESTHESIA POSTPROCEDURE EVALUATION
Post-Op Assessment Note    Last Filed PACU Vitals:  Vitals Value Taken Time   Temp 98 °F (36.7 °C) 05/20/25 10:45   Pulse 73 05/20/25 11:04   /65 05/20/25 11:04   Resp 20 05/20/25 11:04   SpO2 96 % 05/20/25 11:04       Modified Jose David:     Vitals Value Taken Time   Activity 2 05/20/25 11:00   Respiration 2 05/20/25 11:00   Circulation 1 05/20/25 11:00   Consciousness 2 05/20/25 11:00   Oxygen Saturation 2 05/20/25 11:00     Modified Jose David Score: 9

## 2025-05-20 NOTE — ANESTHESIA PREPROCEDURE EVALUATION
Procedure:  RELEASE CARPAL TUNNEL - mini open, revision (Right: Foot)    Relevant Problems   ANESTHESIA   (-) History of anesthesia complications      CARDIO   (-) Chest pain   (-) SCRUGGS (dyspnea on exertion)      GI/HEPATIC   (+) Gastroesophageal reflux disease (Well controlled)   (+) Hiatal hernia      /RENAL   (+) Ureteral stone with hydronephrosis      GYN   (+) History of hysterectomy      HEMATOLOGY   (+) Anemia      MUSCULOSKELETAL   (+) Arthritis   (+) Rheumatoid arthritis (HCC)      NEURO/PSYCH   (+) Arm paresthesia, right      PULMONARY   (+) Sleep apnea (denies)   (-) Shortness of breath   (-) URI (upper respiratory infection)        Physical Exam    Airway     Mallampati score: II  TM Distance: >3 FB  Neck ROM: full      Cardiovascular      Dental    edentulous    Pulmonary      Neurological      Other Findings  post-pubertal.      Anesthesia Plan  ASA Score- 2     Anesthesia Type- MAC and IV sedation with anesthesia with ASA Monitors.         Additional Monitors:     Airway Plan: natural airway.           Plan Factors-Exercise tolerance (METS): >4 METS.    Chart reviewed. EKG reviewed.  Existing labs reviewed. Patient summary reviewed.                  Induction- intravenous.    Postoperative Plan- .   Monitoring Plan - Monitoring plan - standard ASA monitoring          Informed Consent- Anesthetic plan and risks discussed with patient.  I personally reviewed this patient with the CRNA. Discussed and agreed on the Anesthesia Plan with the CRNA..      NPO Status:  Vitals Value Taken Time   Date of last liquid 05/19/25 05/20/25 08:25   Time of last liquid 2330 05/20/25 08:25   Date of last solid 05/19/25 05/20/25 08:25   Time of last solid 2330 05/20/25 08:25

## 2025-06-04 ENCOUNTER — OFFICE VISIT (OUTPATIENT)
Dept: OBGYN CLINIC | Facility: CLINIC | Age: 55
End: 2025-06-04
Payer: MEDICARE

## 2025-06-04 VITALS — HEIGHT: 60 IN | BODY MASS INDEX: 44.57 KG/M2 | WEIGHT: 227 LBS

## 2025-06-04 DIAGNOSIS — G56.02 CARPAL TUNNEL SYNDROME ON LEFT: ICD-10-CM

## 2025-06-04 DIAGNOSIS — G56.01 CARPAL TUNNEL SYNDROME ON RIGHT: ICD-10-CM

## 2025-06-04 DIAGNOSIS — G56.22 CUBITAL TUNNEL SYNDROME ON LEFT: Primary | ICD-10-CM

## 2025-06-04 PROCEDURE — 99213 OFFICE O/P EST LOW 20 MIN: CPT | Performed by: SURGERY

## 2025-06-04 RX ORDER — SODIUM CHLORIDE 9 MG/ML
75 INJECTION, SOLUTION INTRAVENOUS CONTINUOUS
OUTPATIENT
Start: 2025-06-04

## 2025-06-04 RX ORDER — CHLORHEXIDINE GLUCONATE ORAL RINSE 1.2 MG/ML
15 SOLUTION DENTAL ONCE
OUTPATIENT
Start: 2025-06-04 | End: 2025-06-04

## 2025-06-04 NOTE — H&P
Karen Schrader M.D.  Attending, Orthopaedic Surgery  Hand, Wrist, and Elbow Surgery  [facility]      ORTHOPAEDIC HAND, WRIST, AND ELBOW OFFICE  VISIT       ASSESSMENT/PLAN:        Assessment & Plan  Cubital tunnel syndrome on left  Ultrasound of the left cubital tunnel demonstrates evidence of ulnar nerve compression without subluxation  We discussed treatment options, patient wishes to proceed with cubital tunnel release on the side as well as a likely left carpal tunnel revision.  Risk benefits and alternatives to surgery were discussed and informed electronic consent was signed in office today for left cubital tunnel release with possible transposition and left revision carpal tunnel release  Postoperative protocol expectations were reviewed and all questions answered to the best of our ability  Follow-up 10 to 14 days following surgery for postop evaluation and suture removal  OF NOTE she has a posterior surgical scar at the upper arm from a brachioplasty, this should not affect the surgical site however. She is also a limb alert on the right which will affect IV site for anesthesia. Patient had a great anesthesia experience at her surgery    PLAN: LEFT cubital tunnel release w/possible anterior transposition and pedicle based adipose flap, LEFT revision carpal tunnel release (psb)    Orders:    Case request operating room: RELEASE CUBITAL TUNNEL, RELEASE CARPAL TUNNEL REVISION; Standing    Carpal tunnel syndrome on left  Ultrasound of the left carpal tunnel was reviewed.  Patient had suspicion of carpal tunnel syndrome based on cross-sectional area of the nerve but was without significant hypervascularity.  On exam she does have positive Phalen's test and notes intermittent median nerve paresthesias in addition to her ulnar nerve symptoms.  We discussed revision surgery versus observation, ultimately decision was made to proceed with a left carpal tunnel revision at the same time as  her cubital tunnel release.  Risk benefits were reviewed and informed electronic consent was signed today    Orders:    Case request operating room: RELEASE CUBITAL TUNNEL, RELEASE CARPAL TUNNEL REVISION; Standing    Carpal tunnel syndrome on right  Status post revision carpal tunnel release on 5/20/2025  Sutures removed today, wound is healing well with no evidence of infection  Begin scar massage  Resume activities as tolerated  Patient notes her symptoms have nearly completely resolved following surgery                The patient verbalized understanding of exam findings and treatment plan. We engaged in the shared decision-making process and treatment options were discussed at length with the patient. Surgical and conservative management discussed today along with risks and benefits.      Follow Up:  Return for After Surgery.    To Do Next Visit:  Re-evaluation of current issue and Sutures out      Operative Discussions:  Cubital Tunnel Release:   The anatomy and physiology of cubital tunnel syndrome were discussed with the patient today in the office.  Typically, increased elbow flexion activities decrease blood flow within the intraneural spaces, resulting in a feeling of numbness, tingling, weakness, or clumsiness within the hand and fingers.  Occasionally, anatomic structures such as medial elbow osteophytes, the medial head of the triceps, were subluxing ulnar nerve may result in increased pressure or aggravation at the cubital tunnel.  Typical signs and symptoms usually include numbness and tingling within the ring and small finger, weakness with , and weakness with pinch.  Conservative treatment and includes nocturnal bracing to keep the elbow in a semi-extended position, activity modification, therapy, and avoiding excessive elbow flexion activities.  A majority of patients typically respond to conservative treatment over a period of approximately 3-6 months.  Vitamin B6 one tablet daily over the  counter may helpful to reduce symptoms.  EMG/NCV testing of the ulnar nerve at the elbow is not as reliable as carpal tunnel syndrome.  Surgical intervention in the form of in situ release of the ulnar nerve at the elbow or ulnar nerve transposition may be required in up to 20% of patients. The patient has elected to undergo cubital tunnel release.  The possibility of converting to a subcutaneous or submuscular ulnar nerve transposition depending on the nerve stability was discussed with the patient.  Typically, in the postoperative period, light activities are allowed immediately, driving is allowed when narcotic medications have stopped, and the incision may get wet after 5 days.  Heavy activities will be allowed after follow up appointment in 1-2 weeks.  While the pain within the ring and small finger of the hands generally improves rapidly, the numbness and tingling, as well as the strength, will slowly improve over a period of weeks to months.  Total recovery can take up to 18 months from the time of surgery.  Numbness and tingling near the incision, or near the medial aspect of the forearm was discussed with the patient.  The patient has an understanding of the above mentioned discussion. The risks and benefits of the procedure were explained to the patient, which include, but are not limited to: Bleeding, infection, recurrence, pain, scar, damage to tendons, damage to nerves, and damage to blood vessels, failure to give desired results and complications related to anesthesia.  These risks, along with alternative conservative treatment options, and postoperative protocols were voiced back and understood by the patient.  All questions were answered to the patient's satisfaction.  The patient agrees to comply with a standard postoperative protocol, and is willing to proceed.  Education was provided via written and auditory forms.  There were no barriers to learning. Written handouts regarding wound care,  incision and scar care, and general preoperative information was provided to the patient.  Prior to surgery, the patient may be requested to stop all anti-inflammatory medications.  Prophylactic aspirin, Plavix, and Coumadin may be allowed to be continued.  Medications including vitamin E., ginkgo, and fish oil are requested to be stopped approximately one week prior to surgery.  Hypertensive medications and beta blockers, if taken, should be continued. Vitamin B6 one tablet daily over the counter may helpful to reduce symptoms.   and Open Carpal Tunnel Release:   The anatomy and physiology of carpal tunnel syndrome was discussed with the patient today.  Increase pressure localized under the transverse carpal ligament can cause pain, numbness, tingling, or dysesthesias within the median nerve distribution as well as feelings of fatigue, clumsiness, or awkwardness.  These symptoms typically occur at night and worse in the morning upon waking.  Eventually, untreated carpal tunnel syndrome can result in weakness and permanent loss of muscle within the thenar compartment of the hand.  Treatment options were discussed with the patient.  Conservative treatment includes nocturnal resting splints to keep the nerve in a neutral position, ergonomic changes within the work or home environment, activity modification, and tendon gliding exercises.  Vitamin B6 one tablet daily over the counter may helpful to reduce symptoms.  Steroid injections within the carpal canal can help a majority of patients, however this is often self-limited in a majority of patients.  Surgical intervention to divide the transverse carpal ligament typically results in a long-lasting relief of the patient's complaints, with the recurrence rate of less than 1%. The patient has elected to undergo an open carpal tunnel release.  The palmar incision technique was discussed in the office with the patient today.   In the postoperative period, light activities  are allowed immediately, driving is allowed when narcotic medication has stopped, and the bandages may be removed and incision may get wet after 2 days.  Heavy activities (lifting more than approximately 10 pounds) will be allowed after follow up appointment in 1-2 weeks.  While night symptoms (waking from sleep, pain, and discomfort in the hands) generally improves rapidly, the numbness and tingling as well as the strength will slowly improve over weeks to months depending on the chronicity and severity of the carpal tunnel syndrome.  Pillar pain and scar discomfort were discussed with the patient which are self-limiting conditions.  The risks of bleeding and infection from the surgery are less than 1%.  Risk of recurrence is approximately 0.5%.  The risks of nerve injury or nerve damage or damage to the blood vessels is approximately 1 in 1200. The patient has an understanding of the above mentioned discussion.The risks and benefits of the procedure were explained to the patient, which include, but are not limited to: Bleeding, infection, recurrence, pain, scar, damage to tendons, damage to nerves, and damage to blood vessels, failure to give desired results and complications related to anesthesia.  These risks, along with alternative conservative treatment options, and postoperative protocols were voiced back and understood by the patient.  All questions were answered to the patient's satisfaction.  The patient agrees to comply with a standard postoperative protocol, and is willing to proceed.  Education was provided via written and auditory forms.  There were no barriers to learning. Written handouts regarding wound care, incision and scar care, and general preoperative information was provided to the patient.  Prior to surgery, the patient may be requested to stop all anti-inflammatory medications.  Prophylactic aspirin, Plavix, and Coumadin may be allowed to be continued.  Medications including vitamin E.,  ginkgo, and fish oil are requested to be stopped approximately one week prior to surgery.  Hypertensive medications and beta blockers, if taken, s      ____________________________________________________________________________________________________________________________________________      CHIEF COMPLAINT:  No chief complaint on file.      SUBJECTIVE:  Dania Ayala is a 55 y.o. year old RHD female who presents for follow up evaluation following a right revision carpal tunnel release.  Patient reports that hand is feeling overall very well.  Her sensation has improved significantly and the hand no longer cramps up.  She denies any postop complications or issues following the surgery.  Patient would like to proceed with contralateral carpal tunnel revision and cubital tunnel release     I have personally reviewed all the relevant PMH, PSH, SH, FH, Medications and allergies      PAST MEDICAL HISTORY:  Past Medical History[1]    PAST SURGICAL HISTORY:  Past Surgical History[2]    FAMILY HISTORY:  Family History[3]    SOCIAL HISTORY:  Social History[4]    MEDICATIONS:  Current Medications[5]    ALLERGIES:  Allergies[6]        REVIEW OF SYSTEMS:  Review of Systems   Constitutional:  Negative for chills and fever.   HENT:  Negative for ear pain and sore throat.    Eyes:  Negative for pain and visual disturbance.   Respiratory:  Negative for cough and shortness of breath.    Cardiovascular:  Negative for chest pain and palpitations.   Gastrointestinal:  Negative for abdominal pain and vomiting.   Genitourinary:  Negative for dysuria and hematuria.   Musculoskeletal:  Negative for arthralgias and back pain.   Skin:  Negative for color change and rash.   Neurological:  Positive for numbness. Negative for seizures and syncope.   All other systems reviewed and are negative.      VITALS:  There were no vitals filed for this visit.    LABS:      _____________________________________________________  PHYSICAL  "EXAMINATION:  General: well developed and well nourished, alert, oriented times 3, and appears comfortable  Psychiatric: Normal  HEENT: Normocephalic, Atraumatic Trachea Midline, No torticollis  Pulmonary: No audible wheezing or respiratory distress   Abdomen/GI: Non tender, non distended   Cardiovascular: No pitting edema, 2+ radial pulse   Skin: No masses, erythema, lacerations, fluctation, ulcerations  Neurovascular: Sensation Intact to the Median, Ulnar, Radial Nerve, Motor Intact to the Median, Ulnar, Radial Nerve, and Pulses Intact  Musculoskeletal: Normal, except as noted in detailed exam and in HPI.      MUSCULOSKELETAL EXAMINATION:    Right hand:   Surgical site is healing well with no evidence of infection.  Sutures removed today, wound edges remained well-approximated.  No surrounding erythema or significant tenderness.  Patient demonstrates full composite fist and opposition.  NV intact.    Left Carpal Tunnel Exam:    Negative thenar atrophy. Positive phalen's test. Positive carpal tunnel compression. Negative tinels over median nerve at the wrist.  Opposition strength 5/5.  Abduction strength 5/5.  Healed surgical incision from endoscopic CTR     Posterior scar at the proximal arm from cosmetic surgery.     ___________________________________________________  STUDIES REVIEWED:    I have personally reviewed and interpreted  US of the left carpal and cubital tunnel which demonstrate evidence of carpal tunnel based on CSA but without hypervascularity, evidence of cubital tunnel syndrome      LABS REVIEWED:    HgA1c: No results found for: \"HGBA1C\"  BMP:   Lab Results   Component Value Date    CALCIUM 8.7 02/28/2025    K 4.3 02/28/2025    CO2 25 02/28/2025     02/28/2025    BUN 12 02/28/2025    CREATININE 0.57 (L) 02/28/2025               PROCEDURES PERFORMED:  Procedures  No Procedures performed today    _____________________________________________________               [1]   Past Medical " History:  Diagnosis Date    Anesthesia complication     needs IR preprocedure for IV access/not ASC candidate    Anxiety     while lying flat    Breast cancer (HCC)     RIGHT    Breast cancer (HCC)     Breast cancer, right breast (HCC)     Cancer (HCC)     Carpal tunnel syndrome on right 05/20/2025    Chronic pain disorder     GERD (gastroesophageal reflux disease)     Heartburn     Hiatal hernia     History of bariatric surgery     Irregular heart beat     pt states it was a side effect of a medication    Kidney stone     passed    Lung nodule     Osteoporosis     stage 3    Postsurgical malabsorption     RA (rheumatoid arthritis) (HCC)     Rheumatoid arthritis (HCC)     Sleep apnea     does not use CPAP    Stress incontinence     Thyroid nodule    [2]   Past Surgical History:  Procedure Laterality Date    ABDOMINAL SURGERY      BRACHIOPLASTY Bilateral     2017    BREAST IMPLANT Bilateral 09/08/2020    Procedure: BREAST REMOVAL TISSUE EXPANDER/ IMPLANT PLACEMENT BREAST;  Surgeon: Parminder Butler MD;  [facility];  Service: Plastics    BREAST IMPLANT Left 02/09/2021    Procedure: BREAST TISSUE EXPANDER/ IMPLANT EXCHANGE;  Surgeon: Parminder Butler MD;  [facility];  Service: Plastics    BREAST IMPLANT Right 9/19/2024    Procedure: RIGHT BREAST IMPLANT EXCHANGE;  Surgeon: Nolberto Winters MD;  [facility];  Service: Plastics    BREAST LUMPECTOMY  04/2013    CAPSULOTOMY Bilateral 09/08/2020    Procedure: BREAST CAPSULOTOMY;  Surgeon: Parminder Butler MD;  [facility];  Service: Plastics    CAPSULOTOMY Left 02/09/2021    Procedure: BREAST CAPSULOTOMY;  Surgeon: Parminder Butler MD;  [facility];  Service: Plastics    CAPSULOTOMY Right 3/17/2025    Procedure: CAPSULECTOMY;  Surgeon: Nolberto Winters MD;  [facility];  Service: Plastics    CARDIAC CATHETERIZATION      CARPAL TUNNEL RELEASE Bilateral 2015    CHOLECYSTECTOMY       COLONOSCOPY      FL RETROGRADE PYELOGRAM  08/08/2023    GALLBLADDER SURGERY  2015    GASTRIC BYPASS  2014    HYSTERECTOMY  05/2013    INCONTINENCE SURGERY  09/2013    IR BIOPSY LUNG      lung nodules    LIPOSUCTION Right 04/12/2022    Procedure: LIPOSUCTION RIGHT BREAST;  Surgeon: Nolberto Winters MD;  [facility];  Service: Plastics    LIPOSUCTION W/ FAT INJECTION Bilateral 11/10/2021    Procedure: FAT GRAFTING BILATERAL BREAST;  Surgeon: Parminder Butler MD;  [facility];  Service: Plastics    MASTECTOMY Bilateral 10/2016    DC BREAST RECONSTRUCTION W/LATISSIMUS DORSI FLAP Right 08/06/2019    Procedure: BREAST RECONSTRUCTION W/FLAP LATISSIMUS DORSI;  Surgeon: Parminder Butler MD;  [facility];  Service: Plastics    DC CYSTO/URETERO W/LITHOTRIPSY &INDWELL STENT INSRT Right 08/08/2023    Procedure: CYSTOSCOPY URETEROSCOPY, RETROGRADE PYELOGRAM AND INSERTION STENT URETERAL;  Surgeon: Gabriel Rene MD;  [facility];  Service: Urology    DC GRAFTING OF AUTOLOGOUS FAT BY LIPO 50 CC OR LESS Bilateral 01/13/2023    Procedure: BILATERAL BREAST FAT GRAFTING;  Surgeon: Nolberto Winters MD;  [facility];  Service: Plastics    DC GRAFTING OF AUTOLOGOUS FAT BY LIPO 50 CC OR LESS Bilateral 06/13/2023    Procedure: FAT GRAFTING TO BILATERAL BREASTS;  Surgeon: Nolberto Winters MD;  [facility];  Service: Plastics    DC INSERTION BREAST IMPLANT SAME DAY OF MASTECTOMY Right 3/17/2025    Procedure: INSERTION/PLACEMENT IMPLANT BREAST (EXCHANGE) UNILATERAL;  Surgeon: Nolberto Winters MD;  [facility];  Service: Plastics    DC NEUROPLASTY &/TRANSPOS MEDIAN NRV CARPAL TUNNE Right 5/20/2025    Procedure: RELEASE CARPAL TUNNEL - mini open, revision;  Surgeon: Karen Schrader MD;  [facility];  Service: Orthopedics    DC DOMINGA-IMPLANT CAPSULECTOMY BREAST COMPLETE Bilateral 08/06/2019    Procedure: BREAST CAPSULECTOMY;  Surgeon: Parminder CHAVEZ  Yandel Butler MD;  [facility];  Service: Plastics    DC REMOVAL INTACT BREAST IMPLANT Bilateral 08/06/2019    Procedure: BREAST IMPLANT REMOVAL;  Surgeon: Parminder Butler MD;  [facility];  Service: Plastics    DC REMOVAL INTACT BREAST IMPLANT Right 04/12/2022    Procedure: removal of right breast implant, capsulectomy, placement of right breast implant with acelluar dermal matrix.;  Surgeon: Nolberto Winters MD;  [facility];  Service: Plastics    DC REVISION OF RECONSTRUCTED BREAST Right 09/07/2021    Procedure: RIGHT BREAST MOUND REVISION;  Surgeon: Parminder Butler MD;  [facility];  Service: Plastics    DC REVISION OF RECONSTRUCTED BREAST Right 9/19/2024    Procedure: REVISION OF RECONSTRUCTED BREAST.;  Surgeon: Nolberto Winters MD;  [facility];  Service: Plastics    DC REVISION OF RECONSTRUCTED BREAST Right 3/17/2025    Procedure: REVISION OF RIGHT RECONSTRUCTED BREAST;  Surgeon: Nolberto Winters MD;  [facility];  Service: Plastics    DC TISSUE EXPANDER PLACEMENT BREAST RECONSTRUCTION Bilateral 08/06/2019    Procedure: BREAST TISSUE EXPANDER PLACEMENT;  Surgeon: Parminder Butler MD;  [facility];  Service: Plastics    REDUCTION MAMMAPLASTY      REVISION OF SCAR Bilateral 11/10/2021    Procedure: SCAR REVISION BILATERAL ARMS;  Surgeon: Parminder Butler MD;  [facility];  Service: Plastics    SKIN LESION EXCISION Left 9/19/2024    Procedure: LEFT BREAST SCAR EXCISION WITH COMPLEX CLOSURE;  Surgeon: Nolberto Winters MD;  [facility];  Service: Plastics    TRANSFER MUSCLE PECTORALIS Bilateral 08/06/2019    Procedure: PECTORALIS MUSCLE REPAIR;  Surgeon: Parminder Butler MD;  [facility];  Service: Plastics    US GUIDANCE BREAST BIOPSY RIGHT EACH ADDITIONAL Right 12/22/2023    US GUIDED BREAST BIOPSY RIGHT COMPLETE Right 12/22/2023   [3]   Family History  Problem Relation Name Age  of Onset    No Known Problems Mother      Diabetes Father      Heart disease Father      No Known Problems Brother     [4]   Social History  Tobacco Use    Smoking status: Never    Smokeless tobacco: Never   Vaping Use    Vaping status: Never Used   Substance Use Topics    Alcohol use: Not Currently    Drug use: Never   [5]   Current Outpatient Medications:     acetaminophen (TYLENOL) 500 mg tablet, Take 1 tablet (500 mg total) by mouth every 4 (four) hours as needed for mild pain or moderate pain, Disp: 30 tablet, Rfl: 2    alendronate (FOSAMAX) 70 mg tablet, every 7 days Weekly on saturdays, Disp: , Rfl:     chlorhexidine (PERIDEX) 0.12 % solution, in the morning and in the evening., Disp: , Rfl:     triamcinolone (KENALOG) 0.5 % cream, Apply topically as needed, Disp: , Rfl:     docusate sodium (COLACE) 100 mg capsule, Take 1 capsule (100 mg total) by mouth 2 (two) times a day, Disp: 30 capsule, Rfl: 0    LORazepam (ATIVAN) 1 mg tablet, Take 1 tablet (1 mg total) by mouth 1 (one) time for 1 dose Take 30 minutes prior to EMG/NCS, Disp: 1 tablet, Rfl: 0    ondansetron (ZOFRAN) 4 mg tablet, Take 1 tablet (4 mg total) by mouth every 8 (eight) hours as needed for nausea or vomiting, Disp: 20 tablet, Rfl: 0  [6]   Allergies  Allergen Reactions    Accolate [Zafirlukast] Itching    Penicillins Anaphylaxis     Unconsciousness, and bronchoconstriction      Shellfish Allergy - Food Allergy Anaphylaxis    Shellfish-Derived Products - Food Allergy Anaphylaxis    Singulair [Montelukast] Itching    Pork-Derived Products - Food Allergy Hives    Medical Tape Rash     P/S specific surgical tape used at last procedure --- ok w/ paper tape

## 2025-06-04 NOTE — PROGRESS NOTES
Karen Schrader M.D.  Attending, Orthopaedic Surgery  Hand, Wrist, and Elbow Surgery  [facility]      ORTHOPAEDIC HAND, WRIST, AND ELBOW OFFICE  VISIT       ASSESSMENT/PLAN:        Assessment & Plan  Cubital tunnel syndrome on left  Ultrasound of the left cubital tunnel demonstrates evidence of ulnar nerve compression without subluxation  We discussed treatment options, patient wishes to proceed with cubital tunnel release on the side as well as a likely left carpal tunnel revision.  Risk benefits and alternatives to surgery were discussed and informed electronic consent was signed in office today for left cubital tunnel release with possible transposition and left revision carpal tunnel release  Postoperative protocol expectations were reviewed and all questions answered to the best of our ability  Follow-up 10 to 14 days following surgery for postop evaluation and suture removal  OF NOTE she has a posterior surgical scar at the upper arm from a brachioplasty, this should not affect the surgical site however. She is also a limb alert on the right which will affect IV site for anesthesia. Patient had a great anesthesia experience at her surgery    PLAN: LEFT cubital tunnel release w/possible anterior transposition and pedicle based adipose flap, LEFT revision carpal tunnel release (psb)    Orders:    Case request operating room: RELEASE CUBITAL TUNNEL, RELEASE CARPAL TUNNEL REVISION; Standing    Carpal tunnel syndrome on left  Ultrasound of the left carpal tunnel was reviewed.  Patient had suspicion of carpal tunnel syndrome based on cross-sectional area of the nerve but was without significant hypervascularity.  On exam she does have positive Phalen's test and notes intermittent median nerve paresthesias in addition to her ulnar nerve symptoms.  We discussed revision surgery versus observation, ultimately decision was made to proceed with a left carpal tunnel revision at the same time as  her cubital tunnel release.  Risk benefits were reviewed and informed electronic consent was signed today    Orders:    Case request operating room: RELEASE CUBITAL TUNNEL, RELEASE CARPAL TUNNEL REVISION; Standing    Carpal tunnel syndrome on right  Status post revision carpal tunnel release on 5/20/2025  Sutures removed today, wound is healing well with no evidence of infection  Begin scar massage  Resume activities as tolerated  Patient notes her symptoms have nearly completely resolved following surgery                The patient verbalized understanding of exam findings and treatment plan. We engaged in the shared decision-making process and treatment options were discussed at length with the patient. Surgical and conservative management discussed today along with risks and benefits.      Follow Up:  Return for After Surgery.    To Do Next Visit:  Re-evaluation of current issue and Sutures out      Operative Discussions:  Cubital Tunnel Release:   The anatomy and physiology of cubital tunnel syndrome were discussed with the patient today in the office.  Typically, increased elbow flexion activities decrease blood flow within the intraneural spaces, resulting in a feeling of numbness, tingling, weakness, or clumsiness within the hand and fingers.  Occasionally, anatomic structures such as medial elbow osteophytes, the medial head of the triceps, were subluxing ulnar nerve may result in increased pressure or aggravation at the cubital tunnel.  Typical signs and symptoms usually include numbness and tingling within the ring and small finger, weakness with , and weakness with pinch.  Conservative treatment and includes nocturnal bracing to keep the elbow in a semi-extended position, activity modification, therapy, and avoiding excessive elbow flexion activities.  A majority of patients typically respond to conservative treatment over a period of approximately 3-6 months.  Vitamin B6 one tablet daily over the  counter may helpful to reduce symptoms.  EMG/NCV testing of the ulnar nerve at the elbow is not as reliable as carpal tunnel syndrome.  Surgical intervention in the form of in situ release of the ulnar nerve at the elbow or ulnar nerve transposition may be required in up to 20% of patients. The patient has elected to undergo cubital tunnel release.  The possibility of converting to a subcutaneous or submuscular ulnar nerve transposition depending on the nerve stability was discussed with the patient.  Typically, in the postoperative period, light activities are allowed immediately, driving is allowed when narcotic medications have stopped, and the incision may get wet after 5 days.  Heavy activities will be allowed after follow up appointment in 1-2 weeks.  While the pain within the ring and small finger of the hands generally improves rapidly, the numbness and tingling, as well as the strength, will slowly improve over a period of weeks to months.  Total recovery can take up to 18 months from the time of surgery.  Numbness and tingling near the incision, or near the medial aspect of the forearm was discussed with the patient.  The patient has an understanding of the above mentioned discussion. The risks and benefits of the procedure were explained to the patient, which include, but are not limited to: Bleeding, infection, recurrence, pain, scar, damage to tendons, damage to nerves, and damage to blood vessels, failure to give desired results and complications related to anesthesia.  These risks, along with alternative conservative treatment options, and postoperative protocols were voiced back and understood by the patient.  All questions were answered to the patient's satisfaction.  The patient agrees to comply with a standard postoperative protocol, and is willing to proceed.  Education was provided via written and auditory forms.  There were no barriers to learning. Written handouts regarding wound care,  incision and scar care, and general preoperative information was provided to the patient.  Prior to surgery, the patient may be requested to stop all anti-inflammatory medications.  Prophylactic aspirin, Plavix, and Coumadin may be allowed to be continued.  Medications including vitamin E., ginkgo, and fish oil are requested to be stopped approximately one week prior to surgery.  Hypertensive medications and beta blockers, if taken, should be continued. Vitamin B6 one tablet daily over the counter may helpful to reduce symptoms.   and Open Carpal Tunnel Release:   The anatomy and physiology of carpal tunnel syndrome was discussed with the patient today.  Increase pressure localized under the transverse carpal ligament can cause pain, numbness, tingling, or dysesthesias within the median nerve distribution as well as feelings of fatigue, clumsiness, or awkwardness.  These symptoms typically occur at night and worse in the morning upon waking.  Eventually, untreated carpal tunnel syndrome can result in weakness and permanent loss of muscle within the thenar compartment of the hand.  Treatment options were discussed with the patient.  Conservative treatment includes nocturnal resting splints to keep the nerve in a neutral position, ergonomic changes within the work or home environment, activity modification, and tendon gliding exercises.  Vitamin B6 one tablet daily over the counter may helpful to reduce symptoms.  Steroid injections within the carpal canal can help a majority of patients, however this is often self-limited in a majority of patients.  Surgical intervention to divide the transverse carpal ligament typically results in a long-lasting relief of the patient's complaints, with the recurrence rate of less than 1%. The patient has elected to undergo an open carpal tunnel release.  The palmar incision technique was discussed in the office with the patient today.   In the postoperative period, light activities  are allowed immediately, driving is allowed when narcotic medication has stopped, and the bandages may be removed and incision may get wet after 2 days.  Heavy activities (lifting more than approximately 10 pounds) will be allowed after follow up appointment in 1-2 weeks.  While night symptoms (waking from sleep, pain, and discomfort in the hands) generally improves rapidly, the numbness and tingling as well as the strength will slowly improve over weeks to months depending on the chronicity and severity of the carpal tunnel syndrome.  Pillar pain and scar discomfort were discussed with the patient which are self-limiting conditions.  The risks of bleeding and infection from the surgery are less than 1%.  Risk of recurrence is approximately 0.5%.  The risks of nerve injury or nerve damage or damage to the blood vessels is approximately 1 in 1200. The patient has an understanding of the above mentioned discussion.The risks and benefits of the procedure were explained to the patient, which include, but are not limited to: Bleeding, infection, recurrence, pain, scar, damage to tendons, damage to nerves, and damage to blood vessels, failure to give desired results and complications related to anesthesia.  These risks, along with alternative conservative treatment options, and postoperative protocols were voiced back and understood by the patient.  All questions were answered to the patient's satisfaction.  The patient agrees to comply with a standard postoperative protocol, and is willing to proceed.  Education was provided via written and auditory forms.  There were no barriers to learning. Written handouts regarding wound care, incision and scar care, and general preoperative information was provided to the patient.  Prior to surgery, the patient may be requested to stop all anti-inflammatory medications.  Prophylactic aspirin, Plavix, and Coumadin may be allowed to be continued.  Medications including vitamin E.,  ginkgo, and fish oil are requested to be stopped approximately one week prior to surgery.  Hypertensive medications and beta blockers, if taken, s      ____________________________________________________________________________________________________________________________________________      CHIEF COMPLAINT:  No chief complaint on file.      SUBJECTIVE:  Dania Ayala is a 55 y.o. year old RHD female who presents for follow up evaluation following a right revision carpal tunnel release.  Patient reports that hand is feeling overall very well.  Her sensation has improved significantly and the hand no longer cramps up.  She denies any postop complications or issues following the surgery.  Patient would like to proceed with contralateral carpal tunnel revision and cubital tunnel release     I have personally reviewed all the relevant PMH, PSH, SH, FH, Medications and allergies      PAST MEDICAL HISTORY:  Past Medical History[1]    PAST SURGICAL HISTORY:  Past Surgical History[2]    FAMILY HISTORY:  Family History[3]    SOCIAL HISTORY:  Social History[4]    MEDICATIONS:  Current Medications[5]    ALLERGIES:  Allergies[6]        REVIEW OF SYSTEMS:  Review of Systems   Constitutional:  Negative for chills and fever.   HENT:  Negative for ear pain and sore throat.    Eyes:  Negative for pain and visual disturbance.   Respiratory:  Negative for cough and shortness of breath.    Cardiovascular:  Negative for chest pain and palpitations.   Gastrointestinal:  Negative for abdominal pain and vomiting.   Genitourinary:  Negative for dysuria and hematuria.   Musculoskeletal:  Negative for arthralgias and back pain.   Skin:  Negative for color change and rash.   Neurological:  Positive for numbness. Negative for seizures and syncope.   All other systems reviewed and are negative.      VITALS:  There were no vitals filed for this visit.    LABS:      _____________________________________________________  PHYSICAL  "EXAMINATION:  General: well developed and well nourished, alert, oriented times 3, and appears comfortable  Psychiatric: Normal  HEENT: Normocephalic, Atraumatic Trachea Midline, No torticollis  Pulmonary: No audible wheezing or respiratory distress   Abdomen/GI: Non tender, non distended   Cardiovascular: No pitting edema, 2+ radial pulse   Skin: No masses, erythema, lacerations, fluctation, ulcerations  Neurovascular: Sensation Intact to the Median, Ulnar, Radial Nerve, Motor Intact to the Median, Ulnar, Radial Nerve, and Pulses Intact  Musculoskeletal: Normal, except as noted in detailed exam and in HPI.      MUSCULOSKELETAL EXAMINATION:    Right hand:   Surgical site is healing well with no evidence of infection.  Sutures removed today, wound edges remained well-approximated.  No surrounding erythema or significant tenderness.  Patient demonstrates full composite fist and opposition.  NV intact.    Left Carpal Tunnel Exam:    Negative thenar atrophy. Positive phalen's test. Positive carpal tunnel compression. Negative tinels over median nerve at the wrist.  Opposition strength 5/5.  Abduction strength 5/5.  Healed surgical incision from endoscopic CTR     Posterior scar at the proximal arm from cosmetic surgery.     ___________________________________________________  STUDIES REVIEWED:    I have personally reviewed and interpreted  US of the left carpal and cubital tunnel which demonstrate evidence of carpal tunnel based on CSA but without hypervascularity, evidence of cubital tunnel syndrome      LABS REVIEWED:    HgA1c: No results found for: \"HGBA1C\"  BMP:   Lab Results   Component Value Date    CALCIUM 8.7 02/28/2025    K 4.3 02/28/2025    CO2 25 02/28/2025     02/28/2025    BUN 12 02/28/2025    CREATININE 0.57 (L) 02/28/2025               PROCEDURES PERFORMED:  Procedures  No Procedures performed today    _____________________________________________________             [1]   Past Medical " History:  Diagnosis Date    Anesthesia complication     needs IR preprocedure for IV access/not ASC candidate    Anxiety     while lying flat    Breast cancer (HCC)     RIGHT    Breast cancer (HCC)     Breast cancer, right breast (HCC)     Cancer (HCC)     Carpal tunnel syndrome on right 05/20/2025    Chronic pain disorder     GERD (gastroesophageal reflux disease)     Heartburn     Hiatal hernia     History of bariatric surgery     Irregular heart beat     pt states it was a side effect of a medication    Kidney stone     passed    Lung nodule     Osteoporosis     stage 3    Postsurgical malabsorption     RA (rheumatoid arthritis) (HCC)     Rheumatoid arthritis (HCC)     Sleep apnea     does not use CPAP    Stress incontinence     Thyroid nodule    [2]   Past Surgical History:  Procedure Laterality Date    ABDOMINAL SURGERY      BRACHIOPLASTY Bilateral     2017    BREAST IMPLANT Bilateral 09/08/2020    Procedure: BREAST REMOVAL TISSUE EXPANDER/ IMPLANT PLACEMENT BREAST;  Surgeon: Parminder Butler MD;  [facility];  Service: Plastics    BREAST IMPLANT Left 02/09/2021    Procedure: BREAST TISSUE EXPANDER/ IMPLANT EXCHANGE;  Surgeon: Parminder Butler MD;  [facility];  Service: Plastics    BREAST IMPLANT Right 9/19/2024    Procedure: RIGHT BREAST IMPLANT EXCHANGE;  Surgeon: Nolberto Winters MD;  [facility];  Service: Plastics    BREAST LUMPECTOMY  04/2013    CAPSULOTOMY Bilateral 09/08/2020    Procedure: BREAST CAPSULOTOMY;  Surgeon: Parminder Butler MD;  [facility];  Service: Plastics    CAPSULOTOMY Left 02/09/2021    Procedure: BREAST CAPSULOTOMY;  Surgeon: Parminder Butler MD;  [facility];  Service: Plastics    CAPSULOTOMY Right 3/17/2025    Procedure: CAPSULECTOMY;  Surgeon: Nolberto Winters MD;  [facility];  Service: Plastics    CARDIAC CATHETERIZATION      CARPAL TUNNEL RELEASE Bilateral 2015    CHOLECYSTECTOMY       COLONOSCOPY      FL RETROGRADE PYELOGRAM  08/08/2023    GALLBLADDER SURGERY  2015    GASTRIC BYPASS  2014    HYSTERECTOMY  05/2013    INCONTINENCE SURGERY  09/2013    IR BIOPSY LUNG      lung nodules    LIPOSUCTION Right 04/12/2022    Procedure: LIPOSUCTION RIGHT BREAST;  Surgeon: Nolberto Winters MD;  [facility];  Service: Plastics    LIPOSUCTION W/ FAT INJECTION Bilateral 11/10/2021    Procedure: FAT GRAFTING BILATERAL BREAST;  Surgeon: Parminder Butler MD;  [facility];  Service: Plastics    MASTECTOMY Bilateral 10/2016    MS BREAST RECONSTRUCTION W/LATISSIMUS DORSI FLAP Right 08/06/2019    Procedure: BREAST RECONSTRUCTION W/FLAP LATISSIMUS DORSI;  Surgeon: Parminder Butler MD;  [facility];  Service: Plastics    MS CYSTO/URETERO W/LITHOTRIPSY &INDWELL STENT INSRT Right 08/08/2023    Procedure: CYSTOSCOPY URETEROSCOPY, RETROGRADE PYELOGRAM AND INSERTION STENT URETERAL;  Surgeon: Gabriel Rene MD;  [facility];  Service: Urology    MS GRAFTING OF AUTOLOGOUS FAT BY LIPO 50 CC OR LESS Bilateral 01/13/2023    Procedure: BILATERAL BREAST FAT GRAFTING;  Surgeon: Nolberto Winters MD;  [facility];  Service: Plastics    MS GRAFTING OF AUTOLOGOUS FAT BY LIPO 50 CC OR LESS Bilateral 06/13/2023    Procedure: FAT GRAFTING TO BILATERAL BREASTS;  Surgeon: Nolberto Winters MD;  [facility];  Service: Plastics    MS INSERTION BREAST IMPLANT SAME DAY OF MASTECTOMY Right 3/17/2025    Procedure: INSERTION/PLACEMENT IMPLANT BREAST (EXCHANGE) UNILATERAL;  Surgeon: Nolberto Winters MD;  [facility];  Service: Plastics    MS NEUROPLASTY &/TRANSPOS MEDIAN NRV CARPAL TUNNE Right 5/20/2025    Procedure: RELEASE CARPAL TUNNEL - mini open, revision;  Surgeon: Karen Schrader MD;  [facility];  Service: Orthopedics    MS DOMINGA-IMPLANT CAPSULECTOMY BREAST COMPLETE Bilateral 08/06/2019    Procedure: BREAST CAPSULECTOMY;  Surgeon: Parminder CHAVEZ  Yandel Butler MD;  [facility];  Service: Plastics    FL REMOVAL INTACT BREAST IMPLANT Bilateral 08/06/2019    Procedure: BREAST IMPLANT REMOVAL;  Surgeon: Parmidner Butler MD;  [facility];  Service: Plastics    FL REMOVAL INTACT BREAST IMPLANT Right 04/12/2022    Procedure: removal of right breast implant, capsulectomy, placement of right breast implant with acelluar dermal matrix.;  Surgeon: Nolberto Winters MD;  [facility];  Service: Plastics    FL REVISION OF RECONSTRUCTED BREAST Right 09/07/2021    Procedure: RIGHT BREAST MOUND REVISION;  Surgeon: Parminder Butler MD;  [facility];  Service: Plastics    FL REVISION OF RECONSTRUCTED BREAST Right 9/19/2024    Procedure: REVISION OF RECONSTRUCTED BREAST.;  Surgeon: Nolberto Winters MD;  [facility];  Service: Plastics    FL REVISION OF RECONSTRUCTED BREAST Right 3/17/2025    Procedure: REVISION OF RIGHT RECONSTRUCTED BREAST;  Surgeon: Nolberto Winters MD;  [facility];  Service: Plastics    FL TISSUE EXPANDER PLACEMENT BREAST RECONSTRUCTION Bilateral 08/06/2019    Procedure: BREAST TISSUE EXPANDER PLACEMENT;  Surgeon: Parminder Butler MD;  [facility];  Service: Plastics    REDUCTION MAMMAPLASTY      REVISION OF SCAR Bilateral 11/10/2021    Procedure: SCAR REVISION BILATERAL ARMS;  Surgeon: Parminder Butler MD;  [facility];  Service: Plastics    SKIN LESION EXCISION Left 9/19/2024    Procedure: LEFT BREAST SCAR EXCISION WITH COMPLEX CLOSURE;  Surgeon: Nolberto Winters MD;  [facility];  Service: Plastics    TRANSFER MUSCLE PECTORALIS Bilateral 08/06/2019    Procedure: PECTORALIS MUSCLE REPAIR;  Surgeon: Parminder Butler MD;  [facility];  Service: Plastics    US GUIDANCE BREAST BIOPSY RIGHT EACH ADDITIONAL Right 12/22/2023    US GUIDED BREAST BIOPSY RIGHT COMPLETE Right 12/22/2023   [3]   Family History  Problem Relation Name Age  of Onset    No Known Problems Mother      Diabetes Father      Heart disease Father      No Known Problems Brother     [4]   Social History  Tobacco Use    Smoking status: Never    Smokeless tobacco: Never   Vaping Use    Vaping status: Never Used   Substance Use Topics    Alcohol use: Not Currently    Drug use: Never   [5]   Current Outpatient Medications:     acetaminophen (TYLENOL) 500 mg tablet, Take 1 tablet (500 mg total) by mouth every 4 (four) hours as needed for mild pain or moderate pain, Disp: 30 tablet, Rfl: 2    alendronate (FOSAMAX) 70 mg tablet, every 7 days Weekly on saturdays, Disp: , Rfl:     chlorhexidine (PERIDEX) 0.12 % solution, in the morning and in the evening., Disp: , Rfl:     triamcinolone (KENALOG) 0.5 % cream, Apply topically as needed, Disp: , Rfl:     docusate sodium (COLACE) 100 mg capsule, Take 1 capsule (100 mg total) by mouth 2 (two) times a day, Disp: 30 capsule, Rfl: 0    LORazepam (ATIVAN) 1 mg tablet, Take 1 tablet (1 mg total) by mouth 1 (one) time for 1 dose Take 30 minutes prior to EMG/NCS, Disp: 1 tablet, Rfl: 0    ondansetron (ZOFRAN) 4 mg tablet, Take 1 tablet (4 mg total) by mouth every 8 (eight) hours as needed for nausea or vomiting, Disp: 20 tablet, Rfl: 0  [6]   Allergies  Allergen Reactions    Accolate [Zafirlukast] Itching    Penicillins Anaphylaxis     Unconsciousness, and bronchoconstriction      Shellfish Allergy - Food Allergy Anaphylaxis    Shellfish-Derived Products - Food Allergy Anaphylaxis    Singulair [Montelukast] Itching    Pork-Derived Products - Food Allergy Hives    Medical Tape Rash     P/S specific surgical tape used at last procedure --- ok w/ paper tape

## 2025-06-11 ENCOUNTER — OFFICE VISIT (OUTPATIENT)
Dept: PLASTIC SURGERY | Facility: CLINIC | Age: 55
End: 2025-06-11

## 2025-06-11 DIAGNOSIS — Z90.13 HISTORY OF BILATERAL MASTECTOMY: Primary | ICD-10-CM

## 2025-06-11 DIAGNOSIS — Z98.890 HISTORY OF BREAST RECONSTRUCTION: ICD-10-CM

## 2025-06-11 PROCEDURE — 99024 POSTOP FOLLOW-UP VISIT: CPT | Performed by: STUDENT IN AN ORGANIZED HEALTH CARE EDUCATION/TRAINING PROGRAM

## 2025-06-11 NOTE — PROGRESS NOTES
PRS Note    Follow-up after revision of right breast reconstruction    She has been doing well, no concerns.    She is happy with reconstruction.     She is interested in nipple reconstruction via skin graft.    I had discussion with her and recommended not performing skin graft due to history of radiation to the right breast and possible chronic open wound with failed skin graft. Patient acknowledged. Instead, I directed her to tattoo artists for nipple areolar tattooing. List was given.    Follow-up PRREJI Winters MD   [facility]   [address] [place] [address], [address]   [place] [address]   Office: [phone]

## 2025-06-24 ENCOUNTER — TELEPHONE (OUTPATIENT)
Dept: SURGICAL ONCOLOGY | Facility: CLINIC | Age: 55
End: 2025-06-24

## 2025-06-24 NOTE — TELEPHONE ENCOUNTER
Spoke with patient regarding follow up visit with Dr. Dang on 6/30/25. [facility] Will cancel upcoming appointment with Dr. Dang.

## 2025-08-04 ENCOUNTER — ANESTHESIA EVENT (OUTPATIENT)
Dept: ANESTHESIOLOGY | Facility: HOSPITAL | Age: 55
End: 2025-08-04

## 2025-08-04 ENCOUNTER — ANESTHESIA (OUTPATIENT)
Dept: ANESTHESIOLOGY | Facility: HOSPITAL | Age: 55
End: 2025-08-04

## 2025-08-05 ENCOUNTER — ANESTHESIA EVENT (OUTPATIENT)
Dept: PERIOP | Facility: HOSPITAL | Age: 55
End: 2025-08-05
Payer: MEDICARE

## 2025-08-05 RX ORDER — CHLORHEXIDINE GLUCONATE ORAL RINSE 1.2 MG/ML
15 SOLUTION DENTAL 2 TIMES DAILY
COMMUNITY

## 2025-08-18 PROCEDURE — NC001 PR NO CHARGE: Performed by: SURGERY

## 2025-08-19 ENCOUNTER — HOSPITAL ENCOUNTER (OUTPATIENT)
Facility: HOSPITAL | Age: 55
Setting detail: OUTPATIENT SURGERY
Discharge: HOME | End: 2025-08-19
Attending: SURGERY | Admitting: SURGERY
Payer: MEDICARE

## 2025-08-19 ENCOUNTER — ANESTHESIA (OUTPATIENT)
Dept: PERIOP | Facility: HOSPITAL | Age: 55
End: 2025-08-19
Payer: MEDICARE

## 2025-08-19 VITALS
TEMPERATURE: 97.8 F | BODY MASS INDEX: 44.57 KG/M2 | RESPIRATION RATE: 18 BRPM | WEIGHT: 227 LBS | DIASTOLIC BLOOD PRESSURE: 70 MMHG | HEIGHT: 60 IN | HEART RATE: 98 BPM | SYSTOLIC BLOOD PRESSURE: 160 MMHG | OXYGEN SATURATION: 97 %

## 2025-08-19 DIAGNOSIS — G56.22 CUBITAL TUNNEL SYNDROME ON LEFT: Primary | ICD-10-CM

## 2025-08-19 DIAGNOSIS — G56.02 CARPAL TUNNEL SYNDROME ON LEFT: ICD-10-CM

## 2025-08-19 DIAGNOSIS — Z47.89 AFTERCARE FOLLOWING SURGERY OF THE MUSCULOSKELETAL SYSTEM: ICD-10-CM

## 2025-08-19 PROCEDURE — 64718 REVISE ULNAR NERVE AT ELBOW: CPT | Performed by: PHYSICIAN ASSISTANT

## 2025-08-19 PROCEDURE — 64721 CARPAL TUNNEL SURGERY: CPT | Performed by: PHYSICIAN ASSISTANT

## 2025-08-19 PROCEDURE — 64718 REVISE ULNAR NERVE AT ELBOW: CPT | Performed by: SURGERY

## 2025-08-19 PROCEDURE — 64721 CARPAL TUNNEL SURGERY: CPT | Performed by: SURGERY

## 2025-08-19 RX ORDER — CHLORHEXIDINE GLUCONATE ORAL RINSE 1.2 MG/ML
15 SOLUTION DENTAL ONCE
Status: DISCONTINUED | OUTPATIENT
Start: 2025-08-19 | End: 2025-08-19 | Stop reason: HOSPADM

## 2025-08-19 RX ORDER — SODIUM CHLORIDE, SODIUM LACTATE, POTASSIUM CHLORIDE, CALCIUM CHLORIDE 600; 310; 30; 20 MG/100ML; MG/100ML; MG/100ML; MG/100ML
INJECTION, SOLUTION INTRAVENOUS CONTINUOUS PRN
Status: DISCONTINUED | OUTPATIENT
Start: 2025-08-19 | End: 2025-08-19

## 2025-08-19 RX ORDER — KETAMINE HCL IN NACL, ISO-OSM 100MG/10ML
SYRINGE (ML) INJECTION AS NEEDED
Status: DISCONTINUED | OUTPATIENT
Start: 2025-08-19 | End: 2025-08-19

## 2025-08-19 RX ORDER — LABETALOL HYDROCHLORIDE 5 MG/ML
5 INJECTION, SOLUTION INTRAVENOUS ONCE
Status: COMPLETED | OUTPATIENT
Start: 2025-08-19 | End: 2025-08-19

## 2025-08-19 RX ORDER — PROPOFOL 10 MG/ML
INJECTION, EMULSION INTRAVENOUS CONTINUOUS PRN
Status: DISCONTINUED | OUTPATIENT
Start: 2025-08-19 | End: 2025-08-19

## 2025-08-19 RX ORDER — MIDAZOLAM HYDROCHLORIDE 2 MG/2ML
INJECTION, SOLUTION INTRAMUSCULAR; INTRAVENOUS AS NEEDED
Status: DISCONTINUED | OUTPATIENT
Start: 2025-08-19 | End: 2025-08-19

## 2025-08-19 RX ORDER — PROPOFOL 10 MG/ML
INJECTION, EMULSION INTRAVENOUS AS NEEDED
Status: DISCONTINUED | OUTPATIENT
Start: 2025-08-19 | End: 2025-08-19

## 2025-08-19 RX ORDER — OXYCODONE HYDROCHLORIDE 5 MG/1
5 TABLET ORAL EVERY 4 HOURS PRN
Refills: 0 | Status: DISCONTINUED | OUTPATIENT
Start: 2025-08-19 | End: 2025-08-19 | Stop reason: HOSPADM

## 2025-08-19 RX ORDER — BUPIVACAINE HYDROCHLORIDE 5 MG/ML
INJECTION, SOLUTION EPIDURAL; INTRACAUDAL; PERINEURAL
Status: COMPLETED | OUTPATIENT
Start: 2025-08-19 | End: 2025-08-19

## 2025-08-19 RX ORDER — MAGNESIUM HYDROXIDE 1200 MG/15ML
LIQUID ORAL AS NEEDED
Status: DISCONTINUED | OUTPATIENT
Start: 2025-08-19 | End: 2025-08-19 | Stop reason: HOSPADM

## 2025-08-19 RX ORDER — ONDANSETRON 2 MG/ML
4 INJECTION INTRAMUSCULAR; INTRAVENOUS ONCE AS NEEDED
Status: DISCONTINUED | OUTPATIENT
Start: 2025-08-19 | End: 2025-08-19 | Stop reason: HOSPADM

## 2025-08-19 RX ORDER — HYDROMORPHONE HCL IN WATER/PF 6 MG/30 ML
0.2 PATIENT CONTROLLED ANALGESIA SYRINGE INTRAVENOUS
Status: DISCONTINUED | OUTPATIENT
Start: 2025-08-19 | End: 2025-08-19 | Stop reason: HOSPADM

## 2025-08-19 RX ORDER — MIDAZOLAM HYDROCHLORIDE 2 MG/2ML
INJECTION, SOLUTION INTRAMUSCULAR; INTRAVENOUS
Status: COMPLETED
Start: 2025-08-19 | End: 2025-08-19

## 2025-08-19 RX ORDER — OXYCODONE HYDROCHLORIDE 5 MG/1
5 TABLET ORAL EVERY 4 HOURS PRN
Qty: 10 TABLET | Refills: 0 | Status: SHIPPED | OUTPATIENT
Start: 2025-08-19 | End: 2025-08-29

## 2025-08-19 RX ORDER — SODIUM CHLORIDE, SODIUM LACTATE, POTASSIUM CHLORIDE, CALCIUM CHLORIDE 600; 310; 30; 20 MG/100ML; MG/100ML; MG/100ML; MG/100ML
50 INJECTION, SOLUTION INTRAVENOUS CONTINUOUS
Status: DISCONTINUED | OUTPATIENT
Start: 2025-08-19 | End: 2025-08-19 | Stop reason: HOSPADM

## 2025-08-19 RX ORDER — FENTANYL CITRATE/PF 50 MCG/ML
50 SYRINGE (ML) INJECTION
Status: DISCONTINUED | OUTPATIENT
Start: 2025-08-19 | End: 2025-08-19 | Stop reason: HOSPADM

## 2025-08-19 RX ORDER — ACETAMINOPHEN 10 MG/ML
1000 INJECTION, SOLUTION INTRAVENOUS ONCE
Status: COMPLETED | OUTPATIENT
Start: 2025-08-19 | End: 2025-08-19

## 2025-08-19 RX ORDER — SODIUM CHLORIDE 9 MG/ML
75 INJECTION, SOLUTION INTRAVENOUS CONTINUOUS
Status: DISCONTINUED | OUTPATIENT
Start: 2025-08-19 | End: 2025-08-19 | Stop reason: HOSPADM

## 2025-08-19 RX ORDER — CLINDAMYCIN PHOSPHATE 900 MG/50ML
900 INJECTION, SOLUTION INTRAVENOUS ONCE
Status: COMPLETED | OUTPATIENT
Start: 2025-08-19 | End: 2025-08-19

## 2025-08-19 RX ADMIN — LABETALOL HYDROCHLORIDE 5 MG: 5 INJECTION, SOLUTION INTRAVENOUS at 15:36

## 2025-08-19 RX ADMIN — PROPOFOL 30 MG: 10 INJECTION, EMULSION INTRAVENOUS at 12:54

## 2025-08-19 RX ADMIN — Medication 20 MG: at 12:52

## 2025-08-19 RX ADMIN — CLINDAMYCIN PHOSPHATE 900 MG: 900 INJECTION, SOLUTION INTRAVENOUS at 12:55

## 2025-08-19 RX ADMIN — MIDAZOLAM 4 MG: 1 INJECTION INTRAMUSCULAR; INTRAVENOUS at 12:05

## 2025-08-19 RX ADMIN — SODIUM CHLORIDE, SODIUM LACTATE, POTASSIUM CHLORIDE, AND CALCIUM CHLORIDE: .6; .31; .03; .02 INJECTION, SOLUTION INTRAVENOUS at 12:00

## 2025-08-19 RX ADMIN — PROPOFOL 70 MCG/KG/MIN: 10 INJECTION, EMULSION INTRAVENOUS at 12:54

## 2025-08-19 RX ADMIN — BUPIVACAINE HYDROCHLORIDE 30 ML: 5 INJECTION, SOLUTION EPIDURAL; INTRACAUDAL; PERINEURAL at 12:06

## 2025-08-19 RX ADMIN — ACETAMINOPHEN 1000 MG: 10 INJECTION INTRAVENOUS at 16:15

## 2025-08-19 RX ADMIN — OXYCODONE HYDROCHLORIDE 5 MG: 5 TABLET ORAL at 14:31

## 2025-08-22 ENCOUNTER — TELEPHONE (OUTPATIENT)
Dept: PLASTIC SURGERY | Facility: CLINIC | Age: 55
End: 2025-08-22

## (undated) DEVICE — NEEDLE SPINAL18G X 3.5 IN QUINCKE

## (undated) DEVICE — GLOVE SRG BIOGEL ECLIPSE 8.5

## (undated) DEVICE — SOLUTION BOWL: Brand: KENDALL

## (undated) DEVICE — INTENDED FOR TISSUE SEPARATION, AND OTHER PROCEDURES THAT REQUIRE A SHARP SURGICAL BLADE TO PUNCTURE OR CUT.: Brand: BARD-PARKER ® CARBON RIB-BACK BLADES

## (undated) DEVICE — MAGNETIC INSTRUMENT PAD 16" X 20"; LARGE; DISPOSABLE: Brand: CARDINAL HEALTH

## (undated) DEVICE — SYRINGE 30ML LL

## (undated) DEVICE — PLUMEPEN PRO 10FT

## (undated) DEVICE — PACK UNIVERSAL DRAPES SUB-Q ICD

## (undated) DEVICE — GLOVE INDICATOR PI UNDERGLOVE SZ 8.5 BLUE

## (undated) DEVICE — TUBING INFILTRATION SOFTOUCH PUMP

## (undated) DEVICE — JACKSON-PRATT 100CC BULB RESERVOIR: Brand: CARDINAL HEALTH

## (undated) DEVICE — PROXIMATE PLUS MD MULTI-DIRECTIONAL RELEASE SKIN STAPLERS CONTAINS 35 STAINLESS STEEL STAPLES APPROXIMATE CLOSED DIMENSIONS: 6.9MM X 3.9MM WIDE: Brand: PROXIMATE

## (undated) DEVICE — LIGHT GLOVE GREEN

## (undated) DEVICE — Device

## (undated) DEVICE — GUIDEWIRE STRGHT TIP 0.035 IN  SOLO PLUS

## (undated) DEVICE — SYRINGE 20ML LL

## (undated) DEVICE — CHLORAPREP HI-LITE 26ML ORANGE

## (undated) DEVICE — TUBING SUCTION 5MM X 12 FT

## (undated) DEVICE — BETHLEHEM UNIVERSAL BREAST PK: Brand: CARDINAL HEALTH

## (undated) DEVICE — GLOVE INDICATOR PI UNDERGLOVE SZ 7.5 BLUE

## (undated) DEVICE — 3M™ TEGADERM™ TRANSPARENT FILM DRESSING FRAME STYLE, 1624W, 2-3/8 IN X 2-3/4 IN (6 CM X 7 CM), 100/CT 4CT/CASE: Brand: 3M™ TEGADERM™

## (undated) DEVICE — SUT VICRYL 3-0 SH 27 IN J416H

## (undated) DEVICE — TUBING ASPIRATION LIPOSUCTION SET 12FT

## (undated) DEVICE — GLOVE SRG BIOGEL ECLIPSE 7.5

## (undated) DEVICE — INSULATED BLADE ELECTRODE;CAUTION: FOR MANUFACTURING, PROCESSING, OR REPACKING.: Brand: EDGE

## (undated) DEVICE — LIGHT HANDLE COVER SLEEVE DISP BLUE STELLAR

## (undated) DEVICE — SPECIMEN CONTAINER STERILE PEEL PACK

## (undated) DEVICE — HIGH PROFILE XTRA, GEL SIZER FOR USE WITH SHPX-755: Brand: MENTOR MEMORYGEL XTRA RESTERILIZABLE GEL SIZER

## (undated) DEVICE — COTTON BALLS: Brand: DEROYAL

## (undated) DEVICE — PROXIMATE SKIN STAPLERS (35 WIDE) CONTAINS 35 STAINLESS STEEL STAPLES (FIXED HEAD): Brand: PROXIMATE

## (undated) DEVICE — SYRINGE 50ML LL

## (undated) DEVICE — ACE WRAP 6 IN STERILE

## (undated) DEVICE — BAG FAT FILTRATION PUREGRAFT 850

## (undated) DEVICE — GLOVE SRG BIOGEL ECLIPSE 8

## (undated) DEVICE — BRA SURGICAL SZ LGE (36-39)

## (undated) DEVICE — 3000CC GUARDIAN II: Brand: GUARDIAN

## (undated) DEVICE — POV-IOD SOLUTION 4OZ BT

## (undated) DEVICE — ADHESIVE SKIN CLOSURE SYS EXOFIN FUSION 22CM

## (undated) DEVICE — EXOFIN FUSION SKIN CLOSURE SYSTEM, 30CM: Brand: EXOFIN FUSION SKIN CLOSURE SYSTEM, 30CM

## (undated) DEVICE — MAYO STAND COVER: Brand: CONVERTORS

## (undated) DEVICE — ABDOMINAL PAD: Brand: DERMACEA

## (undated) DEVICE — 3M™ STERI-STRIP™ REINFORCED ADHESIVE SKIN CLOSURES, R1547, 1/2 IN X 4 IN (12 MM X 100 MM), 6 STRIPS/ENVELOPE: Brand: 3M™ STERI-STRIP™

## (undated) DEVICE — STERILE MUSCLE FLAP PACK: Brand: CARDINAL HEALTH

## (undated) DEVICE — GLOVE SRG LF STRL BGL SKNSNS 6.5 PF

## (undated) DEVICE — NEEDLE 25G X 1 1/2

## (undated) DEVICE — DRAPE SHEET THREE QUARTER

## (undated) DEVICE — GLOVE SRG BIOGEL 6.5

## (undated) DEVICE — NEPTUNE E-SEP SMOKE EVACUATION PENCIL, COATED, 70MM BLADE, PUSH BUTTON SWITCH: Brand: NEPTUNE E-SEP

## (undated) DEVICE — INTENDED FOR TISSUE SEPARATION, AND OTHER PROCEDURES THAT REQUIRE A SHARP SURGICAL BLADE TO PUNCTURE OR CUT.: Brand: BARD-PARKER SAFETY BLADES SIZE 15, STERILE

## (undated) DEVICE — GAUZE SPONGES,16 PLY: Brand: CURITY

## (undated) DEVICE — ADHESIVE SKIN HIGH VISCOSITY EXOFIN 1ML

## (undated) DEVICE — DISPOSABLE EQUIPMENT COVER: Brand: SMALL TOWEL DRAPE

## (undated) DEVICE — SUT MERSILENE 4-0 P-3 18 IN R691G

## (undated) DEVICE — TUBING LIPOSUCTION ASPIRATION 12FT STERILE

## (undated) DEVICE — STAPLER INSORB SUBCUTICULAR 30 SINGLE USE

## (undated) DEVICE — LINER SUCTION CANISTER 2000ML DISP

## (undated) DEVICE — BULB SYRINGE,IRRIGATION WITH PROTECTIVE CAP: Brand: DOVER

## (undated) DEVICE — ELECTRODE BLADE MOD E-Z CLEAN  2.75IN 7CM -0012AM

## (undated) DEVICE — SKIN MARKER DUAL TIP WITH RULER CAP, FLEXIBLE RULER AND LABELS: Brand: DEVON

## (undated) DEVICE — BASIC SINGLE BASIN 2-LF: Brand: MEDLINE INDUSTRIES, INC.

## (undated) DEVICE — HIGH PROFILE XTRA, GEL SIZER FOR USE WITH SHPX-450: Brand: MENTOR MEMORYGEL XTRA RESTERILIZABLE GEL SIZER

## (undated) DEVICE — SUT NYLON 5-0 P-3 18 IN 690G

## (undated) DEVICE — TRAY FOLEY 16FR SURESTEP TEMP SENS URIMETER STAT LOK

## (undated) DEVICE — LAPAROTOMY SPONGE - RF AND X-RAY DETECTABLE PRE-WASHED: Brand: SITUATE

## (undated) DEVICE — NEEDLE 23 G X 1 SAFETY

## (undated) DEVICE — MEDI-VAC YANK SUCT HNDL W/TPRD BULBOUS TIP: Brand: CARDINAL HEALTH

## (undated) DEVICE — 450 ML BOTTLE OF 0.05% CHLORHEXIDINE GLUCONATE IN 99.95% STERILE WATER FOR IRRIGATION, USP AND APPLICATOR.: Brand: IRRISEPT ANTIMICROBIAL WOUND LAVAGE

## (undated) DEVICE — DISPOSABLE OR TOWEL: Brand: CARDINAL HEALTH

## (undated) DEVICE — CYSTO TUBING SINGLE IRRIGATION

## (undated) DEVICE — BIPOLAR CORD DISP

## (undated) DEVICE — TIBURON SPLIT SHEET: Brand: CONVERTORS

## (undated) DEVICE — SPONGE LAP 18 X 18 IN STRL RFD

## (undated) DEVICE — JP CHAN DRN SIL HUBLESS 15FR W/TRO: Brand: CARDINAL HEALTH

## (undated) DEVICE — SPONGE STICK WITH PVP-I: Brand: KENDALL

## (undated) DEVICE — SPONGE 4 X 4 XRAY 16 PLY STRL LF RFD

## (undated) DEVICE — SYRINGE 5ML LL

## (undated) DEVICE — SUT MONOCRYL 4-0 PS-2 18 IN Y496G

## (undated) DEVICE — SUT PDS II 0 CT-1 27 IN Z340H

## (undated) DEVICE — ELECTRODE BLADE MOD  E-Z CLEAN 6.5IN -0014M

## (undated) DEVICE — SUT SILK 3-0 SH 30 IN K832H

## (undated) DEVICE — DRESSING MEPILEX AG BORDER 4 X 4 IN

## (undated) DEVICE — GLOVE SRG BIOGEL 7

## (undated) DEVICE — ANTIBACTERIAL UNDYED BRAIDED (POLYGLACTIN 910), SYNTHETIC ABSORBABLE SURGICAL SUTURE: Brand: COATED VICRYL

## (undated) DEVICE — CATH URETERAL 5FR X 70 CM FLEX TIP POLYUR BARD

## (undated) DEVICE — JP CHANNEL DRAIN 19FR, FULL FLUTES: Brand: JACKSON-PRATT

## (undated) DEVICE — DRAIN SPONGES,6 PLY: Brand: EXCILON

## (undated) DEVICE — SUT ETHILON 3-0 FS-1 18 IN 663G

## (undated) DEVICE — ELECTRODE BLADE MOD E-Z CLEAN 2.5IN 6.4CM -0012M

## (undated) DEVICE — SUT MONOCRYL 5-0 P-3 18 IN Y493G

## (undated) DEVICE — 60 ML SYRINGE,REGULAR TIP: Brand: MONOJECT

## (undated) DEVICE — SLIM BODY SKIN STAPLER: Brand: APPOSE ULC

## (undated) DEVICE — LIPOSUCTION MACHINE DISP LINER/LID

## (undated) DEVICE — DRESSING BIOPATCH ANTIMICROBIAL 1 IN DISC

## (undated) DEVICE — OCCLUSIVE GAUZE STRIP,3% BISMUTH TRIBROMOPHENATE IN PETROLATUM BLEND: Brand: XEROFORM

## (undated) DEVICE — TUBE CONNECTING PSI-TEC FULTER

## (undated) DEVICE — BULB SYRINGE, IRRIGATION WITH PROTECTIVE CAP, 60 CC, INDIVIDUALLY WRAPPED: Brand: DOVER

## (undated) DEVICE — BETHLEHEM UNIVERSAL MINOR GEN: Brand: CARDINAL HEALTH

## (undated) DEVICE — DRESSING MEPILEX AG BORDER 4 X 8 IN

## (undated) DEVICE — MEDI-VAC YANKAUER SUCTION HANDLE W/STRAIGHT TIP & CONTROL VENT: Brand: CARDINAL HEALTH

## (undated) DEVICE — PACK TUR

## (undated) DEVICE — SUT STRATAFIX SPIRAL MONOCRYL PLUS 3-0 PS-2 45CM SXMP1B107

## (undated) DEVICE — SYRINGE CATH TIP 50ML

## (undated) DEVICE — SUT PDS II 2-0 SH 27 IN Z317H

## (undated) DEVICE — UTILITY MARKER,BLACK WITH LABELS: Brand: DEVON

## (undated) DEVICE — SUCTION FILTER LIPOSUCTION

## (undated) DEVICE — VIAL DECANTER

## (undated) DEVICE — SUT ETHILON 3-0 PS-1 18 IN 1663H

## (undated) DEVICE — ACE WRAP 4 IN STERILE

## (undated) DEVICE — GLOVE INDICATOR PI UNDERGLOVE SZ 7 BLUE

## (undated) DEVICE — INTENDED FOR TISSUE SEPARATION, AND OTHER PROCEDURES THAT REQUIRE A SHARP SURGICAL BLADE TO PUNCTURE OR CUT.: Brand: BARD-PARKER SAFETY BLADES SIZE 10, STERILE

## (undated) DEVICE — CANNULA 4MM DBL MERCEDES SINGLE USE 30CM 10MM PORT

## (undated) DEVICE — HIGH PROFILE XTRA, GEL SIZER FOR USE WITH SHPX-700: Brand: MENTOR MEMORYGEL XTRA RESTERILIZABLE GEL SIZER

## (undated) DEVICE — KNIFE LIGHT 10,PK: Brand: KNIFELIGHT

## (undated) DEVICE — BLADE MINI RND TIP ONE SIDE SHARP

## (undated) DEVICE — PAD GROUNDING ADULT

## (undated) DEVICE — INSULATED BLADE ELECTRODE: Brand: EDGE

## (undated) DEVICE — SUT VICRYL 2-0 SH 27 IN UNDYED J417H

## (undated) DEVICE — GLOVE SRG BIOGEL 7.5

## (undated) DEVICE — TOURNIQUET HEMACLEAR 30-550CM ORANGE STRL

## (undated) DEVICE — EXOFIN PRECISION PEN HIGH VISCOSITY TOPICAL SKIN ADHESIVE: Brand: EXOFIN PRECISION PEN, 1G

## (undated) DEVICE — LIGACLIP MCA MULTIPLE CLIP APPLIERS, 20 MEDIUM CLIPS: Brand: LIGACLIP

## (undated) DEVICE — ANTIBACTERIAL UNDYED BRAIDED (POLYGLACTIN 910), SYNTHETIC ABSORBABLE SUTURE: Brand: COATED VICRYL

## (undated) DEVICE — SUT ETHILON 3-0 PS-1 18 IN 1663G

## (undated) DEVICE — ACE WRAP 6 IN UNSTERILE

## (undated) DEVICE — GLOVE INDICATOR PI UNDERGLOVE SZ 6.5 BLUE

## (undated) DEVICE — 4-PORT MANIFOLD: Brand: NEPTUNE 2

## (undated) DEVICE — FUNNEL E KELLER 2 DELIVERY DEVICE

## (undated) DEVICE — MODERATE PLUS PROFILE XTRA, M+X 755CC GEL SIZER: Brand: MENTOR MEMORYGEL XTRA RESTERILIZABLE GEL SIZER

## (undated) DEVICE — STERILE BETHLEHEM PLASTIC HAND: Brand: CARDINAL HEALTH

## (undated) DEVICE — TRAY FOLEY 16FR URIMETER SURESTEP

## (undated) DEVICE — UNDYED BRAIDED (POLYGLACTIN 910), SYNTHETIC ABSORBABLE SUTURE: Brand: COATED VICRYL

## (undated) DEVICE — SYRINGE 10ML LL

## (undated) DEVICE — SHEATH URETERAL ACCESS 12/14FR 35CM PROXIS

## (undated) DEVICE — SKN PRP WNG SPNGE PVP SCRB STR: Brand: MEDLINE INDUSTRIES, INC.

## (undated) DEVICE — SUT MONOCRYL 3-0 SH 27 IN Y416H

## (undated) DEVICE — SUT PDS II 1 CTX 36 IN Z371T

## (undated) DEVICE — BLANKET HYPOTHERMIA ADULT GAYMAR

## (undated) DEVICE — DRAPE SURGIKIT SADDLE BAG

## (undated) DEVICE — SUT MONOCRYL 3-0 PS-2 18 IN Y497G

## (undated) DEVICE — SCD SEQUENTIAL COMPRESSION COMFORT SLEEVE MEDIUM KNEE LENGTH: Brand: KENDALL SCD

## (undated) DEVICE — CORNEAL PROTECTOR ADULT

## (undated) DEVICE — GLOVE INDICATOR PI UNDERGLOVE SZ 8 BLUE

## (undated) DEVICE — INTENDED FOR TISSUE SEPARATION, AND OTHER PROCEDURES THAT REQUIRE A SHARP SURGICAL BLADE TO PUNCTURE OR CUT.: Brand: BARD-PARKER SAFETY BLADES SIZE 11, STERILE

## (undated) DEVICE — CUFF TOURNIQUET 18 X 4 IN QUICK CONNECT DISP 1 BLADDER

## (undated) DEVICE — VIOLET BRAIDED (POLYGLACTIN 910), SYNTHETIC ABSORBABLE SUTURE: Brand: COATED VICRYL

## (undated) DEVICE — TOWEL SURG XR DETECT GREEN STRL RFD

## (undated) DEVICE — PREVENA PLUS INCISION MANAGEMENT SYSTEM: Brand: PREVENA PLUS™

## (undated) DEVICE — SUT PDS II 1 CT-1 27 IN Z347H

## (undated) DEVICE — ADHESIVE SKIN CLOSR DERMABOND PRINEO

## (undated) DEVICE — Device: Brand: REVOLVE ADVANCED ADIPOSE SYSTEM

## (undated) DEVICE — STOCKINETTE REGULAR

## (undated) DEVICE — PACK PBDS PLASTIC HAND RF